# Patient Record
Sex: FEMALE | Race: WHITE | Employment: PART TIME | URBAN - METROPOLITAN AREA
[De-identification: names, ages, dates, MRNs, and addresses within clinical notes are randomized per-mention and may not be internally consistent; named-entity substitution may affect disease eponyms.]

---

## 2017-01-19 ENCOUNTER — OFFICE VISIT (OUTPATIENT)
Dept: PEDIATRICS | Facility: CLINIC | Age: 42
End: 2017-01-19
Payer: COMMERCIAL

## 2017-01-19 VITALS
WEIGHT: 146.1 LBS | SYSTOLIC BLOOD PRESSURE: 105 MMHG | HEART RATE: 63 BPM | BODY MASS INDEX: 23.48 KG/M2 | HEIGHT: 66 IN | TEMPERATURE: 97.4 F | OXYGEN SATURATION: 99 % | DIASTOLIC BLOOD PRESSURE: 69 MMHG

## 2017-01-19 DIAGNOSIS — N63.20 LEFT BREAST LUMP: ICD-10-CM

## 2017-01-19 DIAGNOSIS — F43.23 SITUATIONAL MIXED ANXIETY AND DEPRESSIVE DISORDER: ICD-10-CM

## 2017-01-19 DIAGNOSIS — Z01.818 PREOP GENERAL PHYSICAL EXAM: Primary | ICD-10-CM

## 2017-01-19 DIAGNOSIS — R09.89 THROAT CLEARING: ICD-10-CM

## 2017-01-19 LAB
ANION GAP SERPL CALCULATED.3IONS-SCNC: 6 MMOL/L (ref 3–14)
BUN SERPL-MCNC: 18 MG/DL (ref 7–30)
CALCIUM SERPL-MCNC: 8.4 MG/DL (ref 8.5–10.1)
CHLORIDE SERPL-SCNC: 106 MMOL/L (ref 94–109)
CO2 SERPL-SCNC: 25 MMOL/L (ref 20–32)
CREAT SERPL-MCNC: 0.7 MG/DL (ref 0.52–1.04)
ERYTHROCYTE [DISTWIDTH] IN BLOOD BY AUTOMATED COUNT: 11.8 % (ref 10–15)
GFR SERPL CREATININE-BSD FRML MDRD: ABNORMAL ML/MIN/1.7M2
GLUCOSE SERPL-MCNC: 84 MG/DL (ref 70–99)
HCT VFR BLD AUTO: 37.5 % (ref 35–47)
HGB BLD-MCNC: 12.9 G/DL (ref 11.7–15.7)
MCH RBC QN AUTO: 31.8 PG (ref 26.5–33)
MCHC RBC AUTO-ENTMCNC: 34.4 G/DL (ref 31.5–36.5)
MCV RBC AUTO: 92 FL (ref 78–100)
PLATELET # BLD AUTO: 201 10E9/L (ref 150–450)
POTASSIUM SERPL-SCNC: 4.2 MMOL/L (ref 3.4–5.3)
RBC # BLD AUTO: 4.06 10E12/L (ref 3.8–5.2)
SODIUM SERPL-SCNC: 137 MMOL/L (ref 133–144)
WBC # BLD AUTO: 3.6 10E9/L (ref 4–11)

## 2017-01-19 PROCEDURE — 99214 OFFICE O/P EST MOD 30 MIN: CPT | Performed by: NURSE PRACTITIONER

## 2017-01-19 PROCEDURE — 36415 COLL VENOUS BLD VENIPUNCTURE: CPT | Performed by: NURSE PRACTITIONER

## 2017-01-19 PROCEDURE — 80048 BASIC METABOLIC PNL TOTAL CA: CPT | Performed by: NURSE PRACTITIONER

## 2017-01-19 PROCEDURE — 85027 COMPLETE CBC AUTOMATED: CPT | Performed by: NURSE PRACTITIONER

## 2017-01-19 RX ORDER — ESCITALOPRAM OXALATE 10 MG/1
TABLET ORAL
Qty: 180 TABLET | Refills: 3 | Status: SHIPPED | OUTPATIENT
Start: 2017-01-19 | End: 2017-04-27

## 2017-01-19 RX ORDER — NICOTINE POLACRILEX 4 MG/1
20 GUM, CHEWING ORAL DAILY
Qty: 30 TABLET | Refills: 1 | Status: SHIPPED | OUTPATIENT
Start: 2017-01-19 | End: 2017-04-04

## 2017-01-19 ASSESSMENT — ANXIETY QUESTIONNAIRES
2. NOT BEING ABLE TO STOP OR CONTROL WORRYING: NOT AT ALL
5. BEING SO RESTLESS THAT IT IS HARD TO SIT STILL: NOT AT ALL
6. BECOMING EASILY ANNOYED OR IRRITABLE: NOT AT ALL
7. FEELING AFRAID AS IF SOMETHING AWFUL MIGHT HAPPEN: NOT AT ALL
3. WORRYING TOO MUCH ABOUT DIFFERENT THINGS: NOT AT ALL
GAD7 TOTAL SCORE: 0
1. FEELING NERVOUS, ANXIOUS, OR ON EDGE: NOT AT ALL
IF YOU CHECKED OFF ANY PROBLEMS ON THIS QUESTIONNAIRE, HOW DIFFICULT HAVE THESE PROBLEMS MADE IT FOR YOU TO DO YOUR WORK, TAKE CARE OF THINGS AT HOME, OR GET ALONG WITH OTHER PEOPLE: NOT DIFFICULT AT ALL

## 2017-01-19 ASSESSMENT — PATIENT HEALTH QUESTIONNAIRE - PHQ9: 5. POOR APPETITE OR OVEREATING: NOT AT ALL

## 2017-01-19 NOTE — PROGRESS NOTES
[address] [address]  [phone]  Dept: [phone]    PRE-OP EVALUATION:  Today's date: 2017    Kaitlyn Garcia (: 1975) presents for pre-operative evaluation assessment as requested by Dr. Montenegro.  She requires evaluation and anesthesia risk assessment prior to undergoing surgery/procedure for treatment of lump of the breast .  Proposed procedure: LUMPECTOMY BREAST    Date of Surgery/ Procedure: 17  Time of Surgery/ Procedure: 11:30AM  Hospital/Surgical Facility: [facility]  Fax number for surgical facility:   Primary Physician: Ani Slater  Type of Anesthesia Anticipated: Combined General with Block    Patient has a Health Care Directive or Living Will:  NO    Preop Questions 2017   1.  Do you have a history of heart attack, stroke, stent, bypass or surgery on an artery in the head, neck, heart or legs? No   2.  Do you ever have any pain or discomfort in your chest? No   3.  Do you have a history of  Heart Failure? No   4.   Are you troubled by shortness of breath when:  walking on a level surface, or up a slight hill, or at night? No   5.  Do you currently have a cold, bronchitis or other respiratory infection? No   6.  Do you have a cough, shortness of breath, or wheezing? No   7.  Do you sometimes get pains in the calves of your legs when you walk? No   8. Do you or anyone in your family have previous history of blood clots? No   9.  Do you or does anyone in your family have a serious bleeding problem such as prolonged bleeding following surgeries or cuts? No   10. Have you ever had problems with anemia or been told to take iron pills? YES - ablation 3 years ago, heavy menstrual cyles    11. Have you had any abnormal blood loss such as black, tarry or bloody stools, or abnormal vaginal bleeding? No   12. Have you ever had a blood transfusion? No   13. Have you or any of your relatives ever had problems with anesthesia? YES  - she has had a lot issues with severe nausea with anesthesia several years ago    14. Do you have sleep apnea, excessive snoring or daytime drowsiness? No   15. Do you have any prosthetic heart valves? No   16. Do you have prosthetic joints? No   17. Is there any chance that you may be pregnant? No           HPI:                                                      Brief HPI related to upcoming procedure: lump of the breast .  Proposed procedure: LUMPECTOMY BREAST      See problem list for active medical problems.  Problems all longstanding and stable, except as noted/documented.  See ROS for pertinent symptoms related to these conditions.                                                                                                  .    MEDICAL HISTORY:                                                      Patient Active Problem List    Diagnosis Date Noted     Allergic rhinitis 04/25/2016     Priority: Medium     Post-concussion syndrome 11/06/2015     Priority: Medium     Muscle spasms of head or neck 11/06/2015     Priority: Medium     Glaucoma suspect, bilateral 10/14/2015     Priority: Medium     Situational mixed anxiety and depressive disorder 04/30/2015     Priority: Medium     Choroidal nevus of right eye 02/20/2015     Priority: Medium     Family history of breast cancer 02/03/2015     Priority: Medium     WBC decreased 02/03/2015     Priority: Medium     Abnormal biliary HIDA scan 05/21/2014     Priority: Medium     Bloating symptom 05/12/2014     Priority: Medium     Abdominal pain 05/12/2014     Priority: Medium     Acne 06/06/2013     Priority: Medium     See dermatology Dr. Vallejo       Atypical hyperplasia of breast 08/14/2012     Priority: Medium     Mammographic microcalcification found on diagnostic imaging of breast 05/29/2012     Priority: Medium     Intraductal papilloma of breast 05/29/2012     Priority: Medium     Iron deficiency anemia 05/21/2012     Priority: Medium     CARDIOVASCULAR  SCREENING; LDL GOAL LESS THAN 160 03/08/2012     Priority: Medium      Past Medical History   Diagnosis Date     Anemia      Shingles      x2 in the past     Papilloma of breast 5/23/12     Left, See Dr. Lynch at      PONV (postoperative nausea and vomiting)      S/P endometrial ablation 10/10/13     Menorrhagia     Depressive disorder 2006     Treated with celexa for two years     Family history of breast cancer 2/3/2015     Family history of breast cancer 2/3/2015     WBC decreased 2/3/2015     Choroidal nevus of right eye      Family history of breast cancer 2/3/2015     WBC decreased 2/3/2015     Past Surgical History   Procedure Laterality Date     Soft tissue surgery       lumpectomy x 2 right breast     Soft tissue surgery       episiotomy fixed     Abdomen surgery       rectoplasty     Biopsy breast needle localization  7/30/2012     Procedure: BIOPSY BREAST NEEDLE LOCALIZATION;  left breast excisional Biopsy with wire localization @0830;  Surgeon: Robert Lynch MD;  Location: [facility] OR      tooth extraction w/forcep       Dilation and curettage, hysteroscopy, ablate endometrium novasure, combined  10/10/2013     Procedure: COMBINED DILATION AND CURETTAGE, HYSTEROSCOPY, ABLATE ENDOMETRIUM NOVASURE;  Endometrial ablation with Novasure;  Surgeon: Indu Birmingham DO;  Location:  OR     Breast surgery       Cosmetic surgery       Rectalplasty for episiotomy repair     Cosmetic surgery       Rectalplasty for episiotomy repair     Current Outpatient Prescriptions   Medication Sig Dispense Refill     fluticasone (FLONASE) 50 MCG/ACT nasal spray USE 2 SPRAYS IN EACH NOSTRIL ONCE DAILY 48 g 11     escitalopram (LEXAPRO) 10 MG tablet TAKE TWO TABLETS BY MOUTH EVERY  tablet 3     naproxen (NAPROSYN) 500 MG tablet TAKE 1 TABLET (500MG) BY MOUTH TWO TIMES A DAY AS NEEDED FOR MODERATE PAIN 30 tablet 1     ibuprofen (ADVIL,MOTRIN) 600 MG tablet Take 1 tablet (600 mg) by mouth every 6 hours as  "needed for pain 30 tablet 0     OTC products: no recent use of OTC ASA, NSAIDS or Steroids and no use of herbal medications or other supplements    Allergies   Allergen Reactions     No Known Drug Allergies       Latex Allergy: NO    Social History   Substance Use Topics     Smoking status: Never Smoker      Smokeless tobacco: Never Used     Alcohol Use: Yes      Comment: Less than once a month     History   Drug Use No       REVIEW OF SYSTEMS:                                                    C: NEGATIVE for fever, chills, change in weight  INTEGUMENTARY/SKIN: NEGATIVE for rash   E/M: NEGATIVE for ear, mouth and throat problems. Postnasal drainage after eating   R: NEGATIVE for significant cough or SOB  CV: NEGATIVE for chest pain, palpitations or peripheral edema  GI: NEGATIVE for nausea, abdominal pain, heartburn, or change in bowel habits  : NEGATIVE for frequency, dysuria, or hematuria  M: NEGATIVE for significant arthralgias or myalgia  N: NEGATIVE for weakness, dizziness or paresthesias. History of concussion in 2015 and has some word finding still present slightly   E: NEGATIVE for temperature intolerance, skin/hair changes  HEME/ALLERGY/IMMUNE: POSITIVE  for anemia and NEGATIVE for bleeding disorder  P: NEGATIVE for changes in mood or affect    EXAM:                                                    /69 mmHg  Pulse 63  Temp(Src) 97.4  F (36.3  C) (Temporal)  Ht 5' 6\" (1.676 m)  Wt 146 lb 1.6 oz (66.271 kg)  BMI 23.59 kg/m2  SpO2 99%  Breastfeeding? No    GENERAL APPEARANCE: healthy, alert and no distress     EYES: Eyes grossly normal to inspection and conjunctivae and sclerae normal     HENT: ear canals and TM's normal and nose and mouth without ulcers or lesions     NECK: no adenopathy     RESP: lungs clear to auscultation - no rales, rhonchi or wheezes     CV: regular rates and rhythm and no murmur, click or rub     ABDOMEN: soft, nontender     MS: extremities normal- no gross deformities " noted, no evidence of inflammation in joints, FROM in all extremities.     SKIN: no suspicious lesions or rashes     NEURO: Normal strength and tone, sensory exam grossly normal, mentation intact and speech normal     PSYCH: mentation appears normal. and affect normal/bright    DIAGNOSTICS:                                                    EKG: Not indicated due to non-vascular surgery and low risk of event (age <65 and without cardiac risk factors)  Results for orders placed or performed in visit on 01/19/17   Basic metabolic panel   Result Value Ref Range    Sodium 137 133 - 144 mmol/L    Potassium 4.2 3.4 - 5.3 mmol/L    Chloride 106 94 - 109 mmol/L    Carbon Dioxide 25 20 - 32 mmol/L    Anion Gap 6 3 - 14 mmol/L    Glucose 84 70 - 99 mg/dL    Urea Nitrogen 18 7 - 30 mg/dL    Creatinine 0.70 0.52 - 1.04 mg/dL    GFR Estimate >90  Non  GFR Calc   >60 mL/min/1.7m2    GFR Estimate If Black >90   GFR Calc   >60 mL/min/1.7m2    Calcium 8.4 (L) 8.5 - 10.1 mg/dL   CBC with platelets   Result Value Ref Range    WBC 3.6 (L) 4.0 - 11.0 10e9/L    RBC Count 4.06 3.8 - 5.2 10e12/L    Hemoglobin 12.9 11.7 - 15.7 g/dL    Hematocrit 37.5 35.0 - 47.0 %    MCV 92 78 - 100 fl    MCH 31.8 26.5 - 33.0 pg    MCHC 34.4 31.5 - 36.5 g/dL    RDW 11.8 10.0 - 15.0 %    Platelet Count 201 150 - 450 10e9/L       IMPRESSION:                                                    Reason for surgery/procedure: lump of the breast .  Proposed procedure: LUMPECTOMY BREAST  Diagnosis/reason for consult: preop evaluation     The proposed surgical procedure is considered INTERMEDIATE risk.    REVISED CARDIAC RISK INDEX  The patient has the following serious cardiovascular risks for perioperative complications such as (MI, PE, VFib and 3  AV Block):  No serious cardiac risks  INTERPRETATION: 1 risks: Class II (low risk - 0.9% complication rate)    The patient has the following additional risks for perioperative  complications:  No identified additional risks    Kaitlyn was seen today for pre-op exam.    Diagnoses and all orders for this visit:    Preop general physical exam  -     Basic metabolic panel  -     CBC with platelets    Left breast lump  -     Basic metabolic panel  -     CBC with platelets    Throat clearing  -     omeprazole 20 MG tablet; Take 1 tablet (20 mg) by mouth daily Take 30-60 minutes before a meal.  Symptomatic therapy suggested:   Will try the prilosec for the throat clearing.      Situational mixed anxiety and depressive disorder  Continue current medications.  -     escitalopram (LEXAPRO) 10 MG tablet; TAKE TWO TABLETS BY MOUTH EVERY DAY      RECOMMENDATIONS:                                                      APPROVAL GIVEN to proceed with proposed procedure, without further diagnostic evaluation     Signed Electronically by: SINDHU Antonio CNP    Copy of this evaluation report is provided to requesting physician.    [facility] Preop Guidelines

## 2017-01-19 NOTE — NURSING NOTE
"Chief Complaint   Patient presents with     Pre-Op Exam     DOS: 2/1/17       Initial /69 mmHg  Pulse 63  Temp(Src) 97.4  F (36.3  C) (Temporal)  Ht 5' 6\" (1.676 m)  Wt 146 lb 1.6 oz (66.271 kg)  BMI 23.59 kg/m2  SpO2 99%  Breastfeeding? No Estimated body mass index is 23.59 kg/(m^2) as calculated from the following:    Height as of this encounter: 5' 6\" (1.676 m).    Weight as of this encounter: 146 lb 1.6 oz (66.271 kg)..  BP completed using cuff size: ALEJANDRO Bhatti    "

## 2017-01-19 NOTE — MR AVS SNAPSHOT
After Visit Summary   1/19/2017    Kaitlyn Garcia    MRN: [id number]           Patient Information     Date Of Birth          1975        Visit Information        Provider Department      1/19/2017 9:20 AM Ani Slater APRN [facility]        Today's Diagnoses     Preop general physical exam    -  1     Left breast lump         Throat clearing         Situational mixed anxiety and depressive disorder           Care Instructions      Before Your Surgery      Call your surgeon if there is any change in your health. This includes signs of a cold or flu (such as a sore throat, runny nose, cough, rash or fever).    Do not smoke, drink alcohol or take over the counter medicine (unless your surgeon or primary care doctor tells you to) for the 24 hours before and after surgery.    If you take prescribed drugs: Follow your doctor s orders about which medicines to take and which to stop until after surgery.    Eating and drinking prior to surgery: follow the instructions from your surgeon    Take a shower or bath the night before surgery. Use the soap your surgeon gave you to gently clean your skin. If you do not have soap from your surgeon, use your regular soap. Do not shave or scrub the surgery site.  Wear clean pajamas and have clean sheets on your bed.     It was a pleasure seeing you today at the [facility]. Thank you for allowing us to care for you today. We truly hope we provided you with the excellent service you deserve. Please let us know if there is anything else we can do for you so we can be sure you are leaving completley satisfied with your care experience.       General information about your clinic   Clinic Hours Lab Hours (Appointments are required)   Mon-Thurs: 7:30 AM - 7 PM Mon-Thurs: 7:30 AM - 7 PM   Fri: 7:30 AM - 5 PM Fri: 7:30 AM - 5 PM        After Hours Nurse Advise & Appts:  [facility] Advisors:  [phone]  [facility] On Call: to make appointments anytime: [phone] On Call Physician: call [phone] and answering service will page the on call physician.        For urgent appointments, please call [phone] and ask for the triage nurse or your care team clinic nurse.  How to contact my care team:  [place]: [email or web address]   Phone: [phone]   Fax: [phone]       [facility]:   Phone: [phone]  Hours: 8:00 AM - 6:00 PM  Medication Refills:  Call your pharmacy and they will forward the refill to us. Please allow 3 business days for your refills to be completed.       Normal or non-critical lab and imaging results will be communicated to you by MyChart, letter or phone within 7 days.  If you do not hear from us within 10 days, please call the clinic. If you have a critical or abnormal lab result, we will notify you by phone as soon as possible.       We now have PWIC (Pediatric Walk in Care)  Monday-Friday from 7:30-4. Simply walk in and be seen for your urgent needs like cough, fever, rash, diarrhea or vomiting, pink eye, UTI. No appointments needed. Ask one of the team for more information      -Your Care Team:    Dr. Ashli Phillips - Internal Medicine/Pediatrics   Dr. Lesvia Oneal - Family Medicine  Dr. Juanita Watson - Pediatrics  Ani Slater CNP - Family Practice Nurse Practitioner  Dr. Aislinn Tay - Pediatrics  Dr. Steve Palafox - Internal Medicine        PLAN:   1.   Symptomatic therapy suggested:   Will try the prilosec for the throat clearing.    2.  Orders Placed This Encounter   Medications     omeprazole 20 MG tablet     Sig: Take 1 tablet (20 mg) by mouth daily Take 30-60 minutes before a meal.     Dispense:  30 tablet     Refill:  1     escitalopram (LEXAPRO) 10 MG tablet     Sig: TAKE TWO TABLETS BY MOUTH EVERY DAY     Dispense:  180 tablet     Refill:  3     Orders Placed This Encounter   Procedures     Basic metabolic panel     CBC with platelets       3.  Patient needs to follow up in if no improvement,or sooner if worsening of symptoms or other symptoms develop.  Will follow up and/or notify patient of  results via My Chart to determine further need for followup                    Follow-ups after your visit        Your next 10 appointments already scheduled     Feb 01, 2017  9:30 AM   (Arrive by 9:15 AM)   MA BREAST WIRE LOCALIZATION LEFT with Jae Breast Rad, UCBCMA3,  BREAST NURSE   [facility] ([facility])    [address] [place] [id number] [phone]           Do not use any powder, lotion or deodorant under your arms or on your breast. If you do, we will ask you to remove it before your exam.  Wear comfortable clothing and a well-fitted bra to the clinic. (A sports bra is best.) Bring a list of your medicines, including vitamins, minerals and over-the-counter drugs. It is best to leave valuables at home.  Tell your care team if:   You have any allergies.   You are taking aspirin, ibuprofen, naproxen or any drug that could increase bleeding.  Bring any previous mammograms from other facilities or have them mailed to the breast center.    Stop taking Coumadin (warfarin) 5 days before treatment. Restart the day after treatment.   If you take Plavix, Ticlid, Pletal or Persantine, ask your doctor if you should stop these medicines. You may need extra tests on the morning of your scan.            Feb 01, 2017 10:20 AM   MA POST PROCEDURE LEFT with UCBCMA3   [facility] ([facility])    [address] [place] [id number] [phone]           Do not use any powder, lotion or deodorant under your arms or on your breast. If you do, we will ask you to remove it before your exam.  Wear comfortable, two-piece clothing.  If you have any allergies, tell your care team.  Bring any previous mammograms from other facilities or have  them mailed to the breast center.            Feb 01, 2017   Procedure with Lori Montenegro MD   [facility] ([facility])    [address] [place] [address]  5th Floor  [facility] [address] [phone]           Located in the Clinics and Surgery Center at [address] [place] [address].   parking is very convenient and highly recommended.  is a $6 flat rate fee; no tips accepted.  Both  and self parkers should enter the main arrival plaza from [facility]; parking attendants will direct you based on your parking preference.            Feb 01, 2017  1:05 PM   MA BREAST SPECIMEN LEFT with UCBCMA3   [facility] ([facility])    [address] [place] [address]  2nd Floor  [facility] [address] [phone]           Do not use any powder, lotion or deodorant under your arms or on your breast. If you do, we will ask you to remove it before your exam.  Wear comfortable, two-piece clothing.  If you have any allergies, tell your care team.  Bring any previous mammograms from other facilities or have them mailed to the breast center.              Who to contact     If you have questions or need follow up information about today's clinic visit or your schedule please contact [facility] directly at [phone].  Normal or non-critical lab and imaging results will be communicated to you by MyChart, letter or phone within 4 business days after the clinic has received the results. If you do not hear from us within 7 days, please contact the clinic through MyChart or phone. If you have a critical or abnormal lab result, we will notify you by phone as soon as possible.  Submit refill requests through [facility] or call your pharmacy and they will forward the refill request to us. Please allow 3 business days for your refill to be completed.          Additional Information About Your Visit       "  CORD:USE Cord Blood [facility] Information     [facility] gives you secure access to your electronic health record. If you see a primary care provider, you can also send messages to your care team and make appointments. If you have questions, please call your primary care clinic.  If you do not have a primary care provider, please call [phone] and they will assist you.      [facility] is an electronic gateway that provides easy, online access to your medical records. With [facility], you can request a clinic appointment, read your test results, renew a prescription or communicate with your care team.     To access your existing account, please contact your [facility] or call [phone] for assistance.        Care EveryWhere ID     This is your Care EveryWhere ID. This could be used by other organizations to access your [facility] medical records  [id number]        Your Vitals Were     Pulse Temperature Height BMI (Body Mass Index) Pulse Oximetry Breastfeeding?    63 97.4  F (36.3  C) (Temporal) 5' 6\" (1.676 m) 23.59 kg/m2 99% No       Blood Pressure from Last 3 Encounters:   01/19/17 105/69   12/27/16 111/80   12/07/16 100/64    Weight from Last 3 Encounters:   01/19/17 146 lb 1.6 oz (66.271 kg)   12/27/16 146 lb (66.225 kg)   12/07/16 144 lb 14.4 oz (65.726 kg)              We Performed the Following     Basic metabolic panel     CBC with platelets          Today's Medication Changes          These changes are accurate as of: 1/19/17 10:32 AM.  If you have any questions, ask your nurse or doctor.               Start taking these medicines.        Dose/Directions    omeprazole 20 MG tablet   Used for:  Throat clearing   Started by:  Ani Slater APRN CNP        Dose:  20 mg   Take 1 tablet (20 mg) by mouth daily Take 30-60 minutes before a meal.   Quantity:  30 tablet   Refills:  1            Where to get your medicines      These medications were sent to [facility]  [place] - [address]  [address], [facility] [address]     Phone:  [phone]    - escitalopram 10 MG tablet  - omeprazole 20 MG tablet             Primary Care Provider Office Phone # Fax #    Ani Slater, SINDHU CRAFT [phone]       [facility] [address]  [place] [address]        Thank you!     Thank you for choosing [facility]  for your care. Our goal is always to provide you with excellent care. Hearing back from our patients is one way we can continue to improve our services. Please take a few minutes to complete the written survey that you may receive in the mail after your visit with us. Thank you!             Your Updated Medication List - Protect others around you: Learn how to safely use, store and throw away your medicines at www.disposemymeds.org.          This list is accurate as of: 1/19/17 10:32 AM.  Always use your most recent med list.                   Brand Name Dispense Instructions for use    escitalopram 10 MG tablet    LEXAPRO    180 tablet    TAKE TWO TABLETS BY MOUTH EVERY DAY       fluticasone 50 MCG/ACT spray    FLONASE    48 g    USE 2 SPRAYS IN EACH NOSTRIL ONCE DAILY       ibuprofen 600 MG tablet    ADVIL/MOTRIN    30 tablet    Take 1 tablet (600 mg) by mouth every 6 hours as needed for pain       naproxen 500 MG tablet    NAPROSYN    30 tablet    TAKE 1 TABLET (500MG) BY MOUTH TWO TIMES A DAY AS NEEDED FOR MODERATE PAIN       omeprazole 20 MG tablet     30 tablet    Take 1 tablet (20 mg) by mouth daily Take 30-60 minutes before a meal.

## 2017-01-19 NOTE — PROGRESS NOTES
Quick Note:    David Garcia,    Attached are your test results.  -Kidney function is normal (Cr, GFR), Sodium is normal, Potassium is normal, Calcium is normal, Glucose is normal (diabetes screening test).   -Normal red blood cell (hgb) levels, very slight decrease in white blood cell count and normal platelet levels. ADVISE: would recheck after surgery    Please contact us if you have any questions.    Ani Slater, CNP    ______

## 2017-01-19 NOTE — PATIENT INSTRUCTIONS
Before Your Surgery      Call your surgeon if there is any change in your health. This includes signs of a cold or flu (such as a sore throat, runny nose, cough, rash or fever).    Do not smoke, drink alcohol or take over the counter medicine (unless your surgeon or primary care doctor tells you to) for the 24 hours before and after surgery.    If you take prescribed drugs: Follow your doctor s orders about which medicines to take and which to stop until after surgery.    Eating and drinking prior to surgery: follow the instructions from your surgeon    Take a shower or bath the night before surgery. Use the soap your surgeon gave you to gently clean your skin. If you do not have soap from your surgeon, use your regular soap. Do not shave or scrub the surgery site.  Wear clean pajamas and have clean sheets on your bed.     It was a pleasure seeing you today at the [facility]. Thank you for allowing us to care for you today. We truly hope we provided you with the excellent service you deserve. Please let us know if there is anything else we can do for you so we can be sure you are leaving completley satisfied with your care experience.       General information about your clinic   Clinic Hours Lab Hours (Appointments are required)   Mon-Thurs: 7:30 AM - 7 PM Mon-Thurs: 7:30 AM - 7 PM   Fri: 7:30 AM - 5 PM Fri: 7:30 AM - 5 PM        After Hours Nurse Advise & Appts:  [facility]: [phone]  [facility] On Call: to make appointments anytime: [phone] On Call Physician: call [phone] and answering service will page the on call physician.        For urgent appointments, please call [phone] and ask for the triage nurse or your care team clinic nurse.  How to contact my care team:  MyChart: [email or web address]   Phone: [phone]   Fax: [phone]       [facility]:   Phone: [phone]  Hours: 8:00 AM - 6:00 PM  Medication Refills:  Call your  pharmacy and they will forward the refill to us. Please allow 3 business days for your refills to be completed.       Normal or non-critical lab and imaging results will be communicated to you by MyChart, letter or phone within 7 days.  If you do not hear from us within 10 days, please call the clinic. If you have a critical or abnormal lab result, we will notify you by phone as soon as possible.       We now have PWIC (Pediatric Walk in Care)  Monday-Friday from 7:30-4. Simply walk in and be seen for your urgent needs like cough, fever, rash, diarrhea or vomiting, pink eye, UTI. No appointments needed. Ask one of the team for more information      -Your Care Team:    Dr. Ashli Phillips - Internal Medicine/Pediatrics   Dr. Lesvia Oneal - Family Medicine  Dr. Juanita Watson - Pediatrics  Ani Slater CNP - Family Practice Nurse Practitioner  Dr. Aislinn Tay - Pediatrics  Dr. Steve Palafox - Internal Medicine        PLAN:   1.   Symptomatic therapy suggested:   Will try the prilosec for the throat clearing.    2.  Orders Placed This Encounter   Medications     omeprazole 20 MG tablet     Sig: Take 1 tablet (20 mg) by mouth daily Take 30-60 minutes before a meal.     Dispense:  30 tablet     Refill:  1     escitalopram (LEXAPRO) 10 MG tablet     Sig: TAKE TWO TABLETS BY MOUTH EVERY DAY     Dispense:  180 tablet     Refill:  3     Orders Placed This Encounter   Procedures     Basic metabolic panel     CBC with platelets       3. Patient needs to follow up in if no improvement,or sooner if worsening of symptoms or other symptoms develop.  Will follow up and/or notify patient of  results via My Chart to determine further need for followup

## 2017-01-20 ASSESSMENT — ANXIETY QUESTIONNAIRES: GAD7 TOTAL SCORE: 0

## 2017-01-20 ASSESSMENT — PATIENT HEALTH QUESTIONNAIRE - PHQ9: SUM OF ALL RESPONSES TO PHQ QUESTIONS 1-9: 0

## 2017-02-01 ENCOUNTER — HOSPITAL ENCOUNTER (OUTPATIENT)
Facility: AMBULATORY SURGERY CENTER | Age: 42
End: 2017-02-01
Attending: SURGERY

## 2017-02-01 ENCOUNTER — ANESTHESIA (OUTPATIENT)
Dept: SURGERY | Facility: AMBULATORY SURGERY CENTER | Age: 42
End: 2017-02-01

## 2017-02-01 ENCOUNTER — ANESTHESIA EVENT (OUTPATIENT)
Dept: SURGERY | Facility: AMBULATORY SURGERY CENTER | Age: 42
End: 2017-02-01

## 2017-02-01 VITALS
HEIGHT: 66 IN | BODY MASS INDEX: 22.5 KG/M2 | DIASTOLIC BLOOD PRESSURE: 64 MMHG | WEIGHT: 140 LBS | SYSTOLIC BLOOD PRESSURE: 104 MMHG | TEMPERATURE: 97.7 F | RESPIRATION RATE: 18 BRPM | OXYGEN SATURATION: 99 %

## 2017-02-01 DIAGNOSIS — Z98.890 S/P LUMPECTOMY, LEFT BREAST: Primary | ICD-10-CM

## 2017-02-01 LAB
HCG UR QL: NORMAL
INTERNAL QC OK POCT: YES

## 2017-02-01 RX ORDER — ONDANSETRON 2 MG/ML
INJECTION INTRAMUSCULAR; INTRAVENOUS PRN
Status: DISCONTINUED | OUTPATIENT
Start: 2017-02-01 | End: 2017-02-01

## 2017-02-01 RX ORDER — HYDROCODONE BITARTRATE AND ACETAMINOPHEN 5; 325 MG/1; MG/1
1-2 TABLET ORAL EVERY 4 HOURS PRN
Qty: 30 TABLET | Refills: 0 | Status: SHIPPED | OUTPATIENT
Start: 2017-02-01 | End: 2017-02-10

## 2017-02-01 RX ORDER — ONDANSETRON 2 MG/ML
4 INJECTION INTRAMUSCULAR; INTRAVENOUS EVERY 30 MIN PRN
Status: CANCELLED | OUTPATIENT
Start: 2017-02-01

## 2017-02-01 RX ORDER — FENTANYL CITRATE 50 UG/ML
25-50 INJECTION, SOLUTION INTRAMUSCULAR; INTRAVENOUS
Status: CANCELLED | OUTPATIENT
Start: 2017-02-01

## 2017-02-01 RX ORDER — ACETAMINOPHEN 325 MG/1
975 TABLET ORAL ONCE
Status: DISCONTINUED | OUTPATIENT
Start: 2017-02-01 | End: 2017-02-01 | Stop reason: HOSPADM

## 2017-02-01 RX ORDER — PROPOFOL 10 MG/ML
INJECTION, EMULSION INTRAVENOUS PRN
Status: DISCONTINUED | OUTPATIENT
Start: 2017-02-01 | End: 2017-02-01

## 2017-02-01 RX ORDER — DEXAMETHASONE SODIUM PHOSPHATE 4 MG/ML
INJECTION, SOLUTION INTRA-ARTICULAR; INTRALESIONAL; INTRAMUSCULAR; INTRAVENOUS; SOFT TISSUE PRN
Status: DISCONTINUED | OUTPATIENT
Start: 2017-02-01 | End: 2017-02-01

## 2017-02-01 RX ORDER — ONDANSETRON 4 MG/1
4 TABLET, ORALLY DISINTEGRATING ORAL EVERY 30 MIN PRN
Status: CANCELLED | OUTPATIENT
Start: 2017-02-01

## 2017-02-01 RX ORDER — GABAPENTIN 300 MG/1
300 CAPSULE ORAL ONCE
Status: COMPLETED | OUTPATIENT
Start: 2017-02-01 | End: 2017-02-01

## 2017-02-01 RX ORDER — SODIUM CHLORIDE, SODIUM LACTATE, POTASSIUM CHLORIDE, CALCIUM CHLORIDE 600; 310; 30; 20 MG/100ML; MG/100ML; MG/100ML; MG/100ML
INJECTION, SOLUTION INTRAVENOUS CONTINUOUS
Status: CANCELLED | OUTPATIENT
Start: 2017-02-01

## 2017-02-01 RX ORDER — SODIUM CHLORIDE, SODIUM LACTATE, POTASSIUM CHLORIDE, CALCIUM CHLORIDE 600; 310; 30; 20 MG/100ML; MG/100ML; MG/100ML; MG/100ML
INJECTION, SOLUTION INTRAVENOUS CONTINUOUS
Status: DISCONTINUED | OUTPATIENT
Start: 2017-02-01 | End: 2017-02-01 | Stop reason: HOSPADM

## 2017-02-01 RX ORDER — LIDOCAINE HYDROCHLORIDE 20 MG/ML
INJECTION, SOLUTION INFILTRATION; PERINEURAL PRN
Status: DISCONTINUED | OUTPATIENT
Start: 2017-02-01 | End: 2017-02-01

## 2017-02-01 RX ORDER — MEPERIDINE HYDROCHLORIDE 25 MG/ML
12.5 INJECTION INTRAMUSCULAR; INTRAVENOUS; SUBCUTANEOUS
Status: CANCELLED | OUTPATIENT
Start: 2017-02-01

## 2017-02-01 RX ORDER — LIDOCAINE 40 MG/G
CREAM TOPICAL
Status: DISCONTINUED | OUTPATIENT
Start: 2017-02-01 | End: 2017-02-01 | Stop reason: HOSPADM

## 2017-02-01 RX ORDER — FENTANYL CITRATE 50 UG/ML
INJECTION, SOLUTION INTRAMUSCULAR; INTRAVENOUS PRN
Status: DISCONTINUED | OUTPATIENT
Start: 2017-02-01 | End: 2017-02-01

## 2017-02-01 RX ORDER — NALOXONE HYDROCHLORIDE 0.4 MG/ML
.1-.4 INJECTION, SOLUTION INTRAMUSCULAR; INTRAVENOUS; SUBCUTANEOUS
Status: CANCELLED | OUTPATIENT
Start: 2017-02-01 | End: 2017-02-02

## 2017-02-01 RX ORDER — CEFAZOLIN SODIUM 1 G/3ML
1 INJECTION, POWDER, FOR SOLUTION INTRAMUSCULAR; INTRAVENOUS SEE ADMIN INSTRUCTIONS
Status: DISCONTINUED | OUTPATIENT
Start: 2017-02-01 | End: 2017-02-01 | Stop reason: HOSPADM

## 2017-02-01 RX ADMIN — PROPOFOL 20 MG: 10 INJECTION, EMULSION INTRAVENOUS at 11:45

## 2017-02-01 RX ADMIN — LIDOCAINE HYDROCHLORIDE 60 MG: 20 INJECTION, SOLUTION INFILTRATION; PERINEURAL at 11:26

## 2017-02-01 RX ADMIN — PROPOFOL 20 MG: 10 INJECTION, EMULSION INTRAVENOUS at 12:05

## 2017-02-01 RX ADMIN — PROPOFOL 20 MG: 10 INJECTION, EMULSION INTRAVENOUS at 11:52

## 2017-02-01 RX ADMIN — PROPOFOL 20 MG: 10 INJECTION, EMULSION INTRAVENOUS at 11:36

## 2017-02-01 RX ADMIN — SODIUM CHLORIDE, SODIUM LACTATE, POTASSIUM CHLORIDE, CALCIUM CHLORIDE: 600; 310; 30; 20 INJECTION, SOLUTION INTRAVENOUS at 10:42

## 2017-02-01 RX ADMIN — PROPOFOL 20 MG: 10 INJECTION, EMULSION INTRAVENOUS at 11:38

## 2017-02-01 RX ADMIN — DEXAMETHASONE SODIUM PHOSPHATE 4 MG: 4 INJECTION, SOLUTION INTRA-ARTICULAR; INTRALESIONAL; INTRAMUSCULAR; INTRAVENOUS; SOFT TISSUE at 11:26

## 2017-02-01 RX ADMIN — PROPOFOL 20 MG: 10 INJECTION, EMULSION INTRAVENOUS at 11:31

## 2017-02-01 RX ADMIN — FENTANYL CITRATE 25 MCG: 50 INJECTION, SOLUTION INTRAMUSCULAR; INTRAVENOUS at 12:03

## 2017-02-01 RX ADMIN — PROPOFOL 20 MG: 10 INJECTION, EMULSION INTRAVENOUS at 11:29

## 2017-02-01 RX ADMIN — PROPOFOL 20 MG: 10 INJECTION, EMULSION INTRAVENOUS at 11:48

## 2017-02-01 RX ADMIN — PROPOFOL 20 MG: 10 INJECTION, EMULSION INTRAVENOUS at 11:33

## 2017-02-01 RX ADMIN — PROPOFOL 20 MG: 10 INJECTION, EMULSION INTRAVENOUS at 12:14

## 2017-02-01 RX ADMIN — PROPOFOL 20 MG: 10 INJECTION, EMULSION INTRAVENOUS at 12:23

## 2017-02-01 RX ADMIN — PROPOFOL 20 MG: 10 INJECTION, EMULSION INTRAVENOUS at 12:03

## 2017-02-01 RX ADMIN — PROPOFOL 20 MG: 10 INJECTION, EMULSION INTRAVENOUS at 11:27

## 2017-02-01 RX ADMIN — PROPOFOL 20 MG: 10 INJECTION, EMULSION INTRAVENOUS at 12:33

## 2017-02-01 RX ADMIN — PROPOFOL 20 MG: 10 INJECTION, EMULSION INTRAVENOUS at 11:47

## 2017-02-01 RX ADMIN — PROPOFOL 20 MG: 10 INJECTION, EMULSION INTRAVENOUS at 11:50

## 2017-02-01 RX ADMIN — PROPOFOL 20 MG: 10 INJECTION, EMULSION INTRAVENOUS at 11:40

## 2017-02-01 RX ADMIN — PROPOFOL 20 MG: 10 INJECTION, EMULSION INTRAVENOUS at 11:44

## 2017-02-01 RX ADMIN — PROPOFOL 20 MG: 10 INJECTION, EMULSION INTRAVENOUS at 12:01

## 2017-02-01 RX ADMIN — PROPOFOL 20 MG: 10 INJECTION, EMULSION INTRAVENOUS at 11:56

## 2017-02-01 RX ADMIN — PROPOFOL 20 MG: 10 INJECTION, EMULSION INTRAVENOUS at 11:54

## 2017-02-01 RX ADMIN — PROPOFOL 20 MG: 10 INJECTION, EMULSION INTRAVENOUS at 11:26

## 2017-02-01 RX ADMIN — PROPOFOL 20 MG: 10 INJECTION, EMULSION INTRAVENOUS at 11:59

## 2017-02-01 RX ADMIN — PROPOFOL 20 MG: 10 INJECTION, EMULSION INTRAVENOUS at 12:17

## 2017-02-01 RX ADMIN — PROPOFOL 20 MG: 10 INJECTION, EMULSION INTRAVENOUS at 11:58

## 2017-02-01 RX ADMIN — FENTANYL CITRATE 50 MCG: 50 INJECTION, SOLUTION INTRAMUSCULAR; INTRAVENOUS at 11:23

## 2017-02-01 RX ADMIN — PROPOFOL 20 MG: 10 INJECTION, EMULSION INTRAVENOUS at 12:10

## 2017-02-01 RX ADMIN — GABAPENTIN 300 MG: 300 CAPSULE ORAL at 10:41

## 2017-02-01 RX ADMIN — PROPOFOL 20 MG: 10 INJECTION, EMULSION INTRAVENOUS at 12:07

## 2017-02-01 RX ADMIN — ONDANSETRON 4 MG: 2 INJECTION INTRAMUSCULAR; INTRAVENOUS at 11:26

## 2017-02-01 NOTE — IP AVS SNAPSHOT
[facility]    [address] [place] [address]    Phone:  [phone]    Fax:  [phone]                                       After Visit Summary   2/1/2017    Kaitlyn Garcia    MRN: [id number]           After Visit Summary Signature Page     I have received my discharge instructions, and my questions have been answered. I have discussed any challenges I see with this plan with the nurse or doctor.    ..........................................................................................................................................  Patient/Patient Representative Signature      ..........................................................................................................................................  Patient Representative Print Name and Relationship to Patient    ..................................................               ................................................  Date                                            Time    ..........................................................................................................................................  Reviewed by Signature/Title    ...................................................              ..............................................  Date                                                            Time

## 2017-02-01 NOTE — ANESTHESIA CARE TRANSFER NOTE
Patient: Kaitlyn Garcia    Procedure(s):  Left Wire Localized Lumpectomy - Wound Class: I-Clean    Diagnosis: Left Atypical Ductal Hyperplasia  Diagnosis Additional Information: No value filed.    Anesthesia Type:   MAC     Note:  Airway :Room Air  Patient transferred to:Phase II  Comments: Arrive Phase II, Stable, Airway Intact  104/57, 73,16,98,97.6  All questions answered.        Vitals: (Last set prior to Anesthesia Care Transfer)    CRNA VITALS  2/1/2017 1209 - 2/1/2017 1243      2/1/2017             Pulse: 71    Ht Rate: 68    SpO2: 95 %    Resp Rate (observed): (!) 2    Resp Rate (set): 10                Electronically Signed By: SINDHU Shine CRNA  February 1, 2017  12:43 PM

## 2017-02-01 NOTE — OP NOTE
DATE OF SURGERY: February 1, 2017     SURGEON: Lori Montenegro MD  ASSISTANT:   1. Merced Wiggins PA-C, no qualified resident was available, thus Ms Wiggins's assistance was required   2. Ramos Cabrera MS3    PREOPERATIVE DIAGNOSIS: Left Atypical Ductal Hyperplasia, central breast  POSTOPERATIVE DIAGNOSIS: Left Atypical Ductal Hyperplasia, central breast    PROCEDURE:   1. Left wire-localized lumpectomy  2. Scar revision  3. Local tissue advancement flap to close 4 x 4 cm cavity    ANESTHESIA: MAC    CLINICAL NOTE:  Kaitlyn Garcia is a 41 year old woman with left breast calcifications, biopsied to be atypical ductal hyperplasia. 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  She asked for a revision of her scar from that lumpectomy in the upper outer quadrant.  The risks and benefits of a wire-localized lumpectomy for diagnostic purposes were discussed with the patient and she elected to proceed with informed consent.    OPERATIVE FINDINGS:  1. Specimen radiograph confirmed presence of the wire and targeted calcifications within the specimen.     OPERATIVE PROCEDURE:  The patient first presented to the Breast Center for the wire-localization by the radiologist.  The wire-localization images were personally reviewed by me prior to the start of the procedure.  Of note, the patient fainted twice during her core needle biopsy; as a result two biopsy clips were placed at the time, neither of which were actually located in the calcifications themselves.  Thus, today, the target for the wire-localization were the suspicious calcifications themselves.    The patient was then brought to the operating room and placed in the supine position with appropriate padding for all of the pressure points. MAC was provided by the anesthesiology team.   A surgical safety checklist was performed to confirm the patient's identity, the site and laterality of the procedure. Perioperative antibiotics (Ancef) was  provided.  VTE prophylaxis was provided with serial compression devices. The left breast was then prepped and draped in the usual sterile fashion using Chloraprep.     An elliptical incision was made around the previous lumpectomy scar. Of note, the localizing wire also traversed this scar.  The skin of the scar was excised, taking care to release the wire without dividing it.  The scar was sent to pathology.  Next, skin flaps were raised circumferentially.  The breast tissue denoted by the localizing wire was then dissected out.  A suture was placed during the dissection to help orient the specimen for inking in the OR.  The dissection was carried out down to the prepectoral fat.  The specimen was inked and radiographed.  It was found to contain the targeted calcifications and wire.  The specimen was then sent to pathology.  The cavity now measures approximately 4 x 4 cm.  Hemoclips were placed to sean the edges of the cavity on the prepectoral plane: medial, inferior, lateral, and superior.   I then raised superior, lateral, and inferior superficial and prepectoral flaps to mobilize the remaining breast tissue into the cavity to fill it and avoid a divot.  The wound was irrigated and hemostasis was achieved.  The mobilized flaps were inset into the cavity and sutured in place with 3-0 vicryl. The incision was closed in two layers with interrupted 3-0 vicryl and a running subcuticular 4-0 monocryl. Steristrips and a dressing were applied.  The patient tolerated the procedure well. The sponge, needle, instrument counts were correct.  The patient was then awakened and taken to recovery in stable fashion.     I was present and scrubbed for the entire procedure.    EBL: 2 mL    SPECIMEN(S):  1. Left breast skin and scar  2. Left breast mass    POSTOPERATIVE PLANS:  Kaitlyn Garcia will be discharged home today with wound care instructions and a prescription for analgesics.  She will follow-up with me in 2 weeks.      Lori Montenegro MD MSc FRCSC    Division of Surgical Oncology  [facility]

## 2017-02-01 NOTE — ANESTHESIA POSTPROCEDURE EVALUATION
Patient: Kaitlyn Garcia    Procedure(s):  Left Wire Localized Lumpectomy - Wound Class: I-Clean    Diagnosis:Left Atypical Ductal Hyperplasia  Diagnosis Additional Information: No value filed.    Anesthesia Type:  MAC    Note:  Anesthesia Post Evaluation    Patient location during evaluation: PACU  Patient participation: Able to fully participate in evaluation  Level of consciousness: awake and alert  Pain management: adequate  Airway patency: patent  Cardiovascular status: hemodynamically stable  Respiratory status: acceptable  Hydration status: stable  PONV: none     Anesthetic complications: None          Last vitals:  Filed Vitals:    02/01/17 0817 02/01/17 1245 02/01/17 1300   BP: 108/80 102/64 104/64   Temp: 36.4  C (97.5  F) 36.4  C (97.5  F) 36.5  C (97.7  F)   Resp: 16 18 18   SpO2: 99% 97% 99%         Electronically Signed By: Dino Armstrong MD  February 1, 2017  1:24 PM

## 2017-02-01 NOTE — IP AVS SNAPSHOT
MRN:[id number]                      After Visit Summary   2/1/2017    Kaitlyn Garcia    MRN: [id number]           Thank you!     Thank you for choosing [facility] for your care. Our goal is always to provide you with excellent care. Hearing back from our patients is one way we can continue to improve our services. Please take a few minutes to complete the written survey that you may receive in the mail after you visit with us. Thank you!        Patient Information     Date Of Birth          1975        About your hospital stay     You were admitted on:  February 1, 2017 [facility]    You were discharged on:  February 1, 2017       Who to Call     For medical emergencies, please call 911.  For non-urgent questions about your medical care, please call your primary care provider or clinic, [phone]  For questions related to your surgery, please call your surgery clinic        Attending Provider     Provider    Lori Montenegro MD       Primary Care Provider Office Phone # Fax #    Ani SINDHU [facility] [phone]       [facility] [address]  [place] [address]        After Care Instructions     Discharge Instructions       1. Patient to follow up with appointment in 2 weeks.   2. Do not drive while taking narcotic pain medication (Norco).   3. May take plain Tylenol as needed for pain. Do not take at the same time as Norco as you can overdose on acetaminophen.   4. Avoid non-steroidal anti-inflammatory medications (Advil, Ibuprofen, Naproxen, aspirin, etc) for 5-7 days.   5. Caution with putting ice on the incision as it will be numb you will not realize it if you leave the ice for too long and can damage your skin.  6. If you develop any fever/chills, worsening pain, redness, swelling, or drainage from your wound please call the clinic (Monday through Friday 8:00am-5:00pm  "[phone] Lindsay TAFOYA) or on-call surgical oncology resident (nights and weekends [phone] and ask \"I would like to page the Surgical Oncology Resident on call.\")   7. No lifting over 15 lbs and no strenuous physical activity for 2 weeks.   8. Keep dressing clean and dry. May remove outer large dressing in 2 days prior to shower. Steristrips will fall off on their own in 10-14 days. Your sutures are dissolvable. Please refrain from submerging in a bathtub or swimming pool.   9. Can wear a supportive bra for comfort.                  Your next 10 appointments already scheduled     Feb 01, 2017  1:05 PM   MA BREAST SPECIMEN LEFT with [id number]   [facility] ([facility])    [address] [place] [address] [phone]           Do not use any powder, lotion or deodorant under your arms or on your breast. If you do, we will ask you to remove it before your exam.  Wear comfortable, two-piece clothing.  If you have any allergies, tell your care team.  Bring any previous mammograms from other facilities or have them mailed to the breast center.            Feb 14, 2017 11:45 AM   (Arrive by 11:30 AM)   Post-Op with Lori Montenegro MD   [facility] ([facility])    [address] [place] [address] [phone]              Further instructions from your care team       [facility]  Home Care Following Anesthesia  For 24 hours after surgery:  1. Get plenty of rest.  A responsible adult must stay with you for at least 24 hours after you leave the surgery center.  2. Do not drive or use heavy equipment.  If you have weakness or tingling, don't drive or use heavy equipment until this feeling goes away.   3. Do not drink alcohol.   4. Avoid strenuous or risky activities.  Ask for help when climbing stairs.  5. You may feel lightheaded.  IF so, sit for a " few minutes before standing.  Have someone help you get up.   6. If you have nausea (feel sick to your stomach): Drink only clear liquids such as apple juice, ginger ale, broth or 7-Up.  Rest may also help.  Be sure to drink enough fluids.  Move to a regular diet as you feel able.   7. You may have a slight fever.  Call the doctor if your fever is over 100 F (37.7 C) (taken under the tongue) or lasts longer than 24 hours.  8. You may have a dry mouth, a sore throat, muscle aches or trouble sleeping. These should go away after 24 hours.  9. Do not make important or legal decisions.   If you are female and have had general anesthesia you may have received a medication that inhibits the effectiveness of oral contraceptives.  We suggest you use a backup method of contraception for the next 7 days if this applies to you.  Tips for taking pain medications  To get the best pain relief possible, remember these points:    Take pain medications as directed, before pain becomes severe.    Pain medication can upset your stomach: taking it with food may help.    Constipation is a common side effect of pain medication. Drink plenty of  fluids.    Eat foods high in fiber. Take a stool softener if recommended by your doctor or pharmacist.    Do not drink alcohol, drive or operate machinery while taking pain medications.    Ask about other ways to control pain, such as with heat, ice or relaxation.    Call a doctor for any of the followin. Signs of infection (fever, growing tenderness at the surgery site, a large amount of drainage or bleeding, severe pain, foul-smelling drainage, redness, swelling).  2. It has been over 8 to 10 hours since surgery and you are still not able to urinate (pass water).  3. Headache for over 24 hours.  4. Numbness, tingling or weakness the day after surgery (if you had spinal anesthesia).  Your doctor is:  Dr. Lori Montenegro, Breast Center: [phone]         Or dial [phone] and ask for the  "resident on call for:  [facility]  For emergency care, call the:  [facility]:  [phone] (TTY for hearing impaired: [phone])    Pending Results     No orders found from 1/31/2017 to 2/2/2017.            Admission Information        Provider Department Dept Phone    2/1/2017 Lori Montenegro MD  [facility] [phone]      Your Vitals Were     Blood Pressure Temperature Respirations    108/80 mmHg 97.5  F (36.4  C) (Oral) 16    Height Weight BMI (Body Mass Index)    1.676 m (5' 6\") 63.504 kg (140 lb) 22.61 kg/m2    Pulse Oximetry          99%        [employer] Information     [employer] gives you secure access to your electronic health record. If you see a primary care provider, you can also send messages to your care team and make appointments. If you have questions, please call your primary care clinic.  If you do not have a primary care provider, please call [phone] and they will assist you.      [employer] is an electronic gateway that provides easy, online access to your medical records. With [employer], you can request a clinic appointment, read your test results, renew a prescription or communicate with your care team.     To access your existing account, please contact your [facility] or call [phone] for assistance.        Care EveryWhere ID     This is your Care EveryWhere ID. This could be used by other organizations to access your [place] medical records  [id number]           Review of your medicines      START taking        Dose / Directions    HYDROcodone-acetaminophen 5-325 MG per tablet   Commonly known as:  NORCO   Used for:  S/P lumpectomy, left breast        Dose:  1-2 tablet   Take 1-2 tablets by mouth every 4 hours as needed for other (Moderate to Severe Pain)   Quantity:  30 tablet   Refills:  0         CONTINUE these medicines which have NOT CHANGED        Dose / Directions    escitalopram 10 MG tablet   Commonly known as:  " LEXAPRO   Used for:  Situational mixed anxiety and depressive disorder        TAKE TWO TABLETS BY MOUTH EVERY DAY   Quantity:  180 tablet   Refills:  3       fluticasone 50 MCG/ACT spray   Commonly known as:  FLONASE   Used for:  Allergic rhinitis due to other allergic trigger, unspecified rhinitis seasonality        USE 2 SPRAYS IN EACH NOSTRIL ONCE DAILY   Quantity:  48 g   Refills:  11       omeprazole 20 MG tablet   Used for:  Throat clearing        Dose:  20 mg   Take 1 tablet (20 mg) by mouth daily Take 30-60 minutes before a meal.   Quantity:  30 tablet   Refills:  1         STOP taking     ibuprofen 600 MG tablet   Commonly known as:  ADVIL/MOTRIN           naproxen 500 MG tablet   Commonly known as:  NAPROSYN                Where to get your medicines      Some of these will need a paper prescription and others can be bought over the counter. Ask your nurse if you have questions.     Bring a paper prescription for each of these medications    - HYDROcodone-acetaminophen 5-325 MG per tablet             Protect others around you: Learn how to safely use, store and throw away your medicines at www.disposemymeds.org.             Medication List: This is a list of all your medications and when to take them. Check marks below indicate your daily home schedule. Keep this list as a reference.      Medications           Morning Afternoon Evening Bedtime As Needed    escitalopram 10 MG tablet   Commonly known as:  LEXAPRO   TAKE TWO TABLETS BY MOUTH EVERY DAY                                fluticasone 50 MCG/ACT spray   Commonly known as:  FLONASE   USE 2 SPRAYS IN EACH NOSTRIL ONCE DAILY                                HYDROcodone-acetaminophen 5-325 MG per tablet   Commonly known as:  NORCO   Take 1-2 tablets by mouth every 4 hours as needed for other (Moderate to Severe Pain)                                omeprazole 20 MG tablet   Take 1 tablet (20 mg) by mouth daily Take 30-60 minutes before a meal.

## 2017-02-01 NOTE — ANESTHESIA PREPROCEDURE EVALUATION
Anesthesia Evaluation     .        ROS/MED HX    ENT/Pulmonary:  - neg pulmonary ROS     Neurologic:  - neg neurologic ROS     Cardiovascular:  - neg cardiovascular ROS       METS/Exercise Tolerance:     Hematologic:  - neg hematologic  ROS       Musculoskeletal:  - neg musculoskeletal ROS       GI/Hepatic:     (+) GERD Asymptomatic on medication,       Renal/Genitourinary:  - ROS Renal section negative       Endo:  - neg endo ROS       Psychiatric:  - neg psychiatric ROS       Infectious Disease:  - neg infectious disease ROS       Malignancy:      - no malignancy   Other:    - neg other ROS           Physical Exam  Normal systems: cardiovascular, pulmonary and dental    Airway   Mallampati: II  TM distance: >3 FB  Neck ROM: full    Dental     Cardiovascular       Pulmonary                     Anesthesia Plan      History & Physical Review  History and physical reviewed and following examination; no interval change.    ASA Status:  2 .    NPO Status:  > 8 hours    Plan for MAC with Intravenous and Propofol induction. Maintenance will be TIVA.  Reason for MAC:  Procedure to face, neck, head or breast  PONV prophylaxis:  Ondansetron (or other 5HT-3) and Dexamethasone or Solumedrol       Postoperative Care  Postoperative pain management:  IV analgesics.      Consents  Anesthetic plan, risks, benefits and alternatives discussed with:  Patient..                          .

## 2017-02-01 NOTE — DISCHARGE INSTRUCTIONS
[facility]  Home Care Following Anesthesia  For 24 hours after surgery:  1. Get plenty of rest.  A responsible adult must stay with you for at least 24 hours after you leave the surgery center.  2. Do not drive or use heavy equipment.  If you have weakness or tingling, don't drive or use heavy equipment until this feeling goes away.   3. Do not drink alcohol.   4. Avoid strenuous or risky activities.  Ask for help when climbing stairs.  5. You may feel lightheaded.  IF so, sit for a few minutes before standing.  Have someone help you get up.   6. If you have nausea (feel sick to your stomach): Drink only clear liquids such as apple juice, ginger ale, broth or 7-Up.  Rest may also help.  Be sure to drink enough fluids.  Move to a regular diet as you feel able.   7. You may have a slight fever.  Call the doctor if your fever is over 100 F (37.7 C) (taken under the tongue) or lasts longer than 24 hours.  8. You may have a dry mouth, a sore throat, muscle aches or trouble sleeping. These should go away after 24 hours.  9. Do not make important or legal decisions.   If you are female and have had general anesthesia you may have received a medication that inhibits the effectiveness of oral contraceptives.  We suggest you use a backup method of contraception for the next 7 days if this applies to you.  Tips for taking pain medications  To get the best pain relief possible, remember these points:    Take pain medications as directed, before pain becomes severe.    Pain medication can upset your stomach: taking it with food may help.    Constipation is a common side effect of pain medication. Drink plenty of  fluids.    Eat foods high in fiber. Take a stool softener if recommended by your doctor or pharmacist.    Do not drink alcohol, drive or operate machinery while taking pain medications.    Ask about other ways to control pain, such as with heat, ice or relaxation.    Call a doctor  for any of the followin. Signs of infection (fever, growing tenderness at the surgery site, a large amount of drainage or bleeding, severe pain, foul-smelling drainage, redness, swelling).  2. It has been over 8 to 10 hours since surgery and you are still not able to urinate (pass water).  3. Headache for over 24 hours.  4. Numbness, tingling or weakness the day after surgery (if you had spinal anesthesia).  Your doctor is:  Dr. Lori Montenegro, [facility]: [phone]         Or dial [phone] and ask for the resident on call for:  [facility]  For emergency care, call the:  [facility]:  [phone] (TTY for hearing impaired: [phone])

## 2017-02-02 ENCOUNTER — TELEPHONE (OUTPATIENT)
Dept: SURGERY | Facility: CLINIC | Age: 42
End: 2017-02-02

## 2017-02-02 NOTE — TELEPHONE ENCOUNTER
POST-OP CALL  Feb 2, 2017    Kaitlyn Garcia is a 41 year old female s/p left wire-localized lumpectomy and scar revision.   She reports doing well. She is a little sore.   Fevers/chills: Patient denies fever/chills.  Eating/drinking: Patient is able to eat and drink without any complaints.   Bowel habits: Patient reports having a normal bowel movement.  Urine output: Voiding without difficulty.   Incisions: Patient denies any signs and symptoms of infection. No erythema, swelling or drainag  Pain: Patient reports pain is controlled.  Narcotics: She took 1 norco yesterday but has not need any today.   Follow up appointment scheduled on 2/14/17 with Dr. Montenegro.  Patient will call with any questions or concerns.    Merced Wiggins PA-C

## 2017-02-08 ENCOUNTER — TRANSFERRED RECORDS (OUTPATIENT)
Dept: HEALTH INFORMATION MANAGEMENT | Facility: CLINIC | Age: 42
End: 2017-02-08

## 2017-02-08 ENCOUNTER — PRE VISIT (OUTPATIENT)
Dept: OTOLARYNGOLOGY | Facility: CLINIC | Age: 42
End: 2017-02-08

## 2017-02-10 ENCOUNTER — OFFICE VISIT (OUTPATIENT)
Dept: OTOLARYNGOLOGY | Facility: CLINIC | Age: 42
End: 2017-02-10
Payer: COMMERCIAL

## 2017-02-10 VITALS — DIASTOLIC BLOOD PRESSURE: 74 MMHG | HEART RATE: 79 BPM | SYSTOLIC BLOOD PRESSURE: 103 MMHG

## 2017-02-10 DIAGNOSIS — K21.9 GASTROESOPHAGEAL REFLUX DISEASE WITHOUT ESOPHAGITIS: Primary | ICD-10-CM

## 2017-02-10 PROCEDURE — 99203 OFFICE O/P NEW LOW 30 MIN: CPT | Mod: 25 | Performed by: OTOLARYNGOLOGY

## 2017-02-10 PROCEDURE — 31575 DIAGNOSTIC LARYNGOSCOPY: CPT | Performed by: OTOLARYNGOLOGY

## 2017-02-10 ASSESSMENT — ENCOUNTER SYMPTOMS
SHORTNESS OF BREATH: 0
TREMORS: 0
HEMOPTYSIS: 0
DIZZINESS: 0
VOMITING: 0
CONSTITUTIONAL NEGATIVE: 1
SPUTUM PRODUCTION: 0
TINGLING: 0
HEARTBURN: 0
DOUBLE VISION: 0
BLURRED VISION: 0
NAUSEA: 0
COUGH: 0

## 2017-02-10 ASSESSMENT — PAIN SCALES - GENERAL: PAINLEVEL: MILD PAIN (2)

## 2017-02-10 NOTE — MR AVS SNAPSHOT
After Visit Summary   2/10/2017    Kaitlyn Garcia    MRN: [id number]           Patient Information     Date Of Birth          1975        Visit Information        Provider Department      2/10/2017 8:30 AM Lorena Fernandez MD [facility]         Follow-ups after your visit        Your next 10 appointments already scheduled     Feb 14, 2017 11:45 AM   (Arrive by 11:30 AM)   Post-Op with Lori Montenegro MD   [facility] ([facility])    [address] [facility]  [address] [facility] [id number] [phone]            May 09, 2017  8:30 AM   Return Visit with Lorena Fernandez MD   [facility] ([facility])    [id number] [place] [address] [phone]              Who to contact     If you have questions or need follow up information about today's clinic visit or your schedule please contact [facility] directly at [phone].  Normal or non-critical lab and imaging results will be communicated to you by [employer], letter or phone within 4 business days after the clinic has received the results. If you do not hear from us within 7 days, please contact the clinic through [employer] or phone. If you have a critical or abnormal lab result, we will notify you by phone as soon as possible.  Submit refill requests through [facility] or call your pharmacy and they will forward the refill request to us. Please allow 3 business days for your refill to be completed.          Additional Information About Your Visit        [employer] Information     [facility] gives you secure access to your electronic health record. If you see a primary care provider, you can also send messages to your care team and make appointments. If you have questions, please call your primary care clinic.  If you do not have a primary care provider, please call [phone] and they will assist you.      [facility]  is an electronic gateway that provides easy, online access to your medical records. With [employer], you can request a clinic appointment, read your test results, renew a prescription or communicate with your care team.     To access your existing account, please contact your [facility] or call [phone] for assistance.        Care EveryWhere ID     This is your Care EveryWhere ID. This could be used by other organizations to access your [facility] medical records  [id number]        Your Vitals Were     Pulse                   79            Blood Pressure from Last 3 Encounters:   02/10/17 103/74   02/01/17 104/64   01/19/17 105/69    Weight from Last 3 Encounters:   02/01/17 63.504 kg (140 lb)   01/19/17 66.271 kg (146 lb 1.6 oz)   12/27/16 66.225 kg (146 lb)              Today, you had the following     No orders found for display       Primary Care Provider Office Phone # Fax #    Ani Aye Slater, SINDHU [facility] [phone]       [facility] [address]  [place] [address]        Thank you!     Thank you for choosing [facility]  for your care. Our goal is always to provide you with excellent care. Hearing back from our patients is one way we can continue to improve our services. Please take a few minutes to complete the written survey that you may receive in the mail after your visit with us. Thank you!             Your Updated Medication List - Protect others around you: Learn how to safely use, store and throw away your medicines at www.disposemymeds.org.          This list is accurate as of: 2/10/17  8:53 AM.  Always use your most recent med list.                   Brand Name Dispense Instructions for use    escitalopram 10 MG tablet    LEXAPRO    180 tablet    TAKE TWO TABLETS BY MOUTH EVERY DAY       fluticasone 50 MCG/ACT spray    FLONASE    48 g    USE 2 SPRAYS IN EACH NOSTRIL ONCE DAILY       omeprazole 20 MG tablet      30 tablet    Take 1 tablet (20 mg) by mouth daily Take 30-60 minutes before a meal.

## 2017-02-10 NOTE — NURSING NOTE
"Kaitlyn Garcia's goals for this visit include:   Chief Complaint   Patient presents with     Consult       She requests these members of her care team be copied on today's visit information: Ani Slater      PCP: Ani Slater    Referring Provider:  No referring provider defined for this encounter.    Chief Complaint   Patient presents with     Consult       Initial There were no vitals taken for this visit. Estimated body mass index is 23.59 kg/(m^2) as calculated from the following:    Height as of 2/1/17: 1.676 m (5' 6\").    Weight as of 1/19/17: 66.271 kg (146 lb 1.6 oz).  Medication Reconciliation: complete    "

## 2017-02-10 NOTE — PROGRESS NOTES
HPI    This is a 41 year old patient who has been having post nasal drainage, frequent clearing of her throat and cough right after any meal or food intake. For months. Denies any facial pressure, heartburn, allergies. No hx of difficulty swallowing, voice changes, bleeding from nose, drainage, congestion, sore throat, food or environmental allergies. She has a hx of post concussion syndrome. She is not a smoker.    Review of Systems   Constitutional: Negative.    HENT: Negative.    Eyes: Negative for blurred vision and double vision.   Respiratory: Negative for cough, hemoptysis, sputum production and shortness of breath.    Gastrointestinal: Negative for heartburn, nausea and vomiting.   Skin: Negative.    Neurological: Negative for dizziness, tingling and tremors.   Endo/Heme/Allergies: Negative for environmental allergies.         Physical Exam   Constitutional: She is well-developed, well-nourished, and in no distress.   HENT:   Head: Normocephalic and atraumatic.   Right Ear: Tympanic membrane, external ear and ear canal normal. No drainage, swelling or tenderness. No middle ear effusion. No decreased hearing is noted.   Left Ear: Tympanic membrane, external ear and ear canal normal. No drainage, swelling or tenderness.  No middle ear effusion. No decreased hearing is noted.   Nose: Mucosal edema and septal deviation present. No rhinorrhea.   Mouth/Throat: Uvula is midline, oropharynx is clear and moist and mucous membranes are normal. No oropharyngeal exudate.   Eyes: Pupils are equal, round, and reactive to light.     Diagnostic nasal endoscopy:     She was seen in the room and identified. Pros and cons of the procedure were explained to the patient. The procedure and its alternatives were explained to the patient in lay terms. Her questions were answered. Her symptoms required a diagnostic endoscopic evaluation under local anesthesia. After obtaining an informed consent from the patient, 3% Lidocaine with  1: 100.000 epinephrine spray was applied to each nostril. Then a flexible scopic exam was performed. Septum is slightly deviated to the left. Ostiomeatal complexes are free of disease but swollen and narrow. No pus or polyp seen. Inferior turbinates are enlarged. Nasopharynx is normal. Rosenmüller fossa and torus tubarius are normal. Epiglottis, hypopharynx, false vocal folds are normal. No pooling in pyriform fossae. Vocal cords are mobile and showed hyperemia at the posterior larynx, nonspecific pachydermia otherwise normal. She tolerated the procedure well and left the room with no complications.    A/P  We will continue with topical nasal steroid to shrink the mucosa and open ope the nasal passages. Her history is consistent with the endoscopic evaluation and highly likely showed esophago-pharyngeal reflux. I will encourage her to continue with antireflux medication, Omeprazole once a day before breakfast. Options discussed and reflux precautions reviewed. I will see this pleasant lady in 3 months.

## 2017-02-13 ASSESSMENT — ENCOUNTER SYMPTOMS
TROUBLE SWALLOWING: 0
SINUS CONGESTION: 0
SINUS PAIN: 0
NECK MASS: 0
HOARSE VOICE: 0
TASTE DISTURBANCE: 0
SORE THROAT: 0
SMELL DISTURBANCE: 0

## 2017-02-14 ENCOUNTER — OFFICE VISIT (OUTPATIENT)
Dept: ONCOLOGY | Facility: CLINIC | Age: 42
End: 2017-02-14
Attending: SURGERY
Payer: COMMERCIAL

## 2017-02-14 VITALS
SYSTOLIC BLOOD PRESSURE: 113 MMHG | RESPIRATION RATE: 16 BRPM | WEIGHT: 146.2 LBS | HEART RATE: 62 BPM | OXYGEN SATURATION: 98 % | HEIGHT: 67 IN | TEMPERATURE: 97.7 F | DIASTOLIC BLOOD PRESSURE: 80 MMHG | BODY MASS INDEX: 22.95 KG/M2

## 2017-02-14 DIAGNOSIS — N60.99 ATYPICAL HYPERPLASIA OF BREAST: Primary | ICD-10-CM

## 2017-02-14 LAB — COPATH REPORT: NORMAL

## 2017-02-14 PROCEDURE — 99212 OFFICE O/P EST SF 10 MIN: CPT | Mod: ZF

## 2017-02-14 RX ORDER — HYDROCODONE BITARTRATE AND ACETAMINOPHEN 5; 325 MG/1; MG/1
TABLET ORAL
COMMUNITY
Start: 2017-02-01 | End: 2017-04-12

## 2017-02-14 RX ORDER — AMITRIPTYLINE HYDROCHLORIDE 10 MG/1
TABLET ORAL
COMMUNITY
Start: 2016-07-12 | End: 2017-02-14

## 2017-02-14 RX ORDER — NAPROXEN 500 MG/1
TABLET ORAL
COMMUNITY
Start: 2016-04-05 | End: 2017-04-12

## 2017-02-14 ASSESSMENT — PAIN SCALES - GENERAL: PAINLEVEL: NO PAIN (0)

## 2017-02-14 NOTE — PROGRESS NOTES
"FOLLOW-UP  Feb 14, 2017    Kaitlyn Garcia is a 41 year old female who returns for her first post-operative follow-up visit.    HPI:    She underwent a wire-localized left lumpectomy on 2/1/2017.  She is currently 2 week(s) post-op.  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    Since the procedure, she has been doing well. She denies any significant pain and has good range of motion of bilateral upper extremities. She denies any redness, drainage or fever/chills.    /80 (BP Location: Right arm, Patient Position: Chair, Cuff Size: Adult Regular)  Pulse 62  Temp 97.7  F (36.5  C) (Oral)  Resp 16  Ht 1.702 m (5' 7\")  Wt 66.3 kg (146 lb 3.2 oz)  SpO2 98%  BMI 22.9 kg/m2   Physical Exam   Constitutional: She is well-developed, well-nourished, and in no distress.   Pulmonary/Chest: No respiratory distress.       Skin: Skin is warm and dry.       INVESTIGATIONS:    Surgical Pathology (2/1/2017):  FINAL DIAGNOSIS:   A: Skin, left breast, scar, excision:   -Benign skin with dermal scar   B: Breast, left, wire localized lumpectomy:   - Flat epithelial atypia, occasional microscopic foci   - Columnar cell change/hyperplasia   - Multiple microscopic intraductal papillomas   - Sclerosing adenosis   - Fibrocystic change, including microcysts with apocrine metaplasia   - Usual ductal hyperplasia, florid   - Prior core biopsy site changes   - Calcifications associated with benign, non-atypical acini and with stroma   - Additional levels are in process (see comment)     ASSESSMENT:    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    She is doing well after surgery. Pathology was reviewed with her today.  We will await the final report of the additional levels.  Currently only atypia was identified. She does not require further surgery or other treatment. She should continue high-risk follow up with Dr Bolanos.  I have not made specific follow-up " appointments with me at this time and will defer ongoing care to her PCP.  She knows to call if she has concerns.     All of the above was discussed with the patient and all questions were answered.     PLAN:  1. Continue high risk screening with Dr Bolanos.  2. Follow up on final surgical pathology report.    Lori Montenegro MD MSc [facility]    Division of Surgical Oncology  [facility]

## 2017-02-14 NOTE — MR AVS SNAPSHOT
After Visit Summary   2/14/2017    Kaitlyn Garcia    MRN: [id number]           Patient Information     Date Of Birth          1975        Visit Information        Provider Department      2/14/2017 11:45 AM Lori Montenegro MD [facility]        Today's Diagnoses     Atypical hyperplasia of breast    -  1       Follow-ups after your visit        Your next 10 appointments already scheduled     May 09, 2017  8:30 AM CDT   Return Visit with Lorena Fernandez MD   [facility] ([facility])    [id number] [place] [address] [phone]              Who to contact     If you have questions or need follow up information about today's clinic visit or your schedule please contact [facility] directly at [phone].  Normal or non-critical lab and imaging results will be communicated to you by [employer], letter or phone within 4 business days after the clinic has received the results. If you do not hear from us within 7 days, please contact the clinic through [employer] or phone. If you have a critical or abnormal lab result, we will notify you by phone as soon as possible.  Submit refill requests through [employer] or call your pharmacy and they will forward the refill request to us. Please allow 3 business days for your refill to be completed.          Additional Information About Your Visit        MyChart Information     [employer] gives you secure access to your electronic health record. If you see a primary care provider, you can also send messages to your care team and make appointments. If you have questions, please call your primary care clinic.  If you do not have a primary care provider, please call [phone] and they will assist you.        Care EveryWhere ID     This is your Care EveryWhere ID. This could be used by other organizations to access your [facility] medical records  [id number]        Your Vitals Were      "Pulse Temperature Respirations Height Pulse Oximetry BMI (Body Mass Index)    62 97.7  F (36.5  C) (Oral) 16 1.702 m (5' 7\") 98% 22.9 kg/m2       Blood Pressure from Last 3 Encounters:   02/14/17 113/80   02/10/17 103/74   02/01/17 104/64    Weight from Last 3 Encounters:   02/14/17 66.3 kg (146 lb 3.2 oz)   02/01/17 63.5 kg (140 lb)   01/19/17 66.3 kg (146 lb 1.6 oz)              Today, you had the following     No orders found for display         Today's Medication Changes          These changes are accurate as of: 2/14/17  1:01 PM.  If you have any questions, ask your nurse or doctor.               Stop taking these medicines if you haven't already. Please contact your care team if you have questions.     amitriptyline 10 MG tablet   Commonly known as:  ELAVIL   Stopped by:  Lori Montenegro MD                    Primary Care Provider Office Phone # Fax #    Ani Aye Slater, SINDHU [facility] [phone]       [facility] [address]  [place] [address]        Thank you!     Thank you for choosing [facility]  for your care. Our goal is always to provide you with excellent care. Hearing back from our patients is one way we can continue to improve our services. Please take a few minutes to complete the written survey that you may receive in the mail after your visit with us. Thank you!             Your Updated Medication List - Protect others around you: Learn how to safely use, store and throw away your medicines at www.disposemymeds.org.          This list is accurate as of: 2/14/17  1:01 PM.  Always use your most recent med list.                   Brand Name Dispense Instructions for use    escitalopram 10 MG tablet    LEXAPRO    180 tablet    TAKE TWO TABLETS BY MOUTH EVERY DAY       fluticasone 50 MCG/ACT spray    FLONASE    48 g    USE 2 SPRAYS IN EACH NOSTRIL ONCE DAILY       HYDROcodone-acetaminophen 5-325 MG per tablet    NORCO         naproxen 500 MG " tablet    NAPROSYN         omeprazole 20 MG tablet     30 tablet    Take 1 tablet (20 mg) by mouth daily Take 30-60 minutes before a meal.

## 2017-02-14 NOTE — LETTER
"2017      RE: Kaitlyn Garcia  [address] [facility] [address]     2017    Ani Slater, APRN [facility] [address]  [facility] [address]    RE: Kaitlyn Garcia  (: 1975)    Dear Dr. Ani Slater:    Your patient was seen for evaluation in my office.  Please find a copy of my notes for your record and review.  If you have any further questions, please feel free to contact my office.   Thank you for your kind referral.    Sincerely,   Lori Montenegro MD MSc [facility]    ---     FOLLOW-UP  2017    Kaitlyn Garcia is a 41 year old female who returns for her first post-operative follow-up visit.    HPI:    She underwent a wire-localized left lumpectomy on 2017.  She is currently 2 week(s) post-op.  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    Since the procedure, she has been doing well. She denies any significant pain and has good range of motion of bilateral upper extremities. She denies any redness, drainage or fever/chills.    /80 (BP Location: Right arm, Patient Position: Chair, Cuff Size: Adult Regular)  Pulse 62  Temp 97.7  F (36.5  C) (Oral)  Resp 16  Ht 1.702 m (5' 7\")  Wt 66.3 kg (146 lb 3.2 oz)  SpO2 98%  BMI 22.9 kg/m2   Physical Exam   Constitutional: She is well-developed, well-nourished, and in no distress.   Pulmonary/Chest: No respiratory distress.       Skin: Skin is warm and dry.       INVESTIGATIONS:    Surgical Pathology (2017):  FINAL DIAGNOSIS:   A: Skin, left breast, scar, excision:   -Benign skin with dermal scar   B: Breast, left, wire localized lumpectomy:   - Flat epithelial atypia, occasional microscopic foci   - Columnar cell change/hyperplasia   - Multiple microscopic intraductal papillomas   - Sclerosing adenosis   - Fibrocystic change, including microcysts with apocrine metaplasia   - Usual ductal hyperplasia, florid   - Prior core biopsy site changes "   - Calcifications associated with benign, non-atypical acini and with stroma   - Additional levels are in process (see comment)     ASSESSMENT:    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    She is doing well after surgery. Pathology was reviewed with her today.  We will await the final report of the additional levels.  Currently only atypia was identified. She does not require further surgery or other treatment. She should continue high-risk follow up with Dr Bolanos.  I have not made specific follow-up appointments with me at this time and will defer ongoing care to her PCP.  She knows to call if she has concerns.     All of the above was discussed with the patient and all questions were answered.     PLAN:  1. Continue high risk screening with Dr Bolanos.  2. Follow up on final surgical pathology report.    Lori Montenegro MD MSc [facility]    Division of Surgical Oncology  [facility]

## 2017-02-14 NOTE — NURSING NOTE
"Kaitlyn Garcia is a 41 year old female who presents for:  Chief Complaint   Patient presents with     Oncology Clinic Visit     Adh and VIDAL, POst op         Initial Vitals:  /80 (BP Location: Right arm, Patient Position: Chair, Cuff Size: Adult Regular)  Pulse 62  Temp 97.7  F (36.5  C) (Oral)  Resp 16  Ht 1.702 m (5' 7\")  Wt 66.3 kg (146 lb 3.2 oz)  SpO2 98%  BMI 22.9 kg/m2 Estimated body mass index is 22.9 kg/(m^2) as calculated from the following:    Height as of this encounter: 1.702 m (5' 7\").    Weight as of this encounter: 66.3 kg (146 lb 3.2 oz).. Body surface area is 1.77 meters squared. BP completed using cuff size: regular  No Pain (0) No LMP recorded. Patient has had an ablation. Allergies and medications reviewed.     Medications: Medication refills not needed today.  Pharmacy name entered into [employer]:    [facility] [place], [place] - [address]  [facility] [place], [place] - [address]  [id number] - [place], [place] - [address] AT [facility]    Comments: Patient received flu vaccine. See Immunizations.  Ora Dumont CMA        6 minutes for nursing intake (face to face time)   Ora Dumont CMA        "

## 2017-02-14 NOTE — Clinical Note
"2/14/2017       RE: Kaitlyn Garcia  [address] [facility] [address]     Dear Colleague,    Thank you for referring your patient, Kaitlyn Garcia, to the [facility] at [facility]. Please see a copy of my visit note below.    FOLLOW-UP  Feb 14, 2017    Kaitlyn Garcia is a 41 year old female who returns for her first post-operative follow-up visit.    HPI:    She underwent a wire-localized left lumpectomy on 2/1/2017.  She is currently 2 week(s) post-op.  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    Since the procedure, she has been doing well. She denies any significant pain and has good range of motion of bilateral upper extremities. She denies any redness, drainage or fever/chills.    /80 (BP Location: Right arm, Patient Position: Chair, Cuff Size: Adult Regular)  Pulse 62  Temp 97.7  F (36.5  C) (Oral)  Resp 16  Ht 1.702 m (5' 7\")  Wt 66.3 kg (146 lb 3.2 oz)  SpO2 98%  BMI 22.9 kg/m2   Physical Exam   Constitutional: She is well-developed, well-nourished, and in no distress.   Pulmonary/Chest: No respiratory distress.       Skin: Skin is warm and dry.       INVESTIGATIONS:    Surgical Pathology (2/1/2017):  FINAL DIAGNOSIS:   A: Skin, left breast, scar, excision:   -Benign skin with dermal scar   B: Breast, left, wire localized lumpectomy:   - Flat epithelial atypia, occasional microscopic foci   - Columnar cell change/hyperplasia   - Multiple microscopic intraductal papillomas   - Sclerosing adenosis   - Fibrocystic change, including microcysts with apocrine metaplasia   - Usual ductal hyperplasia, florid   - Prior core biopsy site changes   - Calcifications associated with benign, non-atypical acini and with stroma   - Additional levels are in process (see comment)     ASSESSMENT:    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    She is doing well after surgery. Pathology was " reviewed with her today.  We will await the final report of the additional levels.  Currently only atypia was identified. She does not require further surgery or other treatment. She should continue high-risk follow up with Dr Bolanos.  I have not made specific follow-up appointments with me at this time and will defer ongoing care to her PCP.  She knows to call if she has concerns.     All of the above was discussed with the patient and all questions were answered.     PLAN:  1. Continue high risk screening with Dr Bolanos.  2. Follow up on final surgical pathology report.    Lori Montenegro MD MSc [facility]    Division of Surgical Oncology  [facility]       Again, thank you for allowing me to participate in the care of your patient.      Sincerely,    Lori Montenegro MD

## 2017-02-14 NOTE — Clinical Note
"2/14/2017      RE: Kaitlyn Garcia  [address] [facility] [address]       FOLLOW-UP  Feb 14, 2017    Kaitlyn Garcia is a 41 year old female who returns for her first post-operative follow-up visit.    HPI:    She underwent a wire-localized left lumpectomy on 2/1/2017.  She is currently 2 week(s) post-op.  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    Since the procedure, she has been doing well. She denies any significant pain and has good range of motion of bilateral upper extremities. She denies any redness, drainage or fever/chills.    /80 (BP Location: Right arm, Patient Position: Chair, Cuff Size: Adult Regular)  Pulse 62  Temp 97.7  F (36.5  C) (Oral)  Resp 16  Ht 1.702 m (5' 7\")  Wt 66.3 kg (146 lb 3.2 oz)  SpO2 98%  BMI 22.9 kg/m2   Physical Exam   Constitutional: She is well-developed, well-nourished, and in no distress.   Pulmonary/Chest: No respiratory distress.       Skin: Skin is warm and dry.       INVESTIGATIONS:    Surgical Pathology (2/1/2017):  FINAL DIAGNOSIS:   A: Skin, left breast, scar, excision:   -Benign skin with dermal scar   B: Breast, left, wire localized lumpectomy:   - Flat epithelial atypia, occasional microscopic foci   - Columnar cell change/hyperplasia   - Multiple microscopic intraductal papillomas   - Sclerosing adenosis   - Fibrocystic change, including microcysts with apocrine metaplasia   - Usual ductal hyperplasia, florid   - Prior core biopsy site changes   - Calcifications associated with benign, non-atypical acini and with stroma   - Additional levels are in process (see comment)     ASSESSMENT:    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    She is doing well after surgery. Pathology was reviewed with her today.  We will await the final report of the additional levels.  Currently only atypia was identified. She does not require further surgery or other treatment. She should continue " high-risk follow up with Dr Bolanos.  I have not made specific follow-up appointments with me at this time and will defer ongoing care to her PCP.  She knows to call if she has concerns.     All of the above was discussed with the patient and all questions were answered.     PLAN:  1. Continue high risk screening with Dr Bolanos.  2. Follow up on final surgical pathology report.    Lori Montenegro MD MSc [facility]    Division of Surgical Oncology  [facility]       Lori Montenegro MD

## 2017-02-14 NOTE — LETTER
"2017      RE: [facility]ait[facility]  [address] [facility] [address]     2017    RE: Kaitlyn Garcia  (: 1975)    Dear Dr. Bolanos    Your patient was seen for evaluation in my office.  Please find a copy of my notes for your record and review.  If you have any further questions, please feel free to contact my office.   Thank you.    Sincerely,   Lori Montenegro MD MSc [facility]    ---         FOLLOW-UP  2017    Kaitlyn Garcia is a 41 year old female who returns for her first post-operative follow-up visit.    HPI:    She underwent a wire-localized left lumpectomy on 2017.  She is currently 2 week(s) post-op.  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    Since the procedure, she has been doing well. She denies any significant pain and has good range of motion of bilateral upper extremities. She denies any redness, drainage or fever/chills.    /80 (BP Location: Right arm, Patient Position: Chair, Cuff Size: Adult Regular)  Pulse 62  Temp 97.7  F (36.5  C) (Oral)  Resp 16  Ht 1.702 m (5' 7\")  Wt 66.3 kg (146 lb 3.2 oz)  SpO2 98%  BMI 22.9 kg/m2   Physical Exam   Constitutional: She is well-developed, well-nourished, and in no distress.   Pulmonary/Chest: No respiratory distress.       Skin: Skin is warm and dry.       INVESTIGATIONS:    Surgical Pathology (2017):  FINAL DIAGNOSIS:   A: Skin, left breast, scar, excision:   -Benign skin with dermal scar   B: Breast, left, wire localized lumpectomy:   - Flat epithelial atypia, occasional microscopic foci   - Columnar cell change/hyperplasia   - Multiple microscopic intraductal papillomas   - Sclerosing adenosis   - Fibrocystic change, including microcysts with apocrine metaplasia   - Usual ductal hyperplasia, florid   - Prior core biopsy site changes   - Calcifications associated with benign, non-atypical acini and with stroma   - Additional levels are in process (see comment) "     ASSESSMENT:    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    She is doing well after surgery. Pathology was reviewed with her today.  We will await the final report of the additional levels.  Currently only atypia was identified. She does not require further surgery or other treatment. She should continue high-risk follow up with Dr Bolanos.  I have not made specific follow-up appointments with me at this time and will defer ongoing care to her PCP.  She knows to call if she has concerns.     All of the above was discussed with the patient and all questions were answered.     PLAN:  1. Continue high risk screening with Dr Bolanos.  2. Follow up on final surgical pathology report.    Lori Montenegro MD MSc [facility]    Division of Surgical Oncology  [facility]

## 2017-03-02 DIAGNOSIS — N60.99 ATYPICAL HYPERPLASIA OF BREAST: ICD-10-CM

## 2017-03-02 DIAGNOSIS — Z12.39 BREAST CANCER SCREENING, HIGH RISK PATIENT: Primary | ICD-10-CM

## 2017-04-04 DIAGNOSIS — R09.89 THROAT CLEARING: ICD-10-CM

## 2017-04-05 NOTE — TELEPHONE ENCOUNTER
omeprazole      Last Written Prescription Date: 02-  Last Fill Quantity: 30,  # refills: 0   Last Office Visit with G, P or [facility] prescribing provider: 01-19-17                                         Next 5 appointments (look out 90 days)     May 09, 2017  8:30 AM CDT   [facility] with [facility]na [facility]andez MD   [facility]    [address] [place] [id number] [phone]                   Thank You,  Shama German  Pharmacy Technician  [facility]  [phone]

## 2017-04-06 NOTE — TELEPHONE ENCOUNTER
Plan to recheck in May with Dr Fernandez.      Did 1 fill until this appt per protocol.    Miguel Barnes Pharm. D.  [facility] Pharmacy Manager  [phone]  4/6/2017

## 2017-04-12 ENCOUNTER — OFFICE VISIT (OUTPATIENT)
Dept: PEDIATRICS | Facility: CLINIC | Age: 42
End: 2017-04-12
Payer: COMMERCIAL

## 2017-04-12 VITALS
WEIGHT: 144.2 LBS | SYSTOLIC BLOOD PRESSURE: 106 MMHG | DIASTOLIC BLOOD PRESSURE: 73 MMHG | HEART RATE: 72 BPM | TEMPERATURE: 99 F | HEIGHT: 67 IN | OXYGEN SATURATION: 99 % | BODY MASS INDEX: 22.63 KG/M2

## 2017-04-12 DIAGNOSIS — J20.9 ACUTE BRONCHITIS, UNSPECIFIED ORGANISM: Primary | ICD-10-CM

## 2017-04-12 PROCEDURE — 99213 OFFICE O/P EST LOW 20 MIN: CPT | Performed by: INTERNAL MEDICINE

## 2017-04-12 RX ORDER — CODEINE PHOSPHATE AND GUAIFENESIN 10; 100 MG/5ML; MG/5ML
1 SOLUTION ORAL EVERY 4 HOURS PRN
Qty: 120 ML | Refills: 0 | Status: SHIPPED | OUTPATIENT
Start: 2017-04-12 | End: 2017-04-27

## 2017-04-12 NOTE — NURSING NOTE
"Chief Complaint   Patient presents with     URI       Initial /73 (BP Location: Right arm, Patient Position: Chair, Cuff Size: Adult Regular)  Pulse 72  Temp 99  F (37.2  C) (Temporal)  Ht 5' 7\" (1.702 m)  Wt 144 lb 3.2 oz (65.4 kg)  SpO2 99%  BMI 22.58 kg/m2 Estimated body mass index is 22.58 kg/(m^2) as calculated from the following:    Height as of this encounter: 5' 7\" (1.702 m).    Weight as of this encounter: 144 lb 3.2 oz (65.4 kg).  Medication Reconciliation: complete     Court Perez CMA    "

## 2017-04-12 NOTE — PATIENT INSTRUCTIONS
What Is Acute Bronchitis?  Acute or short-term bronchitis last for days or weeks. It occurs when the bronchial tubes (airways in the lungs) are irritated by a virus, bacteria, or allergen. This causes a cough that produces yellow or greenish mucus.  Inside healthy lungs    Air travels in and out of the lungs through the airways. The linings of these airways produce sticky mucus. This mucus traps particles that enter the lungs. Tiny structures called cilia then sweep the particles out of the airways.     Healthy airway: Airways are normally open. Air moves in and out easily.      Healthy cilia: Tiny, hairlike cilia sweep mucus and particles up and out of the airways.   Lungs with bronchitis  Bronchitis often occurs with a cold or the flu virus. The airways become inflamed (red and swollen). There is a deep  hacking  cough from the extra mucus. Other symptoms may include:    Wheezing    Chest discomfort    Shortness of breath    Mild fever  A second infection, this time due to bacteria, may then occur. And airways irritated by allergens or smoke are more likely to get infected.        Inflamed airway: Inflammation and extra mucus narrow the airway, causing shortness of breath.      Impaired cilia: Extra mucus impairs cilia, causing congestion and wheezing. Smoking makes the problem worse.   Making a diagnosis  A physical exam, health history, and certain tests help your healthcare provider make the diagnosis.  Health history  Your healthcare provider will ask you about your symptoms.  The exam  Your provider listens to your chest for signs of congestion. He or she may also check your ears, nose, and throat.  Possible tests    A sputum test for bacteria. This requires a sample of mucus from the lungs.    A nasal or throat swab for bacterial infection.    A chest X-ray if your healthcare provider thinks you have pneumonia.    Tests to check for an underlying condition, such as allergies, asthma, or COPD. You may need  to see a specialist for more lung function testing.  Treating a cough  The main treatment for bronchitis is easing symptoms. Avoiding smoke, allergens, and other things that trigger coughing can often help. If the infection is bacterial, you may be given antibiotics. During the illness, it's important to get plenty of sleep. To ease symptoms:    Don t smoke, and avoid secondhand smoke.    Use a humidifier, or breathe in steam from a hot shower. This may help loosen mucus.    Drink a lot of water and juice. They can soothe the throat and may help thin mucus.    Sit up or use extra pillows when in bed to help lessen coughing and congestion.    Ask your provider about using cough medicine, pain and fever medicine, or a decongestant.  Antibiotics  Most cases of bronchitis are caused by cold or flu viruses. Antibiotics don t treat viral illness. Taking antibiotics when they are not needed increases your risk of getting an infection later that is antibiotic-resistant. Your provider will prescribe antibiotics if the infection is caused by bacteria. If they are prescribed:    Take the medicine until it is used up, even if symptoms have improved. If you don t, the bronchitis may come back.    Take them as directed. For instance, some medicines should be taken with food.    Ask your provider or pharmacist what side effects are common, and what to do about them.  Follow-up care  You should see your provider again in 2 to 3 weeks. By this time, symptoms should have improved. An infection that lasts longer may mean you have a more serious problem.  Prevention    Avoid tobacco smoke. If you smoke, quit. Stay away from smoky places. Ask friends and family not to smoke around you, or in your home or car.    Get checked for allergies.    Ask your provider about getting a yearly flu shot, and pneumococcal or pneumonia shots.    Wash your hands often. This helps reduce the chance of picking up viruses that cause colds and flu.  Call  your healthcare provider if:    Symptoms worsen, or new symptoms develop.    Breathing problems worsen or  become severe.    Symptoms don t get better within a week, or within 3 days of taking antibiotics.     5025-1450 [employer]. [address] [place] [address], [place] [address]. All rights reserved. This information is not intended as a substitute for professional medical care. Always follow your healthcare professional's instructions.

## 2017-04-12 NOTE — MR AVS SNAPSHOT
After Visit Summary   4/12/2017    Kaitlyn Garcia    MRN: [id number]           Patient Information     Date Of Birth          1975        Visit Information        Provider Department      4/12/2017 3:20 PM Steve Palafox MD [facility]        Today's Diagnoses     Acute bronchitis, unspecified organism    -  1      Care Instructions      What Is Acute Bronchitis?  Acute or short-term bronchitis last for days or weeks. It occurs when the bronchial tubes (airways in the lungs) are irritated by a virus, bacteria, or allergen. This causes a cough that produces yellow or greenish mucus.  Inside healthy lungs    Air travels in and out of the lungs through the airways. The linings of these airways produce sticky mucus. This mucus traps particles that enter the lungs. Tiny structures called cilia then sweep the particles out of the airways.     Healthy airway: Airways are normally open. Air moves in and out easily.      Healthy cilia: Tiny, hairlike cilia sweep mucus and particles up and out of the airways.   Lungs with bronchitis  Bronchitis often occurs with a cold or the flu virus. The airways become inflamed (red and swollen). There is a deep  hacking  cough from the extra mucus. Other symptoms may include:    Wheezing    Chest discomfort    Shortness of breath    Mild fever  A second infection, this time due to bacteria, may then occur. And airways irritated by allergens or smoke are more likely to get infected.        Inflamed airway: Inflammation and extra mucus narrow the airway, causing shortness of breath.      Impaired cilia: Extra mucus impairs cilia, causing congestion and wheezing. Smoking makes the problem worse.   Making a diagnosis  A physical exam, health history, and certain tests help your healthcare provider make the diagnosis.  Health history  Your healthcare provider will ask you about your symptoms.  The exam  Your provider listens to your chest for signs of  congestion. He or she may also check your ears, nose, and throat.  Possible tests    A sputum test for bacteria. This requires a sample of mucus from the lungs.    A nasal or throat swab for bacterial infection.    A chest X-ray if your healthcare provider thinks you have pneumonia.    Tests to check for an underlying condition, such as allergies, asthma, or COPD. You may need to see a specialist for more lung function testing.  Treating a cough  The main treatment for bronchitis is easing symptoms. Avoiding smoke, allergens, and other things that trigger coughing can often help. If the infection is bacterial, you may be given antibiotics. During the illness, it's important to get plenty of sleep. To ease symptoms:    Don t smoke, and avoid secondhand smoke.    Use a humidifier, or breathe in steam from a hot shower. This may help loosen mucus.    Drink a lot of water and juice. They can soothe the throat and may help thin mucus.    Sit up or use extra pillows when in bed to help lessen coughing and congestion.    Ask your provider about using cough medicine, pain and fever medicine, or a decongestant.  Antibiotics  Most cases of bronchitis are caused by cold or flu viruses. Antibiotics don t treat viral illness. Taking antibiotics when they are not needed increases your risk of getting an infection later that is antibiotic-resistant. Your provider will prescribe antibiotics if the infection is caused by bacteria. If they are prescribed:    Take the medicine until it is used up, even if symptoms have improved. If you don t, the bronchitis may come back.    Take them as directed. For instance, some medicines should be taken with food.    Ask your provider or pharmacist what side effects are common, and what to do about them.  Follow-up care  You should see your provider again in 2 to 3 weeks. By this time, symptoms should have improved. An infection that lasts longer may mean you have a more serious  problem.  Prevention    Avoid tobacco smoke. If you smoke, quit. Stay away from smoky places. Ask friends and family not to smoke around you, or in your home or car.    Get checked for allergies.    Ask your provider about getting a yearly flu shot, and pneumococcal or pneumonia shots.    Wash your hands often. This helps reduce the chance of picking up viruses that cause colds and flu.  Call your healthcare provider if:    Symptoms worsen, or new symptoms develop.    Breathing problems worsen or  become severe.    Symptoms don t get better within a week, or within 3 days of taking antibiotics.     2259-5278 [employer]. [address] [place] [address]. All rights reserved. This information is not intended as a substitute for professional medical care. Always follow your healthcare professional's instructions.              Follow-ups after your visit        Your next 10 appointments already scheduled     May 09, 2017  8:30 AM CDT   Return Visit with Lorena Fernandez MD   [facility]    [address] [place] [id number] [phone]              Who to contact     If you have questions or need follow up information about today's clinic visit or your schedule please contact [facility] directly at [phone].  Normal or non-critical lab and imaging results will be communicated to you by MyChart, letter or phone within 4 business days after the clinic has received the results. If you do not hear from us within 7 days, please contact the clinic through MyChart or phone. If you have a critical or abnormal lab result, we will notify you by phone as soon as possible.  Submit refill requests through [employer] or call your pharmacy and they will forward the refill request to us. Please allow 3 business days for your refill to be completed.          Additional Information About Your Visit        MyChart Information      "[email or web address] gives you secure access to your electronic health record. If you see a primary care provider, you can also send messages to your care team and make appointments. If you have questions, please call your primary care clinic.  If you do not have a primary care provider, please call [phone] and they will assist you.      [email or web address] is an electronic gateway that provides easy, online access to your medical records. With [email or web address], you can request a clinic appointment, read your test results, renew a prescription or communicate with your care team.     To access your existing account, please contact your [facility] or call [phone] for assistance.        Care EveryWhere ID     This is your Care EveryWhere ID. This could be used by other organizations to access your [facility] medical records  [id number]        Your Vitals Were     Pulse Temperature Height Pulse Oximetry BMI (Body Mass Index)       72 99  F (37.2  C) (Temporal) 5' 7\" (1.702 m) 99% 22.58 kg/m2        Blood Pressure from Last 3 Encounters:   04/12/17 106/73   02/14/17 113/80   02/10/17 103/74    Weight from Last 3 Encounters:   04/12/17 144 lb 3.2 oz (65.4 kg)   02/14/17 146 lb 3.2 oz (66.3 kg)   02/01/17 140 lb (63.5 kg)              Today, you had the following     No orders found for display         Today's Medication Changes          These changes are accurate as of: 4/12/17  3:36 PM.  If you have any questions, ask your nurse or doctor.               Start taking these medicines.        Dose/Directions    guaiFENesin-codeine 100-10 MG/5ML Soln solution   Commonly known as:  ROBITUSSIN AC   Used for:  Acute bronchitis, unspecified organism   Started by:  Steve Palafox MD        Dose:  1 tsp.   Take 5 mLs by mouth every 4 hours as needed for cough   Quantity:  120 mL   Refills:  0            Where to get your medicines      Some of these will need a paper prescription and others can be bought over the " counter.  Ask your nurse if you have questions.     Bring a paper prescription for each of these medications     guaiFENesin-codeine 100-10 MG/5ML Soln solution                Primary Care Provider Office Phone # Fax #    SINDHU Antonio VENANCIO [phone]       [facility] [address]  [place] [address]        Thank you!     Thank you for choosing [facility]  for your care. Our goal is always to provide you with excellent care. Hearing back from our patients is one way we can continue to improve our services. Please take a few minutes to complete the written survey that you may receive in the mail after your visit with us. Thank you!             Your Updated Medication List - Protect others around you: Learn how to safely use, store and throw away your medicines at www.disposemymeds.org.          This list is accurate as of: 4/12/17  3:36 PM.  Always use your most recent med list.                   Brand Name Dispense Instructions for use    escitalopram 10 MG tablet    LEXAPRO    180 tablet    TAKE TWO TABLETS BY MOUTH EVERY DAY       fluticasone 50 MCG/ACT spray    FLONASE    48 g    USE 2 SPRAYS IN EACH NOSTRIL ONCE DAILY       guaiFENesin-codeine 100-10 MG/5ML Soln solution    ROBITUSSIN AC    120 mL    Take 5 mLs by mouth every 4 hours as needed for cough

## 2017-04-27 ENCOUNTER — OFFICE VISIT (OUTPATIENT)
Dept: PEDIATRICS | Facility: CLINIC | Age: 42
End: 2017-04-27
Payer: COMMERCIAL

## 2017-04-27 VITALS
WEIGHT: 142.9 LBS | BODY MASS INDEX: 22.38 KG/M2 | TEMPERATURE: 97.1 F | DIASTOLIC BLOOD PRESSURE: 70 MMHG | SYSTOLIC BLOOD PRESSURE: 102 MMHG | OXYGEN SATURATION: 99 % | HEART RATE: 64 BPM

## 2017-04-27 DIAGNOSIS — F43.23 SITUATIONAL MIXED ANXIETY AND DEPRESSIVE DISORDER: ICD-10-CM

## 2017-04-27 DIAGNOSIS — K21.9 GASTROESOPHAGEAL REFLUX DISEASE WITHOUT ESOPHAGITIS: ICD-10-CM

## 2017-04-27 DIAGNOSIS — Z13.29 SCREENING FOR THYROID DISORDER: ICD-10-CM

## 2017-04-27 DIAGNOSIS — Z13.6 CARDIOVASCULAR SCREENING; LDL GOAL LESS THAN 160: ICD-10-CM

## 2017-04-27 DIAGNOSIS — D72.819 LEUKOPENIA, UNSPECIFIED TYPE: ICD-10-CM

## 2017-04-27 DIAGNOSIS — Z00.00 ENCOUNTER FOR ROUTINE ADULT HEALTH EXAMINATION WITHOUT ABNORMAL FINDINGS: Primary | ICD-10-CM

## 2017-04-27 DIAGNOSIS — Z13.1 SCREENING FOR DIABETES MELLITUS: ICD-10-CM

## 2017-04-27 LAB
ANION GAP SERPL CALCULATED.3IONS-SCNC: 6 MMOL/L (ref 3–14)
BUN SERPL-MCNC: 11 MG/DL (ref 7–30)
CALCIUM SERPL-MCNC: 8.8 MG/DL (ref 8.5–10.1)
CHLORIDE SERPL-SCNC: 105 MMOL/L (ref 94–109)
CHOLEST SERPL-MCNC: 199 MG/DL
CO2 SERPL-SCNC: 29 MMOL/L (ref 20–32)
CREAT SERPL-MCNC: 0.83 MG/DL (ref 0.52–1.04)
DEPRECATED CALCIDIOL+CALCIFEROL SERPL-MC: 25 UG/L (ref 20–75)
ERYTHROCYTE [DISTWIDTH] IN BLOOD BY AUTOMATED COUNT: 11.8 % (ref 10–15)
GFR SERPL CREATININE-BSD FRML MDRD: 76 ML/MIN/1.7M2
GLUCOSE SERPL-MCNC: 83 MG/DL (ref 70–99)
HCT VFR BLD AUTO: 37.2 % (ref 35–47)
HDLC SERPL-MCNC: 75 MG/DL
HGB BLD-MCNC: 13.1 G/DL (ref 11.7–15.7)
LDLC SERPL CALC-MCNC: 109 MG/DL
MCH RBC QN AUTO: 32 PG (ref 26.5–33)
MCHC RBC AUTO-ENTMCNC: 35.2 G/DL (ref 31.5–36.5)
MCV RBC AUTO: 91 FL (ref 78–100)
NONHDLC SERPL-MCNC: 124 MG/DL
PLATELET # BLD AUTO: 232 10E9/L (ref 150–450)
POTASSIUM SERPL-SCNC: 3.6 MMOL/L (ref 3.4–5.3)
RBC # BLD AUTO: 4.09 10E12/L (ref 3.8–5.2)
SODIUM SERPL-SCNC: 140 MMOL/L (ref 133–144)
TRIGL SERPL-MCNC: 77 MG/DL
TSH SERPL DL<=0.005 MIU/L-ACNC: 0.96 MU/L (ref 0.4–4)
WBC # BLD AUTO: 3.8 10E9/L (ref 4–11)

## 2017-04-27 PROCEDURE — 85027 COMPLETE CBC AUTOMATED: CPT | Performed by: NURSE PRACTITIONER

## 2017-04-27 PROCEDURE — 84443 ASSAY THYROID STIM HORMONE: CPT | Performed by: NURSE PRACTITIONER

## 2017-04-27 PROCEDURE — 82306 VITAMIN D 25 HYDROXY: CPT | Performed by: NURSE PRACTITIONER

## 2017-04-27 PROCEDURE — 80061 LIPID PANEL: CPT | Performed by: NURSE PRACTITIONER

## 2017-04-27 PROCEDURE — 36415 COLL VENOUS BLD VENIPUNCTURE: CPT | Performed by: NURSE PRACTITIONER

## 2017-04-27 PROCEDURE — 99396 PREV VISIT EST AGE 40-64: CPT | Performed by: NURSE PRACTITIONER

## 2017-04-27 PROCEDURE — 80048 BASIC METABOLIC PNL TOTAL CA: CPT | Performed by: NURSE PRACTITIONER

## 2017-04-27 RX ORDER — OMEPRAZOLE 10 MG/1
CAPSULE, DELAYED RELEASE ORAL
Qty: 60 CAPSULE | Status: CANCELLED | OUTPATIENT
Start: 2017-04-27

## 2017-04-27 RX ORDER — ESCITALOPRAM OXALATE 20 MG/1
20 TABLET ORAL DAILY
Qty: 90 TABLET | Refills: 3 | Status: SHIPPED | OUTPATIENT
Start: 2017-04-27 | End: 2017-12-28

## 2017-04-27 ASSESSMENT — ANXIETY QUESTIONNAIRES
IF YOU CHECKED OFF ANY PROBLEMS ON THIS QUESTIONNAIRE, HOW DIFFICULT HAVE THESE PROBLEMS MADE IT FOR YOU TO DO YOUR WORK, TAKE CARE OF THINGS AT HOME, OR GET ALONG WITH OTHER PEOPLE: NOT DIFFICULT AT ALL
5. BEING SO RESTLESS THAT IT IS HARD TO SIT STILL: NOT AT ALL
6. BECOMING EASILY ANNOYED OR IRRITABLE: NOT AT ALL
7. FEELING AFRAID AS IF SOMETHING AWFUL MIGHT HAPPEN: NOT AT ALL
GAD7 TOTAL SCORE: 0
1. FEELING NERVOUS, ANXIOUS, OR ON EDGE: NOT AT ALL
3. WORRYING TOO MUCH ABOUT DIFFERENT THINGS: NOT AT ALL
2. NOT BEING ABLE TO STOP OR CONTROL WORRYING: NOT AT ALL

## 2017-04-27 ASSESSMENT — PATIENT HEALTH QUESTIONNAIRE - PHQ9: 5. POOR APPETITE OR OVEREATING: NOT AT ALL

## 2017-04-27 NOTE — PATIENT INSTRUCTIONS
PLAN:   1.   Symptomatic therapy suggested: Continue current medications.  2.  Orders Placed This Encounter   Medications     DISCONTD: OMEPRAZOLE PO     escitalopram (LEXAPRO) 20 MG tablet     Sig: Take 1 tablet (20 mg) by mouth daily     Dispense:  90 tablet     Refill:  3     omeprazole (PRILOSEC) 20 MG CR capsule     Sig: Take 1 capsule (20 mg) by mouth daily     Dispense:  90 capsule     Refill:  3     Orders Placed This Encounter   Procedures     Lipid panel reflex to direct LDL     Vitamin D Deficiency     Basic metabolic panel     TSH with free T4 reflex     3. Patient needs to follow up in if no improvement,or sooner if worsening of symptoms or other symptoms develop.  Will follow up and/or notify patient of  results via My Chart to determine further need for followup  Follow up office visit in one year for annual health maintenance exam, sooner PRN.    Preventive Health Recommendations  Female Ages 40 to 49    Yearly exam:     See your health care provider every year in order to  1. Review health changes.   2. Discuss preventive care.    3. Review your medicines if your doctor prescribed any.      Get a Pap test every three years (unless you have an abnormal result and your provider advises testing more often).      If you get Pap tests with HPV test, you only need to test every 5 years, unless you have an abnormal result. You do not need a Pap test if your uterus was removed (hysterectomy) and you have not had cancer.      You should be tested each year for STDs (sexually transmitted diseases), if you're at risk.       Ask your doctor if you should have a mammogram.      Have a colonoscopy (test for colon cancer) if someone in your family has had colon cancer or polyps before age 50.       Have a cholesterol test every 5 years.       Have a diabetes test (fasting glucose) after age 45. If you are at risk for diabetes, you should have this test every 3 years.    Shots: Get a flu shot each year. Get a  tetanus shot every 10 years.     Nutrition:     Eat at least 5 servings of fruits and vegetables each day.    Eat whole-grain bread, whole-wheat pasta and brown rice instead of white grains and rice.    Talk to your provider about Calcium and Vitamin D.     Lifestyle    Exercise at least 150 minutes a week (an average of 30 minutes a day, 5 days a week). This will help you control your weight and prevent disease.    Limit alcohol to one drink per day.    No smoking.     Wear sunscreen to prevent skin cancer.    See your dentist every six months for an exam and cleaning.  It was a pleasure seeing you today at the [facility]. Thank you for allowing us to care for you today. We truly hope we provided you with the excellent service you deserve. Please let us know if there is anything else we can do for you so we can be sure you are leaving completley satisfied with your care experience.       General information about your clinic   Clinic Hours Lab Hours (Appointments are required)   Mon-Thurs: 7:30 AM - 7 PM Mon-Thurs: 7:30 AM - 7 PM   Fri: 7:30 AM - 5 PM Fri: 7:30 AM - 5 PM        After Hours Nurse Advise & Appts:  [facility]: [phone]  [facility] On Call: to make appointments anytime: [phone] On Call Physician: call [phone] and answering service will page the on call physician.        For urgent appointments, please call [phone] and ask for the triage nurse or your care team clinic nurse.  How to contact my care team:  MyChart: [email or web address]   Phone: [phone]   Fax: [phone]       [facility]:   Phone: [phone]  Hours: 8:00 AM - 6:00 PM  Medication Refills:  Call your pharmacy and they will forward the refill to us. Please allow 3 business days for your refills to be completed.       Normal or non-critical lab and imaging results will be communicated to you by MyChart, letter or phone within 7 days.  If you do not hear from us  within 10 days, please call the clinic. If you have a critical or abnormal lab result, we will notify you by phone as soon as possible.       We now have PWIC (Pediatric Walk in Care)  Monday-Friday from 7:30-4. Simply walk in and be seen for your urgent needs like cough, fever, rash, diarrhea or vomiting, pink eye, UTI. No appointments needed. Ask one of the team for more information      -Your Care Team:    Dr. Steve Palafox - Internal Medicine  Dr. Ashli Phillips - Internal Medicine/Pediatrics   Dr. Lesvia Oneal - Family Medicine  Dr. Juanita Watson - Pediatrics  Dr. Aislinn Tay - Pediatrics  Ani Slater CNP - Family Practice Nurse Practitioner

## 2017-04-27 NOTE — MR AVS SNAPSHOT
After Visit Summary   4/27/2017    Kaitlyn Garcia    MRN: [id number]           Patient Information     Date Of Birth          1975        Visit Information        Provider Department      4/27/2017 10:00 AM Ani Slater APRN CNP [facility]        Today's Diagnoses     Encounter for routine adult health examination without abnormal findings    -  1    Situational mixed anxiety and depressive disorder        Gastroesophageal reflux disease without esophagitis        CARDIOVASCULAR SCREENING; LDL GOAL LESS THAN 160        WBC decreased        Screening for diabetes mellitus        Screening for thyroid disorder          Care Instructions    PLAN:   1.   Symptomatic therapy suggested: Continue current medications.  2.  Orders Placed This Encounter   Medications     DISCONTD: OMEPRAZOLE PO     escitalopram (LEXAPRO) 20 MG tablet     Sig: Take 1 tablet (20 mg) by mouth daily     Dispense:  90 tablet     Refill:  3     omeprazole (PRILOSEC) 20 MG CR capsule     Sig: Take 1 capsule (20 mg) by mouth daily     Dispense:  90 capsule     Refill:  3     Orders Placed This Encounter   Procedures     Lipid panel reflex to direct LDL     Vitamin D Deficiency     Basic metabolic panel     TSH with free T4 reflex     3. Patient needs to follow up in if no improvement,or sooner if worsening of symptoms or other symptoms develop.  Will follow up and/or notify patient of  results via My Chart to determine further need for followup  Follow up office visit in one year for annual health maintenance exam, sooner PRN.    Preventive Health Recommendations  Female Ages 40 to 49    Yearly exam:     See your health care provider every year in order to  1. Review health changes.   2. Discuss preventive care.    3. Review your medicines if your doctor prescribed any.      Get a Pap test every three years (unless you have an abnormal result and your provider advises testing more often).      If you get  Pap tests with HPV test, you only need to test every 5 years, unless you have an abnormal result. You do not need a Pap test if your uterus was removed (hysterectomy) and you have not had cancer.      You should be tested each year for STDs (sexually transmitted diseases), if you're at risk.       Ask your doctor if you should have a mammogram.      Have a colonoscopy (test for colon cancer) if someone in your family has had colon cancer or polyps before age 50.       Have a cholesterol test every 5 years.       Have a diabetes test (fasting glucose) after age 45. If you are at risk for diabetes, you should have this test every 3 years.    Shots: Get a flu shot each year. Get a tetanus shot every 10 years.     Nutrition:     Eat at least 5 servings of fruits and vegetables each day.    Eat whole-grain bread, whole-wheat pasta and brown rice instead of white grains and rice.    Talk to your provider about Calcium and Vitamin D.     Lifestyle    Exercise at least 150 minutes a week (an average of 30 minutes a day, 5 days a week). This will help you control your weight and prevent disease.    Limit alcohol to one drink per day.    No smoking.     Wear sunscreen to prevent skin cancer.    See your dentist every six months for an exam and cleaning.  It was a pleasure seeing you today at the [facility]. Thank you for allowing us to care for you today. We truly hope we provided you with the excellent service you deserve. Please let us know if there is anything else we can do for you so we can be sure you are leaving completley satisfied with your care experience.       General information about your clinic   Clinic Hours Lab Hours (Appointments are required)   Mon-Thurs: 7:30 AM - 7 PM Mon-Thurs: 7:30 AM - 7 PM   Fri: 7:30 AM - 5 PM Fri: 7:30 AM - 5 PM        After Hours Nurse Advise & Appts:  [facility]: [phone]  [facility] On Call: to make appointments anytime: [phone]  On Call Physician: call [phone] and answering service will page the on call physician.        For urgent appointments, please call [phone] and ask for the triage nurse or your care team clinic nurse.  How to contact my care team:  [facility]: [email or web address]   Phone: [phone]   Fax: [phone]       [facility]:   Phone: [phone]  Hours: 8:00 AM - 6:00 PM  Medication Refills:  Call your pharmacy and they will forward the refill to us. Please allow 3 business days for your refills to be completed.       Normal or non-critical lab and imaging results will be communicated to you by MyChart, letter or phone within 7 days.  If you do not hear from us within 10 days, please call the clinic. If you have a critical or abnormal lab result, we will notify you by phone as soon as possible.       We now have PWIC (Pediatric Walk in Care)  Monday-Friday from 7:30-4. Simply walk in and be seen for your urgent needs like cough, fever, rash, diarrhea or vomiting, pink eye, UTI. No appointments needed. Ask one of the team for more information      -Your Care Team:    Dr. Steve Palafox - Internal Medicine  Dr. Ashli Phillips - Internal Medicine/Pediatrics   Dr. Lesvia Oneal - Family Medicine  Dr. Juanita Watson - Pediatrics  Dr. Aislinn Tay - Pediatrics  Ani Slater CNP - Family Practice Nurse Practitioner        Follow-ups after your visit        Your next 10 appointments already scheduled     May 09, 2017  8:30 AM CDT   Return Visit with Lorena Fernandez MD   [facility])    [address] [address] [place] [address] [phone]            May 09, 2017 10:15 AM CDT   MR BREAST BILATERAL W CONTRAST with MGMR1   [facility])    [address] [place] [address] [phone]           Take your medicines as usual, unless your doctor tells you not to. Bring a list of your current  medicines to your exam (including vitamins, minerals and over-the-counter drugs).  The timing of your exam may depend on the start of your last period. If you re in menopause, you may have the exam anytime.  Please bring any previous mammograms or breast MRIs from other facilities to the MRI dept. Do not mail these items to us.  You will have contrast for this exam. To prepare:   The day before your exam, drink extra fluids at least six 8-ounce glasses (unless your doctor tells you to restrict your fluids).   Have a blood test (creatinine test) within 30 days of your exam. Go to your clinic or Diagnostic Imaging Department for this test.  The MRI machine uses a strong magnet. Please wear clothes without metal (snaps, zippers). A sweatsuit works well, or we may give you a hospital gown.  Please remove any body piercings and hair extensions before you arrive. You will also remove watches, jewelry, hairpins, wallets, dentures, partial dental plates and hearing aids. You may wear contact lenses, and you may be able to wear your rings. We have a safe place to keep your personal items, but it is safer to leave them at home.   **IMPORTANT** THE INSTRUCTIONS BELOW ARE ONLY FOR THOSE PATIENTS WHO HAVE BEEN TOLD THEY WILL RECEIVE SEDATION OR GENERAL ANESTHESIA DURING THEIR MRI PROCEDURE:  IF YOU WILL RECEIVE SEDATION (take medicine to help you relax during your exam)   You must get the medicine from your doctor before you arrive. Bring the medicine to the exam. Do not take it at home.   Arrive one hour early. Bring someone who can take you home after the test. Your medicine will make you sleepy. After the exam, you may not drive, take a bus or take a taxi by yourself.   No eating 8 hours before your exam. You may have clear liquids up until 4 hours before your exam. (Clear liquids include water, clear tea, black coffee and fruit juice without pulp.)  IF YOU WILL RECEIVE ANESTHESIA (be asleep for your exam)   Arrive 1 1/2  hours early. Bring someone who can take you home after the test. You may not drive, take a bus or take a taxi by yourself.   No eating 8 hours before your exam. You may have clear liquids up until 4 hours before your exam. (Clear liquids include water, clear tea, black coffee and fruit juice without pulp.)  If you have any questions, please contact your Imaging Department exam site.            May 24, 2017 10:00 AM CDT   (Arrive by 9:45 AM)   Return Visit with Krystal Bolanos MD   [facility] [place] [place] ([facility])    [address]  2nd Floor  [facility] [address] [phone]            Jun 09, 2017  8:30 AM CDT   Return Visit with Kirill Nicholson OD   [facility]    [address] [place] [address] [phone]              Who to contact     If you have questions or need follow up information about today's clinic visit or your schedule please contact [facility] directly at [phone].  Normal or non-critical lab and imaging results will be communicated to you by MyChart, letter or phone within 4 business days after the clinic has received the results. If you do not hear from us within 7 days, please contact the clinic through [employer] or phone. If you have a critical or abnormal lab result, we will notify you by phone as soon as possible.  Submit refill requests through [employer] or call your pharmacy and they will forward the refill request to us. Please allow 3 business days for your refill to be completed.          Additional Information About Your Visit        [employer] Information     [employer] gives you secure access to your electronic health record. If you see a primary care provider, you can also send messages to your care team and make appointments. If you have questions, please call your primary care clinic.  If you do not have a primary care provider, please call [phone] and they  will assist you.      [employer] is an electronic gateway that provides easy, online access to your medical records. With [employer], you can request a clinic appointment, read your test results, renew a prescription or communicate with your care team.     To access your existing account, please contact your [facility] or call [phone] for assistance.        Care EveryWhere ID     This is your Care EveryWhere ID. This could be used by other organizations to access your [facility] medical records  [id number]        Your Vitals Were     Pulse Temperature Pulse Oximetry Breastfeeding? BMI (Body Mass Index)       64 97.1  F (36.2  C) (Temporal) 99% No 22.38 kg/m2        Blood Pressure from Last 3 Encounters:   04/27/17 102/70   04/12/17 106/73   02/14/17 113/80    Weight from Last 3 Encounters:   04/27/17 142 lb 14.4 oz (64.8 kg)   04/12/17 144 lb 3.2 oz (65.4 kg)   02/14/17 146 lb 3.2 oz (66.3 kg)              We Performed the Following     **CBC with platelets FUTURE anytime     Basic metabolic panel     Lipid panel reflex to direct LDL     TSH with free T4 reflex     Vitamin D Deficiency          Today's Medication Changes          These changes are accurate as of: 4/27/17 10:24 AM.  If you have any questions, ask your nurse or doctor.               These medicines have changed or have updated prescriptions.        Dose/Directions    escitalopram 20 MG tablet   Commonly known as:  LEXAPRO   This may have changed:    - medication strength  - how much to take  - how to take this  - when to take this  - additional instructions   Used for:  Situational mixed anxiety and depressive disorder   Changed by:  Ani Slater APRN CNP        Dose:  20 mg   Take 1 tablet (20 mg) by mouth daily   Quantity:  90 tablet   Refills:  3       omeprazole 20 MG CR capsule   Commonly known as:  priLOSEC   This may have changed:    - medication strength  - how much to take  - when to take this   Used  for:  Gastroesophageal reflux disease without esophagitis   Changed by:  Ani Slater APRN CNP        Dose:  20 mg   Take 1 capsule (20 mg) by mouth daily   Quantity:  90 capsule   Refills:  3            Where to get your medicines      These medications were sent to [facility] [place] - [address]  [address], [facility] [address]     Phone:  [phone]     escitalopram 20 MG tablet    omeprazole 20 MG CR capsule                Primary Care Provider Office Phone # Fax #    SINDHU Antonio [phone]       [facility] [address]  [place] [address]        Thank you!     Thank you for choosing [facility]  for your care. Our goal is always to provide you with excellent care. Hearing back from our patients is one way we can continue to improve our services. Please take a few minutes to complete the written survey that you may receive in the mail after your visit with us. Thank you!             Your Updated Medication List - Protect others around you: Learn how to safely use, store and throw away your medicines at www.disposemymeds.org.          This list is accurate as of: 4/27/17 10:24 AM.  Always use your most recent med list.                   Brand Name Dispense Instructions for use    escitalopram 20 MG tablet    LEXAPRO    90 tablet    Take 1 tablet (20 mg) by mouth daily       fluticasone 50 MCG/ACT spray    FLONASE    48 g    USE 2 SPRAYS IN EACH NOSTRIL ONCE DAILY       omeprazole 20 MG CR capsule    priLOSEC    90 capsule    Take 1 capsule (20 mg) by mouth daily

## 2017-04-27 NOTE — PROGRESS NOTES
SUBJECTIVE:     CC: Kaitlyn Garcia is an 41 year old woman who presents for preventive health visit.     Healthy Habits:    Do you get at least three servings of calcium containing foods daily (dairy, green leafy vegetables, etc.)? yes    Amount of exercise or daily activities, outside of work: 3 day(s) per week    Problems taking medications regularly No    Medication side effects: No    Have you had an eye exam in the past two years? yes    Do you see a dentist twice per year? yes  Do you have sleep apnea, excessive snoring or daytime drowsiness?no    Today's PHQ-2 Score:   PHQ-2 ( 1999 [employer]) 4/27/2017 2/14/2017   Q1: Little interest or pleasure in doing things 0 0   Q2: Feeling down, depressed or hopeless 0 0   PHQ-2 Score 0 0       Abuse: Current or Past(Physical, Sexual or Emotional)- No  Do you feel safe in your environment - Yes    Social History   Substance Use Topics     Smoking status: Never Smoker     Smokeless tobacco: Never Used     Alcohol use 0.0 oz/week     0 Standard drinks or equivalent per week      Comment: Less than once a month     The patient does not drink >3 drinks per day nor >7 drinks per week.    Recent Labs   Lab Test  02/03/15   0854  05/21/12   1025   CHOL  156  195   HDL  58  64   LDL  85  109   TRIG  66  111   CHOLHDLRATIO  2.7  3.0       Reviewed orders with patient.  Reviewed health maintenance and updated orders accordingly - Yes    Mammo Decision Support:  Patient under age 50, mutual decision reflected in health maintenance.      Pertinent mammograms are reviewed under the imaging tab.  History of abnormal Pap smear: NO - age 30- 65 PAP every 3 years recommended    Reviewed and updated as needed this visit by clinical staff  Tobacco  Allergies  Meds  Med Hx  Surg Hx  Fam Hx  Soc Hx        Reviewed and updated as needed this visit by Provider        Past Medical History:   Diagnosis Date     Anemia      Choroidal nevus of right eye      Concussion 10/2015      Depressive disorder 2006    Treated with celexa for two years     Family history of breast cancer 2/3/2015     Family history of breast cancer 2/3/2015     Family history of breast cancer 2/3/2015     Family history of breast cancer 2/3/2015     Gastroesophageal reflux disease      Papilloma of breast 5/23/12    Left, See Dr. Lynch at      PONV (postoperative nausea and vomiting)      S/P endometrial ablation 10/10/13    Menorrhagia     Shingles     x2 in the past     WBC decreased 2/3/2015     WBC decreased 2/3/2015     WBC decreased 2/3/2015      Past Surgical History:   Procedure Laterality Date     ABDOMEN SURGERY      rectoplasty     BIOPSY BREAST NEEDLE LOCALIZATION  7/30/2012    Procedure: BIOPSY BREAST NEEDLE LOCALIZATION;  left breast excisional Biopsy with wire localization @0830;  Surgeon: Robert Lynch MD;  Location: [facility] OR     BREAST SURGERY       COSMETIC SURGERY      Rectalplasty for episiotomy repair     COSMETIC SURGERY      Rectalplasty for episiotomy repair     DILATION AND CURETTAGE, HYSTEROSCOPY, ABLATE ENDOMETRIUM NOVASURE, COMBINED  10/10/2013    Procedure: COMBINED DILATION AND CURETTAGE, HYSTEROSCOPY, ABLATE ENDOMETRIUM NOVASURE;  Endometrial ablation with Novasure;  Surgeon: Indu Birmingham DO;  Location: [facility] OR     HC TOOTH EXTRACTION W/FORCEP       LUMPECTOMY BREAST Left 2/1/2017    Procedure: LUMPECTOMY BREAST;  Surgeon: Lori Montenegro MD;  [facility]     SOFT TISSUE SURGERY      lumpectomy x 2 right breast     SOFT TISSUE SURGERY      episiotomy fixed       ROS:  CONSTITUTIONAL:NEGATIVE for fever, chills, change in weight  INTEGUMENTARY/SKIN: NEGATIVE for worrisome rashes, moles or lesions  EYES: NEGATIVE for vision changes or irritation  ENT: POSITIVE for nasal congestion and postnasal drainage and NEGATIVE for sinus pressure  RESP:NEGATIVE for significant cough or SOB  BREAST: NEGATIVE for masses, tenderness or discharge  CV: NEGATIVE for chest pain,  palpitations or peripheral edema  GI: POSITIVE for signs of possible reflux noted by ENT  and NEGATIVE for nausea, poor appetite, vomiting and weight loss   female: no unusual urinary symptoms, no unusual vaginal symptoms and menses: had an ablation so nothing   MUSCULOSKELETAL:NEGATIVE for significant arthralgias or myalgia  NEURO: NEGATIVE for weakness, dizziness or paresthesias  ENDOCRINE: NEGATIVE for temperature intolerance, skin/hair changes  HEME/ALLERGY/IMMUNE: POSITIVE  for allergies and NEGATIVE for anemia  PSYCHIATRIC: POSITIVE forHx anxiety and Hx depression and NEGATIVE forthoughts of hurting someone else and thoughts of self harm       Patient Active Problem List   Diagnosis     CARDIOVASCULAR SCREENING; LDL GOAL LESS THAN 160     Iron deficiency anemia     Mammographic microcalcification found on diagnostic imaging of breast     Intraductal papilloma of breast     Atypical hyperplasia of breast     Acne     Bloating symptom     Abdominal pain     Abnormal biliary HIDA scan     Family history of breast cancer     WBC decreased     Choroidal nevus of right eye     Situational mixed anxiety and depressive disorder     Glaucoma suspect, bilateral     Post-concussion syndrome     Muscle spasms of head or neck     Allergic rhinitis     Past Surgical History:   Procedure Laterality Date     ABDOMEN SURGERY      rectoplasty     BIOPSY BREAST NEEDLE LOCALIZATION  7/30/2012    Procedure: BIOPSY BREAST NEEDLE LOCALIZATION;  left breast excisional Biopsy with wire localization @0830;  Surgeon: Robert Lynch MD;  Location: [facility]     BREAST SURGERY       COSMETIC SURGERY      Rectalplasty for episiotomy repair     COSMETIC SURGERY      Rectalplasty for episiotomy repair     DILATION AND CURETTAGE, HYSTEROSCOPY, ABLATE ENDOMETRIUM NOVASURE, COMBINED  10/10/2013    Procedure: COMBINED DILATION AND CURETTAGE, HYSTEROSCOPY, ABLATE ENDOMETRIUM NOVASURE;  Endometrial ablation with Novasure;  Surgeon: Vinnie  nIdu Hamm DO;  Location: [facility]     HC TOOTH EXTRACTION W/FORCEP       LUMPECTOMY BREAST Left 2/1/2017    Procedure: LUMPECTOMY BREAST;  Surgeon: Lori Montenegro MD;  [facility]     SOFT TISSUE SURGERY      lumpectomy x 2 right breast     SOFT TISSUE SURGERY      episiotomy fixed       Social History   Substance Use Topics     Smoking status: Never Smoker     Smokeless tobacco: Never Used     Alcohol use 0.0 oz/week     0 Standard drinks or equivalent per week      Comment: Less than once a month     Family History   Problem Relation Age of Onset     Hypertension Father      Lipids Father      Hyperlipidemia Father      Depression/Anxiety Father      CEREBROVASCULAR DISEASE Father      TIA     CANCER Maternal Grandmother      Glaucoma Maternal Grandmother      Macular Degeneration Maternal Grandmother      OSTEOPOROSIS Maternal Grandmother      Anesthesia Reaction Paternal Grandmother      DIABETES Paternal Grandmother      CANCER Paternal Grandfather      Depression/Anxiety Mother      Depression/Anxiety Daughter      Depression Daughter      Depression Son      Cataracts Maternal Grandfather      Breast Cancer Paternal Aunt 60     x 2 aunts     Breast Cancer Other 40     Paternal cousin     Breast Cancer Other 64     Several aunts on fathers side/Several aunts on fathers side/Several aunts on fathers side/Several aunts on fathers side     Prostate Cancer Other 64     Uncle on mothers side     Asthma No family hx of      C.A.D. No family hx of      Cancer - colorectal No family hx of      Connective Tissue Disorder No family hx of      Thyroid Disease No family hx of      Coronary Artery Disease No family hx of      Ovarian Cancer No family hx of      Thyroid Disease No family hx of      Chemical Addiction No family hx of      Known Genetic Syndrome No family hx of          Current Outpatient Prescriptions   Medication Sig Dispense Refill     OMEPRAZOLE PO        escitalopram (LEXAPRO) 10 MG tablet  TAKE TWO TABLETS BY MOUTH EVERY  tablet 3     fluticasone (FLONASE) 50 MCG/ACT nasal spray USE 2 SPRAYS IN EACH NOSTRIL ONCE DAILY 48 g 11     Allergies   Allergen Reactions     No Known Drug Allergies      OBJECTIVE:     /70 (BP Location: Right arm, Patient Position: Chair, Cuff Size: Adult Regular)  Pulse 64  Temp 97.1  F (36.2  C) (Temporal)  Wt 142 lb 14.4 oz (64.8 kg)  SpO2 99%  Breastfeeding? No  BMI 22.38 kg/m2  EXAM:  GENERAL: healthy, alert and no distress  EYES: Eyes grossly normal to inspection, PERRL and conjunctivae and sclerae normal  HENT: ear canals and TM's normal, nose and mouth without ulcers or lesions  NECK: no adenopathy, no asymmetry, masses, or scars and thyroid normal to palpation  RESP: lungs clear to auscultation - no rales, rhonchi or wheezes  BREAST: normal without masses, tenderness or nipple discharge and no palpable axillary masses or adenopathy  CV: regular rate and rhythm, normal S1 S2, no S3 or S4, no murmur, click or rub, no peripheral edema and peripheral pulses strong  ABDOMEN: soft, nontender, no hepatosplenomegaly, no masses and bowel sounds normal   (female): normal female external genitalia, normal urethral meatus, vaginal mucosa pink, moist, well rugated, and normal cervix/adnexa/uterus without masses or discharge  MS: no gross musculoskeletal defects noted, no edema  SKIN: no suspicious lesions or rashes  NEURO: Normal strength and tone, mentation intact and speech normal  PSYCH: mentation appears normal, affect normal/bright  LYMPH: no cervical, supraclavicular, axillary, or inguinal adenopathy    ASSESSMENT/PLAN:     Kaitlyn was seen today for physical.    Diagnoses and all orders for this visit:    Encounter for routine adult health examination without abnormal findings  -     Lipid panel reflex to direct LDL  -     Vitamin D Deficiency  -     Basic metabolic panel    Situational mixed anxiety and depressive disorder  -     escitalopram (LEXAPRO) 20  "MG tablet; Take 1 tablet (20 mg) by mouth daily  Continue current medications.    Gastroesophageal reflux disease without esophagitis  -     omeprazole (PRILOSEC) 20 MG CR capsule; Take 1 capsule (20 mg) by mouth daily  Continue current medications.    CARDIOVASCULAR SCREENING; LDL GOAL LESS THAN 160  -     Lipid panel reflex to direct LDL    WBC decreased  -     **CBC with platelets FUTURE anytime    Screening for diabetes mellitus  -     Basic metabolic panel    Screening for thyroid disorder  -     TSH with free T4 reflex    PLAN:   Continue current medications.  Patient needs to follow up in if no improvement,or sooner if worsening of symptoms or other symptoms develop.  Will follow up and/or notify patient of  results via My Chart to determine further need for followup  Follow up office visit in one year for annual health maintenance exam, sooner PRN.      COUNSELING:   Reviewed preventive health counseling, as reflected in patient instructions  Special attention given to:        Regular exercise       Healthy diet/nutrition       Vision screening       Contraception       Osteoporosis Prevention/Bone Health       The 10-year ASCVD risk score (Soy OLIVAREZ Jr, et al., 2013) is: 0.2%    Values used to calculate the score:      Age: 41 years      Sex: Female      Is Non- : No      Diabetic: No      Tobacco smoker: No      Systolic Blood Pressure: 103 mmHg      Is BP treated: No      HDL Cholesterol: 75 mg/dL      Total Cholesterol: 199 mg/dL         reports that she has never smoked. She has never used smokeless tobacco.    Estimated body mass index is 22.58 kg/(m^2) as calculated from the following:    Height as of 4/12/17: 5' 7\" (1.702 m).    Weight as of 4/12/17: 144 lb 3.2 oz (65.4 kg).       Counseling Resources:  ATP IV Guidelines  Pooled Cohorts Equation Calculator  Breast Cancer Risk Calculator  FRAX Risk Assessment  ICSI Preventive Guidelines  Dietary Guidelines for Americans, " 2010  USDA's MyPlate  ASA Prophylaxis  Lung CA Screening    SINDHU Antonio CNP  [facility]

## 2017-04-27 NOTE — NURSING NOTE
"Chief Complaint   Patient presents with     Physical     AFE-fasting today       Initial /70 (BP Location: Right arm, Patient Position: Chair, Cuff Size: Adult Regular)  Pulse 64  Temp 97.1  F (36.2  C) (Temporal)  Wt 142 lb 14.4 oz (64.8 kg)  SpO2 99%  Breastfeeding? No  BMI 22.38 kg/m2 Estimated body mass index is 22.38 kg/(m^2) as calculated from the following:    Height as of 4/12/17: 5' 7\" (1.702 m).    Weight as of this encounter: 142 lb 14.4 oz (64.8 kg).  Medication Reconciliation: complete      ALEJANDRO Bishop      "

## 2017-04-28 ASSESSMENT — ANXIETY QUESTIONNAIRES: GAD7 TOTAL SCORE: 0

## 2017-04-28 ASSESSMENT — PATIENT HEALTH QUESTIONNAIRE - PHQ9: SUM OF ALL RESPONSES TO PHQ QUESTIONS 1-9: 0

## 2017-04-29 NOTE — PROGRESS NOTES
David Garcia,    Attached are your test results.  -Kidney function is normal (Cr, GFR), Sodium is normal, Potassium is normal, Calcium is normal, Glucose is normal (diabetes screening test).   -Cholesterol levels (LDL,HDL, Triglycerides) are normal.  ADVISE: rechecking in 1 year.  -TSH (thyroid stimulating hormone) level is normal which indicates normal thyroid function.  -Normal red blood cell (hgb) levels, very slightly decreased white blood cell count and normal platelet levels. ADVISE: Recheck in 3 months   -Vitamin D level is  Mildly below normal, 1000 IU daily in diet or supplements is recommended.    Please contact us if you have any questions.    Ani Slater, CNP

## 2017-05-09 ENCOUNTER — OFFICE VISIT (OUTPATIENT)
Dept: OTOLARYNGOLOGY | Facility: CLINIC | Age: 42
End: 2017-05-09
Payer: COMMERCIAL

## 2017-05-09 ENCOUNTER — RADIANT APPOINTMENT (OUTPATIENT)
Dept: MRI IMAGING | Facility: CLINIC | Age: 42
End: 2017-05-09
Attending: FAMILY MEDICINE
Payer: COMMERCIAL

## 2017-05-09 VITALS
HEIGHT: 66 IN | HEART RATE: 66 BPM | BODY MASS INDEX: 22.5 KG/M2 | SYSTOLIC BLOOD PRESSURE: 110 MMHG | WEIGHT: 140 LBS | DIASTOLIC BLOOD PRESSURE: 73 MMHG

## 2017-05-09 DIAGNOSIS — Z12.39 BREAST CANCER SCREENING, HIGH RISK PATIENT: ICD-10-CM

## 2017-05-09 DIAGNOSIS — K21.9 GASTROESOPHAGEAL REFLUX DISEASE WITHOUT ESOPHAGITIS: ICD-10-CM

## 2017-05-09 DIAGNOSIS — J31.0 CHRONIC RHINITIS: Primary | ICD-10-CM

## 2017-05-09 PROCEDURE — 0159T MR BREAST BILATERAL W/O & W CONTRAST: CPT | Performed by: RADIOLOGY

## 2017-05-09 PROCEDURE — 77059 MR BREAST BILATERAL W/O & W CONTRAST: CPT | Performed by: RADIOLOGY

## 2017-05-09 PROCEDURE — 99213 OFFICE O/P EST LOW 20 MIN: CPT | Performed by: OTOLARYNGOLOGY

## 2017-05-09 PROCEDURE — A9585 GADOBUTROL INJECTION: HCPCS | Performed by: RADIOLOGY

## 2017-05-09 RX ORDER — GADOBUTROL 604.72 MG/ML
7.5 INJECTION INTRAVENOUS ONCE
Status: COMPLETED | OUTPATIENT
Start: 2017-05-09 | End: 2017-05-09

## 2017-05-09 RX ADMIN — GADOBUTROL 6.5 ML: 604.72 INJECTION INTRAVENOUS at 10:36

## 2017-05-09 NOTE — NURSING NOTE
"Kaitlyn Garcia's goals for this visit include:   Chief Complaint   Patient presents with     RECHECK     sinuses are not better       She requests these members of her care team be copied on today's visit information: yes      PCP: Ani Slater    Referring Provider:  No referring provider defined for this encounter.    Chief Complaint   Patient presents with     RECHECK     sinuses are not better       Initial /73  Pulse 66  Ht 1.676 m (5' 6\")  Wt 63.5 kg (140 lb)  BMI 22.6 kg/m2 Estimated body mass index is 22.6 kg/(m^2) as calculated from the following:    Height as of this encounter: 1.676 m (5' 6\").    Weight as of this encounter: 63.5 kg (140 lb).  Medication Reconciliation: complete    "

## 2017-05-09 NOTE — MR AVS SNAPSHOT
After Visit Summary   5/9/2017    Kaitlyn Garcia    MRN: [id number]           Patient Information     Date Of Birth          1975        Visit Information        Provider Department      5/9/2017 8:30 AM Lorena Fernandez MD [facility]         Follow-ups after your visit        Your next 10 appointments already scheduled     May 09, 2017 10:15 AM CDT   MR BREAST BILATERAL W CONTRAST with [facility]   [facility] ([facility])    [address] [place] [address] [phone]           Take your medicines as usual, unless your doctor tells you not to. Bring a list of your current medicines to your exam (including vitamins, minerals and over-the-counter drugs).  The timing of your exam may depend on the start of your last period. If you re in menopause, you may have the exam anytime.  Please bring any previous mammograms or breast MRIs from other facilities to the MRI dept. Do not mail these items to us.  You will have contrast for this exam. To prepare:   The day before your exam, drink extra fluids at least six 8-ounce glasses (unless your doctor tells you to restrict your fluids).   Have a blood test (creatinine test) within 30 days of your exam. Go to your clinic or Diagnostic Imaging Department for this test.  The MRI machine uses a strong magnet. Please wear clothes without metal (snaps, zippers). A sweatsuit works well, or we may give you a hospital gown.  Please remove any body piercings and hair extensions before you arrive. You will also remove watches, jewelry, hairpins, wallets, dentures, partial dental plates and hearing aids. You may wear contact lenses, and you may be able to wear your rings. We have a safe place to keep your personal items, but it is safer to leave them at home.   **IMPORTANT** THE INSTRUCTIONS BELOW ARE ONLY FOR THOSE PATIENTS WHO HAVE BEEN TOLD THEY WILL RECEIVE SEDATION OR GENERAL ANESTHESIA DURING  THEIR MRI PROCEDURE:  IF YOU WILL RECEIVE SEDATION (take medicine to help you relax during your exam)   You must get the medicine from your doctor before you arrive. Bring the medicine to the exam. Do not take it at home.   Arrive one hour early. Bring someone who can take you home after the test. Your medicine will make you sleepy. After the exam, you may not drive, take a bus or take a taxi by yourself.   No eating 8 hours before your exam. You may have clear liquids up until 4 hours before your exam. (Clear liquids include water, clear tea, black coffee and fruit juice without pulp.)  IF YOU WILL RECEIVE ANESTHESIA (be asleep for your exam)   Arrive 1 1/2 hours early. Bring someone who can take you home after the test. You may not drive, take a bus or take a taxi by yourself.   No eating 8 hours before your exam. You may have clear liquids up until 4 hours before your exam. (Clear liquids include water, clear tea, black coffee and fruit juice without pulp.)  If you have any questions, please contact your Imaging Department exam site.            May 24, 2017  7:40 AM CDT   (Arrive by 7:25 AM)   Return Visit with Krystal Bolanos MD   [facility] ([facility])    [address]  [address] [facility] [address] [phone]            Jun 09, 2017  8:30 AM CDT   Return Visit with Kirill Nicholson OD   [facility]    [address] [address] [place] [address] [phone]            Aug 08, 2017  8:30 AM CDT   Return Visit with Lorena Fernandez MD   [facility] ([facility])    [address] [address] [place] [address] [phone]              Who to contact     If you have questions or need follow up information about today's clinic visit or your schedule please contact [facility] directly at [phone].  Normal or non-critical lab and imaging results  "will be communicated to you by MyChart, letter or phone within 4 business days after the clinic has received the results. If you do not hear from us within 7 days, please contact the clinic through [employer] or phone. If you have a critical or abnormal lab result, we will notify you by phone as soon as possible.  Submit refill requests through [employer] or call your pharmacy and they will forward the refill request to us. Please allow 3 business days for your refill to be completed.          Additional Information About Your Visit        Gu[employer] Information     [employer] gives you secure access to your electronic health record. If you see a primary care provider, you can also send messages to your care team and make appointments. If you have questions, please call your primary care clinic.  If you do not have a primary care provider, please call [phone] and they will assist you.      [employer] is an electronic gateway that provides easy, online access to your medical records. With [employer], you can request a clinic appointment, read your test results, renew a prescription or communicate with your care team.     To access your existing account, please contact your [facility] or call [phone] for assistance.        Care EveryWhere ID     This is your Care EveryWhere ID. This could be used by other organizations to access your [facility] medical records  [id number]        Your Vitals Were     Pulse Height BMI (Body Mass Index)             66 1.676 m (5' 6\") 22.6 kg/m2          Blood Pressure from Last 3 Encounters:   05/09/17 110/73   04/27/17 102/70   04/12/17 106/73    Weight from Last 3 Encounters:   05/09/17 63.5 kg (140 lb)   04/27/17 64.8 kg (142 lb 14.4 oz)   04/12/17 65.4 kg (144 lb 3.2 oz)              Today, you had the following     No orders found for display       Primary Care Provider Office Phone # Fax #    SINDHU Antonio [phone]       " [facility] [address]  [place] [address]        Thank you!     Thank you for choosing [facility]  for your care. Our goal is always to provide you with excellent care. Hearing back from our patients is one way we can continue to improve our services. Please take a few minutes to complete the written survey that you may receive in the mail after your visit with us. Thank you!             Your Updated Medication List - Protect others around you: Learn how to safely use, store and throw away your medicines at www.disposemymeds.org.          This list is accurate as of: 5/9/17  9:17 AM.  Always use your most recent med list.                   Brand Name Dispense Instructions for use    escitalopram 20 MG tablet    LEXAPRO    90 tablet    Take 1 tablet (20 mg) by mouth daily       fluticasone 50 MCG/ACT spray    FLONASE    48 g    USE 2 SPRAYS IN EACH NOSTRIL ONCE DAILY       omeprazole 20 MG CR capsule    priLOSEC    90 capsule    Take 1 capsule (20 mg) by mouth daily

## 2017-05-09 NOTE — PROGRESS NOTES
HPI    This is a 41 year old patient is here for the f/u. She has been having post nasal drainage, frequent clearing of her throat and cough right after any meal or food intake. For months. Denies any facial pressure, heartburn, allergies. No hx of difficulty swallowing, voice changes, bleeding from nose, drainage, congestion, sore throat, food or environmental allergies. She has a hx of post concussion syndrome. She is not a smoker.    Review of Systems   Constitutional: Negative.    HENT: Negative.    Eyes: Negative for blurred vision and double vision.   Respiratory: Negative for cough, hemoptysis, sputum production and shortness of breath.    Gastrointestinal: Negative for heartburn, nausea and vomiting.   Skin: Negative.    Neurological: Negative for dizziness, tingling and tremors.   Endo/Heme/Allergies: Negative for environmental allergies.         Physical Exam   Constitutional: She is well-developed, well-nourished, and in no distress.   HENT:   Head: Normocephalic and atraumatic.   Right Ear: Tympanic membrane, external ear and ear canal normal. No drainage, swelling or tenderness. No middle ear effusion. No decreased hearing is noted.   Left Ear: Tympanic membrane, external ear and ear canal normal. No drainage, swelling or tenderness.  No middle ear effusion. No decreased hearing is noted.   Nose: Mucosal edema and septal deviation present. No rhinorrhea.   Mouth/Throat: Uvula is midline, oropharynx is clear and moist and mucous membranes are normal. No oropharyngeal exudate.   Eyes: Pupils are equal, round, and reactive to light.     Septum is slightly deviated to the left. No pus or polyp seen. Inferior turbinates are enlarged. Nasopharynx is normal.     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 We will continue with topical nasal steroid to shrink the mucosa and open ope the nasal passages. Her history is  consistent with the endoscopic evaluation and highly likely showed esophago-pharyngeal reflux. I will encourage her to continue with antireflux medication, Omeprazole once a day before breakfast. Options discussed and reflux precautions reviewed. I will see this pleasant lady in 3 months.

## 2017-05-12 ENCOUNTER — RADIANT APPOINTMENT (OUTPATIENT)
Dept: ULTRASOUND IMAGING | Facility: CLINIC | Age: 42
End: 2017-05-12
Attending: FAMILY MEDICINE
Payer: COMMERCIAL

## 2017-05-12 DIAGNOSIS — R92.8 ABNORMAL MRI, BREAST: ICD-10-CM

## 2017-05-12 PROCEDURE — 76642 ULTRASOUND BREAST LIMITED: CPT | Mod: LT

## 2017-05-18 ENCOUNTER — HOSPITAL ENCOUNTER (OUTPATIENT)
Dept: MRI IMAGING | Facility: CLINIC | Age: 42
Discharge: HOME | End: 2017-05-18
Attending: FAMILY MEDICINE | Admitting: FAMILY MEDICINE
Payer: COMMERCIAL

## 2017-05-18 ENCOUNTER — MYC MEDICAL ADVICE (OUTPATIENT)
Dept: PEDIATRICS | Facility: CLINIC | Age: 42
End: 2017-05-18

## 2017-05-18 ENCOUNTER — TELEPHONE (OUTPATIENT)
Dept: OBGYN | Facility: CLINIC | Age: 42
End: 2017-05-18

## 2017-05-18 ENCOUNTER — HOSPITAL ENCOUNTER (OUTPATIENT)
Dept: MAMMOGRAPHY | Facility: CLINIC | Age: 42
End: 2017-05-18
Attending: FAMILY MEDICINE
Payer: COMMERCIAL

## 2017-05-18 DIAGNOSIS — R92.8 ABNORMAL MRI, BREAST: ICD-10-CM

## 2017-05-18 DIAGNOSIS — F43.23 SITUATIONAL MIXED ANXIETY AND DEPRESSIVE DISORDER: Primary | ICD-10-CM

## 2017-05-18 PROCEDURE — 88305 TISSUE EXAM BY PATHOLOGIST: CPT | Performed by: RADIOLOGY

## 2017-05-18 PROCEDURE — 00000159 ZZHCL STATISTIC H-SEND OUTS PREP: Performed by: RADIOLOGY

## 2017-05-18 PROCEDURE — 27210995 ZZH RX 272: Performed by: FAMILY MEDICINE

## 2017-05-18 PROCEDURE — A9585 GADOBUTROL INJECTION: HCPCS | Performed by: FAMILY MEDICINE

## 2017-05-18 PROCEDURE — 40000272 MA POST PROCEDURE LEFT

## 2017-05-18 PROCEDURE — 19085 BX BREAST 1ST LESION MR IMAG: CPT

## 2017-05-18 PROCEDURE — 88305 TISSUE EXAM BY PATHOLOGIST: CPT | Mod: 26 | Performed by: RADIOLOGY

## 2017-05-18 PROCEDURE — 25000128 H RX IP 250 OP 636: Performed by: FAMILY MEDICINE

## 2017-05-18 RX ORDER — DIAZEPAM 2 MG
2 TABLET ORAL EVERY 12 HOURS PRN
Qty: 10 TABLET | Refills: 0 | Status: SHIPPED | OUTPATIENT
Start: 2017-05-18 | End: 2018-04-24

## 2017-05-18 RX ORDER — ACYCLOVIR 200 MG/1
60 CAPSULE ORAL ONCE
Status: COMPLETED | OUTPATIENT
Start: 2017-05-18 | End: 2017-05-18

## 2017-05-18 RX ORDER — GADOBUTROL 604.72 MG/ML
10 INJECTION INTRAVENOUS ONCE
Status: COMPLETED | OUTPATIENT
Start: 2017-05-18 | End: 2017-05-18

## 2017-05-18 RX ADMIN — GADOBUTROL 10 ML: 604.72 INJECTION INTRAVENOUS at 13:15

## 2017-05-18 RX ADMIN — SODIUM CHLORIDE 60 ML: 9 INJECTION, SOLUTION INTRAMUSCULAR; INTRAVENOUS; SUBCUTANEOUS at 13:15

## 2017-05-18 NOTE — DISCHARGE INSTRUCTIONS
After Your Breast Biopsy    Bleeding or bruising: Slight bruising is normal.  If you bleed through the bandage, put direct pressure on the breast.  If you are still bleeding after 20 minutes, call the doctor who ordered the exam.    Bandages: Keep your bandage in place until tomorrow morning.  Do not get it wet.  Leave the tape in place for two days.  On the second day, cover it with a Band-Aid.    Activity: You may shower the morning after the exam.  No heavy activity (lifting, vacuuming) for 24 hours.    Discomfort: Wear your bra overnight to support the breast.  You may take Tylenol (acetaminophen) for pain.  If you had a stereotactic of MR-directed biopsy, you may take aspirin or ibuprofen (Advil, Motrin) the morning after your biopsy, unless your doctor tells you not to.    Infection: Infection is rare.  Symptoms include fever, redness, increasing pain and fluid draining from the biopsy site.  If you have any of these symptoms, please call the doctor who ordered your exam.    Results: Results may take up to three business days.  If you have not heard your results in three days, call the Breast Center Nurse at [phone] or [phone].  In rare cases, we may need to do another biopsy.    Call the doctor who ordered your exam if:    You have bleeding that lasts more than 20 minutes.    You have pain that cannot be controlled.    You have signs of infection (fever, redness, drainage or other signs).    You have not had your results within three days.    Nurse navigator: Our nurse navigator is here to answer your questions and help you set up future clinic visits.  Please call [phone].    Thank you for choosing [facility].  Please call us if you have questions or concerns about your biopsy.

## 2017-05-18 NOTE — TELEPHONE ENCOUNTER
Routed [employer] message to PCP    Kelle Welch RN,   [facility]

## 2017-05-18 NOTE — TELEPHONE ENCOUNTER
Spoke [facility] . She needed orders placed for Breast biopsy  by Dr Bolanos signed.   Printed orders from 5/12/17 and had Clayton Nunez sign them and they were faxed to  #[phone]

## 2017-05-19 LAB — COPATH REPORT: NORMAL

## 2017-05-19 NOTE — PROGRESS NOTES
After review by Breast Center Radiologist, Dr. Sosa Ojeda, Ms. Garcia was called and given her 5/18/2017 Left MRI Breast Biopsy results (Papilloma, Fibrocystic Changes and Sclerosing Adenosis) and recommended Follow up (Surgical Consult).  Biopsy site is without issues. Kaitlyn has dealt with these type of results before.  She is seeing Dr. Bolanos next week and will discuss a plan with her.  She has had breast surgery in the past for similar pathology results.  She will arrange a surgical consult herself with the surgeon she has used before at the [facility]. She has our [facility] phone number if she needs any further assistance.

## 2017-05-22 ASSESSMENT — ENCOUNTER SYMPTOMS
MUSCLE WEAKNESS: 0
SMELL DISTURBANCE: 0
STIFFNESS: 0
SORE THROAT: 0
TROUBLE SWALLOWING: 0
NECK MASS: 0
MUSCLE CRAMPS: 0
JOINT SWELLING: 0
SINUS CONGESTION: 1
ARTHRALGIAS: 1
SINUS PAIN: 1
BACK PAIN: 0
TASTE DISTURBANCE: 0
HOARSE VOICE: 0
MYALGIAS: 0
NECK PAIN: 0

## 2017-05-23 NOTE — PROGRESS NOTES
Kaitlyn is a 41 year old female who presents to the Breast Center to discuss her hx of intraductal papilloma[s] X 2 and atypical hyperplasia atypia identified 7/30/2012 and 12/2016.   - Frustrated that after her last two images they have resulted in the need for biopsy and excisional biopsy.  She is wondering if mastectomy or other interventions are needed.   - After last MRI  5.9.2017  There was a focal enhancement in central left breast. Pathology showed an intraductal papilloma with no evidence for malignancy. Surgical consultation recommended and she will schedule with Dr. Montenegro.  Mammogram in 12/2016 resulted in biopsy and excisional biopsy  12/12/2016:  Columnar cell change with flat epithelial atypia, focus of atypical ductal hyperplasia (1 mm) with excisional biopsy 2.1.2017:  showed flat epithelial atypia.   - Life time risk by SEBASTIAN 42% and 20% by Heather. Abiodun 11%.  HPI:   Kaitlyn is a healthy 41-year old woman with a history of four breast biopsies: Two  intraductal papillomas and atypical hyperplasia found [5/2012 and 12.2016].   She had her first menstrual period at 14-15 years old and had her first child at age 17. She saw genetic counselor 2/2015 and no genetic testing was recommended. No first degree relatives with breast cancer:     Two of Kaitlyn s paternal aunts have had breast cancer in their 60 s, and are currently in their 70 s.    One of Kaitlyn mcintosh paternal cousins had either breast or ovarian cancer in her 30s or 40s. She is currently 50 years old. This cousin s mother has never had cancer.  ROS  General:  None  Head/eyes:  None  Ears/Nose/Throat:  None  Breast: no concerns  Cardiovascular:  None  Respiratory:  None  Gastrointestinal:  None  Genitourinary:  None  Sexual Function:  None  Musculoskeletal:  None  Skin:  None  Neurological:  None  Mental Health:  None  Endocrine:  None  Past Medical History:  Past Medical History:   Diagnosis Date     Anemia      Choroidal nevus of right eye       Concussion 10/2015     Depressive disorder 2006    Treated with celexa for two years     Family history of breast cancer 2/3/2015     Family history of breast cancer 2/3/2015     Family history of breast cancer 2/3/2015     Family history of breast cancer 2/3/2015     Gastroesophageal reflux disease      Papilloma of breast 5/23/12    Left, See Dr. Lynch at      PONV (postoperative nausea and vomiting)      S/P endometrial ablation 10/10/13    Menorrhagia     Shingles     x2 in the past     WBC decreased 2/3/2015     WBC decreased 2/3/2015     WBC decreased 2/3/2015   Past Surgical History:  Past Surgical History:   Procedure Laterality Date     ABDOMEN SURGERY      rectoplasty     BIOPSY BREAST NEEDLE LOCALIZATION  7/30/2012    Procedure: BIOPSY BREAST NEEDLE LOCALIZATION;  left breast excisional Biopsy with wire localization @0830;  Surgeon: Robert Lynch MD;  Location: [facility] OR     BREAST SURGERY       COSMETIC SURGERY      Rectalplasty for episiotomy repair     COSMETIC SURGERY      Rectalplasty for episiotomy repair     DILATION AND CURETTAGE, HYSTEROSCOPY, ABLATE ENDOMETRIUM NOVASURE, COMBINED  10/10/2013    Procedure: COMBINED DILATION AND CURETTAGE, HYSTEROSCOPY, ABLATE ENDOMETRIUM NOVASURE;  Endometrial ablation with Novasure;  Surgeon: Indu Birmingham DO;  Location: [facility] OR     HC TOOTH EXTRACTION W/FORCEP       LUMPECTOMY BREAST Left 2/1/2017    Procedure: LUMPECTOMY BREAST;  Surgeon: Lori Montenegro MD;  Location: [facility]     SOFT TISSUE SURGERY      lumpectomy x 2 right breast     SOFT TISSUE SURGERY      episiotomy fixed   Medications:  Current Outpatient Prescriptions   Medication Sig Dispense Refill     diazepam (VALIUM) 2 MG tablet Take 1 tablet (2 mg) by mouth every 12 hours as needed for anxiety or sleep 10 tablet 0     escitalopram (LEXAPRO) 20 MG tablet Take 1 tablet (20 mg) by mouth daily 90 tablet 3     omeprazole (PRILOSEC) 20 MG CR capsule Take 1 capsule (20 mg) by  "mouth daily 90 capsule 3     fluticasone (FLONASE) 50 MCG/ACT nasal spray USE 2 SPRAYS IN EACH NOSTRIL ONCE DAILY 48 g 11   Social History:  Nurse on medical floor at [employer]  Vitals:   /67  Pulse 65  Temp 97.8  F (36.6  C) (Oral)  Resp 16  Ht 1.695 m (5' 6.75\")  Wt 65.6 kg (144 lb 11.2 oz)  SpO2 99%  BMI 22.83 kg/m2  Physical Exam:  Constitutional: no distress   Pyschological: Alert/Oriented  Eyes: anicteric, normal extra-ocular movements  Neck: No thyroidmegaly  Breast: Examined in a sitting and lying position.  Symmetrical without visible distortion, swelling or rashes.  No nipple inversion, nipple discharge, breast dimpling or puckering.  Breast tissue is heterogenous dense to palpation. Well healed surgical scars in the upper outer quadrant of both breasts.  The left breast over the scar has significant fibroglandular tissues.  No abnormal masses noted. Axillary area without masses or lympadenopathy.  Skin: no concerning lesions, no jaundice  Neurological: Normal gait, no tremor  25 minutes was spent in direct contact with the patient and > 50% of the time in patient education and coordination of care.  Diagnoses and associated orders for this visit:  HX of Atypical hyperplasia and two intraductal papillomas. Has been following with high risk breast cancer screening since 2012:   -  Reviewed images with radiologist and agree that repeated biopsies are frustrating but continuing with high risk screening is the most reasonable approach    Next mammogram in 12/2017 with breast center visit  AH is a risk factor for breast cancer. Patients diagnosed with atypical hyperplasia are at higher risk for developing breast cancer in the future. Current guidelines advised High risk screening and a discussion regarding benefits of Chemoprevention to reduce risk.   Discussed in depth screening, chemoprevention and prevention of breast cancer  Chemoprevention:  Discussed results of the NSABP P-1 Tamoxifen vs " placebo for 5 yrs: Results reduced the risk of invasive breast cancer by 49% and subgroup analysis demonstrated greatest benefit of 56% for LCIS and 86% reduction for ADH.     RX for Tamoxifen 20 mg given.   5/2017: intraductal papilloma - needs surgical consult which she will arrange before leaving clinic today.

## 2017-05-24 ENCOUNTER — ONCOLOGY VISIT (OUTPATIENT)
Dept: ONCOLOGY | Facility: CLINIC | Age: 42
End: 2017-05-24
Attending: FAMILY MEDICINE
Payer: COMMERCIAL

## 2017-05-24 VITALS
WEIGHT: 144.7 LBS | TEMPERATURE: 97.8 F | HEIGHT: 67 IN | BODY MASS INDEX: 22.71 KG/M2 | SYSTOLIC BLOOD PRESSURE: 103 MMHG | HEART RATE: 65 BPM | OXYGEN SATURATION: 99 % | DIASTOLIC BLOOD PRESSURE: 67 MMHG | RESPIRATION RATE: 16 BRPM

## 2017-05-24 DIAGNOSIS — N60.99 ATYPICAL HYPERPLASIA OF BREAST: Primary | ICD-10-CM

## 2017-05-24 DIAGNOSIS — D24.2 INTRADUCTAL PAPILLOMA OF BREAST, LEFT: ICD-10-CM

## 2017-05-24 DIAGNOSIS — Z12.39 BREAST CANCER SCREENING, HIGH RISK PATIENT: ICD-10-CM

## 2017-05-24 PROCEDURE — 99212 OFFICE O/P EST SF 10 MIN: CPT | Mod: ZF

## 2017-05-24 RX ORDER — TAMOXIFEN CITRATE 20 MG/1
20 TABLET ORAL DAILY
Qty: 90 TABLET | Refills: 1 | Status: SHIPPED | OUTPATIENT
Start: 2017-05-24 | End: 2017-11-06

## 2017-05-24 ASSESSMENT — PAIN SCALES - GENERAL: PAINLEVEL: NO PAIN (0)

## 2017-05-24 NOTE — NURSING NOTE
"Oncology Rooming Note    May 24, 2017 7:35 AM   Kaitlyn Garcia is a 51 year old female who presents for: Oncology Clinic Visit    Initial Vitals: /67  Pulse 65  Temp 97.8  F (36.6  C) (Oral)  Resp 16  Ht 1.695 m (5' 6.75\")  Wt 65.6 kg (144 lb 11.2 oz)  SpO2 99%  BMI 22.83 kg/m2 Estimated body mass index is 22.83 kg/(m^2) as calculated from the following:    Height as of this encounter: 1.695 m (5' 6.75\").    Weight as of this encounter: 65.6 kg (144 lb 11.2 oz). Body surface area is 1.76 meters squared.  No Pain (0) Comment: Data Unavailable   No LMP recorded. Patient has had an ablation.  Allergies reviewed: Yes  Medications reviewed: Yes    Medications: Medication refills not needed today.  Pharmacy name entered into [facility]:    [facility] [place], [place] - [address]  [facility] [place] [address]  [id number] - [place], [place] - [address] [place] AT [facility]    Clinical concerns:no pain just tenderness and brusing  MD was NOT notified.    6 minutes for nursing intake (face to face time)     Ora Dumont CMA                              "

## 2017-05-24 NOTE — MR AVS SNAPSHOT
After Visit Summary   5/24/2017    Kaitlyn Garcia    MRN: [id number]           Patient Information     Date Of Birth          1975        Visit Information        Provider Department      5/24/2017 7:40 AM Krystal Bolanos MD [facility]        Today's Diagnoses     Atypical hyperplasia of breast    -  1       Follow-ups after your visit        Your next 10 appointments already scheduled     Jun 09, 2017  8:30 AM CDT   Return Visit with Kirill Nicholson OD   [facility] ([facility])    [address] [place] [address] [id number] [phone]            Jun 09, 2017 11:45 AM CDT   (Arrive by 11:30 AM)   Return Visit with Lori Montenegro MD   [facility] ([facility])    [address] [place] [id number] [phone]            Aug 08, 2017  8:30 AM CDT   Return Visit with Lorena Fernandez MD   [facility] ([facility])    [address] [place] [address] [phone]              Who to contact     If you have questions or need follow up information about today's clinic visit or your schedule please contact [facility] directly at [phone].  Normal or non-critical lab and imaging results will be communicated to you by MyChart, letter or phone within 4 business days after the clinic has received the results. If you do not hear from us within 7 days, please contact the clinic through MyChart or phone. If you have a critical or abnormal lab result, we will notify you by phone as soon as possible.  Submit refill requests through [employer] or call your pharmacy and they will forward the refill request to us. Please allow 3 business days for your refill to be completed.          Additional Information About Your Visit        [employer] Information     [employer] gives you secure access to your electronic health record. If you  "see a primary care provider, you can also send messages to your care team and make appointments. If you have questions, please call your primary care clinic.  If you do not have a primary care provider, please call [phone] and they will assist you.        Care EveryWhere ID     This is your Care EveryWhere ID. This could be used by other organizations to access your [facility] medical records  [id number]        Your Vitals Were     Pulse Temperature Respirations Height Pulse Oximetry BMI (Body Mass Index)    65 97.8  F (36.6  C) (Oral) 16 1.695 m (5' 6.75\") 99% 22.83 kg/m2       Blood Pressure from Last 3 Encounters:   05/24/17 103/67   05/09/17 110/73   04/27/17 102/70    Weight from Last 3 Encounters:   05/24/17 65.6 kg (144 lb 11.2 oz)   05/09/17 63.5 kg (140 lb)   04/27/17 64.8 kg (142 lb 14.4 oz)              Today, you had the following     No orders found for display         Today's Medication Changes          These changes are accurate as of: 5/24/17  8:35 AM.  If you have any questions, ask your nurse or doctor.               Start taking these medicines.        Dose/Directions    tamoxifen 20 MG tablet   Commonly known as:  NOLVADEX   Used for:  Atypical hyperplasia of breast   Started by:  Krystal Bolanos MD        Dose:  20 mg   Take 1 tablet (20 mg) by mouth daily   Quantity:  90 tablet   Refills:  1            Where to get your medicines      These medications were sent to [facility] [place] - [place], [place] - [address], [address]  [address], [facility] [address]     Phone:  [phone]     tamoxifen 20 MG tablet                Primary Care Provider Office Phone # Fax #    SINDHU Antonio [phone]       [facility] [address]  [place] [address]        Thank you!     Thank you for choosing [facility]  for your care. Our goal is always to provide you with excellent care. Hearing back " from our patients is one way we can continue to improve our services. Please take a few minutes to complete the written survey that you may receive in the mail after your visit with us. Thank you!             Your Updated Medication List - Protect others around you: Learn how to safely use, store and throw away your medicines at www.disposemymeds.org.          This list is accurate as of: 5/24/17  8:35 AM.  Always use your most recent med list.                   Brand Name Dispense Instructions for use    diazepam 2 MG tablet    VALIUM    10 tablet    Take 1 tablet (2 mg) by mouth every 12 hours as needed for anxiety or sleep       escitalopram 20 MG tablet    LEXAPRO    90 tablet    Take 1 tablet (20 mg) by mouth daily       fluticasone 50 MCG/ACT spray    FLONASE    48 g    USE 2 SPRAYS IN EACH NOSTRIL ONCE DAILY       omeprazole 20 MG CR capsule    priLOSEC    90 capsule    Take 1 capsule (20 mg) by mouth daily       tamoxifen 20 MG tablet    NOLVADEX    90 tablet    Take 1 tablet (20 mg) by mouth daily

## 2017-06-09 ENCOUNTER — OFFICE VISIT (OUTPATIENT)
Dept: OPTOMETRY | Facility: CLINIC | Age: 42
End: 2017-06-09
Payer: COMMERCIAL

## 2017-06-09 ENCOUNTER — ONCOLOGY VISIT (OUTPATIENT)
Dept: ONCOLOGY | Facility: CLINIC | Age: 42
End: 2017-06-09
Attending: SURGERY
Payer: COMMERCIAL

## 2017-06-09 VITALS
BODY MASS INDEX: 22.32 KG/M2 | SYSTOLIC BLOOD PRESSURE: 102 MMHG | OXYGEN SATURATION: 97 % | DIASTOLIC BLOOD PRESSURE: 70 MMHG | RESPIRATION RATE: 18 BRPM | TEMPERATURE: 97.5 F | WEIGHT: 142.2 LBS | HEART RATE: 67 BPM | HEIGHT: 67 IN

## 2017-06-09 DIAGNOSIS — N60.99 ATYPICAL HYPERPLASIA OF BREAST: Primary | ICD-10-CM

## 2017-06-09 DIAGNOSIS — D24.2 INTRADUCTAL PAPILLOMA OF BREAST, LEFT: ICD-10-CM

## 2017-06-09 DIAGNOSIS — H40.003 GLAUCOMA SUSPECT, BILATERAL: Primary | ICD-10-CM

## 2017-06-09 PROCEDURE — 92083 EXTENDED VISUAL FIELD XM: CPT | Performed by: OPTOMETRIST

## 2017-06-09 PROCEDURE — 92133 CPTRZD OPH DX IMG PST SGM ON: CPT | Performed by: OPTOMETRIST

## 2017-06-09 PROCEDURE — 99212 OFFICE O/P EST SF 10 MIN: CPT | Mod: ZF

## 2017-06-09 PROCEDURE — 99213 OFFICE O/P EST LOW 20 MIN: CPT | Performed by: OPTOMETRIST

## 2017-06-09 ASSESSMENT — TONOMETRY
OD_IOP_MMHG: 10
IOP_METHOD: TONOPEN
OS_IOP_MMHG: 13

## 2017-06-09 ASSESSMENT — PAIN SCALES - GENERAL: PAINLEVEL: NO PAIN (0)

## 2017-06-09 ASSESSMENT — CUP TO DISC RATIO
OS_RATIO: 0.65
OD_RATIO: 0.6

## 2017-06-09 ASSESSMENT — SLIT LAMP EXAM - LIDS
COMMENTS: NORMAL
COMMENTS: NORMAL

## 2017-06-09 ASSESSMENT — VISUAL ACUITY
METHOD: SNELLEN - LINEAR
OD_SC: 20/20
OS_SC: 20/20

## 2017-06-09 ASSESSMENT — EXTERNAL EXAM - RIGHT EYE: OD_EXAM: NORMAL

## 2017-06-09 ASSESSMENT — EXTERNAL EXAM - LEFT EYE: OS_EXAM: NORMAL

## 2017-06-09 NOTE — NURSING NOTE
"Oncology Rooming Note    June 9, 2017 11:31 AM   Kaitlyn Garcia is a 41 year old female who presents for:    Chief Complaint   Patient presents with     Oncology Clinic Visit     ADH and FEA- New      Initial Vitals: /70  Pulse 67  Temp 97.5  F (36.4  C) (Oral)  Resp 18  Ht 1.702 m (5' 7\")  Wt 64.5 kg (142 lb 3.2 oz)  SpO2 97%  BMI 22.27 kg/m2 Estimated body mass index is 22.27 kg/(m^2) as calculated from the following:    Height as of this encounter: 1.702 m (5' 7\").    Weight as of this encounter: 64.5 kg (142 lb 3.2 oz). Body surface area is 1.75 meters squared.  No Pain (0) Comment: Data Unavailable   No LMP recorded. Patient has had an ablation.  Allergies reviewed: Yes  Medications reviewed: Yes    Medications: Medication refills not needed today.  Pharmacy name entered into [facility]:    [facility] [place], [place] - [address]  [facility] [place] - [address]  [id number] - [place], [place] - [address] [place] [facility]    Clinical concerns: no new concerns     6 minutes for nursing intake (face to face time)     Ora Dumont CMA                "

## 2017-06-09 NOTE — PROGRESS NOTES
CHIEF COMPLAINT:   Chief Complaint   Patient presents with     Glaucoma Suspect Evaluation          OBJECTIVE:     See ophthalmology exam    ASSESSMENT:         ICD-10-CM    1. Glaucoma suspect, bilateral H40.003 OCT Optic Nerve RNFL Optovue OU (both eyes)     HVF 24-2 OU   Due to appearance of optic nerves  IOPs- within normal limits both eyes   Visual fields- stable both eyes   OCT- stable both eyes   Thin corneas both eyes     PLAN:      Patient Instructions   You are a glaucoma suspect.  We will continue to monitor your intraocular pressures and optic nerves.    Return in 1 year for a complete eye exam or sooner if needed.    Kirill Nicholson, OD

## 2017-06-09 NOTE — LETTER
"2017      RE: Kaitlyn Garcia  [address] [facility] [address]     2017    Ani Slater, APRN [facility] [address]  [facility] [address]    RE: Kaitlyn Garcia  (: 1975)    Dear Dr. Ani Slater:    Your patient was seen for evaluation in my office.  Please find a copy of my notes for your record and review.  If you have any further questions, please feel free to contact my office.   Thank you for your kind referral.    Sincerely,   Lori Montenegro MD MSc [facility]    ---    FOLLOW-UP  2017    Kaitlyn Gracia is a 41 year old female who returns for follow-up for an intraductal papilloma.    Treatment to date:  1. L lumpectomy for intraductal papilloma ( by Dr Lynch) - pathology showed atypia.  2. High risk screening with MRI and mammograms [id number] since   3. L wire-localized lumpectomy for ADH (2017) - pathology without malignancy    HPI:    Since her last visit, an enhancing area was seen on MRI.  She subsequently underwent MRI-guided biopsy.  It showed a small intraductal papilloma without malignancy; no comment in the report regarding the presence of atypia.    She has been having some left nipple discharge since the MRI-guided biopsy.  It is coming from multiple ducts and is occasionally bloody.    Since , she has been following the high risk screening guidelines.  She was recently started on chemoprevention with Tamoxifen by Dr Bolanos in May 2017.    /70  Pulse 67  Temp 97.5  F (36.4  C) (Oral)  Resp 18  Ht 1.702 m (5' 7\")  Wt 64.5 kg (142 lb 3.2 oz)  SpO2 97%  BMI 22.27 kg/m2   Physical Exam   Constitutional: She appears well-developed and well-nourished.   Pulmonary/Chest: Right breast exhibits no inverted nipple, no mass, no nipple discharge, no skin change and no tenderness. Left breast exhibits no inverted nipple, no mass, no nipple discharge, no skin change and no tenderness. Breasts are symmetrical (L " slightly > R).       Lymphadenopathy:     She has no cervical adenopathy.        Right cervical: No superficial cervical, no deep cervical and no posterior cervical adenopathy present.       Left cervical: No superficial cervical, no deep cervical and no posterior cervical adenopathy present.     She has no axillary adenopathy.        Right axillary: No pectoral and no lateral adenopathy present.        Left axillary: No pectoral and no lateral adenopathy present.       Right: No supraclavicular adenopathy present.        Left: No supraclavicular adenopathy present.   Skin: Skin is warm and dry.        INVESTIGATIONS:    Left breast ultrasound (5/12/2017) showed:  FINDINGS:     Targeted left breast ultrasound was performed demonstrating scattered small cysts in the fibroglandular cone of the retroareolar left breast and scarring laterally in the left breast. There are couple of prominent fat lobules deep in the retroareolar left breast at the 6:00 position. No ultrasound correlate for the MRI finding is seen.  Both the technologist and radiologist scanned the patient with ultrasound.  IMPRESSION: BI-RADS CATEGORY: 4 - Suspicious Abnormality-Biopsy Should Be Considered    Bilateral Breast MRI (5/9/2017) showed:  Background parenchymal enhancement 1st post C image: Moderate  Findings: 2 adjacent masslike areas of enhancement mid central breast on the left more prominent within the considerable background enhancement, longest diameter of the 2 masslike areas is 9 mm with questionable washout. Postsurgical changes laterally on the left.  Impression: BI-RADS CATEGORY: 0 - Need Additional Imaging Evaluation and/or Prior Mammograms for Comparison.    Pathology from [facility] (5/18/2017) showed:  FINAL DIAGNOSIS:   Left breast, 0.9 cm lesion, retroareolar area, 2.7 cm from the nipple, MRI guided needle core biopsy   - Small intraductal papilloma   - Mild columnar cell hyperplasia   - Mild duct ectasia   -  Focal sclerosing adenosis   - No evidence of malignancy     ASSESSMENT:    Kaitlyn Garcia is a 41 year old female with a new finding of another intraductal papilloma.    The natural history of intraductal papilloma was discussed with the patient and her .  Intraductal papilloma is a benign proliferative lesion of the breast that can cause nipple discharge.  An excision following the core needle biopsy is recommended for diagnostic purposes because there are a small percentage of intraductal papilloma that are upgraded to atypia upon excision.  Non-atypical intraductal papilloma are rarely found to be non-invasive or invasive malignancy upon excision.    Kaitlyn Garcia is on chemoprevention currently.  I would like to have her pathology reviewed here; should the intraductal papilloma found here does not demonstrate any signs of atypia, it would be reasonable to manage it with watchful waiting (she is already on a [id number] imaging schedule), and only remove the papilloma if it demonstrates interval change.  If however, the intraductal papilloma seen here does contain atypia, it would increase the likelihood of there also being non-invasive or invasive malignancy, and excision should be considered.    I have not made specific follow-up appointments with me at this time and will defer ongoing care to her PCP and to Dr Bolanos.  She knows to call if she has concerns.     All of the above was discussed with Kaitlyn Garcia and her .  All questions were answered. They did seem to understand.  She elected to proceed with watchful waiting assuming the pathology review demonstrates an absence of atypia.    Total time spent with the patient was 60 minutes, of which more than half was counseling.     PLAN:  1. Pathology review of the biopsy - if no atypia, watchful waiting; if atypia, excision.  2. Continue high-risk screening.  3. Continue chemoprevention.    Lori Montenegro MD MSc [facility]  Assistant  Professor  Division of Surgical Oncology  [facility]     ADDENDUM:  Pathology review (6/15/2017) showed:  FINAL DIAGNOSIS:   CASE FROM [facility], [place], [place] ([id number], OBTAINED 5/18/2017):   LEFT BREAST, RETROAREOLAR, MRI-GUIDED CORE BIOPSY:   - Fragment of intraductal papilloma (1.5 mm in greatest dimension)   - Fibrocystic change, including multiple microcysts with apocrine metaplasia   - Sclerosing adenosis, focal   - Occasional luminal calcification   - No atypical or malignant findings     Results made available to patient on MyChart. Will proceed with watchful waiting of the papilloma as discussed above.    Lori Montenegro MD MSc FRCSC    Division of Surgical Oncology  [facility]

## 2017-06-09 NOTE — MR AVS SNAPSHOT
After Visit Summary   6/9/2017    Kaitlyn Garcia    MRN: [id number]           Patient Information     Date Of Birth          1975        Visit Information        Provider Department      6/9/2017 8:30 AM Kirill Nicholson, OD [facility]        Today's Diagnoses     Glaucoma suspect, bilateral    -  1      Care Instructions    You are a glaucoma suspect.  We will continue to monitor your intraocular pressures and optic nerves.    Return in 1 year for a complete eye exam or sooner if needed.    Kirill Nicholson OD            Follow-ups after your visit        Follow-up notes from your care team     Return in about 1 year (around 6/9/2018) for Annual Visit.      Your next 10 appointments already scheduled     Aug 08, 2017  8:30 AM CDT   Return Visit with Lorena Fernandez MD   [facility]    [id number] [place] [address] [phone]              Who to contact     If you have questions or need follow up information about today's clinic visit or your schedule please contact [facility] directly at [phone].  Normal or non-critical lab and imaging results will be communicated to you by MyChart, letter or phone within 4 business days after the clinic has received the results. If you do not hear from us within 7 days, please contact the clinic through [email or web address] International[employer] or phone. If you have a critical or abnormal lab result, we will notify you by phone as soon as possible.  Submit refill requests through PagoPago or call your pharmacy and they will forward the refill request to us. Please allow 3 business days for your refill to be completed.          Additional Information About Your Visit        MyChart Information     PagoPago gives you secure access to your electronic health record. If you see a primary care provider, you can also send messages to your care team and make appointments. If you have questions,  please call your primary care clinic.  If you do not have a primary care provider, please call [phone] and they will assist you.      Nanosys is an electronic gateway that provides easy, online access to your medical records. With Nanosys, you can request a clinic appointment, read your test results, renew a prescription or communicate with your care team.     To access your existing account, please contact your [facility] or call [phone] for assistance.        Care EveryWhere ID     This is your Care EveryWhere ID. This could be used by other organizations to access your [facility] medical records  [id number]         Blood Pressure from Last 3 Encounters:   06/09/17 102/70   05/24/17 103/67   05/09/17 110/73    Weight from Last 3 Encounters:   06/09/17 64.5 kg (142 lb 3.2 oz)   05/24/17 65.6 kg (144 lb 11.2 oz)   05/09/17 63.5 kg (140 lb)              We Performed the Following     HVF 24-2 OU     OCT Optic Nerve RNFL Optovue OU (both eyes)        Primary Care Provider Office Phone # Fax #    Ani SINDHU Oh [facility] [phone]       [facility] [address]  [place] [address]        Thank you!     Thank you for choosing [facility]  for your care. Our goal is always to provide you with excellent care. Hearing back from our patients is one way we can continue to improve our services. Please take a few minutes to complete the written survey that you may receive in the mail after your visit with us. Thank you!             Your Updated Medication List - Protect others around you: Learn how to safely use, store and throw away your medicines at www.disposemymeds.org.          This list is accurate as of: 6/9/17 12:12 PM.  Always use your most recent med list.                   Brand Name Dispense Instructions for use    diazepam 2 MG tablet    VALIUM    10 tablet    Take 1 tablet (2 mg) by mouth every 12 hours as  needed for anxiety or sleep       escitalopram 20 MG tablet    LEXAPRO    90 tablet    Take 1 tablet (20 mg) by mouth daily       fluticasone 50 MCG/ACT spray    FLONASE    48 g    USE 2 SPRAYS IN EACH NOSTRIL ONCE DAILY       omeprazole 20 MG CR capsule    priLOSEC    90 capsule    Take 1 capsule (20 mg) by mouth daily       tamoxifen 20 MG tablet    NOLVADEX    90 tablet    Take 1 tablet (20 mg) by mouth daily

## 2017-06-09 NOTE — LETTER
"2017      RE: Kaitlyn Garcia  [address] [facility] [address]     2017    Krystal Bolanos MD  [facility]  [address] [facility] [address]  [place] [address]    RE: Kaitlyn Garcia  (: 1975)    Dear Dr. Bolanos    Your patient was seen for evaluation in my office.  Please find a copy of my notes for your record and review.  If you have any further questions, please feel free to contact my office.   Thank you for your kind referral.    Sincerely,   Lori Montenegro MD MSc [facility]    ---       FOLLOW-UP  2017    Kaitlyn Garcia is a 41 year old female who returns for follow-up for an intraductal papilloma.    Treatment to date:  1. L lumpectomy for intraductal papilloma ( by Dr Lynch) - pathology showed atypia.  2. High risk screening with MRI and mammograms [id number] since   3. L wire-localized lumpectomy for ADH (2017) - pathology without malignancy    HPI:    Since her last visit, an enhancing area was seen on MRI.  She subsequently underwent MRI-guided biopsy.  It showed a small intraductal papilloma without malignancy; no comment in the report regarding the presence of atypia.    She has been having some left nipple discharge since the MRI-guided biopsy.  It is coming from multiple ducts and is occasionally bloody.    Since , she has been following the high risk screening guidelines.  She was recently started on chemoprevention with Tamoxifen by Dr Bolanos in May 2017.    /70  Pulse 67  Temp 97.5  F (36.4  C) (Oral)  Resp 18  Ht 1.702 m (5' 7\")  Wt 64.5 kg (142 lb 3.2 oz)  SpO2 97%  BMI 22.27 kg/m2   Physical Exam   Constitutional: She appears well-developed and well-nourished.   Pulmonary/Chest: Right breast exhibits no inverted nipple, no mass, no nipple discharge, no skin change and no tenderness. Left breast exhibits no inverted nipple, no mass, no nipple discharge, no skin change and no tenderness. Breasts are symmetrical (L slightly > R). "       Lymphadenopathy:     She has no cervical adenopathy.        Right cervical: No superficial cervical, no deep cervical and no posterior cervical adenopathy present.       Left cervical: No superficial cervical, no deep cervical and no posterior cervical adenopathy present.     She has no axillary adenopathy.        Right axillary: No pectoral and no lateral adenopathy present.        Left axillary: No pectoral and no lateral adenopathy present.       Right: No supraclavicular adenopathy present.        Left: No supraclavicular adenopathy present.   Skin: Skin is warm and dry.        INVESTIGATIONS:    Left breast ultrasound (5/12/2017) showed:  FINDINGS:     Targeted left breast ultrasound was performed demonstrating scattered small cysts in the fibroglandular cone of the retroareolar left breast and scarring laterally in the left breast. There are couple of prominent fat lobules deep in the retroareolar left breast at the 6:00 position. No ultrasound correlate for the MRI finding is seen.  Both the technologist and radiologist scanned the patient with ultrasound.  IMPRESSION: BI-RADS CATEGORY: 4 - Suspicious Abnormality-Biopsy Should Be Considered    Bilateral Breast MRI (5/9/2017) showed:  Background parenchymal enhancement 1st post C image: Moderate  Findings: 2 adjacent masslike areas of enhancement mid central breast on the left more prominent within the considerable background enhancement, longest diameter of the 2 masslike areas is 9 mm with questionable washout. Postsurgical changes laterally on the left.  Impression: BI-RADS CATEGORY: 0 - Need Additional Imaging Evaluation and/or Prior Mammograms for Comparison.    Pathology from [facility] (5/18/2017) showed:  FINAL DIAGNOSIS:   Left breast, 0.9 cm lesion, retroareolar area, 2.7 cm from the nipple, MRI guided needle core biopsy   - Small intraductal papilloma   - Mild columnar cell hyperplasia   - Mild duct ectasia   - Focal  sclerosing adenosis   - No evidence of malignancy     ASSESSMENT:    Kaitlyn Garcia is a 41 year old female with a new finding of another intraductal papilloma.    The natural history of intraductal papilloma was discussed with the patient and her .  Intraductal papilloma is a benign proliferative lesion of the breast that can cause nipple discharge.  An excision following the core needle biopsy is recommended for diagnostic purposes because there are a small percentage of intraductal papilloma that are upgraded to atypia upon excision.  Non-atypical intraductal papilloma are rarely found to be non-invasive or invasive malignancy upon excision.    Kaitlyn Garcia is on chemoprevention currently.  I would like to have her pathology reviewed here; should the intraductal papilloma found here does not demonstrate any signs of atypia, it would be reasonable to manage it with watchful waiting (she is already on a [id number] imaging schedule), and only remove the papilloma if it demonstrates interval change.  If however, the intraductal papilloma seen here does contain atypia, it would increase the likelihood of there also being non-invasive or invasive malignancy, and excision should be considered.    I have not made specific follow-up appointments with me at this time and will defer ongoing care to her PCP and to Dr Bolanos.  She knows to call if she has concerns.     All of the above was discussed with Kaitlyn Garcia and her .  All questions were answered. They did seem to understand.  She elected to proceed with watchful waiting assuming the pathology review demonstrates an absence of atypia.    Total time spent with the patient was 60 minutes, of which more than half was counseling.     PLAN:  1. Pathology review of the biopsy - if no atypia, watchful waiting; if atypia, excision.  2. Continue high-risk screening.  3. Continue chemoprevention.    Lori Montenegro MD MSc [facility]    Division of  Surgical Oncology  [facility]     ADDENDUM:  Pathology review (6/15/2017) showed:  FINAL DIAGNOSIS:   CASE FROM [facility], [place], [place] ([id number], OBTAINED 5/18/2017):   LEFT BREAST, RETROAREOLAR, MRI-GUIDED CORE BIOPSY:   - Fragment of intraductal papilloma (1.5 mm in greatest dimension)   - Fibrocystic change, including multiple microcysts with apocrine metaplasia   - Sclerosing adenosis, focal   - Occasional luminal calcification   - No atypical or malignant findings     Results made available to patient on MyChart. Will proceed with watchful waiting of the papilloma as discussed above.    Lori Montenegro MD MSc FRCSC    Division of Surgical Oncology  [facility]

## 2017-06-09 NOTE — PROGRESS NOTES
"FOLLOW-UP  Jun 9, 2017    Kaitlyn Garcia is a 41 year old female who returns for follow-up for an intraductal papilloma.    Treatment to date:  1. L lumpectomy for intraductal papilloma (2012 by Dr Lynch) - pathology showed atypia.  2. High risk screening with MRI and mammograms [id number] since 2012  3. L wire-localized lumpectomy for ADH (2/1/2017) - pathology without malignancy    HPI:    Since her last visit, an enhancing area was seen on MRI.  She subsequently underwent MRI-guided biopsy.  It showed a small intraductal papilloma without malignancy; no comment in the report regarding the presence of atypia.    She has been having some left nipple discharge since the MRI-guided biopsy.  It is coming from multiple ducts and is occasionally bloody.    Since 2012, she has been following the high risk screening guidelines.  She was recently started on chemoprevention with Tamoxifen by Dr Bolanos in May 2017.    /70  Pulse 67  Temp 97.5  F (36.4  C) (Oral)  Resp 18  Ht 1.702 m (5' 7\")  Wt 64.5 kg (142 lb 3.2 oz)  SpO2 97%  BMI 22.27 kg/m2   Physical Exam   Constitutional: She appears well-developed and well-nourished.   Pulmonary/Chest: Right breast exhibits no inverted nipple, no mass, no nipple discharge, no skin change and no tenderness. Left breast exhibits no inverted nipple, no mass, no nipple discharge, no skin change and no tenderness. Breasts are symmetrical (L slightly > R).       Lymphadenopathy:     She has no cervical adenopathy.        Right cervical: No superficial cervical, no deep cervical and no posterior cervical adenopathy present.       Left cervical: No superficial cervical, no deep cervical and no posterior cervical adenopathy present.     She has no axillary adenopathy.        Right axillary: No pectoral and no lateral adenopathy present.        Left axillary: No pectoral and no lateral adenopathy present.       Right: No supraclavicular adenopathy present.        Left: No " supraclavicular adenopathy present.   Skin: Skin is warm and dry.        INVESTIGATIONS:    Left breast ultrasound (5/12/2017) showed:  FINDINGS:     Targeted left breast ultrasound was performed demonstrating scattered small cysts in the fibroglandular cone of the retroareolar left breast and scarring laterally in the left breast. There are couple of prominent fat lobules deep in the retroareolar left breast at the 6:00 position. No ultrasound correlate for the MRI finding is seen.  Both the technologist and radiologist scanned the patient with ultrasound.  IMPRESSION: BI-RADS CATEGORY: 4 - Suspicious Abnormality-Biopsy Should Be Considered    Bilateral Breast MRI (5/9/2017) showed:  Background parenchymal enhancement 1st post C image: Moderate  Findings: 2 adjacent masslike areas of enhancement mid central breast on the left more prominent within the considerable background enhancement, longest diameter of the 2 masslike areas is 9 mm with questionable washout. Postsurgical changes laterally on the left.  Impression: BI-RADS CATEGORY: 0 - Need Additional Imaging Evaluation and/or Prior Mammograms for Comparison.    Pathology from [facility] (5/18/2017) showed:  FINAL DIAGNOSIS:   Left breast, 0.9 cm lesion, retroareolar area, 2.7 cm from the nipple, MRI guided needle core biopsy   - Small intraductal papilloma   - Mild columnar cell hyperplasia   - Mild duct ectasia   - Focal sclerosing adenosis   - No evidence of malignancy     ASSESSMENT:    Kaitlyn Garcia is a 41 year old female with a new finding of another intraductal papilloma.    The natural history of intraductal papilloma was discussed with the patient and her .  Intraductal papilloma is a benign proliferative lesion of the breast that can cause nipple discharge.  An excision following the core needle biopsy is recommended for diagnostic purposes because there are a small percentage of intraductal papilloma that are upgraded to  atypia upon excision.  Non-atypical intraductal papilloma are rarely found to be non-invasive or invasive malignancy upon excision.    Kaitlyn Garcia is on chemoprevention currently.  I would like to have her pathology reviewed here; should the intraductal papilloma found here does not demonstrate any signs of atypia, it would be reasonable to manage it with watchful waiting (she is already on a [id number] imaging schedule), and only remove the papilloma if it demonstrates interval change.  If however, the intraductal papilloma seen here does contain atypia, it would increase the likelihood of there also being non-invasive or invasive malignancy, and excision should be considered.    I have not made specific follow-up appointments with me at this time and will defer ongoing care to her PCP and to Dr Bolanos.  She knows to call if she has concerns.     All of the above was discussed with Kaitlyn Garcia and her .  All questions were answered. They did seem to understand.  She elected to proceed with watchful waiting assuming the pathology review demonstrates an absence of atypia.    Total time spent with the patient was 60 minutes, of which more than half was counseling.     PLAN:  1. Pathology review of the biopsy - if no atypia, watchful waiting; if atypia, excision.  2. Continue high-risk screening.  3. Continue chemoprevention.    Lori Montenegro MD MSc [facility]    Division of Surgical Oncology  [facility]     ADDENDUM:  Pathology review (6/15/2017) showed:  FINAL DIAGNOSIS:   CASE FROM [place], [place] ([id number], OBTAINED 5/18/2017):   LEFT BREAST, RETROAREOLAR, MRI-GUIDED CORE BIOPSY:   - Fragment of intraductal papilloma (1.5 mm in greatest dimension)   - Fibrocystic change, including multiple microcysts with apocrine metaplasia   - Sclerosing adenosis, focal   - Occasional luminal calcification   - No atypical or malignant findings     Results made available  to patient on MyChart. Will proceed with watchful waiting of the papilloma as discussed above.    Lori Montenegro MD MSc FRCSC    Division of Surgical Oncology  [facility]

## 2017-06-09 NOTE — MR AVS SNAPSHOT
After Visit Summary   6/9/2017    Kaitlyn Garcia    MRN: [id number]           Patient Information     Date Of Birth          1975        Visit Information        Provider Department      6/9/2017 11:45 AM Lori Montenegro MD [facility]        Today's Diagnoses     Atypical hyperplasia of breast    -  1    Intraductal papilloma of breast, left           Follow-ups after your visit        Your next 10 appointments already scheduled     Aug 08, 2017  8:30 AM CDT   [facility]    [address] [place] [address] [phone]              Who to contact     If you have questions or need follow up information about today's clinic visit or your schedule please contact [facility] directly at [phone].  Normal or non-critical lab and imaging results will be communicated to you by MyChart, letter or phone within 4 business days after the clinic has received the results. If you do not hear from us within 7 days, please contact the clinic through Brandtreehart or phone. If you have a critical or abnormal lab result, we will notify you by phone as soon as possible.  Submit refill requests through TheCityGame or call your pharmacy and they will forward the refill request to us. Please allow 3 business days for your refill to be completed.          Additional Information About Your Visit        MyChart Information     The[employer]Game gives you secure access to your electronic health record. If you see a primary care provider, you can also send messages to your care team and make appointments. If you have questions, please call your primary care clinic.  If you do not have a primary care provider, please call [phone] and they will assist you.        Care EveryWhere ID     This is your Care EveryWhere ID. This could be used by other organizations to access your [facility]  "records  [id number]        Your Vitals Were     Pulse Temperature Respirations Height Pulse Oximetry BMI (Body Mass Index)    67 97.5  F (36.4  C) (Oral) 18 1.702 m (5' 7\") 97% 22.27 kg/m2       Blood Pressure from Last 3 Encounters:   06/09/17 102/70   05/24/17 103/67   05/09/17 110/73    Weight from Last 3 Encounters:   06/09/17 64.5 kg (142 lb 3.2 oz)   05/24/17 65.6 kg (144 lb 11.2 oz)   05/09/17 63.5 kg (140 lb)              We Performed the Following     Pathology Consult        Primary Care Provider Office Phone # Fax #    Ani Griffiths Bryon, APRN [facility] [phone]       [facility] [address]  [place] [address]        Equal Access to Services     VILLA ALBERTO : Hadii aad ku hadasho Soomaali, waaxda luqadaha, qaybta kaalmada adeegyada, waxay franciscoin hayaddie fong . So [facility] [phone].    ATENCIÓN: Si dottyla español, tiene a lugo disposición servicios gratuitos de asistencia lingüística. Ewelina al [phone].    We comply with applicable federal civil rights laws and [place] laws. We do not discriminate on the basis of race, color, national origin, age, disability sex, sexual orientation or gender identity.            Thank you!     Thank you for choosing [facility]  for your care. Our goal is always to provide you with excellent care. Hearing back from our patients is one way we can continue to improve our services. Please take a few minutes to complete the written survey that you may receive in the mail after your visit with us. Thank you!             Your Updated Medication List - Protect others around you: Learn how to safely use, store and throw away your medicines at www.disposemymeds.org.          This list is accurate as of: 6/9/17 11:59 PM.  Always use your most recent med list.                   Brand Name Dispense Instructions for use Diagnosis    diazepam 2 MG tablet    VALIUM    10 tablet    Take 1 tablet (2 mg) by mouth every 12 " hours as needed for anxiety or sleep    Situational mixed anxiety and depressive disorder       escitalopram 20 MG tablet    LEXAPRO    90 tablet    Take 1 tablet (20 mg) by mouth daily    Situational mixed anxiety and depressive disorder       fluticasone 50 MCG/ACT spray    FLONASE    48 g    USE 2 SPRAYS IN EACH NOSTRIL ONCE DAILY    Allergic rhinitis due to other allergic trigger, unspecified rhinitis seasonality       omeprazole 20 MG CR capsule    priLOSEC    90 capsule    Take 1 capsule (20 mg) by mouth daily    Gastroesophageal reflux disease without esophagitis       tamoxifen 20 MG tablet    NOLVADEX    90 tablet    Take 1 tablet (20 mg) by mouth daily    Atypical hyperplasia of breast

## 2017-06-09 NOTE — Clinical Note
"6/9/2017       RE: Kaitlyn Garcia  [address] [place] [address]     Dear Colleague,    Thank you for referring your patient, Kaitlyn Garcia, to the [facility] at [facility]. Please see a copy of my visit note below.    FOLLOW-UP  Jun 9, 2017    Kaitlyn Garcia is a 41 year old female who returns for follow-up for an intraductal papilloma.    Treatment to date:  1. L lumpectomy for intraductal papilloma (2012 by Dr Lynch) - pathology showed atypia.  2. High risk screening with MRI and mammograms [id number] since 2012  3. L wire-localized lumpectomy for ADH (2/1/2017) - pathology without malignancy    HPI:    Since her last visit, an enhancing area was seen on MRI.  She subsequently underwent MRI-guided biopsy.  It showed a small intraductal papilloma without malignancy; no comment in the report regarding the presence of atypia.    She has been having some left nipple discharge since the MRI-guided biopsy.  It is coming from multiple ducts and is occasionally bloody.    Since 2012, she has been following the high risk screening guidelines.  She was recently started on chemoprevention with Tamoxifen by Dr Bolanos in May 2017.    /70  Pulse 67  Temp 97.5  F (36.4  C) (Oral)  Resp 18  Ht 1.702 m (5' 7\")  Wt 64.5 kg (142 lb 3.2 oz)  SpO2 97%  BMI 22.27 kg/m2   Physical Exam   Constitutional: She appears well-developed and well-nourished.   Pulmonary/Chest: Right breast exhibits no inverted nipple, no mass, no nipple discharge, no skin change and no tenderness. Left breast exhibits no inverted nipple, no mass, no nipple discharge, no skin change and no tenderness. Breasts are symmetrical (L slightly > R).       Lymphadenopathy:     She has no cervical adenopathy.        Right cervical: No superficial cervical, no deep cervical and no posterior cervical adenopathy present.       Left cervical: No superficial cervical, no deep cervical and no " posterior cervical adenopathy present.     She has no axillary adenopathy.        Right axillary: No pectoral and no lateral adenopathy present.        Left axillary: No pectoral and no lateral adenopathy present.       Right: No supraclavicular adenopathy present.        Left: No supraclavicular adenopathy present.   Skin: Skin is warm and dry.        INVESTIGATIONS:    Left breast ultrasound (5/12/2017) showed:  FINDINGS:     Targeted left breast ultrasound was performed demonstrating scattered small cysts in the fibroglandular cone of the retroareolar left breast and scarring laterally in the left breast. There are couple of prominent fat lobules deep in the retroareolar left breast at the 6:00 position. No ultrasound correlate for the MRI finding is seen.  Both the technologist and radiologist scanned the patient with ultrasound.  IMPRESSION: BI-RADS CATEGORY: 4 - Suspicious Abnormality-Biopsy Should Be Considered    Bilateral Breast MRI (5/9/2017) showed:  Background parenchymal enhancement 1st post C image: Moderate  Findings: 2 adjacent masslike areas of enhancement mid central breast on the left more prominent within the considerable background enhancement, longest diameter of the 2 masslike areas is 9 mm with questionable washout. Postsurgical changes laterally on the left.  Impression: BI-RADS CATEGORY: 0 - Need Additional Imaging Evaluation and/or Prior Mammograms for Comparison.    Pathology from [facility] (5/18/2017) showed:  FINAL DIAGNOSIS:   Left breast, 0.9 cm lesion, retroareolar area, 2.7 cm from the nipple, MRI guided needle core biopsy   - Small intraductal papilloma   - Mild columnar cell hyperplasia   - Mild duct ectasia   - Focal sclerosing adenosis   - No evidence of malignancy     ASSESSMENT:    Kaitlyn Garcia is a 41 year old female with a new finding of another intraductal papilloma.    The natural history of intraductal papilloma was discussed with the patient and her  .  Intraductal papilloma is a benign proliferative lesion of the breast that can cause nipple discharge.  An excision following the core needle biopsy is recommended for diagnostic purposes because there are a small percentage of intraductal papilloma that are upgraded to atypia upon excision.  Non-atypical intraductal papilloma are rarely found to be non-invasive or invasive malignancy upon excision.    Kaitlyn Garcia is on chemoprevention currently.  I would like to have her pathology reviewed here; should the intraductal papilloma found here does not demonstrate any signs of atypia, it would be reasonable to manage it with watchful waiting (she is already on a [id number] imaging schedule), and only remove the papilloma if it demonstrates interval change.  If however, the intraductal papilloma seen here does contain atypia, it would increase the likelihood of there also being non-invasive or invasive malignancy, and excision should be considered.    I have not made specific follow-up appointments with me at this time and will defer ongoing care to her PCP and to Dr Bolanos.  She knows to call if she has concerns.     All of the above was discussed with Kaitlyn Garcia and her .  All questions were answered. They did seem to understand.  She elected to proceed with watchful waiting assuming the pathology review demonstrates an absence of atypia.    Total time spent with the patient was 60 minutes, of which more than half was counseling.     PLAN:  1. Pathology review of the biopsy - if no atypia, watchful waiting; if atypia, excision.  2. Continue high-risk screening.  3. Continue chemoprevention.    Lori Montenegro MD MSc [facility]    Division of Surgical Oncology  [facility]     ADDENDUM:  Pathology review (6/15/2017) showed:  FINAL DIAGNOSIS:   CASE FROM [place], [place] ([id number], OBTAINED 5/18/2017):   LEFT BREAST, RETROAREOLAR, MRI-GUIDED CORE BIOPSY:    - Fragment of intraductal papilloma (1.5 mm in greatest dimension)   - Fibrocystic change, including multiple microcysts with apocrine metaplasia   - Sclerosing adenosis, focal   - Occasional luminal calcification   - No atypical or malignant findings     Results made available to patient on MyChart. Will proceed with watchful waiting of the papilloma as discussed above.    Lori Montenegro MD MSc FRC    Division of Surgical Oncology  [facility]     Again, thank you for allowing me to participate in the care of your patient.      Sincerely,    Lori Montenegro MD

## 2017-06-09 NOTE — PATIENT INSTRUCTIONS
You are a glaucoma suspect.  We will continue to monitor your intraocular pressures and optic nerves.    Return in 1 year for a complete eye exam or sooner if needed.    Kirill Nicholson, DEE

## 2017-06-15 PROCEDURE — 00000346 ZZHCL STATISTIC REVIEW OUTSIDE SLIDES TC 88321: Performed by: SURGERY

## 2017-06-26 LAB — COPATH REPORT: NORMAL

## 2017-08-15 ENCOUNTER — OFFICE VISIT (OUTPATIENT)
Dept: OTOLARYNGOLOGY | Facility: CLINIC | Age: 42
End: 2017-08-15
Payer: COMMERCIAL

## 2017-08-15 VITALS
WEIGHT: 140 LBS | BODY MASS INDEX: 21.97 KG/M2 | HEIGHT: 67 IN | DIASTOLIC BLOOD PRESSURE: 77 MMHG | SYSTOLIC BLOOD PRESSURE: 110 MMHG | HEART RATE: 76 BPM

## 2017-08-15 DIAGNOSIS — K21.9 GASTROESOPHAGEAL REFLUX DISEASE WITHOUT ESOPHAGITIS: Primary | ICD-10-CM

## 2017-08-15 PROCEDURE — 99213 OFFICE O/P EST LOW 20 MIN: CPT | Performed by: OTOLARYNGOLOGY

## 2017-08-15 NOTE — NURSING NOTE
"Kaitlyn Garcia's goals for this visit include:   Chief Complaint   Patient presents with     RECHECK     No new concerns, hears whistling when she breathes         She requests these members of her care team be copied on today's visit information: yes      PCP: Ani Slater    Referring Provider:  ESTABLISHED PATIENT  No address on file    Chief Complaint   Patient presents with     RECHECK     No new concerns, hears whistling when she breathes       Initial /77  Pulse 76  Ht 1.702 m (5' 7\")  Wt 63.5 kg (140 lb)  BMI 21.93 kg/m2 Estimated body mass index is 21.93 kg/(m^2) as calculated from the following:    Height as of this encounter: 1.702 m (5' 7\").    Weight as of this encounter: 63.5 kg (140 lb).  Medication Reconciliation: complete   Becky Woodward CMA (AAMA)      "

## 2017-08-15 NOTE — MR AVS SNAPSHOT
After Visit Summary   8/15/2017    Kaitlyn Garcia    MRN: [id number]           Patient Information     Date Of Birth          1975        Visit Information        Provider Department      8/15/2017 8:30 AM Lorena Fernandez MD [facility]        Today's Diagnoses     Gastroesophageal reflux disease without esophagitis    -  1       Follow-ups after your visit        Who to contact     If you have questions or need follow up information about today's clinic visit or your schedule please contact [facility] directly at [phone].  Normal or non-critical lab and imaging results will be communicated to you by [employer], letter or phone within 4 business days after the clinic has received the results. If you do not hear from us within 7 days, please contact the clinic through [employer] or phone. If you have a critical or abnormal lab result, we will notify you by phone as soon as possible.  Submit refill requests through Spl[employer] or call your pharmacy and they will forward the refill request to us. Please allow 3 business days for your refill to be completed.          Additional Information About Your Visit        MyChart Information     Splother gives you secure access to your electronic health record. If you see a primary care provider, you can also send messages to your care team and make appointments. If you have questions, please call your primary care clinic.  If you do not have a primary care provider, please call [phone] and they will assist you.      Splother is an electronic gateway that provides easy, online access to your medical records. With Splother, you can request a clinic appointment, read your test results, renew a prescription or communicate with your care team.     To access your existing account, please contact your [facility] or call [phone] for assistance.        Care EveryWhere ID     This is your Care  "EveryWhere ID. This could be used by other organizations to access your [place] medical records  [id number]        Your Vitals Were     Pulse Height BMI (Body Mass Index)             76 1.702 m (5' 7\") 21.93 kg/m2          Blood Pressure from Last 3 Encounters:   08/15/17 110/77   06/09/17 102/70   05/24/17 103/67    Weight from Last 3 Encounters:   08/15/17 63.5 kg (140 lb)   06/09/17 64.5 kg (142 lb 3.2 oz)   05/24/17 65.6 kg (144 lb 11.2 oz)              Today, you had the following     No orders found for display       Primary Care Provider Office Phone # Fax #    Ani Griffiths Slater, APRN [facility] [phone]       [address]  [place] [address]        Equal Access to Services     [facility]: Hadii aad ku hadasho Soomaali, waaxda luqadaha, qaybta kaalmada adeegyada, waxay franciscoin hayaan ademary fong . So [facility] [phone].    ATENCIÓN: Si habla español, tiene a lugo disposición servicios gratuitos de asistencia lingüística. Llame al [phone].    We comply with applicable federal civil rights laws and [place] laws. We do not discriminate on the basis of race, color, national origin, age, disability sex, sexual orientation or gender identity.            Thank you!     Thank you for choosing [facility]  for your care. Our goal is always to provide you with excellent care. Hearing back from our patients is one way we can continue to improve our services. Please take a few minutes to complete the written survey that you may receive in the mail after your visit with us. Thank you!             Your Updated Medication List - Protect others around you: Learn how to safely use, store and throw away your medicines at www.disposemymeds.org.          This list is accurate as of: 8/15/17  9:15 AM.  Always use your most recent med list.                   Brand Name Dispense Instructions for use Diagnosis    diazepam 2 MG tablet    VALIUM    10 tablet    Take 1 " tablet (2 mg) by mouth every 12 hours as needed for anxiety or sleep    Situational mixed anxiety and depressive disorder       escitalopram 20 MG tablet    LEXAPRO    90 tablet    Take 1 tablet (20 mg) by mouth daily    Situational mixed anxiety and depressive disorder       fluticasone 50 MCG/ACT spray    FLONASE    48 g    USE 2 SPRAYS IN EACH NOSTRIL ONCE DAILY    Allergic rhinitis due to other allergic trigger, unspecified rhinitis seasonality       omeprazole 20 MG CR capsule    priLOSEC    90 capsule    Take 1 capsule (20 mg) by mouth daily    Gastroesophageal reflux disease without esophagitis       tamoxifen 20 MG tablet    NOLVADEX    90 tablet    Take 1 tablet (20 mg) by mouth daily    Atypical hyperplasia of breast

## 2017-08-15 NOTE — PROGRESS NOTES
HPI    This 41 year old patient is here for the f/u. I am glad to see that she is doing great in terms of post nasal drainage, frequent clearing of her throat and cough right after any meal or food intake. Denies any facial pressure, heartburn, allergies. No hx of difficulty swallowing, voice changes, bleeding from nose, drainage, congestion, sore throat, food or environmental allergies. She has a hx of post concussion syndrome. She is not a smoker.    Review of Systems   Constitutional: Negative.    HENT: Negative.    Eyes: Negative for blurred vision and double vision.   Respiratory: Negative for cough, hemoptysis, sputum production and shortness of breath.    Gastrointestinal: Negative for heartburn, nausea and vomiting.   Skin: Negative.    Neurological: Negative for dizziness, tingling and tremors.   Endo/Heme/Allergies: Negative for environmental allergies.         Physical Exam   Constitutional: She is well-developed, well-nourished, and in no distress.   HENT:   Head: Normocephalic and atraumatic.   Right Ear: Tympanic membrane, external ear and ear canal normal. No drainage, swelling or tenderness. No middle ear effusion. No decreased hearing is noted.   Left Ear: Tympanic membrane, external ear and ear canal normal. No drainage, swelling or tenderness.  No middle ear effusion. No decreased hearing is noted.   Nose: Mucosal edema and septal deviation present. No rhinorrhea.   Mouth/Throat: Uvula is midline, oropharynx is clear and moist and mucous membranes are normal. No oropharyngeal exudate.   Eyes: Pupils are equal, round, and reactive to light.     Septum is slightly deviated to the left. No pus or polyp seen. Inferior turbinates are enlarged. Nasopharynx is normal.     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 We will continue with topical nasal steroid to shrink the mucosa and open up the nasal passages.  I will  also continue with antireflux medication, Omeprazole once a day before breakfast. Options discussed and reflux precautions reviewed. I will see this pleasant lady in 3 months.

## 2017-09-20 DIAGNOSIS — D70.9 NEUTROPENIA, UNSPECIFIED TYPE (H): ICD-10-CM

## 2017-09-20 LAB
BASOPHILS # BLD AUTO: 0 10E9/L (ref 0–0.2)
BASOPHILS NFR BLD AUTO: 0.5 %
DIFFERENTIAL METHOD BLD: ABNORMAL
EOSINOPHIL # BLD AUTO: 0.1 10E9/L (ref 0–0.7)
EOSINOPHIL NFR BLD AUTO: 2.1 %
ERYTHROCYTE [DISTWIDTH] IN BLOOD BY AUTOMATED COUNT: 11.8 % (ref 10–15)
HCT VFR BLD AUTO: 34.1 % (ref 35–47)
HGB BLD-MCNC: 11.7 G/DL (ref 11.7–15.7)
LYMPHOCYTES # BLD AUTO: 1.5 10E9/L (ref 0.8–5.3)
LYMPHOCYTES NFR BLD AUTO: 33.7 %
MCH RBC QN AUTO: 31.3 PG (ref 26.5–33)
MCHC RBC AUTO-ENTMCNC: 34.3 G/DL (ref 31.5–36.5)
MCV RBC AUTO: 91 FL (ref 78–100)
MONOCYTES # BLD AUTO: 0.2 10E9/L (ref 0–1.3)
MONOCYTES NFR BLD AUTO: 5.2 %
NEUTROPHILS # BLD AUTO: 2.6 10E9/L (ref 1.6–8.3)
NEUTROPHILS NFR BLD AUTO: 58.5 %
PLATELET # BLD AUTO: 175 10E9/L (ref 150–450)
RBC # BLD AUTO: 3.74 10E12/L (ref 3.8–5.2)
WBC # BLD AUTO: 4.4 10E9/L (ref 4–11)

## 2017-09-20 PROCEDURE — 36415 COLL VENOUS BLD VENIPUNCTURE: CPT | Performed by: NURSE PRACTITIONER

## 2017-09-20 PROCEDURE — 85025 COMPLETE CBC W/AUTO DIFF WBC: CPT | Performed by: NURSE PRACTITIONER

## 2017-09-21 NOTE — PROGRESS NOTES
David Garcia,    Attached are your test results.  -Normal red blood cell (hgb) levels, normal white blood cell count and normal platelet levels.   Please contact us if you have any questions.    Ani Slater, CNP

## 2017-11-06 DIAGNOSIS — N60.99 ATYPICAL HYPERPLASIA OF BREAST: ICD-10-CM

## 2017-11-06 RX ORDER — TAMOXIFEN CITRATE 20 MG/1
20 TABLET ORAL DAILY
Qty: 90 TABLET | Refills: 1 | Status: SHIPPED | OUTPATIENT
Start: 2017-11-06 | End: 2017-11-22

## 2017-11-06 NOTE — TELEPHONE ENCOUNTER
Received refill request for Tamoxifen Citrate 20mg tablets. Dr. Bolanos prescribed at [facility] for patient. Unable to fill per protocol. Will pend to Dr. Bolanos for advice

## 2017-11-21 ASSESSMENT — ENCOUNTER SYMPTOMS
SINUS PAIN: 0
DOUBLE VISION: 0
SMELL DISTURBANCE: 0
EYE IRRITATION: 0
EYE REDNESS: 0
NECK MASS: 0
SORE THROAT: 0
HOARSE VOICE: 0
EYE PAIN: 0
HOT FLASHES: 1
SINUS CONGESTION: 1
TROUBLE SWALLOWING: 0
EYE WATERING: 0
TASTE DISTURBANCE: 0
DECREASED LIBIDO: 0

## 2017-11-21 NOTE — PROGRESS NOTES
FOLLOW UP  Nov 22, 2017     Kaitlyn Garcia is a 42 year old woman who presents with left breast atypical ductal hyperplasia followed with high risk screening and preventative therapy with Tamoxifen.     HPI:    History of 3 left needle biopsies, 2 right needle biopsies, and 2 excisional left breast biopsy, all benign. The left breast biopsy on 12/12/16 did have 1 mm of atypical ductal hyperplasia.     She has 2 paternal aunts who had breast cancer in their 60's and a paternal cousin with breast cancer in her 40's. She was seen by a genetic counselor in 2015, no genetic testing was done.     She began high risk screening with annual mammogram and breast MR in 2013. She started Tamoxifen for risk reduction 5/24/17. She has occasionally hot flashes at night but overall tolerating it well.     Her most recent breast MRI 5/9/17 revealed an area of enhancement biopsied to be an intraductal papilloma without atypia. She was seen for surgical consultation by Dr. Montenegro and monitoring with imaging was recommended.     She is here today for a mammogram per the recommendations of Dr. Montenegro. She denies any breast masses, changes, nipple inversion, nipple discharge.     BREAST-SPECIFIC HISTORY:    Previous breast imaging: Yes   - 5/21/12 b/l Dmammo and right u/s for right breast lump: calcifications in the upper outer quadrant of left breast spanning 5 cm. Right u/s negative. BI-RADS 4 suspicious for malignancy   - 5/23/12 left stereotactic biopsy: intraductal papilloma   - 7/30/12 left wire-placement   - 2/19/13 MRI: large segmental enhancement of left lateral breast, BI-RADS 0 incomplete   Left u/s: BI-RADS 3 probably benign  - 3/29/13 MRI: BI-RADS 2 benign findings  - 8/5/13 b/l Dmammo for fullness: BI-RADS 2 benign   - 2/5/14 MRI: cysts in left breast, BI-RADS 2 benign   - 5/9/14 Dmammo b/l: negative. Left u/s: cystic appearing structures, BI-RADS 2 benign  - 5/20/14 MRI: study not completed due to nausea, BI-RADS 0 incomplete  -  14 MRI: BI-RADS 2 benign  - 10/28/14 b/l Dmammo: BI-RADS 2 benign   - 5/7/15 MRI BI-RADS 2 benign   - 11/10/15 Smammo: bilateral calcs: BI-RADS 2 benign  - 16 left u/s for pain: multiple tiny cyst  - 16 MRI: BI-RADS 2 benign   - 16  Smammo: lateral left breast developing macrocalcifications BI-RADS 0 incomplete   - 16 Dmammo left: left 2:00 calcifications BI-RADS 4 suspicious  - 17: stereotactic biopsy: focus of ADH    - 17 wire placement   - 17 MRI: 2 adjacent mass like areas of enhancement mid central left breast BI-RADS 0 incomplete   - 17 left u/s: scattered small cysts BI-RADS 4 suspicious   - 17 MR biopsy: intraductal papilloma     Prior breast biopsies/surgeries: Yes   - 2 right breast biopsies   - 12 left needle biopsy: intraductal papilloma with sclerosing features   - 12 left excisional biopsy with Dr. Lynch: focal columnar cell change with atypia, intraductal papilloma and surrounding intraductal papillomatosis, sclerosing adenosis, fibrocystic changes  - 16 left needle biopsy x2: flat epithelial atypia with focus of ADH, duct ectasia and focal sclerosing adenosis, microcalcifications.   FEA and columnar cell change with atypia  - 17 left scar excision and excisional biopsy with Dr. Monteengro: FEA, columnar cell change/hyperplasia, intraductal papillomas, usual ductal hyperplasia, calcifications  - 17 left MRI needle biopsy: intraductal papilloma, mild columnar cell hyperplasia, mild duct ectasia, focal sclerosing adenosis    Prior history of breast cancer: No  Prior radiation history: No   Self breast exams: No  Breast density: heterogeneously dense    GYN HISTORY:  . Age at 1st pregnancy: 17. Breastfeeding history: Yes.   Age at menarche: 14-15  Menopausal: premenopausal  Menopausal hormone replacement therapy: No    RISK ASSESSMENT: >1.5% 5 yr risk, >20% lifetime risk   Heather:2.4% 5 yr risk   SEBASTIAN 8: 43.8% lifetime  Abiodun: 11.6%  lifetime    FAMILY HISTORY:  Breast ca: Yes    - paternal aunt 60 's   - paternal aunt 60's  - paternal cousin 40   Ovarian ca: No   - ? Paternal cousin with ovarian cancer   Pancreatic ca: No   Prostate: yes  - maternal uncle 69   Gastric ca: Yes   - paternal grandfather, passed   Melanoma: No  Colon ca: No  Other cancer: Yes   - maternal grandmother skin cancer 90's  - paternal grandfather brain 60's   [place] ethnicity: No  Other genetic, testing, syndromes, or clotting disorders: No   - She saw a genetic counselor 2/2015 and no testing was recommended     PAST MEDICAL HISTORY  Past Medical History:   Diagnosis Date     Anemia      Choroidal nevus of right eye      Concussion 10/2015     Depressive disorder 2006    Treated with celexa for two years     Family history of breast cancer 2/3/2015     Family history of breast cancer 2/3/2015     Family history of breast cancer 2/3/2015     Family history of breast cancer 2/3/2015     Gastroesophageal reflux disease      Papilloma of breast 5/23/12    Left, See Dr. Lynch at      PONV (postoperative nausea and vomiting)      S/P endometrial ablation 10/10/13    Menorrhagia     Shingles     x2 in the past     WBC decreased 2/3/2015     WBC decreased 2/3/2015     WBC decreased 2/3/2015   BMI: Body mass index is 22.95 kg/(m^2).  Cataracts no  Clotting disorders: no  Antiphospholipid syndrome: no  SLE: no  DVT, PE, stroke, TIA no  Osteoporosis: no  Endometriosis no  Contraception: vasectomy    PAST SURGICAL HISTORY   Past Surgical History:   Procedure Laterality Date     ABDOMEN SURGERY      rectoplasty     BIOPSY BREAST NEEDLE LOCALIZATION  7/30/2012    Procedure: BIOPSY BREAST NEEDLE LOCALIZATION;  left breast excisional Biopsy with wire localization @0830;  Surgeon: Robert Lynch MD;  Location: [facility]     BREAST SURGERY       COSMETIC SURGERY      Rectalplasty for episiotomy repair     COSMETIC SURGERY      Rectalplasty for episiotomy repair     DILATION AND  "CURETTAGE, HYSTEROSCOPY, ABLATE ENDOMETRIUM NOVASURE, COMBINED  10/10/2013    Procedure: COMBINED DILATION AND CURETTAGE, HYSTEROSCOPY, ABLATE ENDOMETRIUM NOVASURE;  Endometrial ablation with Novasure;  Surgeon: Indu Birmingham DO;  Location: [facility] OR     HC TOOTH EXTRACTION W/FORCEP       LUMPECTOMY BREAST Left 2/1/2017    Procedure: LUMPECTOMY BREAST;  Surgeon: Lori Montenegro MD;  Location: [facility]     SOFT TISSUE SURGERY      lumpectomy x 2 right breast     SOFT TISSUE SURGERY      episiotomy fixed   Hysterectomy: No    MEDICATIONS  Current Outpatient Prescriptions   Medication Sig Dispense Refill     tamoxifen (NOLVADEX) 20 MG tablet Take 1 tablet (20 mg) by mouth daily 90 tablet 1     escitalopram (LEXAPRO) 20 MG tablet Take 1 tablet (20 mg) by mouth daily 90 tablet 3     omeprazole (PRILOSEC) 20 MG CR capsule Take 1 capsule (20 mg) by mouth daily 90 capsule 3     fluticasone (FLONASE) 50 MCG/ACT nasal spray USE 2 SPRAYS IN EACH NOSTRIL ONCE DAILY 48 g 11     diazepam (VALIUM) 2 MG tablet Take 1 tablet (2 mg) by mouth every 12 hours as needed for anxiety or sleep (Patient not taking: Reported on 8/15/2017) 10 tablet 0   SSRI: yes, Lexapro   OCP: No no  HRT No    ALLERGIES  Allergies   Allergen Reactions     No Known Drug Allergies       SOCIAL HISTORY:  Smokes: No  EtOH: Yes, less than once per month  Exercise: planning     ROS:  Eyes: negative Change in vision No  Respiratory: No shortness of breath, dyspnea on exertion, cough, or hemoptysis Cough No, Shortness of breath No  Cardiovascular: negative   Gastrointestinal: negative  Breast: negative  Musculoskeletal: negative Joint pain No  Psychiatric: negative  Hematologic/Lymphatic/Immunologic: negative Easy bruising or bleeding , Enlarged Lymph nodes no,   Endocrine: negative    EXAM  /67 (BP Location: Right arm, Patient Position: Sitting, Cuff Size: Adult Regular)  Pulse 74  Temp 98.4  F (36.9  C) (Oral)  Ht 1.702 m (5' 7\")  Wt " 66.5 kg (146 lb 8 oz)  LMP 11/13/2017  SpO2 100%  BMI 22.95 kg/m2   PHYSICAL EXAM  Respiratory: breathing non labored.   Breasts: Examination was done in both the upright and supine positions.  Breasts are symmetrical . No dominant fixed or suspicious masses noted. No skin or nipple changes. No nipple discharge. Right scar at 10:00. Left breast scar at 2:00. Left breast with nodularity beneath the scar.   No clavicular, cervical, or axillary lymphadenopathy.     INVESTIGATIONS:    Bilateral diagnostic mammogram 11/21/17:      ASSESSMENT/PLAN:    Kaitlyn Garcia is a 42 year old woman with history of atypical ductal hyperplasia doing high risk screening and preventative therapy with Tamoxifen.    1) Reviewed images today with the Radiologist and results were discussed with the patient.    - No concerning findings. Final radiology report is pending.     2) We also reviewed that atypical ductal hyperplasia increases her baseline risk for breast cancer. Patients diagnosed with atypical hyperplasia are at higher risk for developing breast cancer in the future. Her lifetime risk for breast cancer by SEBASTIAN was 43.8%. Discussed she meets guidelines for high risk screening with yearly mammogram alternating with Breast MRI every six months. Clinical encounter every 6-12 months including family history, risk assessment, and clinical breast exam.   - Next breast MRI with clinic visit: 5/20/18, MRI ordered   - Next mammogram: 11/22/18  - Breast awareness.     3) She is a candidate for breast cancer risk-reduction intervention based on a Heather estimated 5 year risk of 2.4%. The contraindications for Tamoxifen (history of VTE, history or stroke, history of TIA, pregnancy, and potential pregnancy without an effective nonhormal method of method of contraception) along with potential side effects (hot flashes, deep vein thrombosis, pulmonary embolis, stroke, cataracts, and endometrial cancer) including signs and symptoms were  discussed.   - Tamoxifen 20 mg daily for 5 years started: 5/24/17, refilled today   - Yearly gynecological examination. Prompt evaluation for any vaginal spotting.  - Discussed that SSRI's such as Lexapro may effect the metabolism of Tamoxifen and she should discuss with her prescriber switching to something such as citalopram or venlafaxine     4) Lifestyle Modifications were provided.   - Maintain a healthy weight. Your BMI is Body mass index is 22.95 kg/(m^2).. Recommended BMI is 20-25. Higher body mass index (BMI) and adult weight gain is associated with increased risk for breast cancer. This increase in risk has been attributed to increase in circulating endogenous estrogen levels from fat tissue.   - Alcohol consumption, even at moderate levels (1-2 drinks per day), increases breast cancer risk. Limit alcohol consumption to less than 1 drink per day. (1 ounce of liquor, 6 ounces of wine, or 8 ounces of beer)  - Exercise 30 minutes 3x weekly. 150 minutes per week of activity.     Merced Wiggins PA-C    Total time spent face to face with the patient was 15 minutes. 12 minutes was spent counseling the patient as described above.

## 2017-11-22 ENCOUNTER — OFFICE VISIT (OUTPATIENT)
Dept: SURGERY | Facility: CLINIC | Age: 42
End: 2017-11-22
Attending: PHYSICIAN ASSISTANT
Payer: COMMERCIAL

## 2017-11-22 VITALS
DIASTOLIC BLOOD PRESSURE: 67 MMHG | TEMPERATURE: 98.4 F | HEART RATE: 74 BPM | HEIGHT: 67 IN | SYSTOLIC BLOOD PRESSURE: 112 MMHG | WEIGHT: 146.5 LBS | OXYGEN SATURATION: 100 % | BODY MASS INDEX: 22.99 KG/M2

## 2017-11-22 DIAGNOSIS — Z80.3 FAMILY HISTORY OF BREAST CANCER: ICD-10-CM

## 2017-11-22 DIAGNOSIS — N60.92 ATYPICAL DUCTAL HYPERPLASIA OF LEFT BREAST: Primary | ICD-10-CM

## 2017-11-22 DIAGNOSIS — N60.99 ATYPICAL HYPERPLASIA OF BREAST: ICD-10-CM

## 2017-11-22 DIAGNOSIS — Z91.89 AT HIGH RISK FOR BREAST CANCER: ICD-10-CM

## 2017-11-22 PROCEDURE — 99213 OFFICE O/P EST LOW 20 MIN: CPT | Mod: ZP | Performed by: PHYSICIAN ASSISTANT

## 2017-11-22 PROCEDURE — 99213 OFFICE O/P EST LOW 20 MIN: CPT

## 2017-11-22 PROCEDURE — 99212 OFFICE O/P EST SF 10 MIN: CPT | Mod: ZF

## 2017-11-22 RX ORDER — TAMOXIFEN CITRATE 20 MG/1
20 TABLET ORAL DAILY
Qty: 90 TABLET | Refills: 3 | Status: SHIPPED | OUTPATIENT
Start: 2017-11-22 | End: 2018-05-09

## 2017-11-22 ASSESSMENT — PAIN SCALES - GENERAL: PAINLEVEL: NO PAIN (0)

## 2017-11-22 NOTE — LETTER
11/22/2017       RE: Kaitlyn Garcia  [address] [facility] [address]     Dear Colleague,    Thank you for referring your patient, Kaitlyn Garcia, to the [facility]. Please see a copy of my visit note below.    FOLLOW UP  Nov 22, 2017     Kaitlyn Garcia is a 42 year old woman who presents with left breast atypical ductal hyperplasia followed with high risk screening and preventative therapy with Tamoxifen.     HPI:    History of 3 left needle biopsies, 2 right needle biopsies, and 2 excisional left breast biopsy, all benign. The left breast biopsy on 12/12/16 did have 1 mm of atypical ductal hyperplasia.     She has 2 paternal aunts who had breast cancer in their 60's and a paternal cousin with breast cancer in her 40's. She was seen by a genetic counselor in 2015, no genetic testing was done.     She began high risk screening with annual mammogram and breast MR in 2013. She started Tamoxifen for risk reduction 5/24/17. She has occasionally hot flashes at night but overall tolerating it well.     Her most recent breast MRI 5/9/17 revealed an area of enhancement biopsied to be an intraductal papilloma without atypia. She was seen for surgical consultation by Dr. Montenegro and monitoring with imaging was recommended.     She is here today for a mammogram per the recommendations of Dr. Montenegro. She denies any breast masses, changes, nipple inversion, nipple discharge.     BREAST-SPECIFIC HISTORY:    Previous breast imaging: Yes   - 5/21/12 b/l Dmammo and right u/s for right breast lump: calcifications in the upper outer quadrant of left breast spanning 5 cm. Right u/s negative. BI-RADS 4 suspicious for malignancy   - 5/23/12 left stereotactic biopsy: intraductal papilloma   - 7/30/12 left wire-placement   - 2/19/13 MRI: large segmental enhancement of left lateral breast, BI-RADS 0 incomplete   Left u/s: BI-RADS 3 probably benign  - 3/29/13 MRI: BI-RADS 2 benign findings  - 8/5/13 b/l Dmammo for  fullness: BI-RADS 2 benign   - 14 MRI: cysts in left breast, BI-RADS 2 benign   - 14 Dmammo b/l: negative. Left u/s: cystic appearing structures, BI-RADS 2 benign  - 14 MRI: study not completed due to nausea, BI-RADS 0 incomplete  - 14 MRI: BI-RADS 2 benign  - 10/28/14 b/l Dmammo: BI-RADS 2 benign   - 5/7/15 MRI BI-RADS 2 benign   - 11/10/15 Smammo: bilateral calcs: BI-RADS 2 benign  - 16 left u/s for pain: multiple tiny cyst  - 16 MRI: BI-RADS 2 benign   - 16  Smammo: lateral left breast developing macrocalcifications BI-RADS 0 incomplete   - 16 Dmammo left: left 2:00 calcifications BI-RADS 4 suspicious  - 17: stereotactic biopsy: focus of ADH    - 17 wire placement   - 17 MRI: 2 adjacent mass like areas of enhancement mid central left breast BI-RADS 0 incomplete   - 17 left u/s: scattered small cysts BI-RADS 4 suspicious   - 17 MR biopsy: intraductal papilloma     Prior breast biopsies/surgeries: Yes   - 2 right breast biopsies   - 12 left needle biopsy: intraductal papilloma with sclerosing features   - 12 left excisional biopsy with Dr. Lynch: focal columnar cell change with atypia, intraductal papilloma and surrounding intraductal papillomatosis, sclerosing adenosis, fibrocystic changes  - 16 left needle biopsy x2: flat epithelial atypia with focus of ADH, duct ectasia and focal sclerosing adenosis, microcalcifications.   FEA and columnar cell change with atypia  - 17 left scar excision and excisional biopsy with Dr. Montenegro: FEA, columnar cell change/hyperplasia, intraductal papillomas, usual ductal hyperplasia, calcifications  - 17 left MRI needle biopsy: intraductal papilloma, mild columnar cell hyperplasia, mild duct ectasia, focal sclerosing adenosis    Prior history of breast cancer: No  Prior radiation history: No   Self breast exams: No  Breast density: heterogeneously dense    GYN HISTORY:  . Age at 1st pregnancy: 17.  Breastfeeding history: Yes.   Age at menarche: 14-15  Menopausal: premenopausal  Menopausal hormone replacement therapy: No    RISK ASSESSMENT: >1.5% 5 yr risk, >20% lifetime risk   Heather:2.4% 5 yr risk   SEBASTIAN 8: 43.8% lifetime  Abiodun: 11.6% lifetime    FAMILY HISTORY:  Breast ca: Yes    - paternal aunt 60 's   - paternal aunt 60's  - paternal cousin 40   Ovarian ca: No   - ? Paternal cousin with ovarian cancer   Pancreatic ca: No   Prostate: yes  - maternal uncle 69   Gastric ca: Yes   - paternal grandfather, passed   Melanoma: No  Colon ca: No  Other cancer: Yes   - maternal grandmother skin cancer 90's  - paternal grandfather brain 60's   Muslim ethnicity: No  Other genetic, testing, syndromes, or clotting disorders: No   - She saw a genetic counselor 2/2015 and no testing was recommended     PAST MEDICAL HISTORY  Past Medical History:   Diagnosis Date     Anemia      Choroidal nevus of right eye      Concussion 10/2015     Depressive disorder 2006    Treated with celexa for two years     Family history of breast cancer 2/3/2015     Family history of breast cancer 2/3/2015     Family history of breast cancer 2/3/2015     Family history of breast cancer 2/3/2015     Gastroesophageal reflux disease      Papilloma of breast 5/23/12    Left, See Dr. Lynch at      PONV (postoperative nausea and vomiting)      S/P endometrial ablation 10/10/13    Menorrhagia     Shingles     x2 in the past     WBC decreased 2/3/2015     WBC decreased 2/3/2015     WBC decreased 2/3/2015   BMI: Body mass index is 22.95 kg/(m^2).  Cataracts no  Clotting disorders: no  Antiphospholipid syndrome: no  SLE: no  DVT, PE, stroke, TIA no  Osteoporosis: no  Endometriosis no  Contraception: vasectomy    PAST SURGICAL HISTORY   Past Surgical History:   Procedure Laterality Date     ABDOMEN SURGERY      rectoplasty     BIOPSY BREAST NEEDLE LOCALIZATION  7/30/2012    Procedure: BIOPSY BREAST NEEDLE LOCALIZATION;  left breast excisional Biopsy with  wire localization @0830;  Surgeon: Robert Lynch MD;  Location: [facility]     BREAST SURGERY       COSMETIC SURGERY      Rectalplasty for episiotomy repair     COSMETIC SURGERY      Rectalplasty for episiotomy repair     DILATION AND CURETTAGE, HYSTEROSCOPY, ABLATE ENDOMETRIUM NOVASURE, COMBINED  10/10/2013    Procedure: COMBINED DILATION AND CURETTAGE, HYSTEROSCOPY, ABLATE ENDOMETRIUM NOVASURE;  Endometrial ablation with Novasure;  Surgeon: Indu Birmingham DO;  Location: [facility] OR     HC TOOTH EXTRACTION W/FORCEP       LUMPECTOMY BREAST Left 2/1/2017    Procedure: LUMPECTOMY BREAST;  Surgeon: Lori Montenegro MD;  Location: [facility]     SOFT TISSUE SURGERY      lumpectomy x 2 right breast     SOFT TISSUE SURGERY      episiotomy fixed   Hysterectomy: No    MEDICATIONS  Current Outpatient Prescriptions   Medication Sig Dispense Refill     tamoxifen (NOLVADEX) 20 MG tablet Take 1 tablet (20 mg) by mouth daily 90 tablet 1     escitalopram (LEXAPRO) 20 MG tablet Take 1 tablet (20 mg) by mouth daily 90 tablet 3     omeprazole (PRILOSEC) 20 MG CR capsule Take 1 capsule (20 mg) by mouth daily 90 capsule 3     fluticasone (FLONASE) 50 MCG/ACT nasal spray USE 2 SPRAYS IN EACH NOSTRIL ONCE DAILY 48 g 11     diazepam (VALIUM) 2 MG tablet Take 1 tablet (2 mg) by mouth every 12 hours as needed for anxiety or sleep (Patient not taking: Reported on 8/15/2017) 10 tablet 0   SSRI: yes, Lexapro   OCP: No no  HRT No    ALLERGIES  Allergies   Allergen Reactions     No Known Drug Allergies       SOCIAL HISTORY:  Smokes: No  EtOH: Yes, less than once per month  Exercise: planning     ROS:  Eyes: negative Change in vision No  Respiratory: No shortness of breath, dyspnea on exertion, cough, or hemoptysis Cough No, Shortness of breath No  Cardiovascular: negative   Gastrointestinal: negative  Breast: negative  Musculoskeletal: negative Joint pain No  Psychiatric: negative  Hematologic/Lymphatic/Immunologic: negative Easy  "bruising or bleeding , Enlarged Lymph nodes no,   Endocrine: negative    EXAM  /67 (BP Location: Right arm, Patient Position: Sitting, Cuff Size: Adult Regular)  Pulse 74  Temp 98.4  F (36.9  C) (Oral)  Ht 1.702 m (5' 7\")  Wt 66.5 kg (146 lb 8 oz)  LMP 11/13/2017  SpO2 100%  BMI 22.95 kg/m2   PHYSICAL EXAM  Respiratory: breathing non labored.   Breasts: Examination was done in both the upright and supine positions.  Breasts are symmetrical . No dominant fixed or suspicious masses noted. No skin or nipple changes. No nipple discharge. Right scar at 10:00. Left breast scar at 2:00. Left breast with nodularity beneath the scar.   No clavicular, cervical, or axillary lymphadenopathy.     INVESTIGATIONS:    Bilateral diagnostic mammogram 11/21/17:      ASSESSMENT/PLAN:    Kaitlyn Garcia is a 42 year old woman with history of atypical ductal hyperplasia doing high risk screening and preventative therapy with Tamoxifen.    1) Reviewed images today with the Radiologist and results were discussed with the patient.    - No concerning findings. Final radiology report is pending.     2) We also reviewed that atypical ductal hyperplasia increases her baseline risk for breast cancer. Patients diagnosed with atypical hyperplasia are at higher risk for developing breast cancer in the future. Her lifetime risk for breast cancer by SEBASTIAN was 43.8%. Discussed she meets guidelines for high risk screening with yearly mammogram alternating with Breast MRI every six months. Clinical encounter every 6-12 months including family history, risk assessment, and clinical breast exam.   - Next breast MRI with clinic visit: 5/20/18, MRI ordered   - Next mammogram: 11/22/18  - Breast awareness.     3) She is a candidate for breast cancer risk-reduction intervention based on a Heather estimated 5 year risk of 2.4%. The contraindications for Tamoxifen (history of VTE, history or stroke, history of TIA, pregnancy, and potential pregnancy " without an effective nonhormal method of method of contraception) along with potential side effects (hot flashes, deep vein thrombosis, pulmonary embolis, stroke, cataracts, and endometrial cancer) including signs and symptoms were discussed.   - Tamoxifen 20 mg daily for 5 years started: 5/24/17, refilled today   - Yearly gynecological examination. Prompt evaluation for any vaginal spotting.  - Discussed that SSRI's such as Lexapro may effect the metabolism of Tamoxifen and she should discuss with her prescriber switching to something such as citalopram or venlafaxine     4) Lifestyle Modifications were provided.   - Maintain a healthy weight. Your BMI is Body mass index is 22.95 kg/(m^2).. Recommended BMI is 20-25. Higher body mass index (BMI) and adult weight gain is associated with increased risk for breast cancer. This increase in risk has been attributed to increase in circulating endogenous estrogen levels from fat tissue.   - Alcohol consumption, even at moderate levels (1-2 drinks per day), increases breast cancer risk. Limit alcohol consumption to less than 1 drink per day. (1 ounce of liquor, 6 ounces of wine, or 8 ounces of beer)  - Exercise 30 minutes 3x weekly. 150 minutes per week of activity.     Merced Wiggins PA-C    Total time spent face to face with the patient was 15 minutes. 12 minutes was spent counseling the patient as described above.     Again, thank you for allowing me to participate in the care of your patient.      Sincerely,    Merced Wiggins PA-C

## 2017-11-22 NOTE — PATIENT INSTRUCTIONS
Kaitlyn Garcia is a 42 year old woman with history of atypical ductal hyperplasia doing high risk screening and preventative therapy with Tamoxifen.    1) Reviewed images today with the Radiologist and results were discussed with the patient.    2) We also reviewed that atypical ductal hyperplasia increases her baseline risk for breast cancer. Patients diagnosed with atypical hyperplasia are at higher risk for developing breast cancer in the future. Her lifetime risk for breast cancer by SEBASTIAN was 43.8%. Discussed she meets guidelines for high risk screening with yearly mammogram alternating with Breast MRI every six months. Clinical encounter every 6-12 months including family history, risk assessment, and clinical breast exam.   - Next breast MRI with clinic visit: 5/20/18, MRI ordered   - Next mammogram: 11/22/18  - Breast awareness.     3) She is a candidate for breast cancer risk-reduction intervention based on a Heather estimated 5 year risk of 2.4%. The contraindications for Tamoxifen (history of VTE, history or stroke, history of TIA, pregnancy, and potential pregnancy without an effective nonhormal method of method of contraception) along with potential side effects (hot flashes, deep vein thrombosis, pulmonary embolis, stroke, cataracts, and endometrial cancer) including signs and symptoms were discussed.   - Tamoxifen 20 mg daily for 5 years started: 5/24/17, refilled today   - Yearly gynecological examination. Prompt evaluation for any vaginal spotting.  - Discussed that SSRI's such as Lexapro may effect the metabolism of Tamoxifen and she should discuss with her prescriber switching to something such as citalopram or venlafaxine     4) Lifestyle Modifications were provided.   - Maintain a healthy weight. Your BMI is Body mass index is 22.95 kg/(m^2).. Recommended BMI is 20-25. Higher body mass index (BMI) and adult weight gain is associated with increased risk for breast cancer. This increase in risk has  been attributed to increase in circulating endogenous estrogen levels from fat tissue.   - Alcohol consumption, even at moderate levels (1-2 drinks per day), increases breast cancer risk. Limit alcohol consumption to less than 1 drink per day. (1 ounce of liquor, 6 ounces of wine, or 8 ounces of beer)  - Exercise 30 minutes 3x weekly. 150 minutes per week of activity.

## 2017-11-22 NOTE — MR AVS SNAPSHOT
After Visit Summary   11/22/2017    Kaitlyn Garcia    MRN: [id number]           Patient Information     Date Of Birth          1975        Visit Information        Provider Department      11/22/2017 10:00 AM Merced Wiggins, KAIN QUINTERO [facility]        Today's Diagnoses     Atypical ductal hyperplasia of left breast    -  1    Family history of breast cancer        At high risk for breast cancer        Atypical hyperplasia of breast          Care Instructions    Kaitlyn Garcia is a 42 year old woman with history of atypical ductal hyperplasia doing high risk screening and preventative therapy with Tamoxifen.    1) Reviewed images today with the Radiologist and results were discussed with the patient.    2) We also reviewed that atypical ductal hyperplasia increases her baseline risk for breast cancer. Patients diagnosed with atypical hyperplasia are at higher risk for developing breast cancer in the future. Her lifetime risk for breast cancer by SEBASTIAN was 43.8%. Discussed she meets guidelines for high risk screening with yearly mammogram alternating with Breast MRI every six months. Clinical encounter every 6-12 months including family history, risk assessment, and clinical breast exam.   - Next breast MRI with clinic visit: 5/20/18, MRI ordered   - Next mammogram: 11/22/18  - Breast awareness.     3) She is a candidate for breast cancer risk-reduction intervention based on a Heather estimated 5 year risk of 2.4%. The contraindications for Tamoxifen (history of VTE, history or stroke, history of TIA, pregnancy, and potential pregnancy without an effective nonhormal method of method of contraception) along with potential side effects (hot flashes, deep vein thrombosis, pulmonary embolis, stroke, cataracts, and endometrial cancer) including signs and symptoms were discussed.   - Tamoxifen 20 mg daily for 5 years started: 5/24/17, refilled today   - Yearly gynecological examination.  Prompt evaluation for any vaginal spotting.  - Discussed that SSRI's such as Lexapro may effect the metabolism of Tamoxifen and she should discuss with her prescriber switching to something such as citalopram or venlafaxine     4) Lifestyle Modifications were provided.   - Maintain a healthy weight. Your BMI is Body mass index is 22.95 kg/(m^2).. Recommended BMI is 20-25. Higher body mass index (BMI) and adult weight gain is associated with increased risk for breast cancer. This increase in risk has been attributed to increase in circulating endogenous estrogen levels from fat tissue.   - Alcohol consumption, even at moderate levels (1-2 drinks per day), increases breast cancer risk. Limit alcohol consumption to less than 1 drink per day. (1 ounce of liquor, 6 ounces of wine, or 8 ounces of beer)  - Exercise 30 minutes 3x weekly. 150 minutes per week of activity.           Follow-ups after your visit        Future tests that were ordered for you today     Open Future Orders        Priority Expected Expires Ordered    MRI Breast Bilateral w Contrast Routine 5/20/2018 11/22/2018 11/22/2017            Who to contact     If you have questions or need follow up information about today's clinic visit or your schedule please contact [facility] directly at [phone].  Normal or non-critical lab and imaging results will be communicated to you by [employer], letter or phone within 4 business days after the clinic has received the results. If you do not hear from us within 7 days, please contact the clinic through [employer] or phone. If you have a critical or abnormal lab result, we will notify you by phone as soon as possible.  Submit refill requests through [employer] or call your pharmacy and they will forward the refill request to us. Please allow 3 business days for your refill to be completed.          Additional Information About Your Visit        Inspro Information     [employer] gives you secure access to  "your electronic health record. If you see a primary care provider, you can also send messages to your care team and make appointments. If you have questions, please call your primary care clinic.  If you do not have a primary care provider, please call [phone] and they will assist you.        Care EveryWhere ID     This is your Care EveryWhere ID. This could be used by other organizations to access your [facility] medical records  [id number]        Your Vitals Were     Pulse Temperature Height Last Period Pulse Oximetry BMI (Body Mass Index)    74 98.4  F (36.9  C) (Oral) 1.702 m (5' 7\") 11/13/2017 100% 22.95 kg/m2       Blood Pressure from Last 3 Encounters:   11/22/17 112/67   08/15/17 110/77   06/09/17 102/70    Weight from Last 3 Encounters:   11/22/17 66.5 kg (146 lb 8 oz)   08/15/17 63.5 kg (140 lb)   06/09/17 64.5 kg (142 lb 3.2 oz)                 Where to get your medicines      These medications were sent to [facility] - [place], [place] - [address], [address]  [address], [address], [facility] [address]     Phone:  [phone]     tamoxifen 20 MG tablet          Primary Care Provider Office Phone # Fax #    Ani Griffiths Bryon, APRN [facility] [phone]       [address]  [place] [address]        Equal Access to Services     VILLA ALBERTO AH: Hadii aad ku hadasho Soomaali, waaxda luqadaha, qaybta kaalmada adeegyada, morenita gomes. So [facility] [phone].    ATENCIÓN: Si habla eduard, tiene a lugo disposición servicios gratuitos de asistencia lingüística. Llame al [phone].    We comply with applicable federal civil rights laws and [place] laws. We do not discriminate on the basis of race, color, national origin, age, disability, sex, sexual orientation, or gender identity.            Thank you!     Thank you for choosing [facility]  for your care. Our goal is always to provide you with " excellent care. Hearing back from our patients is one way we can continue to improve our services. Please take a few minutes to complete the written survey that you may receive in the mail after your visit with us. Thank you!             Your Updated Medication List - Protect others around you: Learn how to safely use, store and throw away your medicines at www.disposemymeds.org.          This list is accurate as of: 11/22/17 10:40 AM.  Always use your most recent med list.                   Brand Name Dispense Instructions for use Diagnosis    diazepam 2 MG tablet    VALIUM    10 tablet    Take 1 tablet (2 mg) by mouth every 12 hours as needed for anxiety or sleep    Situational mixed anxiety and depressive disorder       escitalopram 20 MG tablet    LEXAPRO    90 tablet    Take 1 tablet (20 mg) by mouth daily    Situational mixed anxiety and depressive disorder       fluticasone 50 MCG/ACT spray    FLONASE    48 g    USE 2 SPRAYS IN EACH NOSTRIL ONCE DAILY    Allergic rhinitis due to other allergic trigger, unspecified rhinitis seasonality       omeprazole 20 MG CR capsule    priLOSEC    90 capsule    Take 1 capsule (20 mg) by mouth daily    Gastroesophageal reflux disease without esophagitis       tamoxifen 20 MG tablet    NOLVADEX    90 tablet    Take 1 tablet (20 mg) by mouth daily    Atypical hyperplasia of breast

## 2017-11-22 NOTE — NURSING NOTE
"Oncology Rooming Note    November 22, 2017 9:42 AM   Kaitlyn Garcia is a 42 year old female who presents for:    Chief Complaint   Patient presents with     Oncology Clinic Visit     Follow up-Biopsy     Initial Vitals: /67 (BP Location: Right arm, Patient Position: Sitting, Cuff Size: Adult Regular)  Pulse 74  Temp 98.4  F (36.9  C) (Oral)  Ht 1.702 m (5' 7\")  Wt 66.5 kg (146 lb 8 oz)  LMP 11/13/2017  SpO2 100%  BMI 22.95 kg/m2 Estimated body mass index is 22.95 kg/(m^2) as calculated from the following:    Height as of this encounter: 1.702 m (5' 7\").    Weight as of this encounter: 66.5 kg (146 lb 8 oz). Body surface area is 1.77 meters squared.  No Pain (0) Comment: Data Unavailable   Patient's last menstrual period was 11/13/2017.  Allergies reviewed: Yes  Medications reviewed: Yes    Medications: MEDICATION REFILLS NEEDED TODAY. Provider was notified.  Pharmacy name entered into [facility]:    [facility] [place] - [place], [place] - [address]  [facility] [place], [place] - [address]  [id number] - [place], [place] - [address], [place] AT [facility]    Clinical concerns: Refill on Tamoxifen Merced Wiggins was notified.    10 minutes for nursing intake (face to face time)     Padmini Nguyen LPN              "

## 2017-12-19 ENCOUNTER — OFFICE VISIT (OUTPATIENT)
Dept: OPHTHALMOLOGY | Facility: CLINIC | Age: 42
End: 2017-12-19
Attending: OPHTHALMOLOGY
Payer: COMMERCIAL

## 2017-12-19 DIAGNOSIS — H43.391 VITREOUS SYNERESIS OF RIGHT EYE: ICD-10-CM

## 2017-12-19 DIAGNOSIS — D31.31 CHOROIDAL NEVUS OF RIGHT EYE: ICD-10-CM

## 2017-12-19 DIAGNOSIS — H43.392 VITREOUS SYNERESIS, LEFT: Primary | ICD-10-CM

## 2017-12-19 PROCEDURE — 76512 OPH US DX B-SCAN: CPT | Mod: ZF | Performed by: OPHTHALMOLOGY

## 2017-12-19 PROCEDURE — 92134 CPTRZ OPH DX IMG PST SGM RTA: CPT | Mod: ZF | Performed by: OPHTHALMOLOGY

## 2017-12-19 PROCEDURE — 92250 FUNDUS PHOTOGRAPHY W/I&R: CPT | Mod: 59,ZF | Performed by: OPHTHALMOLOGY

## 2017-12-19 PROCEDURE — 99212 OFFICE O/P EST SF 10 MIN: CPT | Mod: 25,ZF

## 2017-12-19 ASSESSMENT — TONOMETRY
IOP_METHOD: TONOPEN
OD_IOP_MMHG: 14
OS_IOP_MMHG: 16

## 2017-12-19 ASSESSMENT — CUP TO DISC RATIO
OS_RATIO: 0.65
OD_RATIO: 0.6

## 2017-12-19 ASSESSMENT — CONF VISUAL FIELD
OD_NORMAL: 1
OS_NORMAL: 1

## 2017-12-19 ASSESSMENT — VISUAL ACUITY
OD_SC: 20/20
OS_SC: 20/20
METHOD: SNELLEN - LINEAR

## 2017-12-19 ASSESSMENT — EXTERNAL EXAM - RIGHT EYE: OD_EXAM: NORMAL

## 2017-12-19 ASSESSMENT — SLIT LAMP EXAM - LIDS
COMMENTS: NORMAL
COMMENTS: NORMAL

## 2017-12-19 ASSESSMENT — EXTERNAL EXAM - LEFT EYE: OS_EXAM: NORMAL

## 2017-12-19 NOTE — NURSING NOTE
Chief Complaints and History of Present Illnesses   Patient presents with     Yearly Exam     follow up Choroidal nevus of right eye     HPI    Affected eye(s):  Right   Symptoms:     No floaters   No flashes   No glare   No halos      Duration:  1 year   Frequency:  Constant       Do you have eye pain now?:  No      Comments:  Choroidal nevus of right eye  Pt reports no change  Leigh BHATIA 9:40 AM December 19, 2017

## 2017-12-19 NOTE — MR AVS SNAPSHOT
After Visit Summary   12/19/2017    Kaitlyn Garcia    MRN: [id number]           Patient Information     Date Of Birth          1975        Visit Information        Provider Department      12/19/2017 9:45 AM Aliya Perez MD [facility]        Today's Diagnoses     Vitreous syneresis, left    -  1    Choroidal nevus of right eye        Vitreous syneresis of right eye           Follow-ups after your visit        Follow-up notes from your care team     Return in about 1 year (around 12/19/2018) for OCT OU, Fundus photos OU (Optos).      Future tests that were ordered for you today     Open Future Orders        Priority Expected Expires Ordered    Fundus Photos OD (right eye) Routine  6/22/2019 12/19/2017    OCT Retina Spectralis OU (both eyes) Routine  6/22/2019 12/19/2017            Who to contact     Please call your clinic at [phone] to:    Ask questions about your health    Make or cancel appointments    Discuss your medicines    Learn about your test results    Speak to your doctor   If you have compliments or concerns about an experience at your clinic, or if you wish to file a complaint, please contact [facility] Patient Relations at [phone] or email us at [email or web address]         Additional Information About Your Visit        MyChart Information     [employer] gives you secure access to your electronic health record. If you see a primary care provider, you can also send messages to your care team and make appointments. If you have questions, please call your primary care clinic.  If you do not have a primary care provider, please call [phone] and they will assist you.      [employer] is an electronic gateway that provides easy, online access to your medical records. With [employer], you can request a clinic appointment, read your test results, renew a prescription or communicate with your care team.     To access your existing  account, please contact your [facility] or call [phone] for assistance.        Care EveryWhere ID     This is your Care EveryWhere ID. This could be used by other organizations to access your [place] medical records  [id number]        Your Vitals Were     Last Period                   11/13/2017            Blood Pressure from Last 3 Encounters:   11/22/17 112/67   08/15/17 110/77   06/09/17 102/70    Weight from Last 3 Encounters:   11/22/17 66.5 kg (146 lb 8 oz)   08/15/17 63.5 kg (140 lb)   06/09/17 64.5 kg (142 lb 3.2 oz)              We Performed the Following     Fundus Autofluorescence Image (FAF) OU (both eyes)     Fundus Photos OD (right eye)     OCT Retina Spectralis OD (right eye)     Ultrasound B-scan OD (right eye)        Primary Care Provider Office Phone # Fax #    Ani Griffiths Bryon, APRN [facility] [phone]       [address]  [place] [address]        Equal Access to Services     VILLA ALBERTO : Hadii aad ku hadasho Soomaali, waaxda luqadaha, qaybta kaalmada adeegyada, waxay idiin hayronnin deuce fong . So [facility] [phone].    ATENCIÓN: Si habla español, tiene a lugo disposición servicios gratuitos de asistencia lingüística. Llame al [phone].    We comply with applicable federal civil rights laws and [place] laws. We do not discriminate on the basis of race, color, national origin, age, disability, sex, sexual orientation, or gender identity.            Thank you!     Thank you for choosing E[facility] CLINIC  for your care. Our goal is always to provide you with excellent care. Hearing back from our patients is one way we can continue to improve our services. Please take a few minutes to complete the written survey that you may receive in the mail after your visit with us. Thank you!             Your Updated Medication List - Protect others around you: Learn how to safely use, store and throw away your medicines at  www.disposemymeds.org.          This list is accurate as of: 12/19/17 10:27 PM.  Always use your most recent med list.                   Brand Name Dispense Instructions for use Diagnosis    diazepam 2 MG tablet    VALIUM    10 tablet    Take 1 tablet (2 mg) by mouth every 12 hours as needed for anxiety or sleep    Situational mixed anxiety and depressive disorder       escitalopram 20 MG tablet    LEXAPRO    90 tablet    Take 1 tablet (20 mg) by mouth daily    Situational mixed anxiety and depressive disorder       fluticasone 50 MCG/ACT spray    FLONASE    48 g    USE 2 SPRAYS IN EACH NOSTRIL ONCE DAILY    Allergic rhinitis due to other allergic trigger, unspecified rhinitis seasonality       omeprazole 20 MG CR capsule    priLOSEC    90 capsule    Take 1 capsule (20 mg) by mouth daily    Gastroesophageal reflux disease without esophagitis       tamoxifen 20 MG tablet    NOLVADEX    90 tablet    Take 1 tablet (20 mg) by mouth daily    Atypical hyperplasia of breast
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
## 2017-12-28 ENCOUNTER — OFFICE VISIT (OUTPATIENT)
Dept: PEDIATRICS | Facility: CLINIC | Age: 42
End: 2017-12-28
Payer: COMMERCIAL

## 2017-12-28 VITALS
TEMPERATURE: 96.3 F | BODY MASS INDEX: 22.49 KG/M2 | HEART RATE: 84 BPM | OXYGEN SATURATION: 99 % | WEIGHT: 143.6 LBS | SYSTOLIC BLOOD PRESSURE: 110 MMHG | DIASTOLIC BLOOD PRESSURE: 60 MMHG

## 2017-12-28 DIAGNOSIS — J01.00 ACUTE NON-RECURRENT MAXILLARY SINUSITIS: Primary | ICD-10-CM

## 2017-12-28 PROCEDURE — 99213 OFFICE O/P EST LOW 20 MIN: CPT | Performed by: NURSE PRACTITIONER

## 2017-12-28 NOTE — NURSING NOTE
"Chief Complaint   Patient presents with     Sinus Problem     sinus infection x 1 week       Initial /60 (BP Location: Right arm, Patient Position: Sitting, Cuff Size: Adult Regular)  Pulse 84  Temp 96.3  F (35.7  C) (Temporal)  Wt 143 lb 9.6 oz (65.1 kg)  SpO2 99%  Breastfeeding? No  BMI 22.49 kg/m2 Estimated body mass index is 22.49 kg/(m^2) as calculated from the following:    Height as of 11/22/17: 5' 7\" (1.702 m).    Weight as of this encounter: 143 lb 9.6 oz (65.1 kg).  Medication Reconciliation: complete      ALEJANDRO Bihsop      "

## 2017-12-28 NOTE — PATIENT INSTRUCTIONS
PLAN:   1.   Symptomatic therapy suggested: rest, increase fluids, apply heat to sinuses prn, use mist of vaporizer prn, OTC saline nasal spray as needed and call prn if symptoms persist or worsen.  2.  Orders Placed This Encounter   Medications     amoxicillin-clavulanate (AUGMENTIN) 875-125 MG per tablet     Sig: Take 1 tablet by mouth 2 times daily     Dispense:  20 tablet     Refill:  0     3. Patient needs to follow up in if no improvement,or sooner if worsening of symptoms or other symptoms develop.    It was a pleasure seeing you today at the [facility]. Thank you for allowing us to care for you today. We truly hope we provided you with the excellent service you deserve. Please let us know if there is anything else we can do for you so we can be sure you are leaving completley satisfied with your care experience.       General information about your clinic   Clinic Hours Lab Hours (Appointments are required)   Mon-Thurs: 7:30 AM - 7 PM Mon-Thurs: 7:30 AM - 7 PM   Fri: 7:30 AM - 5 PM Fri: 7:30 AM - 5 PM        After Hours Nurse Advise & Appts:  [facility]: [phone]  [facility] On Call: to make appointments anytime: [phone] On Call Physician: call [phone] and answering service will page the on call physician.        For urgent appointments, please call [phone] and ask for the triage nurse or your care team clinic nurse.  How to contact my care team:  MyChart: [email or web address]   Phone: [phone]   Fax: [phone]       [facility]:   Phone: [phone]  Hours: 8:00 AM - 6:00 PM  Medication Refills:  Call your pharmacy and they will forward the refill to us. Please allow 3 business days for your refills to be completed.       Normal or non-critical lab and imaging results will be communicated to you by MyChart, letter or phone within 7 days.  If you do not hear from us within 10 days, please call the clinic. If you have a critical  or abnormal lab result, we will notify you by phone as soon as possible.       We now have PWIC (Pediatric Walk in Care)  Monday-Friday from 7:30-4. Simply walk in and be seen for your urgent needs like cough, fever, rash, diarrhea or vomiting, pink eye, UTI. No appointments needed. Ask one of the team for more information      -Your Care Team:    Dr. Ashli Phillips - Internal Medicine/Pediatrics   Dr. Lesvia Oneal - Family Medicine  Dr. Juanita Watson - Pediatrics  Ani Slater CNP - Family Practice Nurse Practitioner  Dr. Aislinn Tay - Pediatrics

## 2017-12-28 NOTE — PROGRESS NOTES
SUBJECTIVE:   Kaitlyn Garcia is a 42 year old female who presents to clinic today for the following health issues:    Acute Illness   Acute illness concerns: sinus infection, runny nose  Onset: 1 week    Fever: no    Chills/Sweats: YES    Headache (location?): YES    Sinus Pressure:YES- tender, post-nasal drainage, facial pain and teeth pain    Conjunctivitis:  no    Ear Pain: no    Rhinorrhea: YES    Congestion: YES    Sore Throat: no     Cough: YES-non-productive, productive of yellow sputum    Wheeze: no    Decreased Appetite: YES    Nausea: no    Vomiting: no    Diarrhea:  no    Dysuria/Freq.: no    Fatigue/Achiness: YES    Sick/Strep Exposure: YES- daughter     Therapies Tried and outcome: sudafed, tylenol, ibuprofen, aleve     Works on medical unit at [employer]     Problem list and histories reviewed & adjusted, as indicated.  Additional history: as documented    Patient Active Problem List   Diagnosis     CARDIOVASCULAR SCREENING; LDL GOAL LESS THAN 160     Iron deficiency anemia     Mammographic microcalcification found on diagnostic imaging of breast     Intraductal papilloma of breast     Atypical hyperplasia of breast     Acne     Bloating symptom     Abdominal pain     Abnormal biliary HIDA scan     Family history of breast cancer     Neutropenia (H)     Choroidal nevus of right eye     Situational mixed anxiety and depressive disorder     Glaucoma suspect, bilateral     Post-concussion syndrome     Muscle spasms of head or neck     Allergic rhinitis     WBC decreased     Past Surgical History:   Procedure Laterality Date     ABDOMEN SURGERY      rectoplasty     BIOPSY BREAST NEEDLE LOCALIZATION  7/30/2012    Procedure: BIOPSY BREAST NEEDLE LOCALIZATION;  left breast excisional Biopsy with wire localization @0830;  Surgeon: Robert Lynch MD;  Location: [facility] OR     BREAST SURGERY       COSMETIC SURGERY      Rectalplasty for episiotomy repair     COSMETIC SURGERY      Rectalplasty for  episiotomy repair     DILATION AND CURETTAGE, HYSTEROSCOPY, ABLATE ENDOMETRIUM NOVASURE, COMBINED  10/10/2013    Procedure: COMBINED DILATION AND CURETTAGE, HYSTEROSCOPY, ABLATE ENDOMETRIUM NOVASURE;  Endometrial ablation with Novasure;  Surgeon: Indu Birmingham DO;  Location: [facility] OR     HC TOOTH EXTRACTION W/FORCEP       LUMPECTOMY BREAST Left 2/1/2017    Procedure: LUMPECTOMY BREAST;  Surgeon: Lori Montenegro MD;  Location: [facility]     SOFT TISSUE SURGERY      lumpectomy x 2 right breast     SOFT TISSUE SURGERY      episiotomy fixed       Social History   Substance Use Topics     Smoking status: Never Smoker     Smokeless tobacco: Never Used     Alcohol use 0.0 oz/week     0 Standard drinks or equivalent per week      Comment: Less than once a month     Family History   Problem Relation Age of Onset     Hypertension Father      Lipids Father      Hyperlipidemia Father      Depression/Anxiety Father      CEREBROVASCULAR DISEASE Father      TIA     CANCER Maternal Grandmother      Glaucoma Maternal Grandmother      Macular Degeneration Maternal Grandmother      OSTEOPOROSIS Maternal Grandmother      Anesthesia Reaction Paternal Grandmother      DIABETES Paternal Grandmother      CANCER Paternal Grandfather      Depression/Anxiety Mother      Depression/Anxiety Daughter      Depression Daughter      Depression Son      Cataracts Maternal Grandfather      Breast Cancer Paternal Aunt 60     x 2 aunts     Breast Cancer Other 40     Paternal cousin     Breast Cancer Other 64     Several aunts on fathers side/Several aunts on fathers side/Several aunts on fathers side/Several aunts on fathers side     Prostate Cancer Other 64     Uncle on mothers side     Asthma No family hx of      C.A.D. No family hx of      Cancer - colorectal No family hx of      Connective Tissue Disorder No family hx of      Thyroid Disease No family hx of      Coronary Artery Disease No family hx of      Ovarian Cancer No family  hx of      Thyroid Disease No family hx of      Chemical Addiction No family hx of      Known Genetic Syndrome No family hx of          Current Outpatient Prescriptions   Medication Sig Dispense Refill     tamoxifen (NOLVADEX) 20 MG tablet Take 1 tablet (20 mg) by mouth daily 90 tablet 3     diazepam (VALIUM) 2 MG tablet Take 1 tablet (2 mg) by mouth every 12 hours as needed for anxiety or sleep 10 tablet 0     omeprazole (PRILOSEC) 20 MG CR capsule Take 1 capsule (20 mg) by mouth daily 90 capsule 3     fluticasone (FLONASE) 50 MCG/ACT nasal spray USE 2 SPRAYS IN EACH NOSTRIL ONCE DAILY 48 g 11     Allergies   Allergen Reactions     No Known Drug Allergies      Labs reviewed in EPIC      Reviewed and updated as needed this visit by clinical staffTobacco  Allergies  Meds  Med Hx  Surg Hx  Fam Hx  Soc Hx      Reviewed and updated as needed this visit by Provider         ROS:  CONSTITUTIONAL:POSITIVE  for fatigue and NEGATIVE  for fever   ENT/MOUTH: POSITIVE for nasal congestion, postnasal drainage, sinus pressure and tooth pain and NEGATIVE for epistaxis and fever  RESP:POSITIVE for cough-non productive and NEGATIVE for SOB/dyspnea and wheezing  CV: NEGATIVE for chest pain/chest pressure  GI: NEGATIVE for nausea and vomiting  MUSCULOSKELETAL: NEGATIVE for significant arthralgias or myalgia  HEME/ALLERGY/IMMUNE: NEGATIVE for allergies    OBJECTIVE:     /60 (BP Location: Right arm, Patient Position: Sitting, Cuff Size: Adult Regular)  Pulse 84  Temp 96.3  F (35.7  C) (Temporal)  Wt 143 lb 9.6 oz (65.1 kg)  SpO2 99%  Breastfeeding? No  BMI 22.49 kg/m2  Body mass index is 22.49 kg/(m^2).  GENERAL: Patient is well nourished, well developed,in no apparent distress  mildly ill but alert and responsive  EYES:  Right conjunctiva is not injected and without discharge.  Left conjunctiva is not injected and without discharge.  EARS: negative findings: external ears normal to inspection and palpation, no mastoid  process tenderness, positive findings: , Right TM: serous middle ear fluid, Left TM: serous middle ear fluid  NOSE: positive findings: mucosa erythematous and swollen,  Sinus maxillary tenderness on bilaterally.  THROAT: normal.  NECK: supple with no adenopathy,   CARDIAC:NORMAL - regular rate and rhythm without murmur.  RESP: normal respiratory rate and rhythm, diaphragmatic excursion normal  unlabored respirations, no intercostal retractions or accessory muscle use  ABD: Abdomen soft, non-tender.  SKIN: Skin color, texture, turgor normal. No rashes or lesions.  MS: extremities normal- no gross deformities noted, gait normal and normal muscle tone    Diagnostic Test Results:  none     ASSESSMENT/PLAN:     Kaitlyn was seen today for sinus problem.    Diagnoses and all orders for this visit:    Acute non-recurrent maxillary sinusitis  -     amoxicillin-clavulanate (AUGMENTIN) 875-125 MG per tablet; Take 1 tablet by mouth 2 times daily    PLAN:   1.   Symptomatic therapy suggested: rest, increase fluids, apply heat to sinuses prn, use mist of vaporizer prn, OTC saline nasal spray as needed and call prn if symptoms persist or worsen.  2.  Orders Placed This Encounter   Medications     amoxicillin-clavulanate (AUGMENTIN) 875-125 MG per tablet     Sig: Take 1 tablet by mouth 2 times daily     Dispense:  20 tablet     Refill:  0     3. Patient needs to follow up in if no improvement,or sooner if worsening of symptoms or other symptoms develop.      See Patient Instructions    SINDHU Antonio CNP  [facility]

## 2017-12-28 NOTE — MR AVS SNAPSHOT
After Visit Summary   12/28/2017    Kaitlyn Garcia    MRN: [id number]           Patient Information     Date Of Birth          1975        Visit Information        Provider Department      12/28/2017 2:50 PM Ani Slater APRN [facility]        Today's Diagnoses     Acute non-recurrent maxillary sinusitis    -  1      Care Instructions    PLAN:   1.   Symptomatic therapy suggested: rest, increase fluids, apply heat to sinuses prn, use mist of vaporizer prn, OTC saline nasal spray as needed and call prn if symptoms persist or worsen.  2.  Orders Placed This Encounter   Medications     amoxicillin-clavulanate (AUGMENTIN) 875-125 MG per tablet     Sig: Take 1 tablet by mouth 2 times daily     Dispense:  20 tablet     Refill:  0     3. Patient needs to follow up in if no improvement,or sooner if worsening of symptoms or other symptoms develop.    It was a pleasure seeing you today at the [facility]. Thank you for allowing us to care for you today. We truly hope we provided you with the excellent service you deserve. Please let us know if there is anything else we can do for you so we can be sure you are leaving completley satisfied with your care experience.       General information about your clinic   Clinic Hours Lab Hours (Appointments are required)   Mon-Thurs: 7:30 AM - 7 PM Mon-Thurs: 7:30 AM - 7 PM   Fri: 7:30 AM - 5 PM Fri: 7:30 AM - 5 PM        After Hours Nurse Advise & Appts:  [facility]: [phone]  [facility] On Call: to make appointments anytime: [phone] On Call Physician: call [phone] and answering service will page the on call physician.        For urgent appointments, please call [phone] and ask for the triage nurse or your care team clinic nurse.  How to contact my care team:  MyChart: [email or web address]   Phone: [phone]   Fax: [phone]       [facility]:   Phone:  [phone]  Hours: 8:00 AM - 6:00 PM  Medication Refills:  Call your pharmacy and they will forward the refill to us. Please allow 3 business days for your refills to be completed.       Normal or non-critical lab and imaging results will be communicated to you by MyChart, letter or phone within 7 days.  If you do not hear from us within 10 days, please call the clinic. If you have a critical or abnormal lab result, we will notify you by phone as soon as possible.       We now have PWIC (Pediatric Walk in Care)  Monday-Friday from 7:30-4. Simply walk in and be seen for your urgent needs like cough, fever, rash, diarrhea or vomiting, pink eye, UTI. No appointments needed. Ask one of the team for more information      -Your Care Team:    Dr. Ashli Phillips - Internal Medicine/Pediatrics   Dr. Lesvia Oneal - Family Medicine  Dr. Juanita Watson - Pediatrics  Ani Slater CNP - Family Practice Nurse Practitioner  Dr. Aislinn Tay - Pediatrics                       Follow-ups after your visit        Who to contact     If you have questions or need follow up information about today's clinic visit or your schedule please contact [facility] directly at [phone].  Normal or non-critical lab and imaging results will be communicated to you by MyChart, letter or phone within 4 business days after the clinic has received the results. If you do not hear from us within 7 days, please contact the clinic through [employer] or phone. If you have a critical or abnormal lab result, we will notify you by phone as soon as possible.  Submit refill requests through [employer] [facility] or call your pharmacy and they will forward the refill request to us. Please allow 3 business days for your refill to be completed.          Additional Information About Your Visit        [facility] Information     [facility] gives you secure access to your electronic health record. If you see a primary care provider, you can also send messages to your care  team and make appointments. If you have questions, please call your primary care clinic.  If you do not have a primary care provider, please call [phone] and they will assist you.      [employer] is an electronic gateway that provides easy, online access to your medical records. With [employer], you can request a clinic appointment, read your test results, renew a prescription or communicate with your care team.     To access your existing account, please contact your [facility] or call [phone] for assistance.        Care EveryWhere ID     This is your Care EveryWhere ID. This could be used by other organizations to access your [place] medical records  [id number]        Your Vitals Were     Pulse Temperature Pulse Oximetry Breastfeeding? BMI (Body Mass Index)       84 96.3  F (35.7  C) (Temporal) 99% No 22.49 kg/m2        Blood Pressure from Last 3 Encounters:   12/28/17 110/60   11/22/17 112/67   08/15/17 110/77    Weight from Last 3 Encounters:   12/28/17 143 lb 9.6 oz (65.1 kg)   11/22/17 146 lb 8 oz (66.5 kg)   08/15/17 140 lb (63.5 kg)              Today, you had the following     No orders found for display         Today's Medication Changes          These changes are accurate as of: 12/28/17  3:11 PM.  If you have any questions, ask your nurse or doctor.               Start taking these medicines.        Dose/Directions    amoxicillin-clavulanate 875-125 MG per tablet   Commonly known as:  AUGMENTIN   Used for:  Acute non-recurrent maxillary sinusitis   Started by:  Ani Slater APRN CNP        Dose:  1 tablet   Take 1 tablet by mouth 2 times daily   Quantity:  20 tablet   Refills:  0            Where to get your medicines      These medications were sent to [facility] - [place], [place] - [address], [address]  [address], [address], [facility] [address]     Phone:  [phone]     amoxicillin-clavulanate 875-125 MG per tablet                 Primary Care Provider Office Phone # Fax #    Ani Slater, APRN [facility] [phone]       [address]  [place] [address]        Equal Access to Services     ALEX ALBERTO : Hadii aad ku hadyazmino Somaddisonali, waaxda luqadaha, qaybta kaalmada adeegyada, waxromana ovallen deuce cagle hetal gomes. So [facility] [phone].    ATENCIÓN: Si habla español, tiene a lugo disposición servicios gratuitos de asistencia lingüística. Llame al [phone].    We comply with applicable federal civil rights laws and [place] laws. We do not discriminate on the basis of race, color, national origin, age, disability, sex, sexual orientation, or gender identity.            Thank you!     Thank you for choosing [facility]  for your care. Our goal is always to provide you with excellent care. Hearing back from our patients is one way we can continue to improve our services. Please take a few minutes to complete the written survey that you may receive in the mail after your visit with us. Thank you!             Your Updated Medication List - Protect others around you: Learn how to safely use, store and throw away your medicines at www.disposemymeds.org.          This list is accurate as of: 12/28/17  3:11 PM.  Always use your most recent med list.                   Brand Name Dispense Instructions for use Diagnosis    amoxicillin-clavulanate 875-125 MG per tablet    AUGMENTIN    20 tablet    Take 1 tablet by mouth 2 times daily    Acute non-recurrent maxillary sinusitis       diazepam 2 MG tablet    VALIUM    10 tablet    Take 1 tablet (2 mg) by mouth every 12 hours as needed for anxiety or sleep    Situational mixed anxiety and depressive disorder       fluticasone 50 MCG/ACT spray    FLONASE    48 g    USE 2 SPRAYS IN EACH NOSTRIL ONCE DAILY    Allergic rhinitis due to other allergic trigger, unspecified rhinitis seasonality       omeprazole 20 MG CR capsule    priLOSEC    90  capsule    Take 1 capsule (20 mg) by mouth daily    Gastroesophageal reflux disease without esophagitis       tamoxifen 20 MG tablet    NOLVADEX    90 tablet    Take 1 tablet (20 mg) by mouth daily    Atypical hyperplasia of breast

## 2018-02-01 ENCOUNTER — TRANSFERRED RECORDS (OUTPATIENT)
Dept: HEALTH INFORMATION MANAGEMENT | Facility: CLINIC | Age: 43
End: 2018-02-01

## 2018-02-27 ENCOUNTER — TRANSFERRED RECORDS (OUTPATIENT)
Dept: HEALTH INFORMATION MANAGEMENT | Facility: CLINIC | Age: 43
End: 2018-02-27

## 2018-03-19 ENCOUNTER — TRANSFERRED RECORDS (OUTPATIENT)
Dept: HEALTH INFORMATION MANAGEMENT | Facility: CLINIC | Age: 43
End: 2018-03-19

## 2018-04-08 ENCOUNTER — HEALTH MAINTENANCE LETTER (OUTPATIENT)
Age: 43
End: 2018-04-08

## 2018-04-23 ENCOUNTER — TRANSFERRED RECORDS (OUTPATIENT)
Dept: HEALTH INFORMATION MANAGEMENT | Facility: CLINIC | Age: 43
End: 2018-04-23

## 2018-04-24 ENCOUNTER — MYC MEDICAL ADVICE (OUTPATIENT)
Dept: PEDIATRICS | Facility: CLINIC | Age: 43
End: 2018-04-24

## 2018-04-24 DIAGNOSIS — F43.23 SITUATIONAL MIXED ANXIETY AND DEPRESSIVE DISORDER: ICD-10-CM

## 2018-04-24 RX ORDER — DIAZEPAM 2 MG
2 TABLET ORAL EVERY 12 HOURS PRN
Qty: 10 TABLET | Refills: 0 | Status: SHIPPED | OUTPATIENT
Start: 2018-04-24 | End: 2019-05-30

## 2018-04-24 NOTE — TELEPHONE ENCOUNTER
Routing refill request to provider for review/approval because:  Drug not on the [facility] refill protocol     diazepam (VALIUM) 2 MG tablet 10 tablet 0 5/18/2017  --   Sig: Take 1 tablet (2 mg) by mouth every 12 hours as needed for anxiety or sleep     Last OV with Ani Slater CNP: 12/28/2017    Brandy Garcia RN

## 2018-04-24 NOTE — TELEPHONE ENCOUNTER
Please inform patient, refill/prescriptioni approved. Please fax prescription to designated pharmacy.

## 2018-04-26 NOTE — TELEPHONE ENCOUNTER
My chart message sent to patient asking what pharmacy she would like the script for valium faxed to?    Message also left on patients voicemail.    Disha Engel CMA    Script for Valium is on my desk

## 2018-04-27 ASSESSMENT — ENCOUNTER SYMPTOMS
STIFFNESS: 0
JOINT SWELLING: 0
MUSCLE CRAMPS: 0
MYALGIAS: 1
MUSCLE WEAKNESS: 1
ARTHRALGIAS: 1
BACK PAIN: 0
NECK PAIN: 0

## 2018-04-30 ENCOUNTER — RADIANT APPOINTMENT (OUTPATIENT)
Dept: MRI IMAGING | Facility: CLINIC | Age: 43
End: 2018-04-30
Attending: PHYSICIAN ASSISTANT
Payer: COMMERCIAL

## 2018-04-30 DIAGNOSIS — Z91.89 AT HIGH RISK FOR BREAST CANCER: ICD-10-CM

## 2018-04-30 DIAGNOSIS — N60.92 ATYPICAL DUCTAL HYPERPLASIA OF LEFT BREAST: ICD-10-CM

## 2018-04-30 DIAGNOSIS — Z80.3 FAMILY HISTORY OF BREAST CANCER: ICD-10-CM

## 2018-04-30 PROCEDURE — A9585 GADOBUTROL INJECTION: HCPCS | Performed by: RADIOLOGY

## 2018-04-30 PROCEDURE — 77059 MR BREAST BILATERAL W/O & W CONTRAST: CPT | Performed by: RADIOLOGY

## 2018-04-30 PROCEDURE — 0159T MR BREAST BILATERAL W/O & W CONTRAST: CPT | Performed by: RADIOLOGY

## 2018-04-30 RX ORDER — GADOBUTROL 604.72 MG/ML
7.5 INJECTION INTRAVENOUS ONCE
Status: COMPLETED | OUTPATIENT
Start: 2018-04-30 | End: 2018-04-30

## 2018-04-30 RX ADMIN — GADOBUTROL 6.5 ML: 604.72 INJECTION INTRAVENOUS at 10:29

## 2018-05-07 NOTE — PROGRESS NOTES
FOLLOW UP  May 9, 2018     Kaitlyn Garcia is a 42 year old woman who presents with history of left breast atypical ductal hyperplasia here for high risk screening and preventative therapy with Tamoxifen.     HPI:    History of 3 left breast needle biopsies, 2 right breast needle biopsies, and 2 excisional left breast biopsy, all benign. The left breast biopsy on 12/12/16 had 1 mm of atypical ductal hyperplasia. She has 2 paternal aunts who had breast cancer in their 60's and a paternal cousin with breast cancer in her 40's. She was seen by a genetic counselor in 2015, no genetic testing was done. She began high risk screening with annual mammogram and breast MRI in 2013. She started Tamoxifen for risk reduction 5/24/17. She had a breast MRI 5/9/17 revealed an area of enhancement biopsied to be an intraductal papilloma without atypia. She was seen for surgical consultation by Dr. Montenegro and monitoring with imaging was recommended.    She is here today to follow up the results of her screening breast MRI on 4/30/18, BI-RADS 2. She is tolerating Tamoxifen. She denies any breast masses, changes, nipple inversion, nipple discharge. She reports left breast upper outer quadrant itching. She denies any skin changes. She is not applying any moisturizers.     BREAST-SPECIFIC HISTORY:  Previous breast imaging: Yes   - 5/21/12 b/l Dmammo and right u/s for right breast lump: calcifications in the upper outer quadrant of left breast spanning 5 cm. Right u/s negative. BI-RADS 4 suspicious for malignancy   - 5/23/12 left stereotactic biopsy: intraductal papilloma   - 7/30/12 left wire-placement   - 2/19/13 MRI: large segmental enhancement of left lateral breast, BI-RADS 0 incomplete   Left u/s: BI-RADS 3 probably benign  - 3/29/13 MRI: BI-RADS 2 benign findings  - 8/5/13 b/l Dmammo for fullness: BI-RADS 2 benign   - 2/5/14 MRI: cysts in left breast, BI-RADS 2 benign   - 5/9/14 Dmammo b/l: negative. Left u/s: cystic appearing  structures, BI-RADS 2 benign  - 14 MRI: study not completed due to nausea, BI-RADS 0 incomplete  - 14 MRI: BI-RADS 2 benign  - 10/28/14 b/l Dmammo: BI-RADS 2 benign   - 5/7/15 MRI BI-RADS 2 benign   - 11/10/15 Smammo: bilateral calcs: BI-RADS 2 benign  - 16 left u/s for pain: multiple tiny cyst  - 16 MRI: BI-RADS 2 benign   - 16  Smammo: lateral left breast developing macrocalcifications BI-RADS 0 incomplete   - 16 Dmammo left: left 2:00 calcifications BI-RADS 4 suspicious  - 17: stereotactic biopsy: focus of ADH    - 17 wire placement   - 17 MRI: 2 adjacent mass like areas of enhancement mid central left breast BI-RADS 0 incomplete   - 17 left u/s: scattered small cysts BI-RADS 4 suspicious   - 17 MR biopsy: intraductal papilloma   - 18 MRI BI-RADS 2    Prior breast biopsies:Yes   - 2 right breast biopsies   - 12 left needle biopsy: intraductal papilloma with sclerosing features   - 12 left excisional biopsy with Dr. Lynch: focal columnar cell change with atypia, intraductal papilloma and surrounding intraductal papillomatosis, sclerosing adenosis, fibrocystic changes  - 16 left needle biopsy x2: flat epithelial atypia with focus of ADH, duct ectasia and focal sclerosing adenosis, microcalcifications.   FEA and columnar cell change with atypia  - 17 left scar excision and excisional biopsy with Dr. Montenegro: FEA, columnar cell change/hyperplasia, intraductal papillomas, usual ductal hyperplasia, calcifications  - 17 left MRI needle biopsy: intraductal papilloma, mild columnar cell hyperplasia, mild duct ectasia, focal sclerosing adenosis     Prior history of breast cancer: No  Prior radiation history: No   Self breast exams: No  Breast density: heterogeneously dense    GYN HISTORY:  . Age at 1st pregnancy: 17. Breastfeeding history: Yes.   Age at menarche: [age]  Menopausal: premenopausal  Menopausal hormone replacement therapy:  No    RISK ASSESSMENT: > 1.7% 5 year risk and > 20% lifetime risk   Heather: 2.4% 5 yr risk   IBIS8: 43.8% lifetime risk, 3.7% 5 yrs risk   Abiodun: 11.6% lifetime risk     FAMILY HISTORY:  Breast ca: Yes    - paternal aunt 60 's   - paternal aunt 60's  - paternal cousin 40   Ovarian ca: No   - ? Paternal cousin with ovarian cancer   Pancreatic ca: No   Prostate: yes  - maternal uncle 69   Gastric ca: Yes   - paternal grandfather, passed   Melanoma: No  Colon ca: No  Other cancer: Yes   - maternal grandmother skin cancer 90's  - paternal grandfather brain 60's   Pentecostal ethnicity: No  Other genetic, testing, syndromes, or clotting disorders: No   - She saw a genetic counselor 2/2015 and no testing was recommended     PAST MEDICAL HISTORY  Past Medical History:   Diagnosis Date     Anemia      Atypical ductal hyperplasia of breast      Choroidal nevus of right eye      Concussion 10/2015     Depressive disorder 2006    Treated with celexa for two years     Family history of breast cancer 2/3/2015     Family history of breast cancer 2/3/2015     Family history of breast cancer 2/3/2015     Family history of breast cancer 2/3/2015     Gastroesophageal reflux disease      Papilloma of breast 5/23/12    Left, See Dr. Lynch at      PONV (postoperative nausea and vomiting)      S/P endometrial ablation 10/10/13    Menorrhagia     Shingles     x2 in the past     WBC decreased 2/3/2015     WBC decreased 2/3/2015     WBC decreased 2/3/2015     PAST SURGICAL HISTORY   Past Surgical History:   Procedure Laterality Date     ABDOMEN SURGERY      rectoplasty     BIOPSY BREAST NEEDLE LOCALIZATION  7/30/2012    Procedure: BIOPSY BREAST NEEDLE LOCALIZATION;  left breast excisional Biopsy with wire localization @0830;  Surgeon: Robert Lynch MD;  Location: [facility] OR     BREAST SURGERY       COSMETIC SURGERY      Rectalplasty for episiotomy repair     COSMETIC SURGERY      Rectalplasty for episiotomy repair     DILATION AND CURETTAGE,  HYSTEROSCOPY, ABLATE ENDOMETRIUM NOVASURE, COMBINED  10/10/2013    Procedure: COMBINED DILATION AND CURETTAGE, HYSTEROSCOPY, ABLATE ENDOMETRIUM NOVASURE;  Endometrial ablation with Novasure;  Surgeon: Indu Birmingham DO;  Location: [facility] OR     HC TOOTH EXTRACTION W/FORCEP       LUMPECTOMY BREAST Left 2/1/2017    Procedure: LUMPECTOMY BREAST;  Surgeon: Lori Montenegro MD;  Location: [facility]     SOFT TISSUE SURGERY      lumpectomy x 2 right breast     SOFT TISSUE SURGERY      episiotomy fixed   Hysterectomy: no    MEDICATIONS  Current Outpatient Prescriptions   Medication Sig Dispense Refill     fluticasone (FLONASE) 50 MCG/ACT nasal spray USE 2 SPRAYS IN EACH NOSTRIL ONCE DAILY 48 g 11     omeprazole (PRILOSEC) 20 MG CR capsule Take 1 capsule (20 mg) by mouth daily 90 capsule 3     tamoxifen (NOLVADEX) 20 MG tablet Take 1 tablet (20 mg) by mouth daily 90 tablet 3     diazepam (VALIUM) 2 MG tablet Take 1 tablet (2 mg) by mouth every 12 hours as needed for anxiety or sleep (Patient not taking: Reported on 5/9/2018) 10 tablet 0     [DISCONTINUED] tamoxifen (NOLVADEX) 20 MG tablet Take 1 tablet (20 mg) by mouth daily 90 tablet 3   Contraception: partner vasectomy     ALLERGIES  Allergies   Allergen Reactions     No Known Drug Allergies         SOCIAL HISTORY:  Smokes: No  EtOH: Yes, less than 1 per month  Exercise: walking   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    ROS:  Eyes: negative Change in vision No  Headaches: no  Respiratory: No shortness of breath, dyspnea on exertion, cough, or hemoptysis Cough No, Shortness of breath No  Cardiovascular: negative   Gastrointestinal: negative Abdominal pain: no  Breast: negative  Vaginal bleeding: no  Musculoskeletal: negative Joint pain No Back pain: no  Psychiatric: negative  Hematologic/Lymphatic/Immunologic: negative Easy bruising or bleeding , Enlarged Lymph nodes no,   Endocrine: negative    EXAM  /72  Pulse 80  Temp 97.6  F (36.4  " C) (Oral)  Resp 16  Ht 1.702 m (5' 7\")  Wt 64.1 kg (141 lb 4 oz)  SpO2 98%  Breastfeeding? No  BMI 22.12 kg/m2   PHYSICAL EXAM  Respiratory: breathing non labored.   Breasts: Examination was done in both the upright and supine positions.  Breasts are symmetrical . No dominant fixed or suspicious masses noted. No skin or nipple changes. No nipple discharge. Right breast scar at 10:00 and left breast scar at 2:00. No skin changes.   No clavicular, cervical, or axillary lymphadenopathy.     ASSESSMENT/PLAN:    Kaitlyn Garcia is a 42 year old woman who presents with history of left breast atypical ductal hyperplasia here for high risk screening and preventative therapy with Tamoxifen. Her recent breast MRI was negative.     1) Reviewed images today and results were discussed with the patient.    - No concerning findings.    2) Left breast skin itching.   - Apply Eucerin or Aquaphor 2-3 times daily. Follow up in 1 month if the itching does not resolved.      3) We also reviewed that atypical ductal hyperplasia increases her baseline risk for breast cancer. Patients diagnosed with atypical hyperplasia are at higher risk for developing breast cancer in the future. She meets guidelines for high risk screening with a lifetime risk of breast cancer >20%. Discussed she meets guidelines for high risk screening with yearly mammogram alternating with Breast MRI every six months. Clinical encounter every 6-12 months including family history, risk assessment, and clinical breast exam.   - Next mammogram with clinic visit: 11/22/18, needs to be scheduled  - Next breast MRI with clinic visit: 5/2019, MRI ordered   - Breast awareness.      4) She is a candidate for breast cancer risk-reduction intervention based on a Heather estimated 5 year risk of 2.4%. The contraindications for Tamoxifen (history of VTE, history or stroke, history of TIA, pregnancy, and potential pregnancy without an effective nonhormal method of method of " contraception) along with potential side effects (hot flashes, deep vein thrombosis, pulmonary embolis, stroke, cataracts, and endometrial cancer) including signs and symptoms were discussed.   - Tamoxifen 20 mg daily for 5 years started: 5/24/17, refilled today   - Yearly gynecological examination. Prompt evaluation for any vaginal spotting.    5) Lifestyle Modifications were provided.   - Maintain a healthy weight. Your BMI is Body mass index is 22.12 kg/(m^2).. Recommended BMI is 20-25. Higher body mass index (BMI) and adult weight gain is associated with increased risk for breast cancer. This increase in risk has been attributed to increase in circulating endogenous estrogen levels from fat tissue.   - Alcohol consumption, even at moderate levels (1-2 drinks per day), increases breast cancer risk. Limit alcohol consumption to less than 1 drink per day. (1 ounce of liquor, 6 ounces of wine, or 8 ounces of beer)  - Exercise a minimum of 150 minutes per week of moderate-intensity aerobic activity.     Merced Wiggins PA-C    Total time spent face to face with the patient was 20 minutes. 15 minutes was spent counseling the patient as described above.

## 2018-05-09 ENCOUNTER — OFFICE VISIT (OUTPATIENT)
Dept: SURGERY | Facility: CLINIC | Age: 43
End: 2018-05-09
Attending: PHYSICIAN ASSISTANT
Payer: COMMERCIAL

## 2018-05-09 VITALS
SYSTOLIC BLOOD PRESSURE: 115 MMHG | TEMPERATURE: 97.6 F | WEIGHT: 141.25 LBS | DIASTOLIC BLOOD PRESSURE: 72 MMHG | RESPIRATION RATE: 16 BRPM | HEIGHT: 67 IN | HEART RATE: 80 BPM | BODY MASS INDEX: 22.17 KG/M2 | OXYGEN SATURATION: 98 %

## 2018-05-09 DIAGNOSIS — N60.99 ATYPICAL HYPERPLASIA OF BREAST: ICD-10-CM

## 2018-05-09 DIAGNOSIS — Z91.89 AT HIGH RISK FOR BREAST CANCER: Primary | ICD-10-CM

## 2018-05-09 PROCEDURE — G0463 HOSPITAL OUTPT CLINIC VISIT: HCPCS | Mod: ZF

## 2018-05-09 PROCEDURE — 99214 OFFICE O/P EST MOD 30 MIN: CPT | Mod: ZP | Performed by: PHYSICIAN ASSISTANT

## 2018-05-09 RX ORDER — TAMOXIFEN CITRATE 20 MG/1
20 TABLET ORAL DAILY
Qty: 90 TABLET | Refills: 3 | Status: SHIPPED | OUTPATIENT
Start: 2018-05-09 | End: 2019-06-20 | Stop reason: DRUGHIGH

## 2018-05-09 ASSESSMENT — PAIN SCALES - GENERAL: PAINLEVEL: NO PAIN (0)

## 2018-05-09 NOTE — MR AVS SNAPSHOT
After Visit Summary   5/9/2018    Kaitlyn Garcia    MRN: [id number]           Patient Information     Date Of Birth          1975        Visit Information        Provider Department      5/9/2018 8:00 AM Merced Wiggins PA-C [facility]        Today's Diagnoses     At high risk for breast cancer    -  1    Atypical hyperplasia of breast          Care Instructions    Kaitlyn Garcia is a 42 year old woman who presents with history of left breast atypical ductal hyperplasia here for high risk screening and preventative therapy with Tamoxifen. Her recent breast MRI was negative.     1) Reviewed images today and results were discussed with the patient.    - No concerning findings.     2) We also reviewed that atypical ductal hyperplasia increases her baseline risk for breast cancer. Patients diagnosed with atypical hyperplasia are at higher risk for developing breast cancer in the future. She meets guidelines for high risk screening with a lifetime risk of breast cancer >20%. Discussed she meets guidelines for high risk screening with yearly mammogram alternating with Breast MRI every six months. Clinical encounter every 6-12 months including family history, risk assessment, and clinical breast exam.   - Next mammogram with clinic visit: 11/22/18, needs to be scheduled  - Next breast MRI with clinic visit: 5/2019, MRI ordered   - Breast awareness.      3) She is a candidate for breast cancer risk-reduction intervention based on a Heather estimated 5 year risk of 2.4%. The contraindications for Tamoxifen (history of VTE, history or stroke, history of TIA, pregnancy, and potential pregnancy without an effective nonhormal method of method of contraception) along with potential side effects (hot flashes, deep vein thrombosis, pulmonary embolis, stroke, cataracts, and endometrial cancer) including signs and symptoms were discussed.   - Tamoxifen 20 mg daily for 5 years started: 5/24/17,  refilled today   - Yearly gynecological examination. Prompt evaluation for any vaginal spotting.    3) Lifestyle Modifications were provided.   - Maintain a healthy weight. Your BMI is Body mass index is 22.12 kg/(m^2).. Recommended BMI is 20-25. Higher body mass index (BMI) and adult weight gain is associated with increased risk for breast cancer. This increase in risk has been attributed to increase in circulating endogenous estrogen levels from fat tissue.   - Alcohol consumption, even at moderate levels (1-2 drinks per day), increases breast cancer risk. Limit alcohol consumption to less than 1 drink per day. (1 ounce of liquor, 6 ounces of wine, or 8 ounces of beer)  - Exercise a minimum of 150 minutes per week of moderate-intensity aerobic activity.           Follow-ups after your visit        Your next 10 appointments already scheduled     May 09, 2018  8:00 AM CDT   (Arrive by 7:45 AM)   Return Visit with Merced Wiggins PA-C   [facility] [place]    [address] [place] [id number] [phone]              Future tests that were ordered for you today     Open Future Orders        Priority Expected Expires Ordered    MR Breast Bilateral w Contrast Routine 5/1/2019 5/7/2019 5/7/2018            Who to contact     If you have questions or need follow up information about today's clinic visit or your schedule please contact [facility] directly at [phone].  Normal or non-critical lab and imaging results will be communicated to you by MyChart, letter or phone within 4 business days after the clinic has received the results. If you do not hear from us within 7 days, please contact the clinic through MyChart or phone. If you have a critical or abnormal lab result, we will notify you by phone as soon as possible.  Submit refill requests through [employer] or call your pharmacy and they will forward the refill request  "to us. Please allow 3 business days for your refill to be completed.          Additional Information About Your Visit        MyChart Information     [employer] gives you secure access to your electronic health record. If you see a primary care provider, you can also send messages to your care team and make appointments. If you have questions, please call your primary care clinic.  If you do not have a primary care provider, please call [phone] and they will assist you.        Care EveryWhere ID     This is your Care EveryWhere ID. This could be used by other organizations to access your [facility] medical records  [id number]        Your Vitals Were     Pulse Temperature Respirations Height Pulse Oximetry Breastfeeding?    80 97.6  F (36.4  C) (Oral) 16 1.702 m (5' 7\") 98% No    BMI (Body Mass Index)                   22.12 kg/m2            Blood Pressure from Last 3 Encounters:   05/09/18 115/72   12/28/17 110/60   11/22/17 112/67    Weight from Last 3 Encounters:   05/09/18 64.1 kg (141 lb 4 oz)   12/28/17 65.1 kg (143 lb 9.6 oz)   11/22/17 66.5 kg (146 lb 8 oz)                 Where to get your medicines      These medications were sent to [facility] - [place], [place] - [address], [address]  [address], [facility] [address]     Phone:  [phone]     tamoxifen 20 MG tablet          Primary Care Provider Office Phone # Fax #    Ani Aye Slater, APRN [facility] [phone]       [address]  [place] [address]        Equal Access to Services     ALEX ALBERTO : Hadii george Alvarenga, jamilda marc, qaybta kaalmorenita mejía. So [facility] [phone].    ATENCIÓN: Si habla español, tiene a lugo disposición servicios gratuitos de asistencia lingüística. Daiame al [phone].    We comply with applicable federal civil rights laws and [place] laws. We do not discriminate on the basis of race, " color, national origin, age, disability, sex, sexual orientation, or gender identity.            Thank you!     Thank you for choosing [facility]  for your care. Our goal is always to provide you with excellent care. Hearing back from our patients is one way we can continue to improve our services. Please take a few minutes to complete the written survey that you may receive in the mail after your visit with us. Thank you!             Your Updated Medication List - Protect others around you: Learn how to safely use, store and throw away your medicines at www.disposemymeds.org.          This list is accurate as of 5/9/18  7:59 AM.  Always use your most recent med list.                   Brand Name Dispense Instructions for use Diagnosis    diazepam 2 MG tablet    VALIUM    10 tablet    Take 1 tablet (2 mg) by mouth every 12 hours as needed for anxiety or sleep    Situational mixed anxiety and depressive disorder       fluticasone 50 MCG/ACT spray    FLONASE    48 g    USE 2 SPRAYS IN EACH NOSTRIL ONCE DAILY    Allergic rhinitis due to other allergic trigger, unspecified rhinitis seasonality       omeprazole 20 MG CR capsule    priLOSEC    90 capsule    Take 1 capsule (20 mg) by mouth daily    Gastroesophageal reflux disease without esophagitis       tamoxifen 20 MG tablet    NOLVADEX    90 tablet    Take 1 tablet (20 mg) by mouth daily    Atypical hyperplasia of breast

## 2018-05-09 NOTE — LETTER
5/9/2018       RE: Kaitlyn Garcia  [address] [facility] [address]     Dear Colleague,    Thank you for referring your patient, Kaitlyn Garcia, to the [facility]. Please see a copy of my visit note below.    FOLLOW UP  May 9, 2018     Kaitlyn Garcia is a 42 year old woman who presents with history of left breast atypical ductal hyperplasia here for high risk screening and preventative therapy with Tamoxifen.     HPI:    History of 3 left breast needle biopsies, 2 right breast needle biopsies, and 2 excisional left breast biopsy, all benign. The left breast biopsy on 12/12/16 had 1 mm of atypical ductal hyperplasia. She has 2 paternal aunts who had breast cancer in their 60's and a paternal cousin with breast cancer in her 40's. She was seen by a genetic counselor in 2015, no genetic testing was done. She began high risk screening with annual mammogram and breast MRI in 2013. She started Tamoxifen for risk reduction 5/24/17. She had a breast MRI 5/9/17 revealed an area of enhancement biopsied to be an intraductal papilloma without atypia. She was seen for surgical consultation by Dr. Montenegro and monitoring with imaging was recommended.    She is here today to follow up the results of her screening breast MRI on 4/30/18, BI-RADS 2. She is tolerating Tamoxifen. She denies any breast masses, changes, nipple inversion, nipple discharge. She reports left breast upper outer quadrant itching. She denies any skin changes. She is not applying any moisturizers.     BREAST-SPECIFIC HISTORY:  Previous breast imaging: Yes   - 5/21/12 b/l Dmammo and right u/s for right breast lump: calcifications in the upper outer quadrant of left breast spanning 5 cm. Right u/s negative. BI-RADS 4 suspicious for malignancy   - 5/23/12 left stereotactic biopsy: intraductal papilloma   - 7/30/12 left wire-placement   - 2/19/13 MRI: large segmental enhancement of left lateral breast, BI-RADS 0 incomplete   Left u/s:  BI-RADS 3 probably benign  - 3/29/13 MRI: BI-RADS 2 benign findings  - 8/5/13 b/l Dmammo for fullness: BI-RADS 2 benign   - 2/5/14 MRI: cysts in left breast, BI-RADS 2 benign   - 5/9/14 Dmammo b/l: negative. Left u/s: cystic appearing structures, BI-RADS 2 benign  - 5/20/14 MRI: study not completed due to nausea, BI-RADS 0 incomplete  - 5/28/14 MRI: BI-RADS 2 benign  - 10/28/14 b/l Dmammo: BI-RADS 2 benign   - 5/7/15 MRI BI-RADS 2 benign   - 11/10/15 Smammo: bilateral calcs: BI-RADS 2 benign  - 1/25/16 left u/s for pain: multiple tiny cyst  - 5/17/16 MRI: BI-RADS 2 benign   - 12/6/16  Smammo: lateral left breast developing macrocalcifications BI-RADS 0 incomplete   - 12/7/16 Dmammo left: left 2:00 calcifications BI-RADS 4 suspicious  - 12/12/17: stereotactic biopsy: focus of ADH    - 2/1/17 wire placement   - 5/9/17 MRI: 2 adjacent mass like areas of enhancement mid central left breast BI-RADS 0 incomplete   - 5/12/17 left u/s: scattered small cysts BI-RADS 4 suspicious   - 5/18/17 MR biopsy: intraductal papilloma   - 4/30/18 MRI BI-RADS 2    Prior breast biopsies:Yes   - 2 right breast biopsies   - 5/23/12 left needle biopsy: intraductal papilloma with sclerosing features   - 7/30/12 left excisional biopsy with Dr. Lynch: focal columnar cell change with atypia, intraductal papilloma and surrounding intraductal papillomatosis, sclerosing adenosis, fibrocystic changes  - 12/12/16 left needle biopsy x2: flat epithelial atypia with focus of ADH, duct ectasia and focal sclerosing adenosis, microcalcifications.   FEA and columnar cell change with atypia  - 2/1/17 left scar excision and excisional biopsy with Dr. Montenegro: FEA, columnar cell change/hyperplasia, intraductal papillomas, usual ductal hyperplasia, calcifications  - 5/18/17 left MRI needle biopsy: intraductal papilloma, mild columnar cell hyperplasia, mild duct ectasia, focal sclerosing adenosis     Prior history of breast cancer: No  Prior radiation history: No    Self breast exams: No  Breast density: heterogeneously dense    GYN HISTORY:  . Age at 1st pregnancy: 17. Breastfeeding history: Yes.   Age at menarche: 14-14  Menopausal: premenopausal  Menopausal hormone replacement therapy: No    RISK ASSESSMENT: > 1.7% 5 year risk and > 20% lifetime risk   Heather: 2.4% 5 yr risk   IBIS8: 43.8% lifetime risk, 3.7% 5 yrs risk   Abiodun: 11.6% lifetime risk     FAMILY HISTORY:  Breast ca: Yes    - paternal aunt 60 's   - paternal aunt 60's  - paternal cousin 40   Ovarian ca: No   - ? Paternal cousin with ovarian cancer   Pancreatic ca: No   Prostate: yes  - maternal uncle 69   Gastric ca: Yes   - paternal grandfather, passed   Melanoma: No  Colon ca: No  Other cancer: Yes   - maternal grandmother skin cancer 90's  - paternal grandfather brain 60's   Muslim ethnicity: No  Other genetic, testing, syndromes, or clotting disorders: No   - She saw a genetic counselor 2015 and no testing was recommended     PAST MEDICAL HISTORY  Past Medical History:   Diagnosis Date     Anemia      Atypical ductal hyperplasia of breast      Choroidal nevus of right eye      Concussion 10/2015     Depressive disorder 2006    Treated with celexa for two years     Family history of breast cancer 2/3/2015     Family history of breast cancer 2/3/2015     Family history of breast cancer 2/3/2015     Family history of breast cancer 2/3/2015     Gastroesophageal reflux disease      Papilloma of breast 11    Left, See Dr. Lynch at      PONV (postoperative nausea and vomiting)      S/P endometrial ablation 10/10/13    Menorrhagia     Shingles     x2 in the past     WBC decreased 2/3/2015     WBC decreased 2/3/2015     WBC decreased 2/3/2015     PAST SURGICAL HISTORY   Past Surgical History:   Procedure Laterality Date     ABDOMEN SURGERY      rectoplasty     BIOPSY BREAST NEEDLE LOCALIZATION  2012    Procedure: BIOPSY BREAST NEEDLE LOCALIZATION;  left breast excisional Biopsy with wire localization  @0830;  Surgeon: Robert Lynch MD;  Location: [facility] OR     BREAST SURGERY       COSMETIC SURGERY      Rectalplasty for episiotomy repair     COSMETIC SURGERY      Rectalplasty for episiotomy repair     DILATION AND CURETTAGE, HYSTEROSCOPY, ABLATE ENDOMETRIUM NOVASURE, COMBINED  10/10/2013    Procedure: COMBINED DILATION AND CURETTAGE, HYSTEROSCOPY, ABLATE ENDOMETRIUM NOVASURE;  Endometrial ablation with Novasure;  Surgeon: Indu Birmingham DO;  Location: [facility] OR     HC TOOTH EXTRACTION W/FORCEP       LUMPECTOMY BREAST Left 2/1/2017    Procedure: LUMPECTOMY BREAST;  Surgeon: Lori Montenegro MD;  Location: [facility]     SOFT TISSUE SURGERY      lumpectomy x 2 right breast     SOFT TISSUE SURGERY      episiotomy fixed   Hysterectomy: no    MEDICATIONS  Current Outpatient Prescriptions   Medication Sig Dispense Refill     fluticasone (FLONASE) 50 MCG/ACT nasal spray USE 2 SPRAYS IN EACH NOSTRIL ONCE DAILY 48 g 11     omeprazole (PRILOSEC) 20 MG CR capsule Take 1 capsule (20 mg) by mouth daily 90 capsule 3     tamoxifen (NOLVADEX) 20 MG tablet Take 1 tablet (20 mg) by mouth daily 90 tablet 3     diazepam (VALIUM) 2 MG tablet Take 1 tablet (2 mg) by mouth every 12 hours as needed for anxiety or sleep (Patient not taking: Reported on 5/9/2018) 10 tablet 0     [DISCONTINUED] tamoxifen (NOLVADEX) 20 MG tablet Take 1 tablet (20 mg) by mouth daily 90 tablet 3   Contraception: partner vasectomy     ALLERGIES  Allergies   Allergen Reactions     No Known Drug Allergies         SOCIAL HISTORY:  Smokes: No  EtOH: Yes, less than 1 per month  Exercise: walking   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    ROS:  Eyes: negative Change in vision No  Headaches: no  Respiratory: No shortness of breath, dyspnea on exertion, cough, or hemoptysis Cough No, Shortness of breath No  Cardiovascular: negative   Gastrointestinal: negative Abdominal pain: no  Breast: negative  Vaginal bleeding: no  Musculoskeletal:  "negative Joint pain No Back pain: no  Psychiatric: negative  Hematologic/Lymphatic/Immunologic: negative Easy bruising or bleeding , Enlarged Lymph nodes no,   Endocrine: negative    EXAM  /72  Pulse 80  Temp 97.6  F (36.4  C) (Oral)  Resp 16  Ht 1.702 m (5' 7\")  Wt 64.1 kg (141 lb 4 oz)  SpO2 98%  Breastfeeding? No  BMI 22.12 kg/m2   PHYSICAL EXAM  Respiratory: breathing non labored.   Breasts: Examination was done in both the upright and supine positions.  Breasts are symmetrical . No dominant fixed or suspicious masses noted. No skin or nipple changes. No nipple discharge. Right breast scar at 10:00 and left breast scar at 2:00. No skin changes.   No clavicular, cervical, or axillary lymphadenopathy.     ASSESSMENT/PLAN:    Kaitlyn Garcia is a 42 year old woman who presents with history of left breast atypical ductal hyperplasia here for high risk screening and preventative therapy with Tamoxifen. Her recent breast MRI was negative.     1) Reviewed images today and results were discussed with the patient.    - No concerning findings.    2) Left breast skin itching.   - Apply Eucerin or Aquaphor 2-3 times daily. Follow up in 1 month if the itching does not resolved.      3) We also reviewed that atypical ductal hyperplasia increases her baseline risk for breast cancer. Patients diagnosed with atypical hyperplasia are at higher risk for developing breast cancer in the future. She meets guidelines for high risk screening with a lifetime risk of breast cancer >20%. Discussed she meets guidelines for high risk screening with yearly mammogram alternating with Breast MRI every six months. Clinical encounter every 6-12 months including family history, risk assessment, and clinical breast exam.   - Next mammogram with clinic visit: 11/22/18, needs to be scheduled  - Next breast MRI with clinic visit: 5/2019, MRI ordered   - Breast awareness.      4) She is a candidate for breast cancer risk-reduction " intervention based on a Heather estimated 5 year risk of 2.4%. The contraindications for Tamoxifen (history of VTE, history or stroke, history of TIA, pregnancy, and potential pregnancy without an effective nonhormal method of method of contraception) along with potential side effects (hot flashes, deep vein thrombosis, pulmonary embolis, stroke, cataracts, and endometrial cancer) including signs and symptoms were discussed.   - Tamoxifen 20 mg daily for 5 years started: 5/24/17, refilled today   - Yearly gynecological examination. Prompt evaluation for any vaginal spotting.    5) Lifestyle Modifications were provided.   - Maintain a healthy weight. Your BMI is Body mass index is 22.12 kg/(m^2).. Recommended BMI is 20-25. Higher body mass index (BMI) and adult weight gain is associated with increased risk for breast cancer. This increase in risk has been attributed to increase in circulating endogenous estrogen levels from fat tissue.   - Alcohol consumption, even at moderate levels (1-2 drinks per day), increases breast cancer risk. Limit alcohol consumption to less than 1 drink per day. (1 ounce of liquor, 6 ounces of wine, or 8 ounces of beer)  - Exercise a minimum of 150 minutes per week of moderate-intensity aerobic activity.     Merced Wiggins PA-C    Total time spent face to face with the patient was 20 minutes. 15 minutes was spent counseling the patient as described above.     Again, thank you for allowing me to participate in the care of your patient.      Sincerely,    Merced Wiggins PA-C

## 2018-05-09 NOTE — PATIENT INSTRUCTIONS
Kaitlyn Garcia is a 42 year old woman who presents with history of left breast atypical ductal hyperplasia here for high risk screening and preventative therapy with Tamoxifen. Her recent breast MRI was negative.     1) Reviewed images today and results were discussed with the patient.    - No concerning findings.     2) We also reviewed that atypical ductal hyperplasia increases her baseline risk for breast cancer. Patients diagnosed with atypical hyperplasia are at higher risk for developing breast cancer in the future. She meets guidelines for high risk screening with a lifetime risk of breast cancer >20%. Discussed she meets guidelines for high risk screening with yearly mammogram alternating with Breast MRI every six months. Clinical encounter every 6-12 months including family history, risk assessment, and clinical breast exam.   - Next mammogram with clinic visit: 11/22/18, needs to be scheduled  - Next breast MRI with clinic visit: 5/2019, MRI ordered   - Breast awareness.      3) She is a candidate for breast cancer risk-reduction intervention based on a Heather estimated 5 year risk of 2.4%. The contraindications for Tamoxifen (history of VTE, history or stroke, history of TIA, pregnancy, and potential pregnancy without an effective nonhormal method of method of contraception) along with potential side effects (hot flashes, deep vein thrombosis, pulmonary embolis, stroke, cataracts, and endometrial cancer) including signs and symptoms were discussed.   - Tamoxifen 20 mg daily for 5 years started: 5/24/17, refilled today   - Yearly gynecological examination. Prompt evaluation for any vaginal spotting.    3) Lifestyle Modifications were provided.   - Maintain a healthy weight. Your BMI is Body mass index is 22.12 kg/(m^2).. Recommended BMI is 20-25. Higher body mass index (BMI) and adult weight gain is associated with increased risk for breast cancer. This increase in risk has been attributed to increase in  circulating endogenous estrogen levels from fat tissue.   - Alcohol consumption, even at moderate levels (1-2 drinks per day), increases breast cancer risk. Limit alcohol consumption to less than 1 drink per day. (1 ounce of liquor, 6 ounces of wine, or 8 ounces of beer)  - Exercise a minimum of 150 minutes per week of moderate-intensity aerobic activity.

## 2018-05-09 NOTE — NURSING NOTE
"Oncology Rooming Note    May 9, 2018 7:50 AM   Kaitlyn Garcia is a 42 year old female who presents for:    Chief Complaint   Patient presents with     Oncology Clinic Visit     Return:      Initial Vitals: /72  Pulse 80  Temp 97.6  F (36.4  C) (Oral)  Resp 16  Ht 1.702 m (5' 7\")  Wt 64.1 kg (141 lb 4 oz)  SpO2 98%  Breastfeeding? No  BMI 22.12 kg/m2 Estimated body mass index is 22.12 kg/(m^2) as calculated from the following:    Height as of this encounter: 1.702 m (5' 7\").    Weight as of this encounter: 64.1 kg (141 lb 4 oz). Body surface area is 1.74 meters squared.  No Pain (0) Comment: Data Unavailable   No LMP recorded. Patient has had an ablation.  Allergies reviewed: Yes  Medications reviewed: Yes    Medications: MEDICATION REFILLS NEEDED TODAY. Provider was notified.  Tomoxifen    Pharmacy name entered into [facility]:    [facility] [place], [place] - [address], [id number]  [facility] [place], [place] - [address]  [id number] - [place], [place] - [address], [place] AT [facility]    Clinical concerns: no new concerns today Merced Wiggins was notified.    10 minutes for nursing intake (face to face time)     Iris Kahn CMA              "

## 2018-06-04 ENCOUNTER — RADIANT APPOINTMENT (OUTPATIENT)
Dept: ULTRASOUND IMAGING | Facility: CLINIC | Age: 43
End: 2018-06-04
Attending: NURSE PRACTITIONER
Payer: COMMERCIAL

## 2018-06-04 ENCOUNTER — OFFICE VISIT (OUTPATIENT)
Dept: PEDIATRICS | Facility: CLINIC | Age: 43
End: 2018-06-04
Payer: COMMERCIAL

## 2018-06-04 VITALS
HEIGHT: 67 IN | HEART RATE: 86 BPM | DIASTOLIC BLOOD PRESSURE: 70 MMHG | OXYGEN SATURATION: 98 % | WEIGHT: 141 LBS | TEMPERATURE: 99.2 F | BODY MASS INDEX: 22.13 KG/M2 | SYSTOLIC BLOOD PRESSURE: 102 MMHG

## 2018-06-04 DIAGNOSIS — E04.9 THYROID ENLARGED: ICD-10-CM

## 2018-06-04 DIAGNOSIS — Z00.00 ENCOUNTER FOR ROUTINE ADULT HEALTH EXAMINATION WITHOUT ABNORMAL FINDINGS: Primary | ICD-10-CM

## 2018-06-04 DIAGNOSIS — N93.8 DUB (DYSFUNCTIONAL UTERINE BLEEDING): ICD-10-CM

## 2018-06-04 DIAGNOSIS — Z12.4 SCREENING FOR MALIGNANT NEOPLASM OF CERVIX: ICD-10-CM

## 2018-06-04 LAB
ERYTHROCYTE [DISTWIDTH] IN BLOOD BY AUTOMATED COUNT: 11.4 % (ref 10–15)
FSH SERPL-ACNC: 2.3 IU/L
HCT VFR BLD AUTO: 37 % (ref 35–47)
HGB BLD-MCNC: 12.5 G/DL (ref 11.7–15.7)
LH SERPL-ACNC: 3.8 IU/L
MCH RBC QN AUTO: 31.6 PG (ref 26.5–33)
MCHC RBC AUTO-ENTMCNC: 33.8 G/DL (ref 31.5–36.5)
MCV RBC AUTO: 94 FL (ref 78–100)
PLATELET # BLD AUTO: 209 10E9/L (ref 150–450)
RBC # BLD AUTO: 3.95 10E12/L (ref 3.8–5.2)
TSH SERPL DL<=0.005 MIU/L-ACNC: 1.21 MU/L (ref 0.4–4)
WBC # BLD AUTO: 5 10E9/L (ref 4–11)

## 2018-06-04 PROCEDURE — 87624 HPV HI-RISK TYP POOLED RSLT: CPT | Performed by: NURSE PRACTITIONER

## 2018-06-04 PROCEDURE — 76856 US EXAM PELVIC COMPLETE: CPT

## 2018-06-04 PROCEDURE — G0145 SCR C/V CYTO,THINLAYER,RESCR: HCPCS | Performed by: NURSE PRACTITIONER

## 2018-06-04 PROCEDURE — 76536 US EXAM OF HEAD AND NECK: CPT

## 2018-06-04 PROCEDURE — 36415 COLL VENOUS BLD VENIPUNCTURE: CPT | Performed by: NURSE PRACTITIONER

## 2018-06-04 PROCEDURE — 99396 PREV VISIT EST AGE 40-64: CPT | Performed by: NURSE PRACTITIONER

## 2018-06-04 PROCEDURE — 76830 TRANSVAGINAL US NON-OB: CPT

## 2018-06-04 PROCEDURE — 83002 ASSAY OF GONADOTROPIN (LH): CPT | Performed by: NURSE PRACTITIONER

## 2018-06-04 PROCEDURE — 84443 ASSAY THYROID STIM HORMONE: CPT | Performed by: NURSE PRACTITIONER

## 2018-06-04 PROCEDURE — 85027 COMPLETE CBC AUTOMATED: CPT | Performed by: NURSE PRACTITIONER

## 2018-06-04 PROCEDURE — 83001 ASSAY OF GONADOTROPIN (FSH): CPT | Performed by: NURSE PRACTITIONER

## 2018-06-04 NOTE — PROGRESS NOTES
SUBJECTIVE:   CC: Kaitlyn Garcia is an 42 year old woman who presents for preventive health visit.     Healthy Habits:    Do you get at least three servings of calcium containing foods daily (dairy, green leafy vegetables, etc.)? yes    Amount of exercise or daily activities, outside of work: 3 day(s) per week    Problems taking medications regularly No    Medication side effects: Yes tamoxifen - heat flashes    Have you had an eye exam in the past two years? yes    Do you see a dentist twice per year? yes    Do you have sleep apnea, excessive snoring or daytime drowsiness?no    Today's PHQ-2 Score:   PHQ-2 ( 1999 [employer]) 6/4/2018 12/28/2017   Q1: Little interest or pleasure in doing things 0 0   Q2: Feeling down, depressed or hopeless 0 0   PHQ-2 Score 0 0       Abuse: Current or Past(Physical, Sexual or Emotional)- No  Do you feel safe in your environment - Yes    Social History   Substance Use Topics     Smoking status: Never Smoker     Smokeless tobacco: Never Used     Alcohol use 0.0 oz/week     0 Standard drinks or equivalent per week      Comment: Less than once a month     If you drink alcohol do you typically have >3 drinks per day or >7 drinks per week? No                     Reviewed orders with patient.  Reviewed health maintenance and updated orders accordingly - Yes  Labs reviewed in EPIC  BP Readings from Last 3 Encounters:   06/04/18 102/70   05/09/18 115/72   12/28/17 110/60    Wt Readings from Last 3 Encounters:   06/04/18 141 lb (64 kg)   05/09/18 141 lb 4 oz (64.1 kg)   12/28/17 143 lb 9.6 oz (65.1 kg)               Patient Active Problem List   Diagnosis     CARDIOVASCULAR SCREENING; LDL GOAL LESS THAN 160     Iron deficiency anemia     Mammographic microcalcification found on diagnostic imaging of breast     Intraductal papilloma of breast     Atypical hyperplasia of breast     Acne     Bloating symptom     Abdominal pain     Abnormal biliary HIDA scan     Family history of breast cancer      Neutropenia (H)     Choroidal nevus of right eye     Situational mixed anxiety and depressive disorder     Glaucoma suspect, bilateral     Post-concussion syndrome     Muscle spasms of head or neck     Allergic rhinitis     WBC decreased     Past Surgical History:   Procedure Laterality Date     ABDOMEN SURGERY      rectoplasty     BIOPSY BREAST NEEDLE LOCALIZATION  7/30/2012    Procedure: BIOPSY BREAST NEEDLE LOCALIZATION;  left breast excisional Biopsy with wire localization @0830;  Surgeon: Robert Lynch MD;  Location: [facility]     BREAST SURGERY       COSMETIC SURGERY      Rectalplasty for episiotomy repair     COSMETIC SURGERY      Rectalplasty for episiotomy repair     DILATION AND CURETTAGE, HYSTEROSCOPY, ABLATE ENDOMETRIUM NOVASURE, COMBINED  10/10/2013    Procedure: COMBINED DILATION AND CURETTAGE, HYSTEROSCOPY, ABLATE ENDOMETRIUM NOVASURE;  Endometrial ablation with Novasure;  Surgeon: Indu Birmingham DO;  Location: [facility] OR     HC TOOTH EXTRACTION W/FORCEP       LUMPECTOMY BREAST Left 2/1/2017    Procedure: LUMPECTOMY BREAST;  Surgeon: Lori Montenegro MD;  Location: [facility]     SOFT TISSUE SURGERY      lumpectomy x 2 right breast     SOFT TISSUE SURGERY      episiotomy fixed       Social History   Substance Use Topics     Smoking status: Never Smoker     Smokeless tobacco: Never Used     Alcohol use 0.0 oz/week     0 Standard drinks or equivalent per week      Comment: Less than once a month     Family History   Problem Relation Age of Onset     Hypertension Father      Lipids Father      Hyperlipidemia Father      Depression/Anxiety Father      CEREBROVASCULAR DISEASE Father      TIA     CANCER Maternal Grandmother      Glaucoma Maternal Grandmother      Macular Degeneration Maternal Grandmother      OSTEOPOROSIS Maternal Grandmother      Anesthesia Reaction Paternal Grandmother      DIABETES Paternal Grandmother      CANCER Paternal Grandfather      Depression/Anxiety Mother       Depression/Anxiety Daughter      Depression Daughter      Depression Son      Cataracts Maternal Grandfather      Breast Cancer Paternal Aunt 60     x 2 aunts     Breast Cancer Other 40     Paternal cousin     Breast Cancer Other 64     Several aunts on fathers side/Several aunts on fathers side/Several aunts on fathers side/Several aunts on fathers side     Prostate Cancer Other 64     Uncle on mothers side     Asthma No family hx of      C.A.D. No family hx of      Cancer - colorectal No family hx of      Connective Tissue Disorder No family hx of      Thyroid Disease No family hx of      Coronary Artery Disease No family hx of      Ovarian Cancer No family hx of      Thyroid Disease No family hx of      Chemical Addiction No family hx of      Known Genetic Syndrome No family hx of          Current Outpatient Prescriptions   Medication Sig Dispense Refill     diazepam (VALIUM) 2 MG tablet Take 1 tablet (2 mg) by mouth every 12 hours as needed for anxiety or sleep (Patient not taking: Reported on 5/9/2018) 10 tablet 0     fluticasone (FLONASE) 50 MCG/ACT nasal spray USE 2 SPRAYS IN EACH NOSTRIL ONCE DAILY (Patient not taking: Reported on 6/4/2018) 48 g 11     omeprazole (PRILOSEC) 20 MG CR capsule Take 1 capsule (20 mg) by mouth daily 90 capsule 3     tamoxifen (NOLVADEX) 20 MG tablet Take 1 tablet (20 mg) by mouth daily (Patient not taking: Reported on 6/4/2018) 90 tablet 3     Allergies   Allergen Reactions     No Known Drug Allergies        Patient under age 50, mutual decision reflected in health maintenance.      Pertinent mammograms are reviewed under the imaging tab.  History of abnormal Pap smear: NO - age 30- 65 PAP every 3 years recommended    Reviewed and updated as needed this visit by clinical staff         Reviewed and updated as needed this visit by Provider        Past Medical History:   Diagnosis Date     Anemia      Atypical ductal hyperplasia of breast      Choroidal nevus of right eye       Concussion 10/2015     Depressive disorder 2006    Treated with celexa for two years     Family history of breast cancer 2/3/2015     Family history of breast cancer 2/3/2015     Family history of breast cancer 2/3/2015     Family history of breast cancer 2/3/2015     Gastroesophageal reflux disease      Papilloma of breast 5/23/12    Left, See Dr. Lynch at      PONV (postoperative nausea and vomiting)      S/P endometrial ablation 10/10/13    Menorrhagia     Shingles     x2 in the past     WBC decreased 2/3/2015     WBC decreased 2/3/2015     WBC decreased 2/3/2015      Past Surgical History:   Procedure Laterality Date     ABDOMEN SURGERY      rectoplasty     BIOPSY BREAST NEEDLE LOCALIZATION  7/30/2012    Procedure: BIOPSY BREAST NEEDLE LOCALIZATION;  left breast excisional Biopsy with wire localization @0830;  Surgeon: Robert Lynch MD;  Location: [facility]     BREAST SURGERY       COSMETIC SURGERY      Rectalplasty for episiotomy repair     COSMETIC SURGERY      Rectalplasty for episiotomy repair     DILATION AND CURETTAGE, HYSTEROSCOPY, ABLATE ENDOMETRIUM NOVASURE, COMBINED  10/10/2013    Procedure: COMBINED DILATION AND CURETTAGE, HYSTEROSCOPY, ABLATE ENDOMETRIUM NOVASURE;  Endometrial ablation with Novasure;  Surgeon: Indu Birmingham DO;  Location: [facility]     HC TOOTH EXTRACTION W/FORCEP       LUMPECTOMY BREAST Left 2/1/2017    Procedure: LUMPECTOMY BREAST;  Surgeon: Lori Montenegro MD;  Location: [facility]     SOFT TISSUE SURGERY      lumpectomy x 2 right breast     SOFT TISSUE SURGERY      episiotomy fixed       ROS:  CONSTITUTIONAL:NEGATIVE for fever, chills, change in weight  INTEGUMENTARY/SKIN: NEGATIVE for worrisome rashes, moles or lesions  EYES: NEGATIVE for vision changes or irritation  ENT: NEGATIVE for ear, mouth and throat problems  RESP:NEGATIVE for significant cough or SOB  BREAST: POSITIVE for pain and is seeing oncology this week  and NEGATIVE for erythema, galactorrhea,  "swelling and warmth  CV: NEGATIVE for chest pain, palpitations or peripheral edema  GI: NEGATIVE for nausea, abdominal pain, heartburn, or change in bowel habits   female: no unusual urinary symptoms, no unusual vaginal symptoms and had some spotting about 3 weeks ago  Has not had menses for at least a year and has had a history of ablation done 2013   Has appointment with GYN tomorrow   MUSCULOSKELETAL:POSITIVE  for left hip labral tear and is working with [facility] and NEGATIVE for joint swelling  and joint warmth   NEURO: NEGATIVE for weakness, dizziness or paresthesias  ENDOCRINE: NEGATIVE for temperature intolerance, skin/hair changes  HEME/ALLERGY/IMMUNE: NEGATIVE for bleeding problems  PSYCHIATRIC: NEGATIVE for changes in mood or affect     OBJECTIVE:   /70 (BP Location: Right arm, Patient Position: Chair, Cuff Size: Adult Regular)  Pulse 86  Temp 99.2  F (37.3  C) (Temporal)  Ht 5' 6.75\" (1.695 m)  Wt 141 lb (64 kg)  SpO2 98%  BMI 22.25 kg/m2  EXAM:  GENERAL: healthy, alert and no distress  EYES: Eyes grossly normal to inspection, PERRL and conjunctivae and sclerae normal  HENT: ear canals and TM's normal, nose and mouth without ulcers or lesions  NECK: no adenopathy, no asymmetry, masses, or scars and thyroid appears enlarged  to palpation  RESP: lungs clear to auscultation - no rales, rhonchi or wheezes  BREAST: normal without masses, tenderness or nipple discharge and no palpable axillary masses or adenopathy  CV: regular rate and rhythm, normal S1 S2, no S3 or S4, no murmur, click or rub, no peripheral edema and peripheral pulses strong  ABDOMEN: soft, nontender, no hepatosplenomegaly, no masses and bowel sounds normal   (female): deferred  MS: no gross musculoskeletal defects noted, no edema  SKIN: no suspicious lesions or rashes  NEURO: Normal strength and tone, mentation intact and speech normal  PSYCH: mentation appears normal, affect normal/bright  LYMPH: no cervical, supraclavicular, " "axillary, or inguinal adenopathy    ASSESSMENT/PLAN:   Kaitlyn was seen today for physical.    Diagnoses and all orders for this visit:    Encounter for routine adult health examination without abnormal findings    Screening for malignant neoplasm of cervix  -     HPV High Risk Types DNA Cervical  -     Pap imaged thin layer screen with HPV - recommended age 30 - 65 years (select HPV order below)    DUB (dysfunctional uterine bleeding)  -     US Pelvic Complete w Transvaginal; Future  -     CBC with platelets  -     Lutropin  -     Follicle stimulating hormone  -     TSH with free T4 reflex    Thyroid enlarged  -     US Thyroid; Future  -     TSH with free T4 reflex    PLAN:   Patient needs to follow up in if no improvement,or sooner if worsening of symptoms or other symptoms develop.  FURTHER TESTING:       - pelvic and thyroid ultrasound       - diagnostic mammo   Will follow up and/or notify patient of  results via My Chart to determine further need for followup  Follow up office visit in one year for annual health maintenance exam, sooner PRN.    COUNSELING:   Reviewed preventive health counseling, as reflected in patient instructions  Special attention given to:        Regular exercise       Healthy diet/nutrition       Vision screening       Contraception       Family planning       Osteoporosis Prevention/Bone Health       The 10-year ASCVD risk score ([place] JUANIS Jr, et al., 2013) is: 0.3%    Values used to calculate the score:      Age: 42 years      Sex: Female      Is Non- : No      Diabetic: No      Tobacco smoker: No      Systolic Blood Pressure: 102 mmHg      Is BP treated: No      HDL Cholesterol: 75 mg/dL      Total Cholesterol: 199 mg/dL         reports that she has never smoked. She has never used smokeless tobacco.    Estimated body mass index is 22.12 kg/(m^2) as calculated from the following:    Height as of 5/9/18: 5' 7\" (1.702 m).    Weight as of 5/9/18: 141 lb 4 oz (64.1 " kg).       Counseling Resources:  ATP IV Guidelines  Pooled Cohorts Equation Calculator  Breast Cancer Risk Calculator  FRAX Risk Assessment  ICSI Preventive Guidelines  Dietary Guidelines for Americans, 2010  USDA's MyPlate  ASA Prophylaxis  Lung CA Screening    SINDHU Antonio [facility]

## 2018-06-04 NOTE — PATIENT INSTRUCTIONS
PLAN:   1.   Orders Placed This Encounter   Procedures     US Pelvic Complete w Transvaginal     US Thyroid     HPV High Risk Types DNA Cervical     Pap imaged thin layer screen with HPV - recommended age 30 - 65 years (select HPV order below)     CBC with platelets     Lutropin     Follicle stimulating hormone     TSH with free T4 reflex       2. Patient needs to follow up in if no improvement,or sooner if worsening of symptoms or other symptoms develop.  FURTHER TESTING:       - pelvic and thyroid ultrasound       - diagnostic mammo   Will follow up and/or notify patient of  results via My Chart to determine further need for followup  Follow up office visit in one year for annual health maintenance exam, sooner PRN.    Preventive Health Recommendations  Female Ages 40 to 49    Yearly exam:     See your health care provider every year in order to  1. Review health changes.   2. Discuss preventive care.    3. Review your medicines if your doctor prescribed any.      Get a Pap test every three years (unless you have an abnormal result and your provider advises testing more often).      If you get Pap tests with HPV test, you only need to test every 5 years, unless you have an abnormal result. You do not need a Pap test if your uterus was removed (hysterectomy) and you have not had cancer.      You should be tested each year for STDs (sexually transmitted diseases), if you're at risk.       Ask your doctor if you should have a mammogram.      Have a colonoscopy (test for colon cancer) if someone in your family has had colon cancer or polyps before age 50.       Have a cholesterol test every 5 years.       Have a diabetes test (fasting glucose) after age 45. If you are at risk for diabetes, you should have this test every 3 years.    Shots: Get a flu shot each year. Get a tetanus shot every 10 years.     Nutrition:     Eat at least 5 servings of fruits and vegetables each day.    Eat whole-grain bread, whole-wheat  pasta and brown rice instead of white grains and rice.    Talk to your provider about Calcium and Vitamin D.     Lifestyle    Exercise at least 150 minutes a week (an average of 30 minutes a day, 5 days a week). This will help you control your weight and prevent disease.    Limit alcohol to one drink per day.    No smoking.     Wear sunscreen to prevent skin cancer.    See your dentist every six months for an exam and cleaning.  It was a pleasure seeing you today at the [facility]. Thank you for allowing us to care for you today. We truly hope we provided you with the excellent service you deserve. Please let us know if there is anything else we can do for you so we can be sure you are leaving completley satisfied with your care experience.       General information about your clinic   Clinic Hours Lab Hours (Appointments are required)   Mon-Thurs: 7:30 AM - 7 PM Mon-Thurs: 7:30 AM - 7 PM   Fri: 7:30 AM - 5 PM Fri: 7:30 AM - 5 PM        After Hours Nurse Advise & Appts:  [facility]: [phone]  [facility] On Call: to make appointments anytime: [phone] On Call Physician: call [phone] and answering service will page the on call physician.        For urgent appointments, please call [phone] and ask for the triage nurse or your care team clinic nurse.  How to contact my care team:  MyChart: [email or web address]   Phone: [phone]   Fax: [phone]       [facility]:   Phone: [phone]  Hours: 8:00 AM - 6:00 PM  Medication Refills:  Call your pharmacy and they will forward the refill to us. Please allow 3 business days for your refills to be completed.       Normal or non-critical lab and imaging results will be communicated to you by MyChart, letter or phone within 7 days.  If you do not hear from us within 10 days, please call the clinic. If you have a critical or abnormal lab result, we will notify you by phone as soon as possible.       We  now have PWIC (Pediatric Walk in Care)  Monday-Friday from 7:30-4. Simply walk in and be seen for your urgent needs like cough, fever, rash, diarrhea or vomiting, pink eye, UTI. No appointments needed. Ask one of the team for more information      -Your Care Team:    Dr. Ashli Phillips - Internal Medicine/Pediatrics   Dr. Lesvia Oneal - Family Medicine  Dr. Juanita Watson - Pediatrics  Dr. Aislinn Tay - Pediatrics  Ani Slater CNP - Family Practice Nurse Practitioner

## 2018-06-04 NOTE — PROGRESS NOTES
David Garcia,    Attached are your test results.  Good news is thyroid is normal    Please contact us if you have any questions.    Ani Slater, CNP

## 2018-06-04 NOTE — MR AVS SNAPSHOT
After Visit Summary   6/4/2018    Kaitlyn Garcia    MRN: [id number]           Patient Information     Date Of Birth          1975        Visit Information        Provider Department      6/4/2018 11:10 AM Ani Slater APRN CNP [facility]        Today's Diagnoses     Encounter for routine adult health examination without abnormal findings    -  1    Screening for malignant neoplasm of cervix        DUB (dysfunctional uterine bleeding)        Thyroid enlarged          Care Instructions    PLAN:   1.   Orders Placed This Encounter   Procedures     US Pelvic Complete w Transvaginal     US Thyroid     HPV High Risk Types DNA Cervical     Pap imaged thin layer screen with HPV - recommended age 30 - 65 years (select HPV order below)     CBC with platelets     Lutropin     Follicle stimulating hormone     TSH with free T4 reflex       2. Patient needs to follow up in if no improvement,or sooner if worsening of symptoms or other symptoms develop.  FURTHER TESTING:       - pelvic and thyroid ultrasound       - diagnostic mammo   Will follow up and/or notify patient of  results via My Chart to determine further need for followup  Follow up office visit in one year for annual health maintenance exam, sooner PRN.    Preventive Health Recommendations  Female Ages 40 to 49    Yearly exam:     See your health care provider every year in order to  1. Review health changes.   2. Discuss preventive care.    3. Review your medicines if your doctor prescribed any.      Get a Pap test every three years (unless you have an abnormal result and your provider advises testing more often).      If you get Pap tests with HPV test, you only need to test every 5 years, unless you have an abnormal result. You do not need a Pap test if your uterus was removed (hysterectomy) and you have not had cancer.      You should be tested each year for STDs (sexually transmitted diseases), if you're at risk.        Ask your doctor if you should have a mammogram.      Have a colonoscopy (test for colon cancer) if someone in your family has had colon cancer or polyps before age 50.       Have a cholesterol test every 5 years.       Have a diabetes test (fasting glucose) after age 45. If you are at risk for diabetes, you should have this test every 3 years.    Shots: Get a flu shot each year. Get a tetanus shot every 10 years.     Nutrition:     Eat at least 5 servings of fruits and vegetables each day.    Eat whole-grain bread, whole-wheat pasta and brown rice instead of white grains and rice.    Talk to your provider about Calcium and Vitamin D.     Lifestyle    Exercise at least 150 minutes a week (an average of 30 minutes a day, 5 days a week). This will help you control your weight and prevent disease.    Limit alcohol to one drink per day.    No smoking.     Wear sunscreen to prevent skin cancer.    See your dentist every six months for an exam and cleaning.  It was a pleasure seeing you today at the [facility]. Thank you for allowing us to care for you today. We truly hope we provided you with the excellent service you deserve. Please let us know if there is anything else we can do for you so we can be sure you are leaving completley satisfied with your care experience.       General information about your clinic   Clinic Hours Lab Hours (Appointments are required)   Mon-Thurs: 7:30 AM - 7 PM Mon-Thurs: 7:30 AM - 7 PM   Fri: 7:30 AM - 5 PM Fri: 7:30 AM - 5 PM        After Hours Nurse Advise & Appts:  [facility] Nurse Advisors: [phone]  [facility] On Call: to make appointments anytime: [phone] On Call Physician: call [phone] and answering service will page the on call physician.        For urgent appointments, please call [phone] and ask for the triage nurse or your care team clinic nurse.  How to contact my care team:  MyChart: [email or web address]   Phone:  [phone]   Fax: [phone]       [facility]:   Phone: [phone]  Hours: 8:00 AM - 6:00 PM  Medication Refills:  Call your pharmacy and they will forward the refill to us. Please allow 3 business days for your refills to be completed.       Normal or non-critical lab and imaging results will be communicated to you by MyChart, letter or phone within 7 days.  If you do not hear from us within 10 days, please call the clinic. If you have a critical or abnormal lab result, we will notify you by phone as soon as possible.       We now have PWIC (Pediatric Walk in Care)  Monday-Friday from 7:30-4. Simply walk in and be seen for your urgent needs like cough, fever, rash, diarrhea or vomiting, pink eye, UTI. No appointments needed. Ask one of the team for more information      -Your Care Team:    Dr. Ashli Phillips - Internal Medicine/Pediatrics   Dr. Lesvia Oneal - Family Medicine  Dr. Juanita Watson - Pediatrics  Dr. Aislinn Tay - Pediatrics  Ani Slater CNP - Family Practice Nurse Practitioner                         Follow-ups after your visit        Your next 10 appointments already scheduled     Jun 07, 2018  9:00 AM CDT   MA DIAGNOSTIC LEFT W/ BLOSSOM with [id number]   [facility] ([facility])    [address] [place] [id number] [phone]           Do not use any powder, lotion or deodorant under your arms or on your breast. If you do, we will ask you to remove it before your exam.  Wear comfortable, two-piece clothing.  If you have any allergies, tell your care team.  Bring any previous mammograms from other facilities or have them mailed to the breast center.  Three-dimensional (3D) mammograms are available at [facility] locations in [facility], [facility], [place], [place], and [place]. [facility] locations include [place] [facility] in [place]. Benefits of 3D mammograms  "include: - Improved rate of cancer detection - Decreases your chance of having to go back for more tests, which means fewer: - \"False-positive\" results (This means that there is an abnormal area but it isn't cancer.) - Invasive testing procedures, such as a biopsy or surgery - Can provide clearer images of the breast if you have dense breast tissue. 3D mammography is an optional exam that anyone can have with a 2D mammogram. It doesn't replace or take the place of a 2D mammogram. 2D mammograms remain an effective screening test for all women.  Not all insurance companies cover the cost of a 3D mammogram. Check with your insurance.            Jun 07, 2018  9:30 AM CDT   US BREAST LEFT LIMITED 1-3 QUAD with [id number]   [facility] ([facility])    [address] [place] [id number] [phone]           Please bring a list of your medicines (including vitamins, minerals and over-the-counter drugs). Also, tell your doctor about any allergies you may have. Wear comfortable clothes and leave your valuables at home.  You do not need to do anything special to prepare for your exam.  Please call the Imaging Department at your exam site with any questions.              Future tests that were ordered for you today     Open Future Orders        Priority Expected Expires Ordered    US Thyroid Routine  6/4/2019 6/4/2018    US Pelvic Complete w Transvaginal Routine  6/4/2019 6/4/2018    US Breast Left Limited 1-3 Quadrants Routine  6/4/2019 6/4/2018    MA Diagnostic Left w/Domenico Routine  6/4/2019 6/4/2018            Who to contact     If you have questions or need follow up information about today's clinic visit or your schedule please contact [facility] directly at [phone].  Normal or non-critical lab and imaging results will be communicated to you by MyChart, letter or phone within 4 business days after the clinic has received the results. " "If you do not hear from us within 7 days, please contact the clinic through [employer] or phone. If you have a critical or abnormal lab result, we will notify you by phone as soon as possible.  Submit refill requests through [employer] or call your pharmacy and they will forward the refill request to us. Please allow 3 business days for your refill to be completed.          Additional Information About Your Visit        [employer] Information     [employer] gives you secure access to your electronic health record. If you see a primary care provider, you can also send messages to your care team and make appointments. If you have questions, please call your primary care clinic.  If you do not have a primary care provider, please call [phone] and they will assist you.      [employer] is an electronic gateway that provides easy, online access to your medical records. With [employer], you can request a clinic appointment, read your test results, renew a prescription or communicate with your care team.     To access your existing account, please contact your [facility] or call [phone] for assistance.        Care EveryWhere ID     This is your Care EveryWhere ID. This could be used by other organizations to access your [facility] medical records  [id number]        Your Vitals Were     Pulse Temperature Height Pulse Oximetry BMI (Body Mass Index)       86 99.2  F (37.3  C) (Temporal) 5' 6.75\" (1.695 m) 98% 22.25 kg/m2        Blood Pressure from Last 3 Encounters:   06/04/18 102/70   05/09/18 115/72   12/28/17 110/60    Weight from Last 3 Encounters:   06/04/18 141 lb (64 kg)   05/09/18 141 lb 4 oz (64.1 kg)   12/28/17 143 lb 9.6 oz (65.1 kg)              We Performed the Following     CBC with platelets     Follicle stimulating hormone     HPV High Risk Types DNA Cervical     Lutropin     Pap imaged thin layer screen with HPV - recommended age 30 - 65 years (select HPV order below)     TSH with free " T4 reflex        Primary Care Provider Office Phone # Fax #    Ani Slater, APRN [facility] [phone]       [address]  [place] [address]        Equal Access to Services     ALEX ALBERTO : Hadracheal george arango deepikao Somaddisonali, waaxda luqadaha, qaybta kaalmada adeegyada, waxromana idiin hayaan deuce cagle hetal gomes. So [facility] [phone].    ATENCIÓN: Si habla español, tiene a lugo disposición servicios gratuitos de asistencia lingüística. Llame al [phone].    We comply with applicable federal civil rights laws and [place] laws. We do not discriminate on the basis of race, color, national origin, age, disability, sex, sexual orientation, or gender identity.            Thank you!     Thank you for choosing [facility]  for your care. Our goal is always to provide you with excellent care. Hearing back from our patients is one way we can continue to improve our services. Please take a few minutes to complete the written survey that you may receive in the mail after your visit with us. Thank you!             Your Updated Medication List - Protect others around you: Learn how to safely use, store and throw away your medicines at www.disposemymeds.org.          This list is accurate as of 6/4/18 11:44 AM.  Always use your most recent med list.                   Brand Name Dispense Instructions for use Diagnosis    diazepam 2 MG tablet    VALIUM    10 tablet    Take 1 tablet (2 mg) by mouth every 12 hours as needed for anxiety or sleep    Situational mixed anxiety and depressive disorder       fluticasone 50 MCG/ACT spray    FLONASE    48 g    USE 2 SPRAYS IN EACH NOSTRIL ONCE DAILY    Allergic rhinitis due to other allergic trigger, unspecified rhinitis seasonality       omeprazole 20 MG CR capsule    priLOSEC    90 capsule    Take 1 capsule (20 mg) by mouth daily    Gastroesophageal reflux disease without esophagitis       tamoxifen 20 MG tablet    NOLVADEX    90 tablet     Take 1 tablet (20 mg) by mouth daily    Atypical hyperplasia of breast

## 2018-06-04 NOTE — PROGRESS NOTES
David Garcia,    Attached are your test results.  -TSH (thyroid stimulating hormone) level is normal which indicates normal thyroid function.  -Normal red blood cell (hgb) levels, normal white blood cell count and normal platelet levels.   Please contact us if you have any questions.    Ani Slater, CNP

## 2018-06-04 NOTE — PROGRESS NOTES
David Garcia,    Attached are your test results.  Uterus looks normal appearing   Is a cystic area on the right ovary which will need to recheck    Please contact us if you have any questions.    Ani Slaetr, CNP

## 2018-06-05 NOTE — PROGRESS NOTES
David Garcia,    Attached are your test results.  Not looking menopausal    Please contact us if you have any questions.    Ani Slater, CNP

## 2018-06-06 ENCOUNTER — RADIANT APPOINTMENT (OUTPATIENT)
Dept: ULTRASOUND IMAGING | Facility: CLINIC | Age: 43
End: 2018-06-06
Attending: PHYSICIAN ASSISTANT
Payer: COMMERCIAL

## 2018-06-06 ENCOUNTER — TRANSFERRED RECORDS (OUTPATIENT)
Dept: HEALTH INFORMATION MANAGEMENT | Facility: CLINIC | Age: 43
End: 2018-06-06

## 2018-06-06 ENCOUNTER — RADIANT APPOINTMENT (OUTPATIENT)
Dept: MAMMOGRAPHY | Facility: CLINIC | Age: 43
End: 2018-06-06
Attending: PHYSICIAN ASSISTANT
Payer: COMMERCIAL

## 2018-06-06 DIAGNOSIS — N64.4 BREAST PAIN, LEFT: ICD-10-CM

## 2018-06-06 LAB
COPATH REPORT: NORMAL
PAP: NORMAL

## 2018-06-06 PROCEDURE — 76642 ULTRASOUND BREAST LIMITED: CPT | Mod: LT

## 2018-06-06 PROCEDURE — G0279 TOMOSYNTHESIS, MAMMO: HCPCS

## 2018-06-06 PROCEDURE — 77065 DX MAMMO INCL CAD UNI: CPT

## 2018-06-08 LAB
FINAL DIAGNOSIS: NORMAL
HPV HR 12 DNA CVX QL NAA+PROBE: NEGATIVE
HPV16 DNA SPEC QL NAA+PROBE: NEGATIVE
HPV18 DNA SPEC QL NAA+PROBE: NEGATIVE
SPECIMEN DESCRIPTION: NORMAL
SPECIMEN SOURCE CVX/VAG CYTO: NORMAL

## 2018-08-28 ENCOUNTER — OFFICE VISIT (OUTPATIENT)
Dept: PEDIATRICS | Facility: CLINIC | Age: 43
End: 2018-08-28
Payer: COMMERCIAL

## 2018-08-28 VITALS
HEART RATE: 81 BPM | WEIGHT: 135.3 LBS | TEMPERATURE: 98.6 F | DIASTOLIC BLOOD PRESSURE: 80 MMHG | BODY MASS INDEX: 21.35 KG/M2 | OXYGEN SATURATION: 96 % | SYSTOLIC BLOOD PRESSURE: 112 MMHG

## 2018-08-28 DIAGNOSIS — N60.99 ATYPICAL HYPERPLASIA OF BREAST: ICD-10-CM

## 2018-08-28 DIAGNOSIS — K21.9 GASTROESOPHAGEAL REFLUX DISEASE WITHOUT ESOPHAGITIS: ICD-10-CM

## 2018-08-28 DIAGNOSIS — F41.1 GAD (GENERALIZED ANXIETY DISORDER): Primary | ICD-10-CM

## 2018-08-28 DIAGNOSIS — F41.0 PANIC ATTACK: ICD-10-CM

## 2018-08-28 DIAGNOSIS — F51.02 ADJUSTMENT INSOMNIA: ICD-10-CM

## 2018-08-28 PROCEDURE — 99214 OFFICE O/P EST MOD 30 MIN: CPT | Performed by: FAMILY MEDICINE

## 2018-08-28 RX ORDER — HYDROXYZINE HYDROCHLORIDE 25 MG/1
12.5-25 TABLET, FILM COATED ORAL
Qty: 30 TABLET | Refills: 0 | Status: SHIPPED | OUTPATIENT
Start: 2018-08-28 | End: 2019-05-30

## 2018-08-28 RX ORDER — VENLAFAXINE HYDROCHLORIDE 37.5 MG/1
37.5 CAPSULE, EXTENDED RELEASE ORAL DAILY
Qty: 30 CAPSULE | Refills: 1 | Status: SHIPPED | OUTPATIENT
Start: 2018-08-28 | End: 2018-09-13

## 2018-08-28 ASSESSMENT — ANXIETY QUESTIONNAIRES
5. BEING SO RESTLESS THAT IT IS HARD TO SIT STILL: NEARLY EVERY DAY
GAD7 TOTAL SCORE: 20
2. NOT BEING ABLE TO STOP OR CONTROL WORRYING: NEARLY EVERY DAY
6. BECOMING EASILY ANNOYED OR IRRITABLE: MORE THAN HALF THE DAYS
7. FEELING AFRAID AS IF SOMETHING AWFUL MIGHT HAPPEN: NEARLY EVERY DAY
3. WORRYING TOO MUCH ABOUT DIFFERENT THINGS: NEARLY EVERY DAY
1. FEELING NERVOUS, ANXIOUS, OR ON EDGE: NEARLY EVERY DAY

## 2018-08-28 ASSESSMENT — PATIENT HEALTH QUESTIONNAIRE - PHQ9: 5. POOR APPETITE OR OVEREATING: NEARLY EVERY DAY

## 2018-08-28 NOTE — PROGRESS NOTES
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  Had history of anxiety depression in the past, was on Lexapro until December 2017 when she decided to stop secondary to possible interactions between her tamoxifen and Lexapro.  Patient felt fine since then until this episode.  Yesterday she felt severe panic attack that she had chest pressure, choking sensation lasting for a few minutes.  Patient denies suicidal ideations  She denies use of alcohol, smoking, recreational drugs  Patient is unable to sleep secondary to anxiety for the past several weeks   has no history of insomnia in the past.  Denies family or personal history of thyroid disorder.  Past medical history is significant for atypical hyperplasia of the breast, is currently on tamoxifen.  Has a history of GERD, was on Prilosec before.  Secondary to worsening anxiety, felt a reoccurring and severe heartburn with no associated concerns for nausea, vomiting, abdominal pain, bloating for the past few weeks.  Patient denies hematemesis, melena.      Status since last visit: Worsened     Other associated symptoms:None    Complicating factors:     Significant life event: Yes-       Current substance abuse: None    PHQ-9 4/11/2016 1/19/2017 4/27/2017   Total Score 1 0 0   Q9: Suicide Ideation Not at all Not at all Not at all     AUDELIA-7 SCORE 4/11/2016 1/19/2017 4/27/2017   Total Score - - -   Total Score 0 0 0     In the past two weeks have you had thoughts of suicide or self-harm?  No.    Do you have concerns about your personal safety or the safety of others?   No  PHQ-9  English  PHQ-9   Any Language  AUDELIA-7  Suicide Assessment  Five-step Evaluation and Treatment (SAFE-T)    Amount of exercise or physical activity: None    Problems taking medications regularly: No    Medication side effects: none    Diet: regular (no restrictions)            Problem list and histories reviewed & adjusted, as indicated.  Additional history: as documented    Patient Active Problem List   Diagnosis     CARDIOVASCULAR SCREENING; LDL GOAL LESS THAN 160     Iron deficiency anemia     Mammographic microcalcification found on diagnostic imaging of breast     Intraductal papilloma of breast     Atypical hyperplasia of breast     Acne     Bloating symptom     Abdominal pain     Abnormal biliary HIDA scan     Family history of breast cancer     Neutropenia (H)     Choroidal nevus of right eye     Situational mixed anxiety and depressive disorder     Glaucoma suspect, bilateral     Post-concussion syndrome     Muscle spasms of head or neck     Allergic rhinitis     WBC decreased     Past Surgical History:   Procedure Laterality Date     ABDOMEN SURGERY      rectoplasty     BIOPSY BREAST NEEDLE LOCALIZATION  7/30/2012    Procedure: BIOPSY BREAST NEEDLE LOCALIZATION;  left breast excisional Biopsy with wire localization @0830;  Surgeon: Robert Lynch MD;  Location: [facility] OR     BREAST SURGERY       COSMETIC SURGERY      Rectalplasty for episiotomy repair     COSMETIC SURGERY      Rectalplasty for episiotomy repair     DILATION AND CURETTAGE, HYSTEROSCOPY, ABLATE ENDOMETRIUM NOVASURE, COMBINED  10/10/2013    Procedure: COMBINED DILATION AND CURETTAGE, HYSTEROSCOPY, ABLATE ENDOMETRIUM NOVASURE;  Endometrial ablation with Novasure;  Surgeon: Indu Birmingham DO;  Location: [facility] OR     HC TOOTH EXTRACTION W/FORCEP       LUMPECTOMY BREAST Left 2/1/2017    Procedure: LUMPECTOMY BREAST;  Surgeon: Lori Montenegro MD;  Location: [facility]     SOFT TISSUE SURGERY      lumpectomy x 2 right breast     SOFT TISSUE SURGERY      episiotomy fixed       Social History    Substance Use Topics     Smoking status: Never Smoker     Smokeless tobacco: Never Used     Alcohol use 0.0 oz/week     0 Standard drinks or equivalent per week      Comment: Less than once a month     Family History   Problem Relation Age of Onset     Hypertension Father      Lipids Father      Hyperlipidemia Father      Depression/Anxiety Father      Cerebrovascular Disease Father      TIA     Cancer Maternal Grandmother      Glaucoma Maternal Grandmother      Macular Degeneration Maternal Grandmother      Osteoperosis Maternal Grandmother      Anesthesia Reaction Paternal Grandmother      Diabetes Paternal Grandmother      Cancer Paternal Grandfather      Depression/Anxiety Mother      Depression/Anxiety Daughter      Depression Daughter      Depression Son      Cataracts Maternal Grandfather      Breast Cancer Paternal Aunt 60     x 2 aunts     Breast Cancer Other 40     Paternal cousin     Breast Cancer Other 64     Several aunts on fathers side/Several aunts on fathers side/Several aunts on fathers side/Several aunts on fathers side     Prostate Cancer Other 64     Uncle on mothers side     Asthma No family hx of      C.A.D. No family hx of      Cancer - colorectal No family hx of      Connective Tissue Disorder No family hx of      Thyroid Disease No family hx of      Coronary Artery Disease No family hx of      Ovarian Cancer No family hx of      Thyroid Disease No family hx of      Chemical Addiction No family hx of      Known Genetic Syndrome No family hx of          Current Outpatient Prescriptions   Medication Sig Dispense Refill     hydrOXYzine (ATARAX) 25 MG tablet Take 0.5-1 tablets (12.5-25 mg) by mouth nightly as needed for itching 30 tablet 0     Naproxen Sodium (ALEVE PO) Take 440 mg by mouth At Bedtime       omeprazole (PRILOSEC) 20 MG CR capsule Take 1 capsule (20 mg) by mouth daily 90 capsule 3     tamoxifen (NOLVADEX) 20 MG tablet Take 1 tablet (20 mg) by mouth daily 90 tablet 3      venlafaxine (EFFEXOR-XR) 37.5 MG 24 hr capsule Take 1 capsule (37.5 mg) by mouth daily 30 capsule 1     diazepam (VALIUM) 2 MG tablet Take 1 tablet (2 mg) by mouth every 12 hours as needed for anxiety or sleep (Patient not taking: Reported on 8/28/2018) 10 tablet 0     fluticasone (FLONASE) 50 MCG/ACT nasal spray USE 2 SPRAYS IN EACH NOSTRIL ONCE DAILY (Patient not taking: Reported on 6/4/2018) 48 g 11     Allergies   Allergen Reactions     No Known Drug Allergies      Recent Labs   Lab Test  06/04/18   1222  04/27/17   1026  01/19/17   1034  02/03/15   0854   06/06/13   1057   05/21/12   1025   LDL   --   109*   --   85   --    --    --   109   HDL   --   75   --   58   --    --    --   64   TRIG   --   77   --   66   --    --    --   111   ALT   --    --    --   46   --   35   --    --    CR   --   0.83  0.70  0.77   < >  0.67   --    --    GFRESTIMATED   --   76  >90  Non  GFR Calc    83   < >  >90   --    --    GFRESTBLACK   --   >90   GFR Calc    >90   GFR Calc    >90   GFR Calc     < >  >90   --    --    POTASSIUM   --   3.6  4.2  3.7   < >  4.0   --    --    TSH  1.21  0.96   --    --    --    --    < >   --     < > = values in this interval not displayed.      BP Readings from Last 3 Encounters:   08/28/18 112/80   06/04/18 102/70   05/09/18 115/72    Wt Readings from Last 3 Encounters:   08/28/18 135 lb 4.8 oz (61.4 kg)   06/04/18 141 lb (64 kg)   05/09/18 141 lb 4 oz (64.1 kg)                  Labs reviewed in EPIC    Reviewed and updated as needed this visit by clinical staff  Tobacco  Allergies  Meds  Med Hx  Surg Hx  Fam Hx  Soc Hx      Reviewed and updated as needed this visit by Provider         ROS:  CONSTITUTIONAL: NEGATIVE for fever, chills, change in weight  RESP: NEGATIVE for significant cough or SOB  CV: NEGATIVE for chest pain, palpitations or peripheral edema  GI: as above  MUSCULOSKELETAL: NEGATIVE for significant  arthralgias or myalgia  NEURO: NEGATIVE for weakness, dizziness or paresthesias  ENDOCRINE: NEGATIVE for temperature intolerance, skin/hair changes  HEME/ALLERGY/IMMUNE: NEGATIVE for bleeding problems  PSYCHIATRIC: as above    OBJECTIVE:     /80  Pulse 81  Temp 98.6  F (37  C) (Oral)  Wt 135 lb 4.8 oz (61.4 kg)  SpO2 96%  BMI 21.35 kg/m2  Body mass index is 21.35 kg/(m^2).  GENERAL: healthy, alert and no distress  NECK: no adenopathy, no asymmetry, masses, or scars and thyroid normal to palpation  RESP: lungs clear to auscultation - no rales, rhonchi or wheezes  CV: regular rate and rhythm, normal S1 S2, no S3 or S4, no murmur, click or rub, no peripheral edema and peripheral pulses strong  NEURO: Normal strength and tone, mentation intact and speech normal  PSYCH: mentation appears normal, tearful and anxious    Diagnostic Test Results:  none     ASSESSMENT/PLAN:             1. AUDELIA (generalized anxiety disorder)  With recent worsening.  Recommended to start on Effexor 37.5 mg once daily,  Follow for recheck with Nella and myself in 4-5 weeks or sooner if needed  Recommended counseling, patient will call her therapist to schedule for a follow-up appointment.  Reviewed relaxation techniques  Dosing and potential medication side effects discussed.  Patient verbalised understanding and is agreeable to the plan.    - hydrOXYzine (ATARAX) 25 MG tablet; Take 0.5-1 tablets (12.5-25 mg) by mouth nightly as needed for itching  Dispense: 30 tablet; Refill: 0  - venlafaxine (EFFEXOR-XR) 37.5 MG 24 hr capsule; Take 1 capsule (37.5 mg) by mouth daily  Dispense: 30 capsule; Refill: 1    2. Panic attack  Prescription given for hydroxyzine to take as needed at night to help with sleep and panic attack  Expect improvement in panic attacks with improving anxiety on Effexor  Patient verbalised understanding and is agreeable to the plan.    - hydrOXYzine (ATARAX) 25 MG tablet; Take 0.5-1 tablets (12.5-25 mg) by mouth nightly  as needed for itching  Dispense: 30 tablet; Refill: 0  - venlafaxine (EFFEXOR-XR) 37.5 MG 24 hr capsule; Take 1 capsule (37.5 mg) by mouth daily  Dispense: 30 capsule; Refill: 1    3. Adjustment insomnia  Reviewed sleep hygiene  Expect improvement with improving anxiety  Can use hydroxyzine as needed for anxiety and insomnia at night as needed  - hydrOXYzine (ATARAX) 25 MG tablet; Take 0.5-1 tablets (12.5-25 mg) by mouth nightly as needed for itching  Dispense: 30 tablet; Refill: 0    4. Gastroesophageal reflux disease without esophagitis  Relapsing due to anxiety  We will start back on omeprazole 20 mg once daily, continue reflux precautions  - omeprazole (PRILOSEC) 20 MG CR capsule; Take 1 capsule (20 mg) by mouth daily  Dispense: 90 capsule; Refill: 3    5. Atypical hyperplasia of breast  On tamoxifen.  Lexapro was initially stopped this reason      Chart documentation done in part with Dragon Voice recognition Software. Although reviewed after completion, some word and grammatical error may remain.    Chart forwarded to PCP for FYI     See Patient Instructions    Lesvia Oneal MD  [facility]

## 2018-08-28 NOTE — MR AVS SNAPSHOT
After Visit Summary   8/28/2018    Kaitlyn Garcia    MRN: [id number]           Patient Information     Date Of Birth          1975        Visit Information        Provider Department      8/28/2018 1:30 PM Lesvia Oneal MD [facility]        Today's Diagnoses     AUDELIA (generalized anxiety disorder)    -  1    Panic attack        Adjustment insomnia        Gastroesophageal reflux disease without esophagitis          Care Instructions    Start on Effexor 37.5mg daily in the morning  Take hydroxyzine as needed for panic attacks  Schedule for recheck with Nella or Dr. Oneal in 5-6 weeks          Follow-ups after your visit        Follow-up notes from your care team     Return in about 5 weeks (around 10/2/2018) for Medication check.      Your next 10 appointments already scheduled     Nov 28, 2018 11:00 AM CST   (Arrive by 10:45 AM)   Return Visit with eMrced Wiggins PA-C   [facility] ([facility])    [address] [place] [address]  [address]  [facility] [id number] [phone]            Nov 28, 2018 12:30 PM CST   MA SCREENING DIGITAL BILATERAL with [id number]   [facility] ([facility])    [address] [place] [address], [address]nd Floor  [facility] [id number] [phone]           Do not use any powder, lotion or deodorant under your arms or on your breast. If you do, we will ask you to remove it before your exam.  Wear comfortable, two-piece clothing.  If you have any allergies, tell your care team.  Bring any previous mammograms from other facilities or have them mailed to the breast center. Three-dimensional (3D) mammograms are available at [facility] locations in [facility], [facility], [facility], and [place]. [facility] locations include [place] [facility] in [place]. Benefits of 3D mammograms include: - Improved rate of cancer detection -  "Decreases your chance of having to go back for more tests, which means fewer: - \"False-positive\" results (This means that there is an abnormal area but it isn't cancer.) - Invasive testing procedures, such as a biopsy or surgery - Can provide clearer images of the breast if you have dense breast tissue. 3D mammography is an optional exam that anyone can have with a 2D mammogram. It doesn't replace or take the place of a 2D mammogram. 2D mammograms remain an effective screening test for all women.  Not all insurance companies cover the cost of a 3D mammogram. Check with your insurance.            Nov 28, 2018  1:00 PM CST   MR BREAST BILATERAL W CONTRAST with UC1   [facility] ([facility])    [address] [id number] [phone]           Take your medicines as usual, unless your doctor tells you not to. Bring a list of your current medicines to your exam (including vitamins, minerals and over-the-counter drugs).  The timing of your exam may depend on the start of your last period. If you re in menopause, you may have the exam anytime.  Please bring any previous mammograms or breast MRIs from other facilities to the MRI dept. Do not mail these items to us.   You will have IV contrast for this exam.  You do not need to do anything special to prepare.  The MRI machine uses a strong magnet. Please wear clothes without metal (snaps, zippers). A sweatsuit works well, or we may give you a hospital gown.  Please remove any body piercings and hair extensions before you arrive. You will also remove watches, jewelry, hairpins, wallets, dentures, partial dental plates and hearing aids. You may wear contact lenses, and you may be able to wear your rings. We have a safe place to keep your personal items, but it is safer to leave them at home.  **IMPORTANT** THE INSTRUCTIONS BELOW ARE ONLY FOR THOSE PATIENTS WHO HAVE BEEN PRESCRIBED SEDATION OR GENERAL " ANESTHESIA DURING THEIR MRI PROCEDURE:  IF YOUR DOCTOR PRESCRIBED ORAL SEDATION (take medicine to help you relax during your exam):   You must get the medicine from your doctor (oral medication) before you arrive. Bring the medicine to the exam. Do not take it at home. You ll be told when to take it upon arriving for your exam.   Arrive one hour early. Bring someone who can take you home after the test. Your medicine will make you sleepy. After the exam, you may not drive, take a bus or take a taxi by yourself.  IF YOUR DOCTOR PRESCRIBED IV SEDATION:   Arrive one hour early. Bring someone who can take you home after the test. Your medicine will make you sleepy. After the exam, you may not drive, take a bus or take a taxi by yourself.   No eating 6 hours before your exam. You may have clear liquids up until 4 hours before your exam. (Clear liquids include water, clear tea, black coffee and fruit juice without pulp.)  IF YOUR DOCTOR PRESCRIBED ANESTHESIA (be asleep for your exam):   Arrive 1 1/2 hours early. Bring someone who can take you home after the test. You may not drive, take a bus or take a taxi by yourself.   No eating 8 hours before your exam. You may have clear liquids up until 4 hours before your exam. (Clear liquids include water, clear tea, black coffee and fruit juice without pulp.)   You will spend four to five hours in the recovery room.  If you have any questions, please contact your Imaging Department exam site.              Who to contact     If you have questions or need follow up information about today's clinic visit or your schedule please contact [facility] directly at [phone].  Normal or non-critical lab and imaging results will be communicated to you by MyChart, letter or phone within 4 business days after the clinic has received the results. If you do not hear from us within 7 days, please contact the clinic through MyChart or phone. If you have a critical or abnormal  lab result, we will notify you by phone as soon as possible.  Submit refill requests through [employer] or call your pharmacy and they will forward the refill request to us. Please allow 3 business days for your refill to be completed.          Additional Information About Your Visit        [facility] Information     [employer] gives you secure access to your electronic health record. If you see a primary care provider, you can also send messages to your care team and make appointments. If you have questions, please call your primary care clinic.  If you do not have a primary care provider, please call [phone] and they will assist you.      [employer] is an electronic gateway that provides easy, online access to your medical records. With [employer], you can request a clinic appointment, read your test results, renew a prescription or communicate with your care team.     To access your existing account, please contact your [facility] or call [phone] for assistance.        Care EveryWhere ID     This is your Care EveryWhere ID. This could be used by other organizations to access your [facility] medical records  [id number]        Your Vitals Were     Pulse Temperature Pulse Oximetry BMI (Body Mass Index)          81 98.6  F (37  C) (Oral) 96% 21.35 kg/m2         Blood Pressure from Last 3 Encounters:   08/28/18 112/80   06/04/18 102/70   05/09/18 115/72    Weight from Last 3 Encounters:   08/28/18 135 lb 4.8 oz (61.4 kg)   06/04/18 141 lb (64 kg)   05/09/18 141 lb 4 oz (64.1 kg)              Today, you had the following     No orders found for display         Today's Medication Changes          These changes are accurate as of 8/28/18  2:02 PM.  If you have any questions, ask your nurse or doctor.               Start taking these medicines.        Dose/Directions    hydrOXYzine 25 MG tablet   Commonly known as:  ATARAX   Used for:  AUDELIA (generalized anxiety disorder), Panic attack, Adjustment  insomnia   Started by:  Lesvia Oneal MD        Dose:  12.5-25 mg   Take 0.5-1 tablets (12.5-25 mg) by mouth nightly as needed for itching   Quantity:  30 tablet   Refills:  0       venlafaxine 37.5 MG 24 hr capsule   Commonly known as:  EFFEXOR-XR   Used for:  Panic attack, AUDELIA (generalized anxiety disorder)   Started by:  Lesvia Oneal MD        Dose:  37.5 mg   Take 1 capsule (37.5 mg) by mouth daily   Quantity:  30 capsule   Refills:  1            Where to get your medicines      These medications were sent to [facility] - [place], [place] - [address], [address]  [address], [address], [facility] [address]     Phone:  [phone]     hydrOXYzine 25 MG tablet    omeprazole 20 MG CR capsule    venlafaxine 37.5 MG 24 hr capsule                Primary Care Provider Office Phone # Fax #    Ani Griffiths Bryon, APRN [facility] [phone]       [address]  [place] [address]        Equal Access to Services     [facility]: Hadii aad ku hadasho Soomaali, waaxda luqadaha, qaybta kaalmada adeegyada, morenita soares hayronnin ademary fong . So [facility] [phone].    ATENCIÓN: Si habla español, tiene a lugo disposición servicios gratuitos de asistencia lingüística. [facility] [phone].    We comply with applicable federal civil rights laws and [place] laws. We do not discriminate on the basis of race, color, national origin, age, disability, sex, sexual orientation, or gender identity.            Thank you!     Thank you for choosing [facility]  for your care. Our goal is always to provide you with excellent care. Hearing back from our patients is one way we can continue to improve our services. Please take a few minutes to complete the written survey that you may receive in the mail after your visit with us. Thank you!             Your Updated Medication List - Protect others around you: Learn how to safely use, store and throw  away your medicines at www.disposemymeds.org.          This list is accurate as of 8/28/18  2:02 PM.  Always use your most recent med list.                   Brand Name Dispense Instructions for use Diagnosis    ALEVE PO      Take 440 mg by mouth At Bedtime        diazepam 2 MG tablet    VALIUM    10 tablet    Take 1 tablet (2 mg) by mouth every 12 hours as needed for anxiety or sleep    Situational mixed anxiety and depressive disorder       fluticasone 50 MCG/ACT spray    FLONASE    48 g    USE 2 SPRAYS IN EACH NOSTRIL ONCE DAILY    Allergic rhinitis due to other allergic trigger, unspecified rhinitis seasonality       hydrOXYzine 25 MG tablet    ATARAX    30 tablet    Take 0.5-1 tablets (12.5-25 mg) by mouth nightly as needed for itching    AUDELIA (generalized anxiety disorder), Panic attack, Adjustment insomnia       omeprazole 20 MG CR capsule    priLOSEC    90 capsule    Take 1 capsule (20 mg) by mouth daily    Gastroesophageal reflux disease without esophagitis       tamoxifen 20 MG tablet    NOLVADEX    90 tablet    Take 1 tablet (20 mg) by mouth daily    Atypical hyperplasia of breast       venlafaxine 37.5 MG 24 hr capsule    EFFEXOR-XR    30 capsule    Take 1 capsule (37.5 mg) by mouth daily    Panic attack, AUDELIA (generalized anxiety disorder)

## 2018-08-28 NOTE — PATIENT INSTRUCTIONS
Start on Effexor 37.5mg daily in the morning  Take hydroxyzine as needed for panic attacks  Schedule for recheck with Nella or Dr. Oneal in 5-6 weeks

## 2018-08-29 ASSESSMENT — PATIENT HEALTH QUESTIONNAIRE - PHQ9: SUM OF ALL RESPONSES TO PHQ QUESTIONS 1-9: 18

## 2018-08-29 ASSESSMENT — ANXIETY QUESTIONNAIRES: GAD7 TOTAL SCORE: 20

## 2018-09-13 ENCOUNTER — OFFICE VISIT (OUTPATIENT)
Dept: PEDIATRICS | Facility: CLINIC | Age: 43
End: 2018-09-13
Payer: COMMERCIAL

## 2018-09-13 VITALS
WEIGHT: 135.9 LBS | OXYGEN SATURATION: 97 % | SYSTOLIC BLOOD PRESSURE: 111 MMHG | DIASTOLIC BLOOD PRESSURE: 70 MMHG | BODY MASS INDEX: 21.44 KG/M2 | TEMPERATURE: 97.6 F | HEART RATE: 85 BPM

## 2018-09-13 DIAGNOSIS — F41.0 PANIC ATTACK: ICD-10-CM

## 2018-09-13 DIAGNOSIS — F41.1 GAD (GENERALIZED ANXIETY DISORDER): ICD-10-CM

## 2018-09-13 DIAGNOSIS — F43.23 SITUATIONAL MIXED ANXIETY AND DEPRESSIVE DISORDER: Primary | ICD-10-CM

## 2018-09-13 PROCEDURE — 99213 OFFICE O/P EST LOW 20 MIN: CPT | Performed by: NURSE PRACTITIONER

## 2018-09-13 RX ORDER — VENLAFAXINE HYDROCHLORIDE 37.5 MG/1
37.5 CAPSULE, EXTENDED RELEASE ORAL DAILY
Qty: 90 CAPSULE | Refills: 3 | Status: SHIPPED | OUTPATIENT
Start: 2018-09-13 | End: 2019-06-20

## 2018-09-13 RX ORDER — LORAZEPAM 1 MG/1
TABLET ORAL
COMMUNITY
Start: 2018-08-30

## 2018-09-13 ASSESSMENT — ANXIETY QUESTIONNAIRES
IF YOU CHECKED OFF ANY PROBLEMS ON THIS QUESTIONNAIRE, HOW DIFFICULT HAVE THESE PROBLEMS MADE IT FOR YOU TO DO YOUR WORK, TAKE CARE OF THINGS AT HOME, OR GET ALONG WITH OTHER PEOPLE: SOMEWHAT DIFFICULT
3. WORRYING TOO MUCH ABOUT DIFFERENT THINGS: NOT AT ALL
7. FEELING AFRAID AS IF SOMETHING AWFUL MIGHT HAPPEN: NOT AT ALL
1. FEELING NERVOUS, ANXIOUS, OR ON EDGE: NOT AT ALL
GAD7 TOTAL SCORE: 0
6. BECOMING EASILY ANNOYED OR IRRITABLE: NOT AT ALL
5. BEING SO RESTLESS THAT IT IS HARD TO SIT STILL: NOT AT ALL
2. NOT BEING ABLE TO STOP OR CONTROL WORRYING: NOT AT ALL

## 2018-09-13 ASSESSMENT — PATIENT HEALTH QUESTIONNAIRE - PHQ9: 5. POOR APPETITE OR OVEREATING: NOT AT ALL

## 2018-09-13 NOTE — PATIENT INSTRUCTIONS
PLAN:   1.   Symptomatic therapy suggested: call prn if symptoms persist or worsen.  Increase calcium to 1000mg and 800iu Vit D    2.  Orders Placed This Encounter   Medications     LORazepam (ATIVAN) 1 MG tablet     Sig: Take 1 tablet (1 mg) by mouth twice a day as needed.     venlafaxine (EFFEXOR-XR) 37.5 MG 24 hr capsule     Sig: Take 1 capsule (37.5 mg) by mouth daily     Dispense:  90 capsule     Refill:  3       3. Patient needs to follow up in if no improvement,or sooner if worsening of symptoms or other symptoms develop.  Follow up in 6 months.     It was a pleasure seeing you today at the [facility]. Thank you for allowing us to care for you today. We truly hope we provided you with the excellent service you deserve. Please let us know if there is anything else we can do for you so we can be sure you are leaving completley satisfied with your care experience.       General information about your clinic   Clinic Hours Lab Hours (Appointments are required)   Mon-Thurs: 7:30 AM - 7 PM Mon-Thurs: 7:30 AM - 7 PM   Fri: 7:30 AM - 5 PM Fri: 7:30 AM - 5 PM        After Hours Nurse Advise & Appts:  [facility]: [phone]  [facility] On Call: to make appointments anytime: [phone] On Call Physician: call [phone] and answering service will page the on call physician.        For urgent appointments, please call [phone] and ask for the triage nurse or your care team clinic nurse.  How to contact my care team:  [facility]: [email or web address]   Phone: [phone]   Fax: [phone]       [facility]:   Phone: [phone]  Hours: 8:00 AM - 6:00 PM  Medication Refills:  Call your pharmacy and they will forward the refill to us. Please allow 3 business days for your refills to be completed.       Normal or non-critical lab and imaging results will be communicated to you by MyChart, letter or phone within 7 days.  If you do not hear from us within 10 days,  please call the clinic. If you have a critical or abnormal lab result, we will notify you by phone as soon as possible.       We now have PWIC (Pediatric Walk in Care)  Monday-Friday from 7:30-4. Simply walk in and be seen for your urgent needs like cough, fever, rash, diarrhea or vomiting, pink eye, UTI. No appointments needed. Ask one of the team for more information      -Your Care Team:    Dr. Ashli Phillips - Internal Medicine/Pediatrics   Dr. Lesvia Oneal - Family Medicine  Dr. Juanita Watson - Pediatrics  Dr. Aislinn Tay - Pediatrics  Ani Slater CNP - Family Practice Nurse Practitioner

## 2018-09-13 NOTE — MR AVS SNAPSHOT
After Visit Summary   9/13/2018    Kaitlyn Garcia    MRN: [id number]           Patient Information     Date Of Birth          1975        Visit Information        Provider Department      9/13/2018 11:50 AM Ani Slater APRN [facility]        Today's Diagnoses     Situational mixed anxiety and depressive disorder    -  1    Panic attack        AUDELIA (generalized anxiety disorder)          Care Instructions    PLAN:   1.   Symptomatic therapy suggested: call prn if symptoms persist or worsen.  Increase calcium to 1000mg and 800iu Vit D    2.  Orders Placed This Encounter   Medications     LORazepam (ATIVAN) 1 MG tablet     Sig: Take 1 tablet (1 mg) by mouth twice a day as needed.     venlafaxine (EFFEXOR-XR) 37.5 MG 24 hr capsule     Sig: Take 1 capsule (37.5 mg) by mouth daily     Dispense:  90 capsule     Refill:  3       3. Patient needs to follow up in if no improvement,or sooner if worsening of symptoms or other symptoms develop.  Follow up in 6 months.     It was a pleasure seeing you today at the [facility]. Thank you for allowing us to care for you today. We truly hope we provided you with the excellent service you deserve. Please let us know if there is anything else we can do for you so we can be sure you are leaving completley satisfied with your care experience.       General information about your clinic   Clinic Hours Lab Hours (Appointments are required)   Mon-Thurs: 7:30 AM - 7 PM Mon-Thurs: 7:30 AM - 7 PM   Fri: 7:30 AM - 5 PM Fri: 7:30 AM - 5 PM        After Hours Nurse Advise & Appts:  [facility]: [phone]  [facility] On Call: to make appointments anytime: [phone] On Call Physician: call [phone] and answering service will page the on call physician.        For urgent appointments, please call [phone] and ask for the triage nurse or your care team clinic nurse.  How to contact my care  team:  [facility]: [email or web address]   Phone: [phone]   Fax: [phone]       [facility]:   Phone: [phone]  Hours: 8:00 AM - 6:00 PM  Medication Refills:  Call your pharmacy and they will forward the refill to us. Please allow 3 business days for your refills to be completed.       Normal or non-critical lab and imaging results will be communicated to you by MyChart, letter or phone within 7 days.  If you do not hear from us within 10 days, please call the clinic. If you have a critical or abnormal lab result, we will notify you by phone as soon as possible.       We now have PWIC (Pediatric Walk in Care)  Monday-Friday from 7:30-4. Simply walk in and be seen for your urgent needs like cough, fever, rash, diarrhea or vomiting, pink eye, UTI. No appointments needed. Ask one of the team for more information      -Your Care Team:    Dr. Ashli Phillips - Internal Medicine/Pediatrics   Dr. Lesvia Oneal - Family Medicine  Dr. Juanita Watson - Pediatrics  Dr. Aislinn Tay - Pediatrics  Ani Slater CNP - Family Practice Nurse Practitioner                         Follow-ups after your visit        Your next 10 appointments already scheduled     Nov 28, 2018 11:00 AM CST   (Arrive by 10:45 AM)   Return Visit with Merced Wiggins PA-C   [facility] ([facility])    [address] [place] [address]  [address]  [facility] [address] [phone]            Nov 28, 2018 12:30 PM CST   MA SCREENING DIGITAL BILATERAL with [id number]   [facility] ([facility])    [address] [place] [address], 2nd Floor  [facility] [id number] [phone]           Do not use any powder, lotion or deodorant under your arms or on your breast. If you do, we will ask you to remove it before your exam.  Wear comfortable, two-piece clothing.  If you have any allergies, tell your care team.  Bring any previous mammograms from other facilities or have them  "mailed to the breast center. Three-dimensional (3D) mammograms are available at [facility] locations in [place], [place], [place], [place], [facility], [place], [place], and [place]. [facility] locations include [place] and [facility] Center in [place]. Benefits of 3D mammograms include: - Improved rate of cancer detection - Decreases your chance of having to go back for more tests, which means fewer: - \"False-positive\" results (This means that there is an abnormal area but it isn't cancer.) - Invasive testing procedures, such as a biopsy or surgery - Can provide clearer images of the breast if you have dense breast tissue. 3D mammography is an optional exam that anyone can have with a 2D mammogram. It doesn't replace or take the place of a 2D mammogram. 2D mammograms remain an effective screening test for all women.  Not all insurance companies cover the cost of a 3D mammogram. Check with your insurance.            Nov 28, 2018  1:00 PM CST   MR BREAST BILATERAL W CONTRAST with UCMR1   [facility] ([facility])    [address] [place] [address] [phone]           Take your medicines as usual, unless your doctor tells you not to. Bring a list of your current medicines to your exam (including vitamins, minerals and over-the-counter drugs).  The timing of your exam may depend on the start of your last period. If you re in menopause, you may have the exam anytime.  Please bring any previous mammograms or breast MRIs from other facilities to the MRI dept. Do not mail these items to us.   You will have IV contrast for this exam.  You do not need to do anything special to prepare.  The MRI machine uses a strong magnet. Please wear clothes without metal (snaps, zippers). A sweatsuit works well, or we may give you a hospital gown.  Please remove any body piercings and hair extensions before you arrive. You will also remove watches, " jewelry, hairpins, wallets, dentures, partial dental plates and hearing aids. You may wear contact lenses, and you may be able to wear your rings. We have a safe place to keep your personal items, but it is safer to leave them at home.  **IMPORTANT** THE INSTRUCTIONS BELOW ARE ONLY FOR THOSE PATIENTS WHO HAVE BEEN PRESCRIBED SEDATION OR GENERAL ANESTHESIA DURING THEIR MRI PROCEDURE:  IF YOUR DOCTOR PRESCRIBED ORAL SEDATION (take medicine to help you relax during your exam):   You must get the medicine from your doctor (oral medication) before you arrive. Bring the medicine to the exam. Do not take it at home. You ll be told when to take it upon arriving for your exam.   Arrive one hour early. Bring someone who can take you home after the test. Your medicine will make you sleepy. After the exam, you may not drive, take a bus or take a taxi by yourself.  IF YOUR DOCTOR PRESCRIBED IV SEDATION:   Arrive one hour early. Bring someone who can take you home after the test. Your medicine will make you sleepy. After the exam, you may not drive, take a bus or take a taxi by yourself.   No eating 6 hours before your exam. You may have clear liquids up until 4 hours before your exam. (Clear liquids include water, clear tea, black coffee and fruit juice without pulp.)  IF YOUR DOCTOR PRESCRIBED ANESTHESIA (be asleep for your exam):   Arrive 1 1/2 hours early. Bring someone who can take you home after the test. You may not drive, take a bus or take a taxi by yourself.   No eating 8 hours before your exam. You may have clear liquids up until 4 hours before your exam. (Clear liquids include water, clear tea, black coffee and fruit juice without pulp.)   You will spend four to five hours in the recovery room.  If you have any questions, please contact your Imaging Department exam site.              Who to contact     If you have questions or need follow up information about today's clinic visit or your schedule please contact [facility] directly at [phone].  Normal or non-critical lab and imaging results will be communicated to you by Esphionhart, letter or phone within 4 business days after the clinic has received the results. If you do not hear from us within 7 days, please contact the clinic through Esphionhart or phone. If you have a critical or abnormal lab result, we will notify you by phone as soon as possible.  Submit refill requests through Check or call your pharmacy and they will forward the refill request to us. Please allow 3 business days for your refill to be completed.          Additional Information About Your Visit        Check Information     Check gives you secure access to your electronic health record. If you see a primary care provider, you can also send messages to your care team and make appointments. If you have questions, please call your primary care clinic.  If you do not have a primary care provider, please call [phone] and they will assist you.      Check is an electronic gateway that provides easy, online access to your medical records. With Check, you can request a clinic appointment, read your test results, renew a prescription or communicate with your care team.     To access your existing account, please contact your [facility] or call [phone] for assistance.        Care EveryWhere ID     This is your Care EveryWhere ID. This could be used by other organizations to access your [facility] medical records  [id number]        Your Vitals Were     Pulse Temperature Pulse Oximetry Breastfeeding? BMI (Body Mass Index)       85 97.6  F (36.4  C) (Temporal) 97% No 21.44 kg/m2        Blood Pressure from Last 3 Encounters:   09/13/18 111/70   08/28/18 112/80   06/04/18 102/70    Weight from Last 3 Encounters:   09/13/18 135 lb 14.4 oz (61.6 kg)   08/28/18 135 lb 4.8 oz (61.4 kg)   06/04/18 141 lb (64 kg)              Today, you had the following      No orders found for display         Where to get your medicines      These medications were sent to [facility] - [place], [place] - [address], [address]  [address], [address], [facility] [address]     Phone:  [phone]     venlafaxine 37.5 MG 24 hr capsule          Primary Care Provider Office Phone # Fax #    Ani Slater, APRN [phone]       [address]  [place] [address]        Equal Access to Services     : Hadii aad ku hadasho Soomaali, waaxda luqadaha, qaybta kaalmada adeegyada, morenita soares hayaddie fong . So [facility] [phone].    ATENCIÓN: Si habla español, tiene a lugo disposición servicios gratuitos de asistencia lingüística. [facility] [phone].    We comply with applicable federal civil rights laws and [place] laws. We do not discriminate on the basis of race, color, national origin, age, disability, sex, sexual orientation, or gender identity.            Thank you!     Thank you for choosing [facility]  for your care. Our goal is always to provide you with excellent care. Hearing back from our patients is one way we can continue to improve our services. Please take a few minutes to complete the written survey that you may receive in the mail after your visit with us. Thank you!             Your Updated Medication List - Protect others around you: Learn how to safely use, store and throw away your medicines at www.disposemymeds.org.          This list is accurate as of 9/13/18 12:15 PM.  Always use your most recent med list.                   Brand Name Dispense Instructions for use Diagnosis    ALEVE PO      Take 440 mg by mouth At Bedtime        diazepam 2 MG tablet    VALIUM    10 tablet    Take 1 tablet (2 mg) by mouth every 12 hours as needed for anxiety or sleep    Situational mixed anxiety and depressive disorder       fluticasone 50 MCG/ACT spray    FLONASE    48 g     USE 2 SPRAYS IN EACH NOSTRIL ONCE DAILY    Allergic rhinitis due to other allergic trigger, unspecified rhinitis seasonality       hydrOXYzine 25 MG tablet    ATARAX    30 tablet    Take 0.5-1 tablets (12.5-25 mg) by mouth nightly as needed for itching    AUDELIA (generalized anxiety disorder), Panic attack, Adjustment insomnia       LORazepam 1 MG tablet    ATIVAN     Take 1 tablet (1 mg) by mouth twice a day as needed.        omeprazole 20 MG CR capsule    priLOSEC    90 capsule    Take 1 capsule (20 mg) by mouth daily    Gastroesophageal reflux disease without esophagitis       tamoxifen 20 MG tablet    NOLVADEX    90 tablet    Take 1 tablet (20 mg) by mouth daily    Atypical hyperplasia of breast       venlafaxine 37.5 MG 24 hr capsule    EFFEXOR-XR    90 capsule    Take 1 capsule (37.5 mg) by mouth daily    Panic attack, AUDELIA (generalized anxiety disorder)

## 2018-09-13 NOTE — NURSING NOTE
"Chief Complaint   Patient presents with     ER F/U     follow-up from ER for anxiety       Initial /70 (BP Location: Right arm, Patient Position: Sitting, Cuff Size: Adult Regular)  Pulse 85  Temp 97.6  F (36.4  C) (Temporal)  Wt 135 lb 14.4 oz (61.6 kg)  SpO2 97%  Breastfeeding? No  BMI 21.44 kg/m2 Estimated body mass index is 21.44 kg/(m^2) as calculated from the following:    Height as of 6/4/18: 5' 6.75\" (1.695 m).    Weight as of this encounter: 135 lb 14.4 oz (61.6 kg).  Medication Reconciliation: complete      ALEJANDRO Bishop      "

## 2018-09-13 NOTE — PROGRESS NOTES
SUBJECTIVE:   Kaitlyn Garcia is a 42 year old female who presents to clinic today for the following health issues:    ED/UC Followup:    Facility:  [facility]  Date of visit: 8/30/18  Reason for visit: anxiety  Current Status: feeling better       Problem list and histories reviewed & adjusted, as indicated.  Additional history: as documented    Patient Active Problem List   Diagnosis     CARDIOVASCULAR SCREENING; LDL GOAL LESS THAN 160     Iron deficiency anemia     Mammographic microcalcification found on diagnostic imaging of breast     Intraductal papilloma of breast     Atypical hyperplasia of breast     Acne     Bloating symptom     Abdominal pain     Abnormal biliary HIDA scan     Family history of breast cancer     Neutropenia (H)     Choroidal nevus of right eye     Situational mixed anxiety and depressive disorder     Glaucoma suspect, bilateral     Post-concussion syndrome     Muscle spasms of head or neck     Allergic rhinitis     WBC decreased     Past Surgical History:   Procedure Laterality Date     ABDOMEN SURGERY      rectoplasty     BIOPSY BREAST NEEDLE LOCALIZATION  7/30/2012    Procedure: BIOPSY BREAST NEEDLE LOCALIZATION;  left breast excisional Biopsy with wire localization @0830;  Surgeon: Robert Lynch MD;  Location: [facility] OR     BREAST SURGERY       COSMETIC SURGERY      Rectalplasty for episiotomy repair     COSMETIC SURGERY      Rectalplasty for episiotomy repair     DILATION AND CURETTAGE, HYSTEROSCOPY, ABLATE ENDOMETRIUM NOVASURE, COMBINED  10/10/2013    Procedure: COMBINED DILATION AND CURETTAGE, HYSTEROSCOPY, ABLATE ENDOMETRIUM NOVASURE;  Endometrial ablation with Novasure;  Surgeon: Indu Birmingham DO;  Location: [facility] OR     HC TOOTH EXTRACTION W/FORCEP       LUMPECTOMY BREAST Left 2/1/2017    Procedure: LUMPECTOMY BREAST;  Surgeon: Lori Montenegro MD;  Location: [facility]     SOFT TISSUE SURGERY      lumpectomy x 2 right breast     SOFT TISSUE SURGERY       episiotomy fixed       Social History   Substance Use Topics     Smoking status: Never Smoker     Smokeless tobacco: Never Used     Alcohol use 0.0 oz/week     0 Standard drinks or equivalent per week      Comment: Less than once a month     Family History   Problem Relation Age of Onset     Hypertension Father      Lipids Father      Hyperlipidemia Father      Depression/Anxiety Father      Cerebrovascular Disease Father      TIA     Cancer Maternal Grandmother      Glaucoma Maternal Grandmother      Macular Degeneration Maternal Grandmother      Osteoporosis Maternal Grandmother      Anesthesia Reaction Paternal Grandmother      Diabetes Paternal Grandmother      Cancer Paternal Grandfather      Depression/Anxiety Mother      Depression/Anxiety Daughter      Depression Daughter      Depression Son      Cataracts Maternal Grandfather      Breast Cancer Paternal Aunt 60     x 2 aunts     Breast Cancer Other 40     Paternal cousin     Breast Cancer Other 64     Several aunts on fathers side/Several aunts on fathers side/Several aunts on fathers side/Several aunts on fathers side     Prostate Cancer Other 64     Uncle on mothers side     Asthma No family hx of      C.A.D. No family hx of      Cancer - colorectal No family hx of      Connective Tissue Disorder No family hx of      Thyroid Disease No family hx of      Coronary Artery Disease No family hx of      Ovarian Cancer No family hx of      Thyroid Disease No family hx of      Chemical Addiction No family hx of      Known Genetic Syndrome No family hx of          Current Outpatient Prescriptions   Medication Sig Dispense Refill     diazepam (VALIUM) 2 MG tablet Take 1 tablet (2 mg) by mouth every 12 hours as needed for anxiety or sleep 10 tablet 0     fluticasone (FLONASE) 50 MCG/ACT nasal spray USE 2 SPRAYS IN EACH NOSTRIL ONCE DAILY 48 g 11     hydrOXYzine (ATARAX) 25 MG tablet Take 0.5-1 tablets (12.5-25 mg) by mouth nightly as needed for itching 30 tablet 0      LORazepam (ATIVAN) 1 MG tablet Take 1 tablet (1 mg) by mouth twice a day as needed.       MELATONIN PO        Naproxen Sodium (ALEVE PO) Take 440 mg by mouth At Bedtime       omeprazole (PRILOSEC) 20 MG CR capsule Take 1 capsule (20 mg) by mouth daily 90 capsule 3     tamoxifen (NOLVADEX) 20 MG tablet Take 1 tablet (20 mg) by mouth daily 90 tablet 3     venlafaxine (EFFEXOR-XR) 37.5 MG 24 hr capsule Take 1 capsule (37.5 mg) by mouth daily 90 capsule 3     [DISCONTINUED] venlafaxine (EFFEXOR-XR) 37.5 MG 24 hr capsule Take 1 capsule (37.5 mg) by mouth daily 30 capsule 1     Allergies   Allergen Reactions     No Known Drug Allergies      Labs reviewed in EPIC    Reviewed and updated as needed this visit by clinical staff  Tobacco  Meds  Med Hx  Surg Hx  Fam Hx  Soc Hx      Reviewed and updated as needed this visit by Provider         ROS:  Constitutional, HEENT, cardiovascular, pulmonary, gi and gu systems are negative, except as otherwise noted.    OBJECTIVE:     /70 (BP Location: Right arm, Patient Position: Sitting, Cuff Size: Adult Regular)  Pulse 85  Temp 97.6  F (36.4  C) (Temporal)  Wt 135 lb 14.4 oz (61.6 kg)  SpO2 97%  Breastfeeding? No  BMI 21.44 kg/m2  Body mass index is 21.44 kg/(m^2).  GENERAL APPEARANCE: alert, active and no distress  NECK: no adenopathy and thyroid normal to palpation  RESP: lungs clear to auscultation - no rales, rhonchi or wheezes  CV: regular rates and rhythm and no murmur, click or rub  MS: extremities normal- no gross deformities noted  NEURO: Normal strength and tone, mentation intact and speech normal  PSYCH: mentation appears normal and affect normal/bright  MENTAL STATUS EXAM:  Appearance/Behavior: No apparent distress, Casually groomed and Dressed appropriately for weather  Speech: Normal  Mood/Affect: normal affect  Insight: Adequate    Diagnostic Test Results:  none     ASSESSMENT/PLAN:       Kaitlyn was seen today for er f/u.    Diagnoses and all orders  for this visit:    Situational mixed anxiety and depressive disorder    Panic attack  -     venlafaxine (EFFEXOR-XR) 37.5 MG 24 hr capsule; Take 1 capsule (37.5 mg) by mouth daily    AUDELIA (generalized anxiety disorder)  -     venlafaxine (EFFEXOR-XR) 37.5 MG 24 hr capsule; Take 1 capsule (37.5 mg) by mouth daily    PLAN:   Reviewed concept of depression as function of biochemical imbalance of neurotransmitters/rationale for treatment.  Risks and benefits of medication(s) reviewed with patient.  Questions answered.  Followup appointment in 6 month(s)  Patient instructed to call for significant side effects medications or problems  Patient advised immediate presentation to hospital for suicidal thought, etc.  The current medical regimen is effective;  continue present plan and medications.     Patient needs to follow up in if no improvement,or sooner if worsening of symptoms or other symptoms develop.  Follow up in 6 months.   See Patient Instructions    SINDHU Antonio CNP  [facility]

## 2018-09-14 ASSESSMENT — ANXIETY QUESTIONNAIRES: GAD7 TOTAL SCORE: 0

## 2018-09-14 ASSESSMENT — PATIENT HEALTH QUESTIONNAIRE - PHQ9: SUM OF ALL RESPONSES TO PHQ QUESTIONS 1-9: 2

## 2018-10-08 ENCOUNTER — RADIANT APPOINTMENT (OUTPATIENT)
Dept: ULTRASOUND IMAGING | Facility: CLINIC | Age: 43
End: 2018-10-08
Attending: NURSE PRACTITIONER
Payer: COMMERCIAL

## 2018-10-08 DIAGNOSIS — N83.209 CYST OF OVARY, UNSPECIFIED LATERALITY: ICD-10-CM

## 2018-10-08 PROCEDURE — 76856 US EXAM PELVIC COMPLETE: CPT | Mod: 59

## 2018-10-08 PROCEDURE — 76830 TRANSVAGINAL US NON-OB: CPT

## 2018-10-08 NOTE — PROGRESS NOTES
David Garcia,    Attached are your test results.  Right sided cystic area resolved but one on left increased  Lets refer you to GYN    Please call [phone] to make appointment  if you do not hear from referrals in the next few days.      Please contact us if you have any questions.    Ani Slater, CNP

## 2018-10-11 ENCOUNTER — OFFICE VISIT (OUTPATIENT)
Dept: OBGYN | Facility: CLINIC | Age: 43
End: 2018-10-11
Attending: NURSE PRACTITIONER
Payer: COMMERCIAL

## 2018-10-11 VITALS
SYSTOLIC BLOOD PRESSURE: 115 MMHG | WEIGHT: 137.9 LBS | BODY MASS INDEX: 21.76 KG/M2 | DIASTOLIC BLOOD PRESSURE: 83 MMHG | HEART RATE: 78 BPM | OXYGEN SATURATION: 97 %

## 2018-10-11 DIAGNOSIS — N83.202 LEFT OVARIAN CYST: Primary | ICD-10-CM

## 2018-10-11 LAB — CANCER AG125 SERPL-ACNC: 24 U/ML (ref 0–30)

## 2018-10-11 PROCEDURE — 86304 IMMUNOASSAY TUMOR CA 125: CPT | Performed by: OBSTETRICS & GYNECOLOGY

## 2018-10-11 PROCEDURE — 36415 COLL VENOUS BLD VENIPUNCTURE: CPT | Performed by: OBSTETRICS & GYNECOLOGY

## 2018-10-11 PROCEDURE — 99214 OFFICE O/P EST MOD 30 MIN: CPT | Performed by: OBSTETRICS & GYNECOLOGY

## 2018-10-11 NOTE — MR AVS SNAPSHOT
After Visit Summary   10/11/2018    Kaitlyn Garcia    MRN: [id number]           Patient Information     Date Of Birth          1975        Visit Information        Provider Department      10/11/2018 10:30 AM Emerita Arreaga DO [facility]        Care Instructions                                                         If you have any questions regarding your visit, Please contact your care team.    [facility] Access Services: [phone]      [facility] CLINIC HOURS TELEPHONE NUMBER   Emerita Arreaga DO.    TERESITA Marcial -    LOTTIE Yoder       [facility], Wednesday, [facility]  8:30a.m-5:00 p.m   [facility]  [address] [place], [place] [address]. [address]  [place], [place] [address] [phone] ask for [facility]    Imaging [phone]       Urgent Care locations:    [facility] Saturday and Sunday   9 am - 5 pm    Monday-Friday   12 pm - 8 pm  Saturday and Sunday   9 am - 5 pm   [phone]     [facility] Labor and Delivery:  [phone]    If you need a medication refill, please contact your pharmacy. Please allow 3 business days for your refill to be completed.  As always, Thank you for trusting us with your healthcare needs!                Follow-ups after your visit        Your next 10 appointments already scheduled     Oct 31, 2018  8:40 AM CDT   Return Visit with Kirill Nicholson OD   [facility] ([facility])    [address] [place] [id number] [phone]            Nov 28, 2018 11:00 AM CST   (Arrive by 10:45 AM)   Return Visit with Merced Wiggins PA-C   [facility] ([facility])    [address]  [address]  [facility] [address] [phone]            Nov 28, 2018 12:30 PM CST   MA SCREENING DIGITAL BILATERAL with [id number]   [facility] (  "[facility])    [address]  [facility] [address] [phone]           How do I prepare for my exam? (Food and drink instructions) No Food and Drink Restrictions.  How do I prepare for my exam? (Other instructions) Do not use any powder, lotion or deodorant under your arms or on your breast. If you do, we will ask you to remove it before your exam.  What should I wear: Wear comfortable, two-piece clothing.  How long does the exam take: Most scans will take 15 minutes.  What should I bring: Bring any previous mammograms from other facilities or have them mailed to the breast center.  Do I need a :  No  is needed.  What do I need to tell my doctor: If you have any allergies, tell your care team.  What should I do after the exam: No restrictions, You may resume normal activities.  What is this test: This test is an x-ray of the breast to look for breast disease. The breast is pressed between two plates to flatten and spread the tissue. An X-ray is taken of the breast from different angles.  Who should I call with questions: If you have any questions, please call the Imaging Department where you will have your exam. Directions, parking instructions, and other information is available on our website, [email or web address].  Other information about my exam Three-dimensional (3D) mammograms are available at [place] locations in [facility], [facility], [facility], [place], [facility] and [place]. [facility] locations include [place] and the Clinics and Surgery Center in [place].  Benefits of 3D mammograms include * Improved rate of cancer detection * Decreases your chance of having to go back for more tests, which means fewer: * \"False-positive\" results (This means that there is an abnormal area but it isn't cancer.) * Invasive testing procedures, such as a biopsy or surgery * Can provide clearer images of the breast if you have " "dense breast tissue.  *3D mammography is an optional exam that anyone can have with a 2D mammogram. It doesn't replace or take the place of a 2D mammogram. 2D mammograms remain an effective screening test for all women.  Not all insurance companies cover the cost of a 3D mammogram. Check with your insurance. Three-dimensional (3D) mammograms are available at [place] locations in [facility], [place], [place], [facility], [place], [place], and [place]. [facility] locations include [place] and [facility] in [place]. Benefits of 3D mammograms include: - Improved rate of cancer detection - Decreases your chance of having to go back for more tests, which means fewer: - \"False-positive\" results (This means that there is an abnormal area but it isn't cancer.) - Invasive testing procedures, such as a biopsy or surgery - Can provide clearer images of the breast if you have dense breast tissue. 3D mammography is an optional exam that anyone can have with a 2D mammogram. It doesn't replace or take the place of a 2D mammogram. 2D mammograms remain an effective screening test for all women.  Not all insurance companies cover the cost of a 3D mammogram. Check with your insurance.            Nov 28, 2018  1:00 PM CST   MR BREAST BILATERAL W CONTRAST with [facility] [facility] MRI ([facility] [place])    [address] [address] [phone]           How do I prepare for my exam? (Food and drink instructions) **If you will be receiving sedation or general anesthesia, please see special notes below.**  How do I prepare for my exam? (Other instructions) Take your medicines as usual, unless your doctor tells you not to. The timing of your exam may depend on the start of your last period. If you re in menopause, you may have the exam anytime.  You will have IV contrast for this exam.  You do not need to do anything special to prepare. **If " you will be receiving sedation or general anesthesia, please see special notes below.**  What should I wear:  The MRI machine uses a strong magnet. Please wear clothes without metal (snaps, zippers). A sweatsuit works well, or we may give you a hospital gown. Please remove any body piercings and hair extensions before you arrive. You will also remove watches, jewelry, hairpins, wallets, dentures, partial dental plates and hearing aids. You may wear contact lenses, and you may be able to wear your rings. We have a safe place to keep your personal items, but it is safer to leave them at home.  How long does the exam take:  Most tests take 30 to 60 minutes.  HOWEVER, IF YOUR DOCTOR PRESCRIBES ANESTHESIA please plan on spending four to five hours in the recovery room.  What should I bring: Bring a list of your current medicines to your exam (including vitamins, minerals and over-the-counter drugs). Please bring any previous mammograms or breast MRIs from other facilities to the MRI dept. Do not mail these items to us.  Do I need a : **If you will be receiving sedation or general anesthesia, please see special notes below.**  What should I do after the exam: No Restrictions, You may resume normal activities.  What is this test: MRI (magnetic resonance imaging) uses a strong magnet and radio waves to look inside the body. An MRA (magnetic resonance angiogram) does the same thing, but it lets us look at your blood vessels. A computer turns the radio waves into pictures showing cross sections of the body, much like slices of bread. This helps us see any problems more clearly. You may receive fluid (called  contrast ) before or during your scan. The fluid helps us see the pictures better. We give the fluid through an IV (small needle in your arm).  Who should I call with questions: If you have any questions, please contact your Imaging Department exam site. Directions, parking instructions, and other information is  available on our website, [email or web address].  How do I prepare if I m having sedation or anesthesia? **IMPORTANT** THE INSTRUCTIONS BELOW ARE ONLY FOR THOSE PATIENTS WHO HAVE BEEN TOLD THEY WILL RECEIVE SEDATION OR GENERAL ANESTHESIA DURING THEIR MRI PROCEDURE:  IF YOU WILL RECEIVE SEDATION (take medicine to help you relax during your exam) You must get the medicine from your doctor before you arrive. Bring the medicine to the exam. Do not take it at home. Arrive one hour early. Bring someone who can take you home after the test. Your medicine will make you sleepy. After the exam, you may not drive, take a bus or take a taxi by yourself. No eating 8 hours before your exam. You may have clear liquids up until 4 hours before your exam. (Clear liquids include water, clear tea, black coffee and fruit juice without pulp.)  IF YOU WILL RECEIVE ANESTHESIA (be asleep for your exam) Arrive 1 1/2 hours early. Bring someone who can take you home after the test. You may not drive, take a bus or take a taxi by yourself. No eating 8 hours before your exam. You may have clear liquids up until 4 hours before your exam. (Clear liquids include water, clear tea, black coffee and fruit juice without pulp.)              Who to contact     If you have questions or need follow up information about today's clinic visit or your schedule please contact [facility] directly at [phone].  Normal or non-critical lab and imaging results will be communicated to you by MyChart, letter or phone within 4 business days after the clinic has received the results. If you do not hear from us within 7 days, please contact the clinic through [employer] or phone. If you have a critical or abnormal lab result, we will notify you by phone as soon as possible.  Submit refill requests through [employer] or call your pharmacy and they will forward the refill request to us. Please allow 3 business days for your refill to be completed.           Additional Information About Your Visit        MyChart Information     [facility] [employer] gives you secure access to your electronic health record. If you see a primary care provider, you can also send messages to your care team and make appointments. If you have questions, please call your primary care clinic.  If you do not have a primary care provider, please call [phone] and they will assist you.        Care EveryWhere ID     This is your Care EveryWhere ID. This could be used by other organizations to access your [place] medical records  [id number]        Your Vitals Were     Pulse Pulse Oximetry Breastfeeding? BMI (Body Mass Index)          78 97% No 21.76 kg/m2         Blood Pressure from Last 3 Encounters:   10/11/18 115/83   09/13/18 111/70   08/28/18 112/80    Weight from Last 3 Encounters:   10/11/18 137 lb 14.4 oz (62.6 kg)   09/13/18 135 lb 14.4 oz (61.6 kg)   08/28/18 135 lb 4.8 oz (61.4 kg)              Today, you had the following     No orders found for display       Primary Care Provider Office Phone # Fax #    Ani Rossjose r Slater, APRN [facility] [phone]       [address]  [place] [address]        Equal Access to Services     ALEX ALBERTO : Hadii aad ku hadasho Soomaali, waaxda luqadaha, qaybta kaalmada adeegyada, waxay idiin hayaan ademary cagle la'addie . So [facility] [phone].    ATENCIÓN: Si habla español, tiene a lugo disposición servicios gratuitos de asistencia lingüística. Llame al [phone].    We comply with applicable federal civil rights laws and [place] laws. We do not discriminate on the basis of race, color, national origin, age, disability, sex, sexual orientation, or gender identity.            Thank you!     Thank you for choosing [facility]  for your care. Our goal is always to provide you with excellent care. Hearing back from our patients is one way we can continue to improve our services. Please take a few minutes to complete the  written survey that you may receive in the mail after your visit with us. Thank you!             Your Updated Medication List - Protect others around you: Learn how to safely use, store and throw away your medicines at www.disposemymeds.org.          This list is accurate as of 10/11/18 10:42 AM.  Always use your most recent med list.                   Brand Name Dispense Instructions for use Diagnosis    ALEVE PO      Take 440 mg by mouth At Bedtime        diazepam 2 MG tablet    VALIUM    10 tablet    Take 1 tablet (2 mg) by mouth every 12 hours as needed for anxiety or sleep    Situational mixed anxiety and depressive disorder       fluticasone 50 MCG/ACT spray    FLONASE    48 g    USE 2 SPRAYS IN EACH NOSTRIL ONCE DAILY    Allergic rhinitis due to other allergic trigger, unspecified rhinitis seasonality       hydrOXYzine 25 MG tablet    ATARAX    30 tablet    Take 0.5-1 tablets (12.5-25 mg) by mouth nightly as needed for itching    AUDELIA (generalized anxiety disorder), Panic attack, Adjustment insomnia       LORazepam 1 MG tablet    ATIVAN     Take 1 tablet (1 mg) by mouth twice a day as needed.        MELATONIN PO           omeprazole 20 MG CR capsule    priLOSEC    90 capsule    Take 1 capsule (20 mg) by mouth daily    Gastroesophageal reflux disease without esophagitis       tamoxifen 20 MG tablet    NOLVADEX    90 tablet    Take 1 tablet (20 mg) by mouth daily    Atypical hyperplasia of breast       venlafaxine 37.5 MG 24 hr capsule    EFFEXOR-XR    90 capsule    Take 1 capsule (37.5 mg) by mouth daily    Panic attack, AUDELIA (generalized anxiety disorder)

## 2018-10-11 NOTE — PROGRESS NOTES
This 44 y/o female, , s/p endometrial ablation procedure on 10/10/13, presents for f/u of a left ovarian cyst.  She states that she is currently not having any pain issues or s/s of torsion.  Her pelvic US on 18 demonstrated a 3.7 x 3 x 2.9 cm left ovarian cyst so a f/u US was performed on 10/8/18 and her right ovarian cyst had resolved but the left cyst was slightly increased at 4.3 x 4.7 x 4.5 cm with a benign-appearance.  She has not had a CA-125 marker checked yet.  She has experienced 2 episodes of bleeding since the ablation - in May and September.  However, overall her bleeding issue has substantially improved since surgery.  /83  Pulse 78  Wt 137 lb 14.4 oz (62.6 kg)  SpO2 97%  Breastfeeding? No  BMI 21.76 kg/m2  ROS:  10 systems were reviewed and the positives were listed under problems  A PE was not performed today.  Assessment - benign-appearing, symptomatic left ovarian cyst  Plan - We reviewed the 2 US results and will check a CA-125 marker test today.  If elevated, then will refer to gyn oncology for follow up.  She is taking Tamoxifen for follow up therapy for breast cancer.  She will return if she becomes symptomatic but currently is comfortable with observation.  Will recommend a repeat US in 3 months to reassess if her lab test is normal.  She declines a flu vaccine today.  This was a 30-minute visit and over 50% of the time was spent in direct pt consultation.

## 2018-10-11 NOTE — PATIENT INSTRUCTIONS
If you have any questions regarding your visit, Please contact your care team.    [place] Access Services: [phone]      [facility] CLINIC HOURS TELEPHONE NUMBER   Emerita Arreaga DO.    TERESITA Marcial -    LOTTIE Yoder       Monday, Wednesday, Thursday and Friday, [place]  8:30a.m-5:00 p.m   [facility]  [address] [place] [address]  [phone] ask for [facility]    Imaging [phone]       Urgent Care locations:    [facility] Saturday and Sunday   9 am - 5 pm    Monday-Friday   12 pm - 8 pm  Saturday and Sunday   9 am - 5 pm   [phone]     [facility] Labor and Delivery:  [phone]    If you need a medication refill, please contact your pharmacy. Please allow 3 business days for your refill to be completed.  As always, Thank you for trusting us with your healthcare needs!

## 2018-10-31 ENCOUNTER — OFFICE VISIT (OUTPATIENT)
Dept: OPTOMETRY | Facility: CLINIC | Age: 43
End: 2018-10-31
Payer: COMMERCIAL

## 2018-10-31 DIAGNOSIS — H40.003 GLAUCOMA SUSPECT, BILATERAL: ICD-10-CM

## 2018-10-31 DIAGNOSIS — H52.13 MYOPIA OF BOTH EYES: Primary | ICD-10-CM

## 2018-10-31 DIAGNOSIS — D31.31 CHOROIDAL NEVUS OF RIGHT EYE: ICD-10-CM

## 2018-10-31 DIAGNOSIS — H52.4 PRESBYOPIA: ICD-10-CM

## 2018-10-31 PROCEDURE — 92015 DETERMINE REFRACTIVE STATE: CPT | Performed by: OPTOMETRIST

## 2018-10-31 PROCEDURE — 92014 COMPRE OPH EXAM EST PT 1/>: CPT | Performed by: OPTOMETRIST

## 2018-10-31 ASSESSMENT — SLIT LAMP EXAM - LIDS
COMMENTS: NORMAL
COMMENTS: NORMAL

## 2018-10-31 ASSESSMENT — VISUAL ACUITY
OD_SC: 20/20
OS_SC: 20/20
OD_SC: 20/25
METHOD: SNELLEN - LINEAR
OS_SC: 20/20

## 2018-10-31 ASSESSMENT — EXTERNAL EXAM - LEFT EYE: OS_EXAM: NORMAL

## 2018-10-31 ASSESSMENT — CONF VISUAL FIELD
OS_NORMAL: 1
OD_NORMAL: 1

## 2018-10-31 ASSESSMENT — CUP TO DISC RATIO
OS_RATIO: 0.65
OD_RATIO: 0.6

## 2018-10-31 ASSESSMENT — TONOMETRY
IOP_METHOD: TONOPEN
OS_IOP_MMHG: 9
OD_IOP_MMHG: 9

## 2018-10-31 ASSESSMENT — REFRACTION_MANIFEST
OD_CYLINDER: SPHERE
OS_CYLINDER: SPHERE
OD_ADD: +1.25
OS_SPHERE: -0.25
OD_SPHERE: -0.25
OS_ADD: +1.25

## 2018-10-31 ASSESSMENT — EXTERNAL EXAM - RIGHT EYE: OD_EXAM: NORMAL

## 2018-10-31 NOTE — PATIENT INSTRUCTIONS
Eyeglass prescription given.  Computer/reading glasses with bluetech lens are an option.    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    You are a glaucoma suspect. Schedule a follow up OCT/HVF/ at [place].    Return in 1 year for a complete eye exam or sooner if needed.    Kirill Nicholson, DEE      The affects of the dilating drops last for 4- 6 hours.  You will be more sensitive to light and vision will be blurry up close.  Mydriatic sunglasses were given if needed.      Optometry Providers       Clinic Locations                                 Telephone Number   Dr. Sandra Arnett Harrells   [facility] [place]   [facility] [phone]     [facility] Hours:                [facility] Hours:       [facility] Hours:   [address]   [address]     [address] [address]   KAROLINE Lopez [address]    KAROLINE [place]pa [address]  Phone: [phone]                    Phone: [phone]     Phone: [phone]                      Monday 8:00-7:00                          Monday 8:00-7:00                          Monday 8:00-7:00              Tuesday 8:00-6:00                          Tuesday 8:00-7:00                          Tuesday 8:00-7:00              Wednesday 8:00-6:00                  Wednesday 8:00-7:00                   Wednesday 8:00-7:00      Thursday 8:00-6:00                        Thursday 8:00-7:00                         Thursday 8:00-7:00            Friday 8:00-5:00                              Friday 8:00-5:00                              Friday 8:00-5:00    [facility] Hours:   [address] [place]r [id number] [phone]    Monday 8:00-7:00  Tuesday 8:00-7:00  Wednesday 8:00-7:00  Thursday 8:00-7:00  Friday 8:00-5:00  Please log on to [email or web address] to order your contact lenses.  The link is found on the [facility] Care [facility] Vision Services  page.  As always, Thank you for trusting us with your health care needs!

## 2018-10-31 NOTE — MR AVS SNAPSHOT
After Visit Summary   10/31/2018    Kaitlyn Garcia    MRN: [id number]           Patient Information     Date Of Birth          1975        Visit Information        Provider Department      10/31/2018 8:40 AM Kirill Nicholson, DEE [facility]        Today's Diagnoses     Myopia of both eyes    -  1    Presbyopia        Choroidal nevus of right eye        Glaucoma suspect, bilateral          Care Instructions    Eyeglass prescription given.  Computer/reading glasses with bluetech lens are an option.    Continue care at the [facility] for choroidal nevus.    You are a glaucoma suspect. Schedule a follow up OCT/HVF/ at [place].    Return in 1 year for a complete eye exam or sooner if needed.    Kirill Nicholson, DEE      The affects of the dilating drops last for 4- 6 hours.  You will be more sensitive to light and vision will be blurry up close.  Mydriatic sunglasses were given if needed.      Optometry Providers       Clinic Locations                                 Telephone Number   Dr. Sandra Arnett [facility] [phone]     [facility] Hours:                [facility] Hours:       [facility] Hours:   [address] [facility] [address]   [address] [facility]     [address] [place] [address]   [place] [address]    [place] [address]  Phone: [phone]                    Phone: [phone]     Phone: [phone]                      Monday 8:00-7:00                          Monday 8:00-7:00                          Monday 8:00-7:00              Tuesday 8:00-6:00                          Tuesday 8:00-7:00                          Tuesday 8:00-7:00              Wednesday 8:00-6:00                  Wednesday 8:00-7:00                   Wednesday 8:00-7:00      Thursday 8:00-6:00                        Thursday 8:00-7:00                          Thursday 8:00-7:00            Friday 8:00-5:00                              Friday 8:00-5:00                              Friday 8:00-5:00    [facility] Hours:   [address] [place] [address]  [phone]    Monday 8:00-7:00  Tuesday 8:00-7:00  Wednesday 8:00-7:00  Thursday 8:00-7:00  Friday 8:00-5:00  Please log on to [email or web address] to order your contact lenses.  The link is found on the [facility] page.  As always, Thank you for trusting us with your health care needs!            Follow-ups after your visit        Follow-up notes from your care team     Return in about 1 year (around 10/31/2019) for Annual Visit.      Your next 10 appointments already scheduled     Nov 28, 2018 11:00 AM CST   (Arrive by 10:45 AM)   Return Visit with Merced Wiggins PA-C   [facility] ([facility])    [address] [place] [address]  [address]  [facility] [id number] [phone]            Nov 28, 2018 12:30 PM CST   MA SCREENING DIGITAL BILATERAL with [id number]   [facility] ([facility])    [address] [place] [address], 2nd Floor  [facility] [id number] [phone]           How do I prepare for my exam? (Food and drink instructions) No Food and Drink Restrictions.  How do I prepare for my exam? (Other instructions) Do not use any powder, lotion or deodorant under your arms or on your breast. If you do, we will ask you to remove it before your exam.  What should I wear: Wear comfortable, two-piece clothing.  How long does the exam take: Most scans will take 15 minutes.  What should I bring: Bring any previous mammograms from other facilities or have them mailed to the breast center.  Do I need a :  No  is needed.  What do I need to tell my doctor: If you have any allergies, tell your care team.  What should I do after the exam: No restrictions, You may resume normal activities.  What is this test:  "This test is an x-ray of the breast to look for breast disease. The breast is pressed between two plates to flatten and spread the tissue. An X-ray is taken of the breast from different angles.  Who should I call with questions: If you have any questions, please call the Imaging Department where you will have your exam. Directions, parking instructions, and other information is available on our website, [email or web address].  Other information about my exam Three-dimensional (3D) mammograms are available at [facility] locations in [facility], [facility], [place], [facility] and [place].  Health locations include [place] and the [facility] in [place].  Benefits of 3D mammograms include * Improved rate of cancer detection * Decreases your chance of having to go back for more tests, which means fewer: * \"False-positive\" results (This means that there is an abnormal area but it isn't cancer.) * Invasive testing procedures, such as a biopsy or surgery * Can provide clearer images of the breast if you have dense breast tissue.  *3D mammography is an optional exam that anyone can have with a 2D mammogram. It doesn't replace or take the place of a 2D mammogram. 2D mammograms remain an effective screening test for all women.  Not all insurance companies cover the cost of a 3D mammogram. Check with your insurance. Three-dimensional (3D) mammograms are available at [facility] locations in [facility], [facility], [facility], and [place]. Health locations include [place] and [facility] in [place]. Benefits of 3D mammograms include: - Improved rate of cancer detection - Decreases your chance of having to go back for more tests, which means fewer: - \"False-positive\" results (This means that there is an abnormal area but it isn't cancer.) - Invasive testing procedures, such as a biopsy or surgery - Can provide " clearer images of the breast if you have dense breast tissue. 3D mammography is an optional exam that anyone can have with a 2D mammogram. It doesn't replace or take the place of a 2D mammogram. 2D mammograms remain an effective screening test for all women.  Not all insurance companies cover the cost of a 3D mammogram. Check with your insurance.            Nov 28, 2018  1:00 PM CST   MR BREAST BILATERAL W CONTRAST with [id number]   [facility] [place] [facility] ([facility] [place])    [address] [address] [phone]           How do I prepare for my exam? (Food and drink instructions) **If you will be receiving sedation or general anesthesia, please see special notes below.**  How do I prepare for my exam? (Other instructions) Take your medicines as usual, unless your doctor tells you not to. The timing of your exam may depend on the start of your last period. If you re in menopause, you may have the exam anytime.  You will have IV contrast for this exam.  You do not need to do anything special to prepare. **If you will be receiving sedation or general anesthesia, please see special notes below.**  What should I wear:  The MRI machine uses a strong magnet. Please wear clothes without metal (snaps, zippers). A sweatsuit works well, or we may give you a hospital gown. Please remove any body piercings and hair extensions before you arrive. You will also remove watches, jewelry, hairpins, wallets, dentures, partial dental plates and hearing aids. You may wear contact lenses, and you may be able to wear your rings. We have a safe place to keep your personal items, but it is safer to leave them at home.  How long does the exam take:  Most tests take 30 to 60 minutes.  HOWEVER, IF YOUR DOCTOR PRESCRIBES ANESTHESIA please plan on spending four to five hours in the recovery room.  What should I bring: Bring a list of your current medicines to your exam (including vitamins,  minerals and over-the-counter drugs). Please bring any previous mammograms or breast MRIs from other facilities to the MRI dept. Do not mail these items to us.  Do I need a : **If you will be receiving sedation or general anesthesia, please see special notes below.**  What should I do after the exam: No Restrictions, You may resume normal activities.  What is this test: MRI (magnetic resonance imaging) uses a strong magnet and radio waves to look inside the body. An MRA (magnetic resonance angiogram) does the same thing, but it lets us look at your blood vessels. A computer turns the radio waves into pictures showing cross sections of the body, much like slices of bread. This helps us see any problems more clearly. You may receive fluid (called  contrast ) before or during your scan. The fluid helps us see the pictures better. We give the fluid through an IV (small needle in your arm).  Who should I call with questions: If you have any questions, please contact your Imaging Department exam site. Directions, parking instructions, and other information is available on our website, [email or web address].  How do I prepare if I m having sedation or anesthesia? **IMPORTANT** THE INSTRUCTIONS BELOW ARE ONLY FOR THOSE PATIENTS WHO HAVE BEEN TOLD THEY WILL RECEIVE SEDATION OR GENERAL ANESTHESIA DURING THEIR MRI PROCEDURE:  IF YOU WILL RECEIVE SEDATION (take medicine to help you relax during your exam) You must get the medicine from your doctor before you arrive. Bring the medicine to the exam. Do not take it at home. Arrive one hour early. Bring someone who can take you home after the test. Your medicine will make you sleepy. After the exam, you may not drive, take a bus or take a taxi by yourself. No eating 8 hours before your exam. You may have clear liquids up until 4 hours before your exam. (Clear liquids include water, clear tea, black coffee and fruit juice without pulp.)  IF YOU WILL RECEIVE ANESTHESIA (be  asleep for your exam) Arrive 1 1/2 hours early. Bring someone who can take you home after the test. You may not drive, take a bus or take a taxi by yourself. No eating 8 hours before your exam. You may have clear liquids up until 4 hours before your exam. (Clear liquids include water, clear tea, black coffee and fruit juice without pulp.)              Who to contact     If you have questions or need follow up information about today's clinic visit or your schedule please contact [facility] directly at [phone].  Normal or non-critical lab and imaging results will be communicated to you by [employer], letter or phone within 4 business days after the clinic has received the results. If you do not hear from us within 7 days, please contact the clinic through [employer] or phone. If you have a critical or abnormal lab result, we will notify you by phone as soon as possible.  Submit refill requests through [employer] or call your pharmacy and they will forward the refill request to us. Please allow 3 business days for your refill to be completed.          Additional Information About Your Visit        MyChart Information     [employer] gives you secure access to your electronic health record. If you see a primary care provider, you can also send messages to your care team and make appointments. If you have questions, please call your primary care clinic.  If you do not have a primary care provider, please call [phone] and they will assist you.      [employer] is an electronic gateway that provides easy, online access to your medical records. With [employer], you can request a clinic appointment, read your test results, renew a prescription or communicate with your care team.     To access your existing account, please contact your [facility] or call [phone] for assistance.        Care EveryWhere ID     This is your Care EveryWhere ID. This could be used by other organizations  to access your [facility] medical records  [id number]         Blood Pressure from Last 3 Encounters:   10/11/18 115/83   09/13/18 111/70   08/28/18 112/80    Weight from Last 3 Encounters:   10/11/18 62.6 kg (137 lb 14.4 oz)   09/13/18 61.6 kg (135 lb 14.4 oz)   08/28/18 61.4 kg (135 lb 4.8 oz)              We Performed the Following     EYE EXAM (SIMPLE-NONBILLABLE)     REFRACTION        Primary Care Provider Office Phone # Fax #    Ani Slater, APRN [phone]       [address]  [place] [address]        Equal Access to Services     ALEX ALBERTO : Hadii aad ku hadasho Soomaali, waaxda luqadaha, qaybta kaalmada adeegyada, waxromana singhin hayaddie fong . So [facility] [phone].    ATENCIÓN: Si habla español, tiene a lugo disposición servicios gratuitos de asistencia lingüística. Daiame al [phone].    We comply with applicable federal civil rights laws and [place] laws. We do not discriminate on the basis of race, color, national origin, age, disability, sex, sexual orientation, or gender identity.            Thank you!     Thank you for choosing [facility]  for your care. Our goal is always to provide you with excellent care. Hearing back from our patients is one way we can continue to improve our services. Please take a few minutes to complete the written survey that you may receive in the mail after your visit with us. Thank you!             Your Updated Medication List - Protect others around you: Learn how to safely use, store and throw away your medicines at www.disposemymeds.org.          This list is accurate as of 10/31/18 10:00 AM.  Always use your most recent med list.                   Brand Name Dispense Instructions for use Diagnosis    ALEVE PO      Take 440 mg by mouth At Bedtime        diazepam 2 MG tablet    VALIUM    10 tablet    Take 1 tablet (2 mg) by mouth every 12 hours as needed for anxiety or sleep    Situational mixed  anxiety and depressive disorder       fluticasone 50 MCG/ACT spray    FLONASE    48 g    USE 2 SPRAYS IN EACH NOSTRIL ONCE DAILY    Allergic rhinitis due to other allergic trigger, unspecified rhinitis seasonality       hydrOXYzine 25 MG tablet    ATARAX    30 tablet    Take 0.5-1 tablets (12.5-25 mg) by mouth nightly as needed for itching    AUDELIA (generalized anxiety disorder), Panic attack, Adjustment insomnia       LORazepam 1 MG tablet    ATIVAN     Take 1 tablet (1 mg) by mouth twice a day as needed.        MELATONIN PO           omeprazole 20 MG CR capsule    priLOSEC    90 capsule    Take 1 capsule (20 mg) by mouth daily    Gastroesophageal reflux disease without esophagitis       tamoxifen 20 MG tablet    NOLVADEX    90 tablet    Take 1 tablet (20 mg) by mouth daily    Atypical hyperplasia of breast       venlafaxine 37.5 MG 24 hr capsule    EFFEXOR-XR    90 capsule    Take 1 capsule (37.5 mg) by mouth daily    Panic attack, AUDELIA (generalized anxiety disorder)

## 2018-10-31 NOTE — PROGRESS NOTES
Chief Complaint   Patient presents with     COMPREHENSIVE EYE EXAM         Last Eye Exam: 12-7-2016  Dilated Previously: Yes    What are you currently using to see?  does not use glasses or contacts       Distance Vision Acuity: Satisfied with vision    Near Vision Acuity: Not satisfied, computer screen fuzzy    Eye Comfort: good  Do you use eye drops? : No  Occupation or Hobbies: [employer]          Medical, surgical and family histories reviewed and updated 10/31/2018.       OBJECTIVE: See Ophthalmology exam    ASSESSMENT:    ICD-10-CM    1. Myopia of both eyes H52.13 REFRACTION   2. Presbyopia H52.4 REFRACTION   3. Choroidal nevus of right eye D31.31 EYE EXAM (SIMPLE-NONBILLABLE)   4. Glaucoma suspect, bilateral H40.003 EYE EXAM (SIMPLE-NONBILLABLE)      PLAN:     Patient Instructions   Eyeglass prescription given.  Computer/reading glasses with bluetech lens are an option.    Continue care at the [facility] for choroidal nevus.    You are a glaucoma suspect. Schedule a follow up OCT/HVF/ at [place].    Return in 1 year for a complete eye exam or sooner if needed.    Kirill Nicholson, OD

## 2018-11-26 NOTE — PROGRESS NOTES
FOLLOW UP  Nov 28, 2018     Kaitlyn Garcia is a 42 year old woman who presents with history of left breast atypical ductal hyperplasia here for high risk screening and preventative therapy with Tamoxifen.     HPI:    History of 3 left breast needle biopsies, 2 right breast needle biopsies, and 2 excisional left breast biopsy, all benign. The left breast biopsy on 12/12/16 had 1 mm of atypical ductal hyperplasia. She has 2 paternal aunts who had breast cancer in their 60's and a paternal cousin with breast cancer in her 40's. She was seen by a genetic counselor in 2015, no genetic testing was done. She began high risk screening with annual mammogram and breast MRI in 2013. She started Tamoxifen for risk reduction 5/24/17. She had a breast MRI 5/9/17 revealed an area of enhancement biopsied to be an intraductal papilloma without atypia. She was seen for surgical consultation by Dr. Montenegro and monitoring with imaging was recommended.     She had a pelvic ultrasound 6/4/18 for vaginal bleeding that was negative. She continues to get occasional cyclical swelling.     She denies any breast masses, changes, nipple inversion, nipple discharge. She denies any skin changes.     BREAST-SPECIFIC HISTORY:    Previous breast imaging: Yes   - 5/21/12 b/l Dmammo and right u/s for right breast lump: calcifications in the upper outer quadrant of left breast spanning 5 cm. Right u/s negative. BI-RADS 4 suspicious for malignancy   - 5/23/12 left stereotactic biopsy: intraductal papilloma   - 7/30/12 left wire-placement   - 2/19/13 MRI: large segmental enhancement of left lateral breast, BI-RADS 0 incomplete   Left u/s: BI-RADS 3 probably benign  - 3/29/13 MRI: BI-RADS 2 benign findings  - 8/5/13 b/l Dmammo for fullness: BI-RADS 2 benign   - 2/5/14 MRI: cysts in left breast, BI-RADS 2 benign   - 5/9/14 Dmammo b/l: negative. Left u/s: cystic appearing structures, BI-RADS 2 benign  - 5/20/14 MRI: study not completed due to nausea, BI-RADS 0  incomplete  - 14 MRI: BI-RADS 2 benign  - 10/28/14 b/l Dmammo: BI-RADS 2 benign   - 5/7/15 MRI BI-RADS 2 benign   - 11/10/15 Smammo: bilateral calcs: BI-RADS 2 benign  - 16 left u/s for pain: multiple tiny cyst  - 16 MRI: BI-RADS 2 benign   - 16  Smammo: lateral left breast developing macrocalcifications BI-RADS 0 incomplete   - 16 Dmammo left: left 2:00 calcifications BI-RADS 4 suspicious  - 16: stereotactic biopsy: focus of ADH    - 17 wire placement   - 17 MRI: 2 adjacent mass like areas of enhancement mid central left breast BI-RADS 0 incomplete   - 17 left u/s: scattered small cysts BI-RADS 4 suspicious   - 17 MR biopsy: intraductal papilloma   - 18 MRI BI-RADS 2  - 18 left Dmammo and ultrasound for pain BI-RADS 2     Prior breast biopsies:Yes   - 2 right breast biopsies   - 12 left needle biopsy: intraductal papilloma with sclerosing features   - 12 left excisional biopsy with Dr. Lynch: focal columnar cell change with atypia, intraductal papilloma and surrounding intraductal papillomatosis, sclerosing adenosis, fibrocystic changes  - 16 left needle biopsy x2: flat epithelial atypia with focus of ADH, duct ectasia and focal sclerosing adenosis, microcalcifications.   FEA and columnar cell change with atypia  - 17 left scar excision and excisional biopsy with Dr. Montenegro: FEA, columnar cell change/hyperplasia, intraductal papillomas, usual ductal hyperplasia, calcifications  - 17 left MRI needle biopsy: intraductal papilloma, mild columnar cell hyperplasia, mild duct ectasia, focal sclerosing adenosis      Prior history of breast cancer: No  Prior radiation history: No   Self breast exams: No  Breast density: heterogeneously dense    GYN HISTORY:  . Age at 1st pregnancy: 17. Breastfeeding history: Yes.   Age at menarche: 14-14  Menopausal: premenopausal  Menopausal hormone replacement therapy: No    RISK ASSESSMENT: > 1.7% 5 year  risk and > 20% lifetime risk   Heather:2.6% 5 year risk   SEBASTIAN: 52.3% lifetime risk   Abiodun: 11.6% lifetime risk     FAMILY HISTORY:  Breast ca: Yes    - paternal aunt 60 's   - paternal aunt 60's  - paternal cousin 40   Ovarian ca: No   - ? Paternal cousin with ovarian cancer   Pancreatic ca: No   Prostate: yes  - maternal uncle 69   Gastric ca: Yes   - paternal grandfather, passed   Melanoma: No  Colon ca: No  Other cancer: Yes   - maternal grandmother skin cancer 90's  - paternal grandfather brain 60's   [place] ethnicity: No  Other genetic, testing, syndromes, or clotting disorders: No   - She saw a genetic counselor 2/2015 and no testing was recommended     PAST MEDICAL HISTORY  Past Medical History:   Diagnosis Date     Anemia      Atypical ductal hyperplasia of breast      Choroidal nevus of right eye      Concussion 10/2015     Depressive disorder 2006    Treated with celexa for two years     Family history of breast cancer 2/3/2015     Family history of breast cancer 2/3/2015     Family history of breast cancer 2/3/2015     Family history of breast cancer 2/3/2015     Gastroesophageal reflux disease      Papilloma of breast 5/23/12    Left, See Dr. Lynch at      PONV (postoperative nausea and vomiting)      S/P endometrial ablation 10/10/13    Menorrhagia     Shingles     x2 in the past     WBC decreased 2/3/2015     WBC decreased 2/3/2015     WBC decreased 2/3/2015     PAST SURGICAL HISTORY   Past Surgical History:   Procedure Laterality Date     ABDOMEN SURGERY      rectoplasty     BIOPSY BREAST NEEDLE LOCALIZATION  7/30/2012    Procedure: BIOPSY BREAST NEEDLE LOCALIZATION;  left breast excisional Biopsy with wire localization @0830;  Surgeon: Robert Lynch MD;  Location: [facility] OR     BREAST SURGERY       COSMETIC SURGERY      Rectalplasty for episiotomy repair     COSMETIC SURGERY      Rectalplasty for episiotomy repair     DILATION AND CURETTAGE, HYSTEROSCOPY, ABLATE ENDOMETRIUM NOVASURE, COMBINED   10/10/2013    Procedure: COMBINED DILATION AND CURETTAGE, HYSTEROSCOPY, ABLATE ENDOMETRIUM NOVASURE;  Endometrial ablation with Novasure;  Surgeon: Indu Birmingham DO;  Location: [facility] OR     HC TOOTH EXTRACTION W/FORCEP       LUMPECTOMY BREAST Left 2/1/2017    Procedure: LUMPECTOMY BREAST;  Surgeon: Lori Montenegro MD;  Location: [facility]     SOFT TISSUE SURGERY      lumpectomy x 2 right breast     SOFT TISSUE SURGERY      episiotomy fixed   Hysterectomy: no    MEDICATIONS  Current Outpatient Prescriptions   Medication Sig Dispense Refill     diazepam (VALIUM) 2 MG tablet Take 1 tablet (2 mg) by mouth every 12 hours as needed for anxiety or sleep 10 tablet 0     fluticasone (FLONASE) 50 MCG/ACT nasal spray USE 2 SPRAYS IN EACH NOSTRIL ONCE DAILY 48 g 11     hydrOXYzine (ATARAX) 25 MG tablet Take 0.5-1 tablets (12.5-25 mg) by mouth nightly as needed for itching 30 tablet 0     LORazepam (ATIVAN) 1 MG tablet Take 1 tablet (1 mg) by mouth twice a day as needed.       MELATONIN PO        Naproxen Sodium (ALEVE PO) Take 440 mg by mouth At Bedtime       omeprazole (PRILOSEC) 20 MG CR capsule Take 1 capsule (20 mg) by mouth daily 90 capsule 3     tamoxifen (NOLVADEX) 20 MG tablet Take 1 tablet (20 mg) by mouth daily 90 tablet 3     venlafaxine (EFFEXOR-XR) 37.5 MG 24 hr capsule Take 1 capsule (37.5 mg) by mouth daily 90 capsule 3   Serotonin specific reuptake inhibitor: on SNRI.   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    ROS:  Change in vision No  Headaches: no  Respiratory: No shortness of breath, dyspnea on exertion, cough, or hemoptysis   Cardiovascular: negative   Gastrointestinal: negative Abdominal pain: no  Breast: negative  Musculoskeletal: negative Joint pain No Back pain:  "no  Psychiatric: negative  Hematologic/Lymphatic/Immunologic: negative  Endocrine: negative    EXAM  /78 (BP Location: Right arm, Patient Position: Chair, Cuff Size: Adult Regular)  Pulse 69  Temp 97  F (36.1  C) (Oral)  Resp 14  Ht 1.695 m (5' 6.73\")  Wt 64.3 kg (141 lb 11.2 oz)  SpO2 99%  BMI 22.37 kg/m2   PHYSICAL EXAM  Respiratory: breathing non labored.   Breasts: Examination was done in both the upright and supine positions.  Breasts are symmetrical . No dominant fixed or suspicious masses noted. No skin or nipple changes. No nipple discharge.   No clavicular, cervical, or axillary lymphadenopathy.     INVESTIGATIONS:    11/28/18 screening mammogram: no concerning findings per Radiology, final report pending.      ASSESSMENT/PLAN:    Kaitlyn Garcia is a 42 year old woman who presents with history of left breast atypical ductal hyperplasia here for high risk screening and preventative therapy with Tamoxifen. Her recent breast MRI was negative.      1) Reviewed images today and results were discussed with the patient.    - No concerning findings.      2) We also reviewed that atypical ductal hyperplasia increases her baseline risk for breast cancer. Patients diagnosed with atypical hyperplasia are at higher risk for developing breast cancer in the future. She meets guidelines for high risk screening with a lifetime risk of breast cancer >20%. Discussed she meets guidelines for high risk screening with yearly mammogram alternating with Breast MRI every six months. Clinical encounter every 6-12 months including family history, risk assessment, and clinical breast exam.   - Next breast MRI with clinic visit: 5/2019, MRI ordered   - Next mammogram with clinic visit: 11/29/19, needs to be scheduled      3) She is a candidate for breast cancer risk-reduction intervention based on a Heather estimated 5 year risk. The contraindications for Tamoxifen (history of VTE, history or stroke, history of TIA, " pregnancy, and potential pregnancy without an effective nonhormal method of method of contraception) along with potential side effects (hot flashes, deep vein thrombosis, pulmonary embolis, stroke, cataracts, and endometrial cancer) including signs and symptoms were discussed.   - Tamoxifen 20 mg daily for 5 years started: 5/24/17  - Yearly gynecological examination. Prompt evaluation for any vaginal spotting.     4) Lifestyle Modifications were provided.   - Maintain a healthy weight. Your BMI is Body mass index is 22.12 kg/(m^2).. Recommended BMI is 20-25. Higher body mass index (BMI) and adult weight gain is associated with increased risk for breast cancer. This increase in risk has been attributed to increase in circulating endogenous estrogen levels from fat tissue.   - Alcohol consumption, even at moderate levels (1-2 drinks per day), increases breast cancer risk. Limit alcohol consumption to less than 1 drink per day. (1 ounce of liquor, 6 ounces of wine, or 8 ounces of beer)  - Exercise a minimum of 150 minutes per week of moderate-intensity aerobic activity.     Merced Wiggins PA-C    Total time spent face to face with the patient was 40 minutes. 30 minutes was spent counseling the patient as described above.

## 2018-11-28 ENCOUNTER — RADIANT APPOINTMENT (OUTPATIENT)
Dept: MAMMOGRAPHY | Facility: CLINIC | Age: 43
End: 2018-11-28
Payer: COMMERCIAL

## 2018-11-28 ENCOUNTER — OFFICE VISIT (OUTPATIENT)
Dept: SURGERY | Facility: CLINIC | Age: 43
End: 2018-11-28
Attending: PHYSICIAN ASSISTANT
Payer: COMMERCIAL

## 2018-11-28 VITALS
RESPIRATION RATE: 14 BRPM | OXYGEN SATURATION: 99 % | HEIGHT: 67 IN | HEART RATE: 69 BPM | DIASTOLIC BLOOD PRESSURE: 78 MMHG | WEIGHT: 141.7 LBS | BODY MASS INDEX: 22.24 KG/M2 | SYSTOLIC BLOOD PRESSURE: 115 MMHG | TEMPERATURE: 97 F

## 2018-11-28 DIAGNOSIS — Z80.3 FAMILY HISTORY OF BREAST CANCER: ICD-10-CM

## 2018-11-28 DIAGNOSIS — Z91.89 AT HIGH RISK FOR BREAST CANCER: Primary | ICD-10-CM

## 2018-11-28 DIAGNOSIS — N60.99 ATYPICAL HYPERPLASIA OF BREAST: ICD-10-CM

## 2018-11-28 DIAGNOSIS — Z12.31 VISIT FOR SCREENING MAMMOGRAM: ICD-10-CM

## 2018-11-28 PROCEDURE — 99215 OFFICE O/P EST HI 40 MIN: CPT | Mod: ZP | Performed by: PHYSICIAN ASSISTANT

## 2018-11-28 PROCEDURE — G0463 HOSPITAL OUTPT CLINIC VISIT: HCPCS | Mod: ZF

## 2018-11-28 ASSESSMENT — PAIN SCALES - GENERAL: PAINLEVEL: NO PAIN (0)

## 2018-11-28 NOTE — PATIENT INSTRUCTIONS
Kaitlyn Garcia is a 42 year old woman who presents with history of left breast atypical ductal hyperplasia here for high risk screening and preventative therapy with Tamoxifen. Her recent breast MRI was negative.      1) Reviewed images today and results were discussed with the patient.    - No concerning findings.     2) We also reviewed that atypical ductal hyperplasia increases her baseline risk for breast cancer. Patients diagnosed with atypical hyperplasia are at higher risk for developing breast cancer in the future. She meets guidelines for high risk screening with a lifetime risk of breast cancer >20%. Discussed she meets guidelines for high risk screening with yearly mammogram alternating with Breast MRI every six months. Clinical encounter every 6-12 months including family history, risk assessment, and clinical breast exam.   - Next breast MRI with clinic visit: 5/2019, MRI ordered   - Next mammogram with clinic visit: 11/29/19, needs to be scheduled      3) She is a candidate for breast cancer risk-reduction intervention based on a Heather estimated 5 year risk. The contraindications for Tamoxifen (history of VTE, history or stroke, history of TIA, pregnancy, and potential pregnancy without an effective nonhormal method of method of contraception) along with potential side effects (hot flashes, deep vein thrombosis, pulmonary embolis, stroke, cataracts, and endometrial cancer) including signs and symptoms were discussed.   - Tamoxifen 20 mg daily for 5 years started: 5/24/17  - Yearly gynecological examination. Prompt evaluation for any vaginal spotting.     4) Lifestyle Modifications were provided.   - Maintain a healthy weight. Your BMI is Body mass index is 22.12 kg/(m^2).. Recommended BMI is 20-25. Higher body mass index (BMI) and adult weight gain is associated with increased risk for breast cancer. This increase in risk has been attributed to increase in circulating endogenous estrogen levels from fat  tissue.   - Alcohol consumption, even at moderate levels (1-2 drinks per day), increases breast cancer risk. Limit alcohol consumption to less than 1 drink per day. (1 ounce of liquor, 6 ounces of wine, or 8 ounces of beer)  - Exercise a minimum of 150 minutes per week of moderate-intensity aerobic activity.

## 2018-11-28 NOTE — MR AVS SNAPSHOT
After Visit Summary   11/28/2018    Kaitlyn Garcia    MRN: [id number]           Patient Information     Date Of Birth          1975        Visit Information        Provider Department      11/28/2018 11:00 AM Merced Wiggins PA-C [facility]        Care Instructions    Kaitlyn Garcia is a 42 year old woman who presents with history of left breast atypical ductal hyperplasia here for high risk screening and preventative therapy with Tamoxifen. Her recent breast MRI was negative.      1) Reviewed images today and results were discussed with the patient.    - No concerning findings.     2) We also reviewed that atypical ductal hyperplasia increases her baseline risk for breast cancer. Patients diagnosed with atypical hyperplasia are at higher risk for developing breast cancer in the future. She meets guidelines for high risk screening with a lifetime risk of breast cancer >20%. Discussed she meets guidelines for high risk screening with yearly mammogram alternating with Breast MRI every six months. Clinical encounter every 6-12 months including family history, risk assessment, and clinical breast exam.   - Next breast MRI with clinic visit: 5/2019, MRI ordered   - Next mammogram with clinic visit: 11/29/19, needs to be scheduled      3) She is a candidate for breast cancer risk-reduction intervention based on a Heather estimated 5 year risk. The contraindications for Tamoxifen (history of VTE, history or stroke, history of TIA, pregnancy, and potential pregnancy without an effective nonhormal method of method of contraception) along with potential side effects (hot flashes, deep vein thrombosis, pulmonary embolis, stroke, cataracts, and endometrial cancer) including signs and symptoms were discussed.   - Tamoxifen 20 mg daily for 5 years started: 5/24/17  - Yearly gynecological examination. Prompt evaluation for any vaginal spotting.     4) Lifestyle Modifications were provided.    - Maintain a healthy weight. Your BMI is Body mass index is 22.12 kg/(m^2).. Recommended BMI is 20-25. Higher body mass index (BMI) and adult weight gain is associated with increased risk for breast cancer. This increase in risk has been attributed to increase in circulating endogenous estrogen levels from fat tissue.   - Alcohol consumption, even at moderate levels (1-2 drinks per day), increases breast cancer risk. Limit alcohol consumption to less than 1 drink per day. (1 ounce of liquor, 6 ounces of wine, or 8 ounces of beer)  - Exercise a minimum of 150 minutes per week of moderate-intensity aerobic activity.             Follow-ups after your visit        Your next 10 appointments already scheduled     Nov 28, 2018 12:30 PM CST   MA SCREENING DIGITAL BILATERAL with [id number]   [facility] ([facility])    [address] [place] [address], 2nd Floor  [facility] [address] [phone]           How do I prepare for my exam? (Food and drink instructions) No Food and Drink Restrictions.  How do I prepare for my exam? (Other instructions) Do not use any powder, lotion or deodorant under your arms or on your breast. If you do, we will ask you to remove it before your exam.  What should I wear: Wear comfortable, two-piece clothing.  How long does the exam take: Most scans will take 15 minutes.  What should I bring: Bring any previous mammograms from other facilities or have them mailed to the breast center.  Do I need a :  No  is needed.  What do I need to tell my doctor: If you have any allergies, tell your care team.  What should I do after the exam: No restrictions, You may resume normal activities.  What is this test: This test is an x-ray of the breast to look for breast disease. The breast is pressed between two plates to flatten and spread the tissue. An X-ray is taken of the breast from different angles.  Who should I call with questions: If you have any  "questions, please call the Imaging Department where you will have your exam. Directions, parking instructions, and other information is available on our website, [email or web address].  Other information about my exam Three-dimensional (3D) mammograms are available at [facility] locations in [facility], [facility], [place], [facility] and [place].  Health locations include [place] and [facility] in [place].  Benefits of 3D mammograms include * Improved rate of cancer detection * Decreases your chance of having to go back for more tests, which means fewer: * \"False-positive\" results (This means that there is an abnormal area but it isn't cancer.) * Invasive testing procedures, such as a biopsy or surgery * Can provide clearer images of the breast if you have dense breast tissue.  *3D mammography is an optional exam that anyone can have with a 2D mammogram. It doesn't replace or take the place of a 2D mammogram. 2D mammograms remain an effective screening test for all women.  Not all insurance companies cover the cost of a 3D mammogram. Check with your insurance. Three-dimensional (3D) mammograms are available at [facility] locations in [facility], [facility], [facility], and [place]. Health locations include [place] and [facility] in [place]. Benefits of 3D mammograms include: - Improved rate of cancer detection - Decreases your chance of having to go back for more tests, which means fewer: - \"False-positive\" results (This means that there is an abnormal area but it isn't cancer.) - Invasive testing procedures, such as a biopsy or surgery - Can provide clearer images of the breast if you have dense breast tissue. 3D mammography is an optional exam that anyone can have with a 2D mammogram. It doesn't replace or take the place of a 2D mammogram. 2D mammograms remain an effective screening test for " all women.  Not all insurance companies cover the cost of a 3D mammogram. Check with your insurance.            Nov 28, 2018  1:00 PM CST   MR BREAST BILATERAL W CONTRAST with [id number]   [facility] ([facility])    [address] [id number] [phone]           How do I prepare for my exam? (Food and drink instructions) **If you will be receiving sedation or general anesthesia, please see special notes below.**  How do I prepare for my exam? (Other instructions) Take your medicines as usual, unless your doctor tells you not to. The timing of your exam may depend on the start of your last period. If you re in menopause, you may have the exam anytime.  You will have IV contrast for this exam.  You do not need to do anything special to prepare. **If you will be receiving sedation or general anesthesia, please see special notes below.**  What should I wear:  The MRI machine uses a strong magnet. Please wear clothes without metal (snaps, zippers). A sweatsuit works well, or we may give you a hospital gown. Please remove any body piercings and hair extensions before you arrive. You will also remove watches, jewelry, hairpins, wallets, dentures, partial dental plates and hearing aids. You may wear contact lenses, and you may be able to wear your rings. We have a safe place to keep your personal items, but it is safer to leave them at home.  How long does the exam take:  Most tests take 30 to 60 minutes.  HOWEVER, IF YOUR DOCTOR PRESCRIBES ANESTHESIA please plan on spending four to five hours in the recovery room.  What should I bring: Bring a list of your current medicines to your exam (including vitamins, minerals and over-the-counter drugs). Please bring any previous mammograms or breast MRIs from other facilities to the MRI dept. Do not mail these items to us.  Do I need a : **If you will be receiving sedation or general anesthesia, please  see special notes below.**  What should I do after the exam: No Restrictions, You may resume normal activities.  What is this test: MRI (magnetic resonance imaging) uses a strong magnet and radio waves to look inside the body. An MRA (magnetic resonance angiogram) does the same thing, but it lets us look at your blood vessels. A computer turns the radio waves into pictures showing cross sections of the body, much like slices of bread. This helps us see any problems more clearly. You may receive fluid (called  contrast ) before or during your scan. The fluid helps us see the pictures better. We give the fluid through an IV (small needle in your arm).  Who should I call with questions: If you have any questions, please contact your Imaging Department exam site. Directions, parking instructions, and other information is available on our website, [email or web address].  How do I prepare if I m having sedation or anesthesia? **IMPORTANT** THE INSTRUCTIONS BELOW ARE ONLY FOR THOSE PATIENTS WHO HAVE BEEN TOLD THEY WILL RECEIVE SEDATION OR GENERAL ANESTHESIA DURING THEIR MRI PROCEDURE:  IF YOU WILL RECEIVE SEDATION (take medicine to help you relax during your exam) You must get the medicine from your doctor before you arrive. Bring the medicine to the exam. Do not take it at home. Arrive one hour early. Bring someone who can take you home after the test. Your medicine will make you sleepy. After the exam, you may not drive, take a bus or take a taxi by yourself. No eating 8 hours before your exam. You may have clear liquids up until 4 hours before your exam. (Clear liquids include water, clear tea, black coffee and fruit juice without pulp.)  IF YOU WILL RECEIVE ANESTHESIA (be asleep for your exam) Arrive 1 1/2 hours early. Bring someone who can take you home after the test. You may not drive, take a bus or take a taxi by yourself. No eating 8 hours before your exam. You may have clear liquids up until 4 hours before your  exam. (Clear liquids include water, clear tea, black coffee and fruit juice without pulp.)            Dec 12, 2018  9:20 AM CST   [facility]    [address] [place] [address] [phone]            Jan 22, 2019  9:20 AM CST   RETURN RETINA with Aliya Perez MD   [facility] ([facility])    [address]  [facility] [address] [phone]              Who to contact     If you have questions or need follow up information about today's clinic visit or your schedule please contact [facility] directly at [phone].  Normal or non-critical lab and imaging results will be communicated to you by [email or web address], letter or phone within 4 business days after the clinic has received the results. If you do not hear from us within 7 days, please contact the clinic through [email or web address] or phone. If you have a critical or abnormal lab result, we will notify you by phone as soon as possible.  Submit refill requests through [employer] or call your pharmacy and they will forward the refill request to us. Please allow 3 business days for your refill to be completed.          Additional Information About Your Visit        [employer] Information     [employer] gives you secure access to your electronic health record. If you see a primary care provider, you can also send messages to your care team and make appointments. If you have questions, please call your primary care clinic.  If you do not have a primary care provider, please call [phone] and they will assist you.        Care EveryWhere ID     This is your Care EveryWhere ID. This could be used by other organizations to access your [facility] medical records  [id number]        Your Vitals Were     Pulse Temperature Respirations Height Pulse Oximetry BMI (Body Mass Index)    69 97  F (36.1  C) (Oral)  "14 1.695 m (5' 6.73\") 99% 22.37 kg/m2       Blood Pressure from Last 3 Encounters:   11/28/18 115/78   10/11/18 115/83   09/13/18 111/70    Weight from Last 3 Encounters:   11/28/18 64.3 kg (141 lb 11.2 oz)   10/11/18 62.6 kg (137 lb 14.4 oz)   09/13/18 61.6 kg (135 lb 14.4 oz)              Today, you had the following     No orders found for display       Primary Care Provider Office Phone # Fax #    Ani Slater, APRN [phone]       [address]  [place] [address]        Equal Access to Services     ALEX ALBERTO : Hadii george poeo Soomaali, waaxda luqadaha, qaybta kaalmada adeegyada, morenita fong . So [facility] [phone].    ATENCIÓN: Si habla español, tiene a lugo disposición servicios gratuitos de asistencia lingüística. [facility] [phone].    We comply with applicable federal civil rights laws and [place] laws. We do not discriminate on the basis of race, color, national origin, age, disability, sex, sexual orientation, or gender identity.            Thank you!     Thank you for choosing [facility]  for your care. Our goal is always to provide you with excellent care. Hearing back from our patients is one way we can continue to improve our services. Please take a few minutes to complete the written survey that you may receive in the mail after your visit with us. Thank you!             Your Updated Medication List - Protect others around you: Learn how to safely use, store and throw away your medicines at www.disposemymeds.org.          This list is accurate as of 11/28/18 11:02 AM.  Always use your most recent med list.                   Brand Name Dispense Instructions for use Diagnosis    ALEVE PO      Take 440 mg by mouth At Bedtime        diazepam 2 MG tablet    VALIUM    10 tablet    Take 1 tablet (2 mg) by mouth every 12 hours as needed for anxiety or sleep    Situational mixed anxiety and depressive disorder "       fluticasone 50 MCG/ACT nasal spray    FLONASE    48 g    USE 2 SPRAYS IN EACH NOSTRIL ONCE DAILY    Allergic rhinitis due to other allergic trigger, unspecified rhinitis seasonality       hydrOXYzine 25 MG tablet    ATARAX    30 tablet    Take 0.5-1 tablets (12.5-25 mg) by mouth nightly as needed for itching    AUDELIA (generalized anxiety disorder), Panic attack, Adjustment insomnia       LORazepam 1 MG tablet    ATIVAN     Take 1 tablet (1 mg) by mouth twice a day as needed.        MELATONIN PO           omeprazole 20 MG DR capsule    priLOSEC    90 capsule    Take 1 capsule (20 mg) by mouth daily    Gastroesophageal reflux disease without esophagitis       tamoxifen 20 MG tablet    NOLVADEX    90 tablet    Take 1 tablet (20 mg) by mouth daily    Atypical hyperplasia of breast       venlafaxine 37.5 MG 24 hr capsule    EFFEXOR-XR    90 capsule    Take 1 capsule (37.5 mg) by mouth daily    Panic attack, AUDELIA (generalized anxiety disorder)

## 2018-11-28 NOTE — LETTER
11/28/2018       RE: Kaitlyn Garcia  [address] [facility] [address]     Dear Colleague,    Thank you for referring your patient, Kaitlyn Garcia, to the [facility]. Please see a copy of my visit note below.    FOLLOW UP  Nov 28, 2018     Kaitlyn Garcia is a 42 year old woman who presents with history of left breast atypical ductal hyperplasia here for high risk screening and preventative therapy with Tamoxifen.     HPI:    History of 3 left breast needle biopsies, 2 right breast needle biopsies, and 2 excisional left breast biopsy, all benign. The left breast biopsy on 12/12/16 had 1 mm of atypical ductal hyperplasia. She has 2 paternal aunts who had breast cancer in their 60's and a paternal cousin with breast cancer in her 40's. She was seen by a genetic counselor in 2015, no genetic testing was done. She began high risk screening with annual mammogram and breast MRI in 2013. She started Tamoxifen for risk reduction 5/24/17. She had a breast MRI 5/9/17 revealed an area of enhancement biopsied to be an intraductal papilloma without atypia. She was seen for surgical consultation by Dr. Montenegro and monitoring with imaging was recommended.     She had a pelvic ultrasound 6/4/18 for vaginal bleeding that was negative. She continues to get occasional cyclical swelling.     She denies any breast masses, changes, nipple inversion, nipple discharge. She denies any skin changes.     BREAST-SPECIFIC HISTORY:    Previous breast imaging: Yes   - 5/21/12 b/l Dmammo and right u/s for right breast lump: calcifications in the upper outer quadrant of left breast spanning 5 cm. Right u/s negative. BI-RADS 4 suspicious for malignancy   - 5/23/12 left stereotactic biopsy: intraductal papilloma   - 7/30/12 left wire-placement   - 2/19/13 MRI: large segmental enhancement of left lateral breast, BI-RADS 0 incomplete   Left u/s: BI-RADS 3 probably benign  - 3/29/13 MRI: BI-RADS 2 benign findings  - 8/5/13 b/l  Dmammo for fullness: BI-RADS 2 benign   - 2/5/14 MRI: cysts in left breast, BI-RADS 2 benign   - 5/9/14 Dmammo b/l: negative. Left u/s: cystic appearing structures, BI-RADS 2 benign  - 5/20/14 MRI: study not completed due to nausea, BI-RADS 0 incomplete  - 5/28/14 MRI: BI-RADS 2 benign  - 10/28/14 b/l Dmammo: BI-RADS 2 benign   - 5/7/15 MRI BI-RADS 2 benign   - 11/10/15 Smammo: bilateral calcs: BI-RADS 2 benign  - 1/25/16 left u/s for pain: multiple tiny cyst  - 5/17/16 MRI: BI-RADS 2 benign   - 12/6/16  Smammo: lateral left breast developing macrocalcifications BI-RADS 0 incomplete   - 12/7/16 Dmammo left: left 2:00 calcifications BI-RADS 4 suspicious  - 12/12/16: stereotactic biopsy: focus of ADH    - 2/1/17 wire placement   - 5/9/17 MRI: 2 adjacent mass like areas of enhancement mid central left breast BI-RADS 0 incomplete   - 5/12/17 left u/s: scattered small cysts BI-RADS 4 suspicious   - 5/18/17 MR biopsy: intraductal papilloma   - 4/30/18 MRI BI-RADS 2  - 6/6/18 left Dmammo and ultrasound for pain BI-RADS 2     Prior breast biopsies:Yes   - 2 right breast biopsies   - 5/23/12 left needle biopsy: intraductal papilloma with sclerosing features   - 7/30/12 left excisional biopsy with Dr. Lynch: focal columnar cell change with atypia, intraductal papilloma and surrounding intraductal papillomatosis, sclerosing adenosis, fibrocystic changes  - 12/12/16 left needle biopsy x2: flat epithelial atypia with focus of ADH, duct ectasia and focal sclerosing adenosis, microcalcifications.   FEA and columnar cell change with atypia  - 2/1/17 left scar excision and excisional biopsy with Dr. Montenegro: FEA, columnar cell change/hyperplasia, intraductal papillomas, usual ductal hyperplasia, calcifications  - 5/18/17 left MRI needle biopsy: intraductal papilloma, mild columnar cell hyperplasia, mild duct ectasia, focal sclerosing adenosis      Prior history of breast cancer: No  Prior radiation history: No   Self breast exams:  No  Breast density: heterogeneously dense    GYN HISTORY:  . Age at 1st pregnancy: 17. Breastfeeding history: Yes.   Age at menarche: [age]  Menopausal: premenopausal  Menopausal hormone replacement therapy: No    RISK ASSESSMENT: > 1.7% 5 year risk and > 20% lifetime risk   Heather:2.6% 5 year risk   SEBASTIAN: 52.3% lifetime risk   Abiodun: 11.6% lifetime risk     FAMILY HISTORY:  Breast ca: Yes    - paternal aunt 60 's   - paternal aunt 60's  - paternal cousin 40   Ovarian ca: No   - ? Paternal cousin with ovarian cancer   Pancreatic ca: No   Prostate: yes  - maternal uncle 69   Gastric ca: Yes   - paternal grandfather, passed   Melanoma: No  Colon ca: No  Other cancer: Yes   - maternal grandmother skin cancer 90's  - paternal grandfather brain 60's   Voodoo ethnicity: No  Other genetic, testing, syndromes, or clotting disorders: No   - She saw a genetic counselor 2015 and no testing was recommended     PAST MEDICAL HISTORY  Past Medical History:   Diagnosis Date     Anemia      Atypical ductal hyperplasia of breast      Choroidal nevus of right eye      Concussion 10/2015     Depressive disorder 2006    Treated with celexa for two years     Family history of breast cancer 2/3/2015     Family history of breast cancer 2/3/2015     Family history of breast cancer 2/3/2015     Family history of breast cancer 2/3/2015     Gastroesophageal reflux disease      Papilloma of breast 12    Left, See Dr. Lynch at      PONV (postoperative nausea and vomiting)      S/P endometrial ablation 10/10/13    Menorrhagia     Shingles     x2 in the past     WBC decreased 2/3/2015     WBC decreased 2/3/2015     WBC decreased 2/3/2015     PAST SURGICAL HISTORY   Past Surgical History:   Procedure Laterality Date     ABDOMEN SURGERY      rectoplasty     BIOPSY BREAST NEEDLE LOCALIZATION  2012    Procedure: BIOPSY BREAST NEEDLE LOCALIZATION;  left breast excisional Biopsy with wire localization @0830;  Surgeon: Robert Lynch  MD Jasvir;  Location: [facility] OR     BREAST SURGERY       COSMETIC SURGERY      Rectalplasty for episiotomy repair     COSMETIC SURGERY      Rectalplasty for episiotomy repair     DILATION AND CURETTAGE, HYSTEROSCOPY, ABLATE ENDOMETRIUM NOVASURE, COMBINED  10/10/2013    Procedure: COMBINED DILATION AND CURETTAGE, HYSTEROSCOPY, ABLATE ENDOMETRIUM NOVASURE;  Endometrial ablation with Novasure;  Surgeon: Indu Birmingham DO;  Location: [facility] OR     HC TOOTH EXTRACTION W/FORCEP       LUMPECTOMY BREAST Left 2/1/2017    Procedure: LUMPECTOMY BREAST;  Surgeon: Lori Montenegro MD;  Location: [facility]     SOFT TISSUE SURGERY      lumpectomy x 2 right breast     SOFT TISSUE SURGERY      episiotomy fixed   Hysterectomy: no    MEDICATIONS  Current Outpatient Prescriptions   Medication Sig Dispense Refill     diazepam (VALIUM) 2 MG tablet Take 1 tablet (2 mg) by mouth every 12 hours as needed for anxiety or sleep 10 tablet 0     fluticasone (FLONASE) 50 MCG/ACT nasal spray USE 2 SPRAYS IN EACH NOSTRIL ONCE DAILY 48 g 11     hydrOXYzine (ATARAX) 25 MG tablet Take 0.5-1 tablets (12.5-25 mg) by mouth nightly as needed for itching 30 tablet 0     LORazepam (ATIVAN) 1 MG tablet Take 1 tablet (1 mg) by mouth twice a day as needed.       MELATONIN PO        Naproxen Sodium (ALEVE PO) Take 440 mg by mouth At Bedtime       omeprazole (PRILOSEC) 20 MG CR capsule Take 1 capsule (20 mg) by mouth daily 90 capsule 3     tamoxifen (NOLVADEX) 20 MG tablet Take 1 tablet (20 mg) by mouth daily 90 tablet 3     venlafaxine (EFFEXOR-XR) 37.5 MG 24 hr capsule Take 1 capsule (37.5 mg) by mouth daily 90 capsule 3   Serotonin specific reuptake inhibitor: on SNRI.   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    ROS:  Change in  "vision No  Headaches: no  Respiratory: No shortness of breath, dyspnea on exertion, cough, or hemoptysis   Cardiovascular: negative   Gastrointestinal: negative Abdominal pain: no  Breast: negative  Musculoskeletal: negative Joint pain No Back pain: no  Psychiatric: negative  Hematologic/Lymphatic/Immunologic: negative  Endocrine: negative    EXAM  /78 (BP Location: Right arm, Patient Position: Chair, Cuff Size: Adult Regular)  Pulse 69  Temp 97  F (36.1  C) (Oral)  Resp 14  Ht 1.695 m (5' 6.73\")  Wt 64.3 kg (141 lb 11.2 oz)  SpO2 99%  BMI 22.37 kg/m2   PHYSICAL EXAM  Respiratory: breathing non labored.   Breasts: Examination was done in both the upright and supine positions.  Breasts are symmetrical . No dominant fixed or suspicious masses noted. No skin or nipple changes. No nipple discharge.   No clavicular, cervical, or axillary lymphadenopathy.     INVESTIGATIONS:    11/28/18 screening mammogram: no concerning findings per Radiology, final report pending.      ASSESSMENT/PLAN:    Kaitlyn Garcia is a 42 year old woman who presents with history of left breast atypical ductal hyperplasia here for high risk screening and preventative therapy with Tamoxifen. Her recent breast MRI was negative.      1) Reviewed images today and results were discussed with the patient.    - No concerning findings.      2) We also reviewed that atypical ductal hyperplasia increases her baseline risk for breast cancer. Patients diagnosed with atypical hyperplasia are at higher risk for developing breast cancer in the future. She meets guidelines for high risk screening with a lifetime risk of breast cancer >20%. Discussed she meets guidelines for high risk screening with yearly mammogram alternating with Breast MRI every six months. Clinical encounter every 6-12 months including family history, risk assessment, and clinical breast exam.   - Next breast MRI with clinic visit: 5/2019, MRI ordered   - Next mammogram with " clinic visit: 11/29/19, needs to be scheduled      3) She is a candidate for breast cancer risk-reduction intervention based on a Heather estimated 5 year risk. The contraindications for Tamoxifen (history of VTE, history or stroke, history of TIA, pregnancy, and potential pregnancy without an effective nonhormal method of method of contraception) along with potential side effects (hot flashes, deep vein thrombosis, pulmonary embolis, stroke, cataracts, and endometrial cancer) including signs and symptoms were discussed.   - Tamoxifen 20 mg daily for 5 years started: 5/24/17  - Yearly gynecological examination. Prompt evaluation for any vaginal spotting.     4) Lifestyle Modifications were provided.   - Maintain a healthy weight. Your BMI is Body mass index is 22.12 kg/(m^2).. Recommended BMI is 20-25. Higher body mass index (BMI) and adult weight gain is associated with increased risk for breast cancer. This increase in risk has been attributed to increase in circulating endogenous estrogen levels from fat tissue.   - Alcohol consumption, even at moderate levels (1-2 drinks per day), increases breast cancer risk. Limit alcohol consumption to less than 1 drink per day. (1 ounce of liquor, 6 ounces of wine, or 8 ounces of beer)  - Exercise a minimum of 150 minutes per week of moderate-intensity aerobic activity.     Merced Wiggins PA-C    Total time spent face to face with the patient was 40 minutes. 30 minutes was spent counseling the patient as described above.     Again, thank you for allowing me to participate in the care of your patient.      Sincerely,    Merced Wiggins PA-C

## 2018-11-28 NOTE — NURSING NOTE
"Oncology Rooming Note    November 28, 2018 10:52 AM   Kaitlyn Garcia is a 43 year old female who presents for:    Chief Complaint   Patient presents with     Oncology Clinic Visit     P RETURN- INTRADUCTAL PAPILLOMA OF BREAST     Initial Vitals: /78 (BP Location: Right arm, Patient Position: Chair, Cuff Size: Adult Regular)  Pulse 69  Temp 97  F (36.1  C) (Oral)  Resp 14  Ht 1.695 m (5' 6.73\")  Wt 64.3 kg (141 lb 11.2 oz)  SpO2 99%  BMI 22.37 kg/m2 Estimated body mass index is 22.37 kg/(m^2) as calculated from the following:    Height as of this encounter: 1.695 m (5' 6.73\").    Weight as of this encounter: 64.3 kg (141 lb 11.2 oz). Body surface area is 1.74 meters squared.  No Pain (0) Comment: Data Unavailable   No LMP recorded. Patient has had an ablation.  Allergies reviewed: Yes  Medications reviewed: Yes    Medications: Medication refills not needed today.  Pharmacy name entered into EPIC: Data Unavailable    Clinical concerns: No new concerns. Rosalie was notified.    10 minutes for nursing intake (face to face time)     Héctor Blanco LPN            "

## 2018-12-13 ENCOUNTER — MYC REFILL (OUTPATIENT)
Dept: PEDIATRICS | Facility: CLINIC | Age: 43
End: 2018-12-13

## 2018-12-13 DIAGNOSIS — K21.9 GASTROESOPHAGEAL REFLUX DISEASE WITHOUT ESOPHAGITIS: ICD-10-CM

## 2018-12-13 DIAGNOSIS — J30.89 ALLERGIC RHINITIS DUE TO OTHER ALLERGIC TRIGGER, UNSPECIFIED SEASONALITY: Primary | ICD-10-CM

## 2018-12-14 RX ORDER — FLUTICASONE PROPIONATE 50 MCG
2 SPRAY, SUSPENSION (ML) NASAL DAILY
Qty: 48 G | Refills: 5 | Status: SHIPPED | OUTPATIENT
Start: 2018-12-14 | End: 2021-11-30

## 2018-12-14 NOTE — TELEPHONE ENCOUNTER
Disp Refills Start End EFRAÍN   fluticasone (FLONASE) 50 MCG/ACT nasal spray 48 g 11 10/20/2016  No   Sig: USE 2 SPRAYS IN EACH NOSTRIL ONCE DAILY     Last OV with JANIS Slater CNP: 9/13/18    Next 5 appointments (look out 90 days)    Jan 02, 2019  8:20 AM CST  [facility] with Kirill Nicholson OD  [facility] [address] [place] [address] [id number] [phone]        Refilled per [facility] protocol.    Brandy Garcia RN

## 2018-12-14 NOTE — TELEPHONE ENCOUNTER
Disp Refills Start End EFRAÍN   omeprazole (PRILOSEC) 20 MG CR capsule 90 capsule 3 8/28/2018  No   Sig - Route: Take 1 capsule (20 mg) by mouth daily - Oral     Pharmacy     [facility] - [place], [place] - [address], [id number]     Patient informed. Brandy Garcia RN

## 2019-01-02 ENCOUNTER — ANCILLARY PROCEDURE (OUTPATIENT)
Dept: ULTRASOUND IMAGING | Facility: CLINIC | Age: 44
End: 2019-01-02
Attending: OBSTETRICS & GYNECOLOGY
Payer: COMMERCIAL

## 2019-01-02 ENCOUNTER — OFFICE VISIT (OUTPATIENT)
Dept: OPTOMETRY | Facility: CLINIC | Age: 44
End: 2019-01-02
Payer: COMMERCIAL

## 2019-01-02 DIAGNOSIS — H40.003 GLAUCOMA SUSPECT OF BOTH EYES: Primary | ICD-10-CM

## 2019-01-02 DIAGNOSIS — N83.202 LEFT OVARIAN CYST: ICD-10-CM

## 2019-01-02 PROCEDURE — 92083 EXTENDED VISUAL FIELD XM: CPT | Performed by: OPTOMETRIST

## 2019-01-02 PROCEDURE — 99213 OFFICE O/P EST LOW 20 MIN: CPT | Performed by: OPTOMETRIST

## 2019-01-02 PROCEDURE — 76856 US EXAM PELVIC COMPLETE: CPT | Mod: 59

## 2019-01-02 PROCEDURE — 76830 TRANSVAGINAL US NON-OB: CPT

## 2019-01-02 PROCEDURE — 92133 CPTRZD OPH DX IMG PST SGM ON: CPT | Performed by: OPTOMETRIST

## 2019-01-02 ASSESSMENT — VISUAL ACUITY
OS_SC: 20/20
OS_SC: 20/40
OD_SC: 20/60
METHOD: SNELLEN - LINEAR
OD_SC: 20/20

## 2019-01-02 ASSESSMENT — EXTERNAL EXAM - RIGHT EYE: OD_EXAM: NORMAL

## 2019-01-02 ASSESSMENT — TONOMETRY
IOP_METHOD: TONOPEN
OD_IOP_MMHG: 17
OS_IOP_MMHG: 16

## 2019-01-02 ASSESSMENT — EXTERNAL EXAM - LEFT EYE: OS_EXAM: NORMAL

## 2019-01-02 ASSESSMENT — CUP TO DISC RATIO
OD_RATIO: 0.6
OS_RATIO: 0.65

## 2019-01-02 ASSESSMENT — SLIT LAMP EXAM - LIDS
COMMENTS: NORMAL
COMMENTS: NORMAL

## 2019-01-02 NOTE — PROGRESS NOTES
CHIEF COMPLAINT:   Chief Complaint   Patient presents with     Glaucoma Suspect Evaluation          OBJECTIVE:     See ophthalmology exam    ASSESSMENT:         ICD-10-CM    1. Glaucoma suspect of both eyes H40.003    Large optic nerve cupping  IOPs within normal limits both eyes   Visual fields- within normal limits both eyes   Thin corneas both eyes   OCT- stable both eyes and within normal limits both eyes     PLAN:      Patient Instructions   Testing was stable today.  You are a glaucoma suspect.  We will continue to monitor your intraocular pressures and optic nerves.    Continue follow up at [facility] for choroidal nevus as recommended.    Return in 1 year for a complete eye exam or sooner if needed.    Kirill Nicholson, OD

## 2019-01-02 NOTE — PATIENT INSTRUCTIONS
Testing was stable today.  You are a glaucoma suspect.  We will continue to monitor your intraocular pressures and optic nerves.    Continue follow up at [facility] for choroidal nevus as recommended.    Return in 1 year for a complete eye exam or sooner if needed.    Kirill Nicholson, OD

## 2019-01-03 DIAGNOSIS — N83.202 LEFT OVARIAN CYST: Primary | ICD-10-CM

## 2019-01-22 ENCOUNTER — OFFICE VISIT (OUTPATIENT)
Dept: OPHTHALMOLOGY | Facility: CLINIC | Age: 44
End: 2019-01-22
Attending: OPHTHALMOLOGY
Payer: COMMERCIAL

## 2019-01-22 DIAGNOSIS — H43.392 VITREOUS SYNERESIS, LEFT: ICD-10-CM

## 2019-01-22 DIAGNOSIS — H43.391 VITREOUS SYNERESIS OF RIGHT EYE: ICD-10-CM

## 2019-01-22 DIAGNOSIS — D31.31 CHOROIDAL NEVUS OF RIGHT EYE: ICD-10-CM

## 2019-01-22 PROCEDURE — 92250 FUNDUS PHOTOGRAPHY W/I&R: CPT | Mod: XU | Performed by: OPHTHALMOLOGY

## 2019-01-22 PROCEDURE — G0463 HOSPITAL OUTPT CLINIC VISIT: HCPCS | Mod: ZF

## 2019-01-22 PROCEDURE — 92134 CPTRZ OPH DX IMG PST SGM RTA: CPT | Mod: ZF | Performed by: OPHTHALMOLOGY

## 2019-01-22 ASSESSMENT — CONF VISUAL FIELD
OD_NORMAL: 1
OS_NORMAL: 1

## 2019-01-22 ASSESSMENT — SLIT LAMP EXAM - LIDS
COMMENTS: NORMAL
COMMENTS: NORMAL

## 2019-01-22 ASSESSMENT — TONOMETRY
IOP_METHOD: TONOPEN
OD_IOP_MMHG: 15
OS_IOP_MMHG: 14

## 2019-01-22 ASSESSMENT — CUP TO DISC RATIO
OS_RATIO: 0.65
OD_RATIO: 0.6

## 2019-01-22 ASSESSMENT — VISUAL ACUITY
METHOD: SNELLEN - LINEAR
OD_SC: 20/20
OS_SC: 20/20

## 2019-01-22 ASSESSMENT — EXTERNAL EXAM - RIGHT EYE: OD_EXAM: NORMAL

## 2019-01-22 ASSESSMENT — EXTERNAL EXAM - LEFT EYE: OS_EXAM: NORMAL

## 2019-01-22 NOTE — NURSING NOTE
Chief Complaints and History of Present Illnesses   Patient presents with     Annual Eye Exam     Chief Complaint(s) and History of Present Illness(es)     Annual Eye Exam     Pain scale: 0/10              Comments     Annual exam for Choroidal nevus, right eye.     JANNETTE Rowan 9:52 AM 01/22/2019
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
## 2019-05-07 ENCOUNTER — ANCILLARY PROCEDURE (OUTPATIENT)
Dept: MRI IMAGING | Facility: CLINIC | Age: 44
End: 2019-05-07
Attending: PHYSICIAN ASSISTANT
Payer: COMMERCIAL

## 2019-05-07 DIAGNOSIS — Z91.89 AT HIGH RISK FOR BREAST CANCER: ICD-10-CM

## 2019-05-07 PROCEDURE — A9585 GADOBUTROL INJECTION: HCPCS | Mod: JW | Performed by: PHYSICIAN ASSISTANT

## 2019-05-07 PROCEDURE — 77049 MRI BREAST C-+ W/CAD BI: CPT | Performed by: RADIOLOGY

## 2019-05-07 RX ORDER — GADOBUTROL 604.72 MG/ML
7.5 INJECTION INTRAVENOUS ONCE
Status: COMPLETED | OUTPATIENT
Start: 2019-05-07 | End: 2019-05-07

## 2019-05-07 RX ADMIN — GADOBUTROL 6.5 ML: 604.72 INJECTION INTRAVENOUS at 14:28

## 2019-05-22 ENCOUNTER — TRANSFERRED RECORDS (OUTPATIENT)
Dept: HEALTH INFORMATION MANAGEMENT | Facility: CLINIC | Age: 44
End: 2019-05-22

## 2019-05-29 NOTE — PROGRESS NOTES
FOLLOWUP  May 30, 2019     Kaitlyn Garcia is a 43 year old woman who presents with history of left breast atypical ductal hyperplasia here for high risk screening and preventative therapy with Tamoxifen.     HPI:    History of 3 left breast needle biopsies, 2 right breast needle biopsies, and 2 excisional left breast biopsy, all benign. The left breast biopsy on 12/12/16 had 1 mm of atypical ductal hyperplasia. She has 2 paternal aunts who had breast cancer in their 60's and a paternal cousin with breast cancer in her 40's. She was seen by a genetic counselor in 2015, no genetic testing was done. She began high risk screening with annual mammogram and breast MRI in 2013. She started Tamoxifen for risk reduction 5/24/17. She had a breast MRI 5/9/17 revealed an area of enhancement biopsied to be an intraductal papilloma without atypia. She was seen for surgical consultation by Dr. Montenegro and monitoring with imaging was recommended.     She had a pelvic ultrasound 6/4/18 for vaginal bleeding that was negative.      She denies any breast masses, changes, nipple inversion, nipple discharge. She denies any skin changes. Her recent breast MRI was negative.     BREAST-SPECIFIC HISTORY:    Previous breast imaging: Yes   - 5/21/12 b/l Dmammo and right u/s for right breast lump: calcifications in the upper outer quadrant of left breast spanning 5 cm. Right u/s negative. BI-RADS 4 suspicious for malignancy   - 5/23/12 left stereotactic biopsy: intraductal papilloma   - 7/30/12 left wire-placement   - 2/19/13 MRI: large segmental enhancement of left lateral breast, BI-RADS 0 incomplete   Left u/s: BI-RADS 3 probably benign  - 3/29/13 MRI: BI-RADS 2 benign findings  - 8/5/13 b/l Dmammo for fullness: BI-RADS 2 benign   - 2/5/14 MRI: cysts in left breast, BI-RADS 2 benign   - 5/9/14 Dmammo b/l: negative. Left u/s: cystic appearing structures, BI-RADS 2 benign  - 5/20/14 MRI: study not completed due to nausea, BI-RADS 0 incomplete  -  14 MRI: BI-RADS 2 benign  - 10/28/14 b/l Dmammo: BI-RADS 2 benign   - 5/7/15 MRI BI-RADS 2 benign   - 11/10/15 Smammo: bilateral calcs: BI-RADS 2 benign  - 16 left u/s for pain: multiple tiny cyst  - 16 MRI: BI-RADS 2 benign   - 16  Smammo: lateral left breast developing macrocalcifications BI-RADS 0 incomplete   - 16 Dmammo left: left 2:00 calcifications BI-RADS 4 suspicious  - 16: stereotactic biopsy: focus of ADH    - 17 wire placement   - 17 MRI: 2 adjacent mass like areas of enhancement mid central left breast BI-RADS 0 incomplete   - 17 left u/s: scattered small cysts BI-RADS 4 suspicious   - 17 MR biopsy: intraductal papilloma   - 18 MRI BI-RADS 2  - 18 left Dmammo and ultrasound for pain BI-RADS 2  - 18 Smammo BI-RADS 2  - 19 breast MRI BI-RADS 2     Prior breast biopsies:Yes   - 2 right breast biopsies   - 12 left needle biopsy: intraductal papilloma with sclerosing features   - 12 left excisional biopsy with Dr. Lynch: focal columnar cell change with atypia, intraductal papilloma and surrounding intraductal papillomatosis, sclerosing adenosis, fibrocystic changes  - 16 left needle biopsy x2: flat epithelial atypia with focus of ADH, duct ectasia and focal sclerosing adenosis, microcalcifications.   FEA and columnar cell change with atypia  - 17 left scar excision and excisional biopsy with Dr. Montenergo: FEA, columnar cell change/hyperplasia, intraductal papillomas, usual ductal hyperplasia, calcifications  - 17 left MRI needle biopsy: intraductal papilloma, mild columnar cell hyperplasia, mild duct ectasia, focal sclerosing adenosis      Prior history of breast cancer: No  Prior radiation history: No   Self breast exams: No  Breast density: heterogeneously dense    GYN HISTORY:  . Age at 1st pregnancy: 17. Breastfeeding history: Yes.   Age at menarche: 14-14  Menopausal: premenopausal  Menopausal hormone replacement  therapy: No    RISK ASSESSMENT: > 1.7% 5 year risk and > 20% lifetime risk   Heather: 2.6% 5 year risk  SEBASTIAN: 43.8% lifetime risk   Abiodun: 11.6% lifetime risk     FAMILY HISTORY:  Breast ca: Yes    - paternal aunt 60 's   - paternal aunt 60's  - paternal cousin 40   Ovarian ca: No   - ? Paternal cousin with ovarian cancer   Pancreatic ca: No   Prostate: yes  - maternal uncle 69   Gastric ca: Yes   - paternal grandfather, passed   Melanoma: No  Colon ca: No  Other cancer: Yes   - maternal grandmother skin cancer 90's  - paternal grandfather brain 60's   [place] ethnicity: No  Other genetic, testing, syndromes, or clotting disorders: No   - She saw a genetic counselor 2/2015 and no testing was recommended     PAST MEDICAL HISTORY  Past Medical History:   Diagnosis Date     Anemia      Atypical ductal hyperplasia of breast      Choroidal nevus of right eye      Concussion 10/2015     Depressive disorder 2006    Treated with celexa for two years     Family history of breast cancer 2/3/2015     Family history of breast cancer 2/3/2015     Family history of breast cancer 2/3/2015     Family history of breast cancer 2/3/2015     Gastroesophageal reflux disease      Papilloma of breast 5/23/12    Left, See Dr. Lynch at      PONV (postoperative nausea and vomiting)      S/P endometrial ablation 10/10/13    Menorrhagia     Shingles     x2 in the past     WBC decreased 2/3/2015     WBC decreased 2/3/2015     WBC decreased 2/3/2015     PAST SURGICAL HISTORY   Past Surgical History:   Procedure Laterality Date     ABDOMEN SURGERY      rectoplasty     BIOPSY BREAST NEEDLE LOCALIZATION  7/30/2012    Procedure: BIOPSY BREAST NEEDLE LOCALIZATION;  left breast excisional Biopsy with wire localization @0830;  Surgeon: Robert Lynch MD;  Location: [facility] OR     BREAST SURGERY       COSMETIC SURGERY      Rectalplasty for episiotomy repair     COSMETIC SURGERY      Rectalplasty for episiotomy repair     DILATION AND CURETTAGE,  HYSTEROSCOPY, ABLATE ENDOMETRIUM NOVASURE, COMBINED  10/10/2013    Procedure: COMBINED DILATION AND CURETTAGE, HYSTEROSCOPY, ABLATE ENDOMETRIUM NOVASURE;  Endometrial ablation with Novasure;  Surgeon: Indu Birmingham DO;  Location: [facility] OR     HC TOOTH EXTRACTION W/FORCEP       LUMPECTOMY BREAST Left 2/1/2017    Procedure: LUMPECTOMY BREAST;  Surgeon: Lori Montenegro MD;  Location: [facility]     SOFT TISSUE SURGERY      lumpectomy x 2 right breast     SOFT TISSUE SURGERY      episiotomy fixed     MEDICATIONS  Current Outpatient Medications   Medication Sig Dispense Refill     fluticasone (FLONASE) 50 MCG/ACT nasal spray Spray 2 sprays into both nostrils daily 48 g 5     LORazepam (ATIVAN) 1 MG tablet Take 1 tablet (1 mg) by mouth twice a day as needed.       MELATONIN PO        Naproxen Sodium (ALEVE PO) Take 440 mg by mouth At Bedtime       omeprazole (PRILOSEC) 20 MG CR capsule Take 1 capsule (20 mg) by mouth daily 90 capsule 3     tamoxifen (NOLVADEX) 20 MG tablet Take 1 tablet (20 mg) by mouth daily 90 tablet 3     venlafaxine (EFFEXOR-XR) 37.5 MG 24 hr capsule Take 1 capsule (37.5 mg) by mouth daily 90 capsule 3   Serotonin specific reuptake inhibitor: on SNRI.   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   PHYSICAL EXAM  Respiratory: breathing non labored.   Breasts: Examination  was done in both the upright and supine positions.  Breasts are symmetrical . No dominant fixed or suspicious masses noted. No skin or nipple changes. No nipple discharge.   No clavicular, cervical, or axillary lymphadenopathy.     ASSESSMENT/PLAN:    Kaitlyn Garcia is a 43 year old woman who presents with history of left breast atypical ductal hyperplasia here for high risk screening and preventative therapy with Tamoxifen.      1) We also reviewed that atypical ductal hyperplasia increases her baseline risk for breast cancer. Patients diagnosed with atypical hyperplasia are at higher risk for developing breast cancer in the future. She meets guidelines for high risk screening with a lifetime risk of breast cancer >20%. Discussed she meets guidelines for high risk screening with yearly mammogram alternating with Breast MRI every six months. Clinical encounter every 6-12 months including family history, risk assessment, and clinical breast exam.   - Next mammogram with clinic visit due 11/29/19  - Next breast MRI with clinic visit: 5/2020      2) She is a candidate for breast cancer risk-reduction intervention based on a Heather estimated 5 year risk. The contraindications for Tamoxifen (history of VTE, history or stroke, history of TIA, pregnancy, and potential pregnancy without an effective nonhormal method of method of contraception) along with potential side effects (hot flashes, deep vein thrombosis, pulmonary embolis, stroke, cataracts, and endometrial cancer) including signs and symptoms were discussed. We discussed recent research and the option of switching to lower dose Tamoxifen, and she is interested in doing so.   - Tamoxifen 5 mg daily for 5 years started: 5/24/17   - Yearly gynecological examination. Prompt evaluation for any vaginal spotting.    3) Lifestyle Modifications were provided.   - Maintain a healthy weight. Your BMI is Body mass index is 22.74 kg/m . Recommended BMI is 20-25. Higher body mass  index (BMI) and adult weight gain is associated with increased risk for breast cancer. This increase in risk has been attributed to increase in circulating endogenous estrogen levels from fat tissue.   - Alcohol consumption, even at moderate levels (1-2 drinks per day), increases breast cancer risk. Limit alcohol consumption to less than 1 drink per day. (1 ounce of liquor, 6 ounces of wine, or 8 ounces of beer)  - Exercise a minimum of 3 hours per week of moderate-intensity aerobic activity.     Merced Wiggins PA-C    Total time spent face to face with the patient was 30 minutes. 20 minutes was spent counseling the patient as described above.

## 2019-05-30 ENCOUNTER — OFFICE VISIT (OUTPATIENT)
Dept: SURGERY | Facility: CLINIC | Age: 44
End: 2019-05-30
Attending: PHYSICIAN ASSISTANT
Payer: COMMERCIAL

## 2019-05-30 VITALS
WEIGHT: 144 LBS | TEMPERATURE: 97.8 F | RESPIRATION RATE: 14 BRPM | OXYGEN SATURATION: 98 % | HEART RATE: 101 BPM | DIASTOLIC BLOOD PRESSURE: 75 MMHG | HEIGHT: 67 IN | BODY MASS INDEX: 22.6 KG/M2 | SYSTOLIC BLOOD PRESSURE: 109 MMHG

## 2019-05-30 DIAGNOSIS — Z91.89 AT HIGH RISK FOR BREAST CANCER: Primary | ICD-10-CM

## 2019-05-30 PROCEDURE — 99214 OFFICE O/P EST MOD 30 MIN: CPT | Mod: ZP | Performed by: PHYSICIAN ASSISTANT

## 2019-05-30 RX ORDER — TAMOXIFEN CITRATE 10 MG/1
5 TABLET ORAL DAILY
Qty: 30 TABLET | Refills: 3 | Status: SHIPPED | OUTPATIENT
Start: 2019-05-30 | End: 2020-02-10

## 2019-05-30 ASSESSMENT — PAIN SCALES - GENERAL: PAINLEVEL: SEVERE PAIN (6)

## 2019-05-30 ASSESSMENT — MIFFLIN-ST. JEOR: SCORE: 1331.55

## 2019-05-30 NOTE — PATIENT INSTRUCTIONS
Kaitlyn Garcia is a 43 year old woman who presents with history of left breast atypical ductal hyperplasia here for high risk screening and preventative therapy with Tamoxifen.      1) We also reviewed that atypical ductal hyperplasia increases her baseline risk for breast cancer. Patients diagnosed with atypical hyperplasia are at higher risk for developing breast cancer in the future. She meets guidelines for high risk screening with a lifetime risk of breast cancer >20%. Discussed she meets guidelines for high risk screening with yearly mammogram alternating with Breast MRI every six months. Clinical encounter every 6-12 months including family history, risk assessment, and clinical breast exam.   - Next mammogram with clinic visit due 11/29/19  - Next breast MRI with clinic visit: 5/2020      2) She is a candidate for breast cancer risk-reduction intervention based on a Heather estimated 5 year risk. The contraindications for Tamoxifen (history of VTE, history or stroke, history of TIA, pregnancy, and potential pregnancy without an effective nonhormal method of method of contraception) along with potential side effects (hot flashes, deep vein thrombosis, pulmonary embolis, stroke, cataracts, and endometrial cancer) including signs and symptoms were discussed. We discussed recent research and the option of switching to lower dose Tamoxifen, and she is interested in doing so.   - Tamoxifen 5 mg daily for 5 years started: 5/24/17   - Yearly gynecological examination. Prompt evaluation for any vaginal spotting.    3) Lifestyle Modifications were provided.   - Maintain a healthy weight. Your BMI is Body mass index is 22.74 kg/m . Recommended BMI is 20-25. Higher body mass index (BMI) and adult weight gain is associated with increased risk for breast cancer. This increase in risk has been attributed to increase in circulating endogenous estrogen levels from fat tissue.   - Alcohol consumption, even at moderate levels  (1-2 drinks per day), increases breast cancer risk. Limit alcohol consumption to less than 1 drink per day. (1 ounce of liquor, 6 ounces of wine, or 8 ounces of beer)  - Exercise a minimum of 3 hours per week of moderate-intensity aerobic activity.

## 2019-05-30 NOTE — NURSING NOTE
"Oncology Rooming Note    May 30, 2019 8:31 AM   Kaitlyn Garcia is a 43 year old female who presents for:    No chief complaint on file.    Initial Vitals: /75 (BP Location: Right arm, Patient Position: Chair, Cuff Size: Adult Regular)   Pulse 101   Temp 97.8  F (36.6  C) (Oral)   Resp 14   Ht 1.695 m (5' 6.73\")   Wt 65.3 kg (144 lb)   SpO2 98%   BMI 22.74 kg/m   Estimated body mass index is 22.74 kg/m  as calculated from the following:    Height as of this encounter: 1.695 m (5' 6.73\").    Weight as of this encounter: 65.3 kg (144 lb). Body surface area is 1.75 meters squared.  Severe Pain (6) Comment: Data Unavailable   No LMP recorded. Patient has had an ablation.  Allergies reviewed: Yes  Medications reviewed: Yes    Medications: MEDICATION REFILLS NEEDED TODAY. Provider was notified.  Pharmacy name entered into EPIC: Data Unavailable    Clinical concerns: Patient needs a refill on her Tamoxifan.   Patient has no complaints or concerns today.  She is here for routine follow up for a recent MRI.   Rosalie was notified.      Leigh Moore RN              "

## 2019-05-30 NOTE — LETTER
5/30/2019       RE: Kaitlyn Garcia  [address] [facility] [address]     Dear Colleague,    Thank you for referring your patient, Kaitlyn Garcia, to the [facility]. Please see a copy of my visit note below.    FOLLOWUP  May 30, 2019     Kaitlyn Garcia is a 43 year old woman who presents with history of left breast atypical ductal hyperplasia here for high risk screening and preventative therapy with Tamoxifen.     HPI:    History of 3 left breast needle biopsies, 2 right breast needle biopsies, and 2 excisional left breast biopsy, all benign. The left breast biopsy on 12/12/16 had 1 mm of atypical ductal hyperplasia. She has 2 paternal aunts who had breast cancer in their 60's and a paternal cousin with breast cancer in her 40's. She was seen by a genetic counselor in 2015, no genetic testing was done. She began high risk screening with annual mammogram and breast MRI in 2013. She started Tamoxifen for risk reduction 5/24/17. She had a breast MRI 5/9/17 revealed an area of enhancement biopsied to be an intraductal papilloma without atypia. She was seen for surgical consultation by Dr. Montenegro and monitoring with imaging was recommended.     She had a pelvic ultrasound 6/4/18 for vaginal bleeding that was negative.      She denies any breast masses, changes, nipple inversion, nipple discharge. She denies any skin changes.  Her recent breast MRI was negative.     BREAST-SPECIFIC HISTORY:    Previous breast imaging: Yes   - 5/21/12 b/l Dmammo and right u/s for right breast lump: calcifications in the upper outer quadrant of left breast spanning 5 cm. Right u/s negative. BI-RADS 4 suspicious for malignancy   - 5/23/12 left stereotactic biopsy: intraductal papilloma   - 7/30/12 left wire-placement   - 2/19/13 MRI: large segmental enhancement of left lateral breast, BI-RADS 0 incomplete   Left u/s: BI-RADS 3 probably benign  - 3/29/13 MRI: BI-RADS 2 benign findings  - 8/5/13 b/l Dmammo for  fullness: BI-RADS 2 benign   - 2/5/14 MRI: cysts in left breast, BI-RADS 2 benign   - 5/9/14 Dmammo b/l: negative. Left u/s: cystic appearing structures, BI-RADS 2 benign  - 5/20/14 MRI: study not completed due to nausea, BI-RADS 0 incomplete  - 5/28/14 MRI: BI-RADS 2 benign  - 10/28/14 b/l Dmammo: BI-RADS 2 benign   - 5/7/15 MRI BI-RADS 2 benign   - 11/10/15 Smammo: bilateral calcs: BI-RADS 2 benign  - 1/25/16 left u/s for pain: multiple tiny cyst  - 5/17/16 MRI: BI-RADS 2 benign   - 12/6/16  Smammo: lateral left breast developing macrocalcifications BI-RADS 0 incomplete   - 12/7/16 Dmammo left: left 2:00 calcifications BI-RADS 4 suspicious  - 12/12/16: stereotactic biopsy: focus of ADH    - 2/1/17 wire placement   - 5/9/17 MRI: 2 adjacent mass like areas of enhancement mid central left breast BI-RADS 0 incomplete   - 5/12/17 left u/s: scattered small cysts BI-RADS 4 suspicious   - 5/18/17 MR biopsy: intraductal papilloma   - 4/30/18 MRI BI-RADS 2  - 6/6/18 left Dmammo and ultrasound for pain BI-RADS 2  - 11/28/18 Smammo BI-RADS 2  - 5/7/19 breast MRI BI-RADS 2     Prior breast biopsies:Yes   - 2 right breast biopsies   - 5/23/12 left needle biopsy: intraductal papilloma with sclerosing features   - 7/30/12 left excisional biopsy with Dr. Lynch: focal columnar cell change with atypia, intraductal papilloma and surrounding intraductal papillomatosis, sclerosing adenosis, fibrocystic changes  - 12/12/16 left needle biopsy x2: flat epithelial atypia with focus of ADH, duct ectasia and focal sclerosing adenosis, microcalcifications.   FEA and columnar cell change with atypia  - 2/1/17 left scar excision and excisional biopsy with Dr. Montenegro: FEA, columnar cell change/hyperplasia, intraductal papillomas, usual ductal hyperplasia, calcifications  - 5/18/17 left MRI needle biopsy: intraductal papilloma, mild columnar cell hyperplasia, mild duct ectasia, focal sclerosing adenosis      Prior history of breast cancer: No  Prior  radiation history: No   Self breast exams: No  Breast density: heterogeneously dense    GYN HISTORY:  . Age at 1st pregnancy: 17. Breastfeeding history: Yes.   Age at menarche: 14-14  Menopausal: premenopausal  Menopausal hormone replacement therapy: No    RISK ASSESSMENT: > 1.7% 5 year risk and > 20% lifetime risk   Heather: 2.6% 5 year risk  SEBASTIAN: 43.8% lifetime risk   Abiodun: 11.6% lifetime risk     FAMILY HISTORY:  Breast ca: Yes    - paternal aunt 60 's   - paternal aunt 60's  - paternal cousin 40   Ovarian ca: No   - ? Paternal cousin with ovarian cancer   Pancreatic ca: No   Prostate: yes  - maternal uncle 69   Gastric ca: Yes   - paternal grandfather, passed   Melanoma: No  Colon ca: No  Other cancer: Yes   - maternal grandmother skin cancer 90's  - paternal grandfather brain 60's   [place]ar[place] ethnicity: No  Other genetic, testing, syndromes, or clotting disorders: No   - She saw a genetic counselor 2015 and no testing was recommended     PAST MEDICAL HISTORY  Past Medical History:   Diagnosis Date     Anemia      Atypical ductal hyperplasia of breast      Choroidal nevus of right eye      Concussion 10/2015     Depressive disorder 2006    Treated with celexa for two years     Family history of breast cancer 2/3/2015     Family history of breast cancer 2/3/2015     Family history of breast cancer 2/3/2015     Family history of breast cancer 2/3/2015     Gastroesophageal reflux disease      Papilloma of breast 11    Left, See Dr. Lynch at      PONV (postoperative nausea and vomiting)      S/P endometrial ablation 10/10/13    Menorrhagia     Shingles     x2 in the past     WBC decreased 2/3/2015     WBC decreased 2/3/2015     WBC decreased 2/3/2015     PAST SURGICAL HISTORY   Past Surgical History:   Procedure Laterality Date     ABDOMEN SURGERY      rectoplasty     BIOPSY BREAST NEEDLE LOCALIZATION  2012    Procedure: BIOPSY BREAST NEEDLE LOCALIZATION;  left breast excisional Biopsy with wire  localization @0830;  Surgeon: Robert Lynch MD;  Location: [facility] OR     BREAST SURGERY       COSMETIC SURGERY      Rectalplasty for episiotomy repair     COSMETIC SURGERY      Rectalplasty for episiotomy repair     DILATION AND CURETTAGE, HYSTEROSCOPY, ABLATE ENDOMETRIUM NOVASURE, COMBINED  10/10/2013    Procedure: COMBINED DILATION AND CURETTAGE, HYSTEROSCOPY, ABLATE ENDOMETRIUM NOVASURE;  Endometrial ablation with Novasure;  Surgeon: Indu Birmingham DO;  Location: [facility] OR     HC TOOTH EXTRACTION W/FORCEP       LUMPECTOMY BREAST Left 2/1/2017    Procedure: LUMPECTOMY BREAST;  Surgeon: Lori Montenegro MD;  Location: [facility]     SOFT TISSUE SURGERY      lumpectomy x 2 right breast     SOFT TISSUE SURGERY      episiotomy fixed     MEDICATIONS  Current Outpatient Medications   Medication Sig Dispense Refill     fluticasone (FLONASE) 50 MCG/ACT nasal spray Spray 2 sprays into both nostrils daily 48 g 5     LORazepam (ATIVAN) 1 MG tablet Take 1 tablet (1 mg) by mouth twice a day as needed.       MELATONIN PO        Naproxen Sodium (ALEVE PO) Take 440 mg by mouth At Bedtime       omeprazole (PRILOSEC) 20 MG CR capsule Take 1 capsule (20 mg) by mouth daily 90 capsule 3     tamoxifen (NOLVADEX) 20 MG tablet Take 1 tablet (20 mg) by mouth daily 90 tablet 3     venlafaxine (EFFEXOR-XR) 37.5 MG 24 hr capsule Take 1 capsule (37.5 mg) by mouth daily 90 capsule 3   Serotonin specific reuptake inhibitor: on SNRI.   Contraception: partner had a vasectomy     ALLERGIES  Allergies   Allergen Reactions     No Known Drug Allergies       SOCIAL HISTORY:  Smokes: No  EtOH: Yes, less than 1 per month  Exercise: walking   Works as nurse at [employer] [facility]    ROS:  Change in vision No  Headaches: no  Respiratory: No shortness of breath, dyspnea on exertion, cough, or hemoptysis   Cardiovascular: negative   Gastrointestinal: negative Abdominal pain: no  Breast: negative  Musculoskeletal: negative  Joint pain No Back pain: no  Psychiatric: negative  Hematologic/Lymphatic/Immunologic: negative  Endocrine: negative    EXAM  There were no vitals taken for this visit.   PHYSICAL EXAM  Respiratory: breathing non labored.   Breasts: Examination was done in both the upright and supine positions.  Breasts are symmetrical . No dominant fixed or suspicious masses noted. No skin or nipple changes. No nipple discharge.   No clavicular, cervical, or axillary lymphadenopathy.     ASSESSMENT/PLAN:    Kaitlyn Garcia is a 43 year old woman who presents with history of left breast atypical ductal hyperplasia here for high risk screening and preventative therapy with Tamoxifen.      1) We also reviewed that atypical ductal hyperplasia increases her baseline risk for breast cancer. Patients diagnosed with atypical hyperplasia are at higher risk for developing breast cancer in the future. She meets guidelines for high risk screening with a lifetime risk of breast cancer >20%. Discussed she meets guidelines for high risk screening with yearly mammogram alternating with Breast MRI every six months. Clinical encounter every 6-12 months including family history, risk assessment, and clinical breast exam.   - Next mammogram with clinic visit due 11/29/19  - Next breast MRI with clinic visit: 5/2020      2) She is a candidate for breast cancer risk-reduction intervention based on a Heather estimated 5 year risk. The contraindications for Tamoxifen (history of VTE, history or stroke, history of TIA, pregnancy, and potential pregnancy without an effective nonhormal method of method of contraception) along with potential side effects (hot flashes, deep vein thrombosis, pulmonary embolis, stroke, cataracts, and endometrial cancer) including signs and symptoms were discussed. We discussed recent research and the option of switching to lower dose Tamoxifen, and she is interested in doing so.   - Tamoxifen 5 mg daily for 5 years started: 5/24/17    - Yearly gynecological examination. Prompt evaluation for any vaginal spotting.    3) Lifestyle Modifications were provided.   - Maintain a healthy weight. Your BMI is Body mass index is 22.74 kg/m . Recommended BMI is 20-25. Higher body mass index (BMI) and adult weight gain is associated with increased risk for breast cancer. This increase in risk has been attributed to increase in circulating endogenous estrogen levels from fat tissue.   - Alcohol consumption, even at moderate levels (1-2 drinks per day), increases breast cancer risk. Limit alcohol consumption to less than 1 drink per day. (1 ounce of liquor, 6 ounces of wine, or 8 ounces of beer)  - Exercise a minimum of 3 hours per week of moderate-intensity aerobic activity.     Merced Wiggins PA-C    Total time spent face to face with the patient was 30 minutes. 20 minutes was spent counseling the patient as described above.

## 2019-06-12 ENCOUNTER — ANCILLARY PROCEDURE (OUTPATIENT)
Dept: ULTRASOUND IMAGING | Facility: CLINIC | Age: 44
End: 2019-06-12
Attending: OBSTETRICS & GYNECOLOGY
Payer: COMMERCIAL

## 2019-06-12 DIAGNOSIS — N83.202 LEFT OVARIAN CYST: ICD-10-CM

## 2019-06-12 PROCEDURE — 76856 US EXAM PELVIC COMPLETE: CPT | Mod: 59 | Performed by: RADIOLOGY

## 2019-06-12 PROCEDURE — 76830 TRANSVAGINAL US NON-OB: CPT | Performed by: RADIOLOGY

## 2019-06-20 ENCOUNTER — OFFICE VISIT (OUTPATIENT)
Dept: PEDIATRICS | Facility: CLINIC | Age: 44
End: 2019-06-20
Payer: COMMERCIAL

## 2019-06-20 VITALS
SYSTOLIC BLOOD PRESSURE: 110 MMHG | BODY MASS INDEX: 22.97 KG/M2 | HEART RATE: 89 BPM | OXYGEN SATURATION: 97 % | TEMPERATURE: 98.5 F | WEIGHT: 145.5 LBS | DIASTOLIC BLOOD PRESSURE: 65 MMHG

## 2019-06-20 DIAGNOSIS — Z00.00 ROUTINE GENERAL MEDICAL EXAMINATION AT A HEALTH CARE FACILITY: Primary | ICD-10-CM

## 2019-06-20 DIAGNOSIS — J30.9 ALLERGIC RHINITIS, UNSPECIFIED SEASONALITY, UNSPECIFIED TRIGGER: ICD-10-CM

## 2019-06-20 DIAGNOSIS — F41.1 GAD (GENERALIZED ANXIETY DISORDER): ICD-10-CM

## 2019-06-20 DIAGNOSIS — F41.0 PANIC ATTACK: ICD-10-CM

## 2019-06-20 DIAGNOSIS — J01.10 ACUTE NON-RECURRENT FRONTAL SINUSITIS: ICD-10-CM

## 2019-06-20 PROCEDURE — 99214 OFFICE O/P EST MOD 30 MIN: CPT | Mod: 25 | Performed by: NURSE PRACTITIONER

## 2019-06-20 PROCEDURE — 99396 PREV VISIT EST AGE 40-64: CPT | Performed by: NURSE PRACTITIONER

## 2019-06-20 RX ORDER — VENLAFAXINE HYDROCHLORIDE 37.5 MG/1
37.5 CAPSULE, EXTENDED RELEASE ORAL DAILY
Qty: 90 CAPSULE | Refills: 3 | Status: SHIPPED | OUTPATIENT
Start: 2019-06-20 | End: 2020-06-02

## 2019-06-20 RX ORDER — AMOXICILLIN 875 MG
875 TABLET ORAL 2 TIMES DAILY
Qty: 20 TABLET | Refills: 0 | Status: SHIPPED | OUTPATIENT
Start: 2019-06-20 | End: 2019-08-21

## 2019-06-20 NOTE — PATIENT INSTRUCTIONS
PLAN:   1.   Symptomatic therapy suggested: will try course of antibiotics and start on Nasal steroid.  ,Zyrtec, Claritin or Allegra  (or generic equivalents)    2.  Orders Placed This Encounter   Medications     amoxicillin (AMOXIL) 875 MG tablet     Sig: Take 1 tablet (875 mg) by mouth 2 times daily     Dispense:  20 tablet     Refill:  0     venlafaxine (EFFEXOR-XR) 37.5 MG 24 hr capsule     Sig: Take 1 capsule (37.5 mg) by mouth daily     Dispense:  90 capsule     Refill:  3     3. Patient needs to follow up in if no improvement,or sooner if worsening of symptoms or other symptoms develop.  Follow up office visit in one year for annual health maintenance exam, sooner PRN.    Preventive Health Recommendations  Female Ages 40 to 49    Yearly exam:     See your health care provider every year in order to  1. Review health changes.   2. Discuss preventive care.    3. Review your medicines if your doctor prescribed any.      Get a Pap test every three years (unless you have an abnormal result and your provider advises testing more often).      If you get Pap tests with HPV test, you only need to test every 5 years, unless you have an abnormal result. You do not need a Pap test if your uterus was removed (hysterectomy) and you have not had cancer.      You should be tested each year for STDs (sexually transmitted diseases), if you're at risk.     Ask your doctor if you should have a mammogram.      Have a colonoscopy (test for colon cancer) if someone in your family has had colon cancer or polyps before age 50.       Have a cholesterol test every 5 years.       Have a diabetes test (fasting glucose) after age 45. If you are at risk for diabetes, you should have this test every 3 years.    Shots: Get a flu shot each year. Get a tetanus shot every 10 years.     Nutrition:     Eat at least 5 servings of fruits and vegetables each day.    Eat whole-grain bread, whole-wheat pasta and brown rice instead of white grains and  rice.    Get adequate Calcium and Vitamin D.      Lifestyle    Exercise at least 150 minutes a week (an average of 30 minutes a day, 5 days a week). This will help you control your weight and prevent disease.    Limit alcohol to one drink per day.    No smoking.     Wear sunscreen to prevent skin cancer.    See your dentist every six months for an exam and cleaning.

## 2019-06-20 NOTE — NURSING NOTE
"Chief Complaint   Patient presents with     Physical     AFE       Initial /65 (BP Location: Right arm, Patient Position: Sitting, Cuff Size: Adult Regular)   Pulse 89   Temp 98.5  F (36.9  C) (Temporal)   Wt 66 kg (145 lb 8 oz)   SpO2 97%   Breastfeeding? No   BMI 22.97 kg/m   Estimated body mass index is 22.97 kg/m  as calculated from the following:    Height as of 5/30/19: 1.695 m (5' 6.73\").    Weight as of this encounter: 66 kg (145 lb 8 oz).  Medication Reconciliation: complete      ALEJANDRO Bishop      "

## 2019-06-20 NOTE — PROGRESS NOTES
SUBJECTIVE:   CC: Kaitlyn Garcia is an 43 year old woman who presents for preventive health visit.     Healthy Habits:    Do you get at least three servings of calcium containing foods daily (dairy, green leafy vegetables, etc.)? yes    Amount of exercise or daily activities, outside of work: very little    Problems taking medications regularly No    Medication side effects: No    Have you had an eye exam in the past two years? yes    Do you see a dentist twice per year? yes    Do you have sleep apnea, excessive snoring or daytime drowsiness?yes      Today's PHQ-2 Score:   PHQ-2 ( 1999 [employer]) 9/13/2018 8/28/2018   Q1: Little interest or pleasure in doing things 0 2   Q2: Feeling down, depressed or hopeless 0 2   PHQ-2 Score 0 4       Abuse: Current or Past(Physical, Sexual or Emotional)- No  Do you feel safe in your environment? Yes    Social History     Tobacco Use     Smoking status: Never Smoker     Smokeless tobacco: Never Used   Substance Use Topics     Alcohol use: Yes     Alcohol/week: 0.0 oz     Comment: Less than once a month     If you drink alcohol do you typically have >3 drinks per day or >7 drinks per week? No                     Reviewed orders with patient.  Reviewed health maintenance and updated orders accordingly - Yes  Lab work is in process  Labs reviewed in EPIC  BP Readings from Last 3 Encounters:   06/20/19 110/65   05/30/19 109/75   11/28/18 115/78    Wt Readings from Last 3 Encounters:   06/20/19 66 kg (145 lb 8 oz)   05/30/19 65.3 kg (144 lb)   11/28/18 64.3 kg (141 lb 11.2 oz)                  Patient Active Problem List   Diagnosis     CARDIOVASCULAR SCREENING; LDL GOAL LESS THAN 160     Iron deficiency anemia     Mammographic microcalcification found on diagnostic imaging of breast     Intraductal papilloma of breast     Atypical hyperplasia of breast     Acne     Bloating symptom     Abdominal pain     Abnormal biliary HIDA scan     Family history of breast cancer     Neutropenia  (H)     Choroidal nevus of right eye     Situational mixed anxiety and depressive disorder     Glaucoma suspect, bilateral     Post-concussion syndrome     Muscle spasms of head or neck     Allergic rhinitis     WBC decreased     Past Surgical History:   Procedure Laterality Date     ABDOMEN SURGERY      rectoplasty     BIOPSY BREAST NEEDLE LOCALIZATION  7/30/2012    Procedure: BIOPSY BREAST NEEDLE LOCALIZATION;  left breast excisional Biopsy with wire localization @0830;  Surgeon: Robert Lynch MD;  Location: [facility]     BREAST SURGERY       COSMETIC SURGERY      Rectalplasty for episiotomy repair     COSMETIC SURGERY      Rectalplasty for episiotomy repair     DILATION AND CURETTAGE, HYSTEROSCOPY, ABLATE ENDOMETRIUM NOVASURE, COMBINED  10/10/2013    Procedure: COMBINED DILATION AND CURETTAGE, HYSTEROSCOPY, ABLATE ENDOMETRIUM NOVASURE;  Endometrial ablation with Novasure;  Surgeon: Indu Birmingham DO;  Location: [facility]     HC TOOTH EXTRACTION W/FORCEP       LUMPECTOMY BREAST Left 2/1/2017    Procedure: LUMPECTOMY BREAST;  Surgeon: Lori Montenegro MD;  [facility]     SOFT TISSUE SURGERY      lumpectomy x 2 right breast     SOFT TISSUE SURGERY      episiotomy fixed       Social History     Tobacco Use     Smoking status: Never Smoker     Smokeless tobacco: Never Used   Substance Use Topics     Alcohol use: Yes     Alcohol/week: 0.0 oz     Comment: Less than once a month     Family History   Problem Relation Age of Onset     Hypertension Father      Lipids Father      Hyperlipidemia Father      Depression/Anxiety Father      Cerebrovascular Disease Father         TIA     Cancer Maternal Grandmother      Glaucoma Maternal Grandmother      Macular Degeneration Maternal Grandmother      Osteoporosis Maternal Grandmother      Anesthesia Reaction Paternal Grandmother      Diabetes Paternal Grandmother      Cancer Paternal Grandfather      Depression/Anxiety Mother      Depression/Anxiety  Daughter      Depression Daughter      Depression Son      Cataracts Maternal Grandfather      Breast Cancer Paternal Aunt 60        x 2 aunts     Breast Cancer Other 40        Paternal cousin     Breast Cancer Other 64        Several aunts on fathers side/Several aunts on fathers side/Several aunts on fathers side/Several aunts on fathers side     Prostate Cancer Other 64        Uncle on mothers side     Asthma No family hx of      C.A.D. No family hx of      Cancer - colorectal No family hx of      Connective Tissue Disorder No family hx of      Thyroid Disease No family hx of      Coronary Artery Disease No family hx of      Ovarian Cancer No family hx of      Thyroid Disease No family hx of      Chemical Addiction No family hx of      Known Genetic Syndrome No family hx of          Current Outpatient Medications   Medication Sig Dispense Refill     fluticasone (FLONASE) 50 MCG/ACT nasal spray Spray 2 sprays into both nostrils daily 48 g 5     LORazepam (ATIVAN) 1 MG tablet Take 1 tablet (1 mg) by mouth twice a day as needed.       MELATONIN PO        Naproxen Sodium (ALEVE PO) Take 440 mg by mouth At Bedtime       omeprazole (PRILOSEC) 20 MG CR capsule Take 1 capsule (20 mg) by mouth daily 90 capsule 3     tamoxifen (NOLVADEX) 10 MG tablet Take 0.5 tablets (5 mg) by mouth daily 30 tablet 3     venlafaxine (EFFEXOR-XR) 37.5 MG 24 hr capsule Take 1 capsule (37.5 mg) by mouth daily 90 capsule 3     Allergies   Allergen Reactions     No Known Drug Allergies        Mammogram Screening: Patient under age 50, mutual decision reflected in health maintenance.      Pertinent mammograms are reviewed under the imaging tab.  History of abnormal Pap smear: NO - age 30- 65 PAP every 3 years recommended  PAP / HPV Latest Ref Rng & Units 6/4/2018 2/3/2015 5/21/2012   PAP - NIL NIL NIL   HPV 16 DNA NEG:Negative Negative - -   HPV 18 DNA NEG:Negative Negative - -   OTHER HR HPV NEG:Negative Negative - -     Reviewed and updated as  needed this visit by clinical staff  Tobacco  Allergies  Med Hx  Surg Hx  Fam Hx  Soc Hx        Reviewed and updated as needed this visit by Provider        Past Medical History:   Diagnosis Date     Anemia      Atypical ductal hyperplasia of breast      Choroidal nevus of right eye      Concussion 10/2015     Depressive disorder 2006    Treated with celexa for two years     Family history of breast cancer 2/3/2015     Family history of breast cancer 2/3/2015     Family history of breast cancer 2/3/2015     Family history of breast cancer 2/3/2015     Gastroesophageal reflux disease      Papilloma of breast 5/23/12    Left, See Dr. Lynch at      PONV (postoperative nausea and vomiting)      S/P endometrial ablation 10/10/13    Menorrhagia     Shingles     x2 in the past     WBC decreased 2/3/2015     WBC decreased 2/3/2015     WBC decreased 2/3/2015      Past Surgical History:   Procedure Laterality Date     ABDOMEN SURGERY      rectoplasty     BIOPSY BREAST NEEDLE LOCALIZATION  7/30/2012    Procedure: BIOPSY BREAST NEEDLE LOCALIZATION;  left breast excisional Biopsy with wire localization @0830;  Surgeon: Robert Lynch MD;  Location: [facility]     BREAST SURGERY       COSMETIC SURGERY      Rectalplasty for episiotomy repair     COSMETIC SURGERY      Rectalplasty for episiotomy repair     DILATION AND CURETTAGE, HYSTEROSCOPY, ABLATE ENDOMETRIUM NOVASURE, COMBINED  10/10/2013    Procedure: COMBINED DILATION AND CURETTAGE, HYSTEROSCOPY, ABLATE ENDOMETRIUM NOVASURE;  Endometrial ablation with Novasure;  Surgeon: Indu Birmingham DO;  Location: [facility]     HC TOOTH EXTRACTION W/FORCEP       LUMPECTOMY BREAST Left 2/1/2017    Procedure: LUMPECTOMY BREAST;  Surgeon: Lori Montenegro MD;  [facility]     SOFT TISSUE SURGERY      lumpectomy x 2 right breast     SOFT TISSUE SURGERY      episiotomy fixed       ROS:  CONSTITUTIONAL:NEGATIVE for fever, chills, change in  weight  INTEGUMENTARY/SKIN: NEGATIVE for worrisome rashes, moles or lesions  EYES: NEGATIVE for vision changes or irritation  ENT: POSITIVE for had a bad sinus illness about 5 weeks ago and still having some tenderness in the right tonsillar throat. Thinks may have some allergies  and NEGATIVE for epistaxis and fever  RESP:NEGATIVE for significant cough or SOB  BREAST: NEGATIVE for masses, tenderness or discharge  CV: NEGATIVE for chest pain, palpitations or peripheral edema  GI: POSITIVE for heartburn or reflux and Hx GERD and NEGATIVE for nausea, poor appetite, vomiting and weight loss   female: no unusual urinary symptoms, no unusual vaginal symptoms and menses: amenorrhea ablation   MUSCULOSKELETAL:NEGATIVE for significant arthralgias or myalgia  NEURO: NEGATIVE for weakness, dizziness or paresthesias  ENDOCRINE: NEGATIVE for temperature intolerance, skin/hair changes  HEME/ALLERGY/IMMUNE: NEGATIVE for bleeding problems  PSYCHIATRIC: NEGATIVE for changes in mood or affect       OBJECTIVE:   /65 (BP Location: Right arm, Patient Position: Sitting, Cuff Size: Adult Regular)   Pulse 89   Temp 98.5  F (36.9  C) (Temporal)   Wt 66 kg (145 lb 8 oz)   SpO2 97%   Breastfeeding? No   BMI 22.97 kg/m    EXAM:  GENERAL: healthy, alert and no distress  EYES: Eyes grossly normal to inspection and conjunctivae and sclerae normal  HENT: ear canals and TM's normal, nose and mouth without ulcers or lesions  NECK: no adenopathy, no asymmetry, masses, or scars and thyroid normal to palpation  RESP: lungs clear to auscultation - no rales, rhonchi or wheezes  BREAST: normal without masses, tenderness or nipple discharge and no palpable axillary masses or adenopathy  CV: regular rates and rhythm, no murmur, click or rub, peripheral pulses strong and no peripheral edema  ABDOMEN: soft, nontender, no hepatosplenomegaly, no masses and bowel sounds normal   (female): normal female external genitalia, normal urethral meatus,  vaginal mucosa pink, moist, well rugated, and normal cervix/adnexa/uterus without masses or discharge  MS: no gross musculoskeletal defects noted, no edema  SKIN: no suspicious lesions or rashes  NEURO: Normal strength and tone, mentation intact and speech normal  PSYCH: mentation appears normal, affect normal/bright  LYMPH: no cervical, supraclavicular, axillary, or inguinal adenopathy    Diagnostic Test Results:  Pending orders and results       ASSESSMENT/PLAN:   Kaitlyn was seen today for physical.    Diagnoses and all orders for this visit:    Routine general medical examination at a health care facility    Acute non-recurrent frontal sinusitis  -     amoxicillin (AMOXIL) 875 MG tablet; Take 1 tablet (875 mg) by mouth 2 times daily    Allergic rhinitis, unspecified seasonality, unspecified trigger  Discussed the nature and general treatment of allergies including avoidance, meds, and shot therapy. I recommended initially trying to control the symptoms with meds and going on to allergy treating and shot therapy if not well controlled.  Discussed the use of antihistamines, nasal steroid sprays   Continue current medication regimen unchanged.    Panic attack  -     venlafaxine (EFFEXOR-XR) 37.5 MG 24 hr capsule; Take 1 capsule (37.5 mg) by mouth daily    AUDELIA (generalized anxiety disorder)  -     venlafaxine (EFFEXOR-XR) 37.5 MG 24 hr capsule; Take 1 capsule (37.5 mg) by mouth daily  Continue current medication regimen unchanged.    PLAN:   1.   Symptomatic therapy suggested: will try course of antibiotics and start on Nasal steroid.  ,Zyrtec, Claritin or Allegra  (or generic equivalents)    2.  Orders Placed This Encounter   Medications     amoxicillin (AMOXIL) 875 MG tablet     Sig: Take 1 tablet (875 mg) by mouth 2 times daily     Dispense:  20 tablet     Refill:  0     venlafaxine (EFFEXOR-XR) 37.5 MG 24 hr capsule     Sig: Take 1 capsule (37.5 mg) by mouth daily     Dispense:  90 capsule     Refill:  3     3.  "Patient needs to follow up in if no improvement,or sooner if worsening of symptoms or other symptoms develop.  Follow up office visit in one year for annual health maintenance exam, sooner PRN.      COUNSELING:   Reviewed preventive health counseling, as reflected in patient instructions  Special attention given to:        Regular exercise       Healthy diet/nutrition       Vision screening       Osteoporosis Prevention/Bone Health       Safe sex practices/STD prevention       The 10-year ASCVD risk score (Soy OLIVAREZ Jr., et al., 2013) is: 0.3%    Values used to calculate the score:      Age: 43 years      Sex: Female      Is Non- : No      Diabetic: No      Tobacco smoker: No      Systolic Blood Pressure: 110 mmHg      Is BP treated: No      HDL Cholesterol: 75 mg/dL      Total Cholesterol: 199 mg/dL    Estimated body mass index is 22.97 kg/m  as calculated from the following:    Height as of 5/30/19: 1.695 m (5' 6.73\").    Weight as of this encounter: 66 kg (145 lb 8 oz).    Weight management plan: Discussed healthy diet and exercise guidelines     reports that she has never smoked. She has never used smokeless tobacco.      Counseling Resources:  ATP IV Guidelines  Pooled Cohorts Equation Calculator  Breast Cancer Risk Calculator  FRAX Risk Assessment  ICSI Preventive Guidelines  Dietary Guidelines for Americans, 2010  USDA's MyPlate  ASA Prophylaxis  Lung CA Screening    SINDHU Antonio CNP  [facility]  "

## 2019-07-12 ENCOUNTER — MYC MEDICAL ADVICE (OUTPATIENT)
Dept: PEDIATRICS | Facility: CLINIC | Age: 44
End: 2019-07-12

## 2019-07-12 ENCOUNTER — TRANSFERRED RECORDS (OUTPATIENT)
Dept: HEALTH INFORMATION MANAGEMENT | Facility: CLINIC | Age: 44
End: 2019-07-12

## 2019-07-12 NOTE — TELEPHONE ENCOUNTER
Please see Pressly message. No current availability on any provider schedule for today.  Routing to provider to review and advise.     Jaja Son RN, BSN, PHN  .[facility]

## 2019-07-12 NOTE — TELEPHONE ENCOUNTER
Called patient, relayed message & patient verbalized understanding. Offered today at [place] or tomorrow here. She will think about what she wants to do and schedule PRN.   Fatuma Trevino RN

## 2019-07-31 ENCOUNTER — MYC MEDICAL ADVICE (OUTPATIENT)
Dept: PEDIATRICS | Facility: CLINIC | Age: 44
End: 2019-07-31

## 2019-07-31 DIAGNOSIS — J01.10 ACUTE NON-RECURRENT FRONTAL SINUSITIS: ICD-10-CM

## 2019-08-01 RX ORDER — FLUCONAZOLE 150 MG/1
150 TABLET ORAL ONCE
Qty: 1 TABLET | Refills: 0 | Status: SHIPPED | OUTPATIENT
Start: 2019-08-01 | End: 2019-08-01

## 2019-08-01 RX ORDER — FLUCONAZOLE 150 MG/1
150 TABLET ORAL ONCE
Qty: 1 TABLET | Refills: 0 | Status: SHIPPED | OUTPATIENT
Start: 2019-08-01 | End: 2019-08-21

## 2019-08-21 ENCOUNTER — ANCILLARY PROCEDURE (OUTPATIENT)
Dept: MAMMOGRAPHY | Facility: CLINIC | Age: 44
End: 2019-08-21
Attending: OBSTETRICS & GYNECOLOGY
Payer: COMMERCIAL

## 2019-08-21 ENCOUNTER — OFFICE VISIT (OUTPATIENT)
Dept: OBGYN | Facility: CLINIC | Age: 44
End: 2019-08-21
Payer: COMMERCIAL

## 2019-08-21 ENCOUNTER — ANCILLARY PROCEDURE (OUTPATIENT)
Dept: ULTRASOUND IMAGING | Facility: CLINIC | Age: 44
End: 2019-08-21
Attending: OBSTETRICS & GYNECOLOGY
Payer: COMMERCIAL

## 2019-08-21 VITALS
HEIGHT: 67 IN | BODY MASS INDEX: 23.23 KG/M2 | SYSTOLIC BLOOD PRESSURE: 118 MMHG | HEART RATE: 79 BPM | OXYGEN SATURATION: 97 % | WEIGHT: 148 LBS | DIASTOLIC BLOOD PRESSURE: 81 MMHG

## 2019-08-21 DIAGNOSIS — N63.0 BREAST MASS: ICD-10-CM

## 2019-08-21 DIAGNOSIS — N63.0 BREAST MASS: Primary | ICD-10-CM

## 2019-08-21 PROCEDURE — 77065 DX MAMMO INCL CAD UNI: CPT

## 2019-08-21 PROCEDURE — 99214 OFFICE O/P EST MOD 30 MIN: CPT | Performed by: OBSTETRICS & GYNECOLOGY

## 2019-08-21 PROCEDURE — 76642 ULTRASOUND BREAST LIMITED: CPT | Mod: RT

## 2019-08-21 PROCEDURE — G0279 TOMOSYNTHESIS, MAMMO: HCPCS

## 2019-08-21 ASSESSMENT — MIFFLIN-ST. JEOR: SCORE: 1349.98

## 2019-08-21 NOTE — PATIENT INSTRUCTIONS
If you have any questions regarding your visit, Please contact your care team.    [facility]: [phone]      [facility] HOURS TELEPHONE NUMBER   Emerita DO Gibson.    TERESITA Marcial -    LOTTIE Roldan, LOTTIE Baker RN     Monday, Wednesday, Thursday and Friday, [place]  8:30a.m-5:00 p.m   [facility]  [address] [place] [address]  [phone] ask for [facility]    Imaging [phone]       Urgent Care locations:    [facility] Saturday and Sunday   9 am - 5 pm    Monday-Friday   12 pm - 8 pm  Saturday and Sunday   9 am - 5 pm   [phone]     [facility] Labor and Delivery:  [phone]    If you need a medication refill, please contact your pharmacy. Please allow 3 business days for your refill to be completed.  As always, Thank you for trusting us with your healthcare needs!

## 2019-08-21 NOTE — PROGRESS NOTES
Kaitlyn is a 43 year old female, , who is here for a breast and pelvic exam only, due to her current use of Tamoxifen.  Her oncologist prefers that she have these two exam performed by a gynecologist.  The patient is s/p lumpectomy but has not been performing self breast exams herself each month.  She understands that her left ovarian cyst resolved per US on 19 and denies any new symptoms.  She is not due for a pap smear today - due in 2021.     ROS: Ten point review of systems was reviewed and negative except the above.    Health Maintenance   Topic Date Due     DEPRESSION ACTION PLAN  1974     PHQ-9  2019     PREVENTIVE CARE VISIT  2019     INFLUENZA VACCINE (1) 2019     EYE EXAM  2020     PAP  2021     DTAP/TDAP/TD IMMUNIZATION (2 - Td) 2023     HIV SCREENING  Addressed     IPV IMMUNIZATION  Aged Out     MENINGITIS IMMUNIZATION  Aged Out      Last pap: not due  Last Mammogram: not due  Last Dexa: not due  Last Colonoscopy: not due  Lab Results   Component Value Date    CHOL 199 2017     Lab Results   Component Value Date    HDL 75 2017     Lab Results   Component Value Date     2017     Lab Results   Component Value Date    TRIG 77 2017     Lab Results   Component Value Date    CHOLHDLRATIO 2.7 2015         OBHX:      PSH:   Past Surgical History:   Procedure Laterality Date     ABDOMEN SURGERY      rectoplasty     BIOPSY BREAST NEEDLE LOCALIZATION  2012    Procedure: BIOPSY BREAST NEEDLE LOCALIZATION;  left breast excisional Biopsy with wire localization @0830;  Surgeon: Robert Lynch MD;  Location: [facility] OR     BREAST SURGERY       COSMETIC SURGERY      Rectalplasty for episiotomy repair     COSMETIC SURGERY      Rectalplasty for episiotomy repair     DILATION AND CURETTAGE, HYSTEROSCOPY, ABLATE ENDOMETRIUM NOVASURE, COMBINED  10/10/2013    Procedure: COMBINED DILATION AND CURETTAGE, HYSTEROSCOPY, ABLATE  "ENDOMETRIUM NOVASURE;  Endometrial ablation with Novasure;  Surgeon: Indu Birmingham DO;  Location: [facility] OR     HC TOOTH EXTRACTION W/FORCEP       LUMPECTOMY BREAST Left 2/1/2017    Procedure: LUMPECTOMY BREAST;  Surgeon: Lori Montenegro MD;  [facility]     SOFT TISSUE SURGERY      lumpectomy x 2 right breast     SOFT TISSUE SURGERY      episiotomy fixed         PMH: Her past medical, surgical, and obstetric histories were reviewed and are documented in their appropriate chart areas.    ALL/Meds: Her medication and allergy histories were reviewed and are documented in their appropriate chart areas.    SH/FMH: Her social and family history was reviewed and documented in its appropriate chart area.    PE: /81   Pulse 79   Ht 1.695 m (5' 6.75\")   Wt 67.1 kg (148 lb)   SpO2 97%   Breastfeeding? No   BMI 23.35 kg/m    Body mass index is 23.35 kg/m .    Breast: a pea-sized mass was palpated just lateral to the nipple of her right breast so she was sent for further evaluation - to Radiology.  No nipple discharge was noted and her left breast was palpably normal.  Abdomen: Benign, Soft, flat, non-tender, No masses, organomegaly, No inguinal nodes and Bowel sounds normoactive.   Pelvic:       - Ext: Vulva and perineum are normal without lesion, mass or discharge        - Urethra: normal without discharge        - Urethral Meatus: normal appearance       - Bladder: no tenderness, no masses       - Vagina: Normal mucosa, no discharge        - Cervix: normal and multiparous       - Uterus:Normal shape, position and consistency        - Adnexa: Normal without masses or tenderness       - Rectal: deferred    A/P:  Breast and Pelvic Exams, Right Breast Mass, Hx of Breast Cancer with Current Use of Tamoxifen     -  I discussed the new pap recommendations regarding screening.  Explained the rationale for increased intervals between paps.  Questions asked and answered.  She does agree to this " regiment.  Pap was not obtained   -  BC: condoms   -  No orders of the defined types were placed in this encounter.   -  Schedule a f/u diagnostic mammogram and US of right breast due to palpable mass on today's exam   -  Encouraged self-breast exam   -  Encouraged low fat diet, regular exercise, and adequate calcium intake.    Emerita Arreaga DO  FACOG, FACS

## 2019-08-26 ENCOUNTER — MYC MEDICAL ADVICE (OUTPATIENT)
Dept: PEDIATRICS | Facility: CLINIC | Age: 44
End: 2019-08-26

## 2019-08-26 DIAGNOSIS — B00.1 RECURRENT COLD SORES: ICD-10-CM

## 2019-08-26 DIAGNOSIS — J01.01 ACUTE RECURRENT MAXILLARY SINUSITIS: Primary | ICD-10-CM

## 2019-08-26 RX ORDER — VALACYCLOVIR HYDROCHLORIDE 1 G/1
2000 TABLET, FILM COATED ORAL 2 TIMES DAILY
Qty: 4 TABLET | Refills: 3 | Status: SHIPPED | OUTPATIENT
Start: 2019-08-26 | End: 2020-04-21

## 2019-09-12 ENCOUNTER — ANCILLARY PROCEDURE (OUTPATIENT)
Dept: CT IMAGING | Facility: CLINIC | Age: 44
End: 2019-09-12
Attending: NURSE PRACTITIONER
Payer: COMMERCIAL

## 2019-09-12 DIAGNOSIS — J01.01 ACUTE RECURRENT MAXILLARY SINUSITIS: ICD-10-CM

## 2019-09-12 DIAGNOSIS — K21.9 GASTROESOPHAGEAL REFLUX DISEASE WITHOUT ESOPHAGITIS: ICD-10-CM

## 2019-09-12 PROCEDURE — 70486 CT MAXILLOFACIAL W/O DYE: CPT | Performed by: RADIOLOGY

## 2019-09-12 NOTE — RESULT ENCOUNTER NOTE
David Garcia,    Attached are your test results.  Your sinuses are normal   I am thinking we should send you to allergist   Your provider has referred you to: N: [facility] [place] [phone]   [email or web address]       Please contact us if you have any questions.    Ani Slater, CNP

## 2019-11-12 ENCOUNTER — ANCILLARY PROCEDURE (OUTPATIENT)
Dept: MAMMOGRAPHY | Facility: CLINIC | Age: 44
End: 2019-11-12
Attending: PHYSICIAN ASSISTANT
Payer: COMMERCIAL

## 2019-11-12 DIAGNOSIS — Z12.31 VISIT FOR SCREENING MAMMOGRAM: ICD-10-CM

## 2019-11-12 PROCEDURE — 77063 BREAST TOMOSYNTHESIS BI: CPT | Performed by: RADIOLOGY

## 2019-11-12 PROCEDURE — 77067 SCR MAMMO BI INCL CAD: CPT | Performed by: RADIOLOGY

## 2019-11-12 NOTE — PROGRESS NOTES
FOLLOW UP  Nov 13, 2019     Kaitlyn Garcia is a 44 year old woman who presents with history of left breast atypical ductal hyperplasia here for high risk screening and preventative therapy with Tamoxifen.     HPI:    History of 3 left breast needle biopsies, 2 right breast needle biopsies, and 2 excisional left breast biopsy, all benign. The left breast biopsy on 12/12/16 had 1 mm of atypical ductal hyperplasia.     She has 2 paternal aunts who had breast cancer in their 60's and a paternal cousin with breast cancer in her 40's.     She was seen by a genetic counselor in 2015, no genetic testing was done. She began high risk screening with annual mammogram and breast MRI in 2013. She started Tamoxifen for risk reduction 5/24/17 and switched to 5 mg dosing 5/30/19. She had a breast MRI 5/9/17 revealed an area of enhancement biopsied to be an intraductal papilloma without atypia. She was seen for surgical consultation by Dr. Montenegro and monitoring with imaging was recommended.     She had a pelvic ultrasound 6/4/18 for vaginal bleeding that was negative. She had a pelvic ultrasound 6/12/19 for a left ovarian cyst, the uterus was without concerning findings.      She denies any breast masses, changes, nipple inversion, nipple discharge. She denies any skin changes. She had a screening mammogram yesterday. She is tolerating Tamoxifen. She has some increase breast pain with the reduced dose of Tamoxifen.     BREAST-SPECIFIC HISTORY:    Previous breast imaging: Yes   - 5/21/12 b/l Dmammo and right u/s for right breast lump: calcifications in the upper outer quadrant of left breast spanning 5 cm. Right u/s negative. BI-RADS 4 suspicious for malignancy   - 5/23/12 left stereotactic biopsy: intraductal papilloma   - 7/30/12 left wire-placement   - 2/19/13 MRI: large segmental enhancement of left lateral breast, BI-RADS 0 incomplete   Left u/s: BI-RADS 3 probably benign  - 3/29/13 MRI: BI-RADS 2 benign findings  - 8/5/13 b/l  Dmammo for fullness: BI-RADS 2 benign   - 2/5/14 MRI: cysts in left breast, BI-RADS 2 benign   - 5/9/14 Dmammo b/l: negative. Left u/s: cystic appearing structures, BI-RADS 2 benign  - 5/20/14 MRI: study not completed due to nausea, BI-RADS 0 incomplete  - 5/28/14 MRI: BI-RADS 2 benign  - 10/28/14 b/l Dmammo: BI-RADS 2 benign   - 5/7/15 MRI BI-RADS 2 benign   - 11/10/15 Smammo: bilateral calcs: BI-RADS 2 benign  - 1/25/16 left u/s for pain: multiple tiny cyst  - 5/17/16 MRI: BI-RADS 2 benign   - 12/6/16  Smammo: lateral left breast developing macrocalcifications BI-RADS 0 incomplete   - 12/7/16 Dmammo left: left 2:00 calcifications BI-RADS 4 suspicious  - 12/12/16: stereotactic biopsy: focus of ADH    - 2/1/17 wire placement   - 5/9/17 MRI: 2 adjacent mass like areas of enhancement mid central left breast BI-RADS 0 incomplete   - 5/12/17 left u/s: scattered small cysts BI-RADS 4 suspicious   - 5/18/17 MR biopsy: intraductal papilloma   - 4/30/18 MRI BI-RADS 2  - 6/6/18 left Dmammo and ultrasound for pain BI-RADS 2  - 11/28/18 Smammo BI-RADS 2  - 5/7/19 breast MRI BI-RADS 2  - 8/21/19 Dmammo and right breast ultrasound: BI-RADS 2     Prior breast biopsies:Yes   - 2 right breast biopsies   - 5/23/12 left needle biopsy: intraductal papilloma with sclerosing features   - 7/30/12 left excisional biopsy with Dr. Lynch: focal columnar cell change with atypia, intraductal papilloma and surrounding intraductal papillomatosis, sclerosing adenosis, fibrocystic changes  - 12/12/16 left needle biopsy x2: flat epithelial atypia with focus of ADH, duct ectasia and focal sclerosing adenosis, microcalcifications.   FEA and columnar cell change with atypia  - 2/1/17 left scar excision and excisional biopsy with Dr. Montenegro: FEA, columnar cell change/hyperplasia, intraductal papillomas, usual ductal hyperplasia, calcifications  - 5/18/17 left MRI needle biopsy: intraductal papilloma, mild columnar cell hyperplasia, mild duct ectasia, focal  sclerosing adenosis      Prior history of breast cancer: No  Prior radiation history: No   Self breast exams: No  Breast density: heterogeneously dense    GYN HISTORY:  . Age at 1st pregnancy: 17. Breastfeeding history: Yes.   Age at menarche: 14-14  Menopausal: premenopausal  Menopausal hormone replacement therapy: No    RISK ASSESSMENT: > 1.7% 5 year risk and > 20% lifetime risk   Heather:2.8% 5 year risk   SEBASTIAN: 53.9% lifetime risk   Abiodun: 11.6% lifetime risk     FAMILY HISTORY:  Breast ca: Yes    - paternal aunt 60 's   - paternal aunt 60's  - paternal cousin 40   Ovarian ca: No   - ? Paternal cousin with ovarian cancer   Pancreatic ca: No   Prostate: yes  - maternal uncle 69   Gastric ca: Yes   - paternal grandfather, passed   Melanoma: No  Colon ca: No  Other cancer: Yes   - maternal grandmother skin cancer 90's  - paternal grandfather brain 60's   Anabaptist ethnicity: No  Other genetic, testing, syndromes, or clotting disorders: No   - She saw a genetic counselor 2015 and no testing was recommended     PAST MEDICAL HISTORY  Past Medical History:   Diagnosis Date     Anemia      Atypical ductal hyperplasia of breast      Choroidal nevus of right eye      Concussion 10/2015     Depressive disorder 2006    Treated with celexa for two years     Family history of breast cancer 2/3/2015     Family history of breast cancer 2/3/2015     Family history of breast cancer 2/3/2015     Family history of breast cancer 2/3/2015     Gastroesophageal reflux disease      Papilloma of breast 11    Left, See Dr. Lynch at      PONV (postoperative nausea and vomiting)      S/P endometrial ablation 10/10/13    Menorrhagia     Shingles     x2 in the past     WBC decreased 2/3/2015     WBC decreased 2/3/2015     WBC decreased 2/3/2015     PAST SURGICAL HISTORY   Past Surgical History:   Procedure Laterality Date     ABDOMEN SURGERY      rectoplasty     BIOPSY BREAST NEEDLE LOCALIZATION  2012    Procedure: BIOPSY BREAST  NEEDLE LOCALIZATION;  left breast excisional Biopsy with wire localization @0830;  Surgeon: Robert Lynch MD;  Location: [facility] OR     BREAST SURGERY       COSMETIC SURGERY      Rectalplasty for episiotomy repair     COSMETIC SURGERY      Rectalplasty for episiotomy repair     DILATION AND CURETTAGE, HYSTEROSCOPY, ABLATE ENDOMETRIUM NOVASURE, COMBINED  10/10/2013    Procedure: COMBINED DILATION AND CURETTAGE, HYSTEROSCOPY, ABLATE ENDOMETRIUM NOVASURE;  Endometrial ablation with Novasure;  Surgeon: Indu Birmingham DO;  Location: [facility] OR     HC TOOTH EXTRACTION W/FORCEP       LUMPECTOMY BREAST Left 2/1/2017    Procedure: LUMPECTOMY BREAST;  Surgeon: Lori Montenegro MD;  Location: [facility]     SOFT TISSUE SURGERY      lumpectomy x 2 right breast     SOFT TISSUE SURGERY      episiotomy fixed     MEDICATIONS  Current Outpatient Medications   Medication Sig Dispense Refill     fluticasone (FLONASE) 50 MCG/ACT nasal spray Spray 2 sprays into both nostrils daily 48 g 5     LORazepam (ATIVAN) 1 MG tablet Take 1 tablet (1 mg) by mouth twice a day as needed.       MELATONIN PO        Naproxen Sodium (ALEVE PO) Take 440 mg by mouth At Bedtime       omeprazole (PRILOSEC) 20 MG DR capsule TAKE ONE CAPSULE BY MOUTH EVERY DAY 90 capsule 2     tamoxifen (NOLVADEX) 10 MG tablet Take 0.5 tablets (5 mg) by mouth daily 30 tablet 3     valACYclovir (VALTREX) 1000 mg tablet Take 2 tablets (2,000 mg) by mouth 2 times daily 4 tablet 3     venlafaxine (EFFEXOR-XR) 37.5 MG 24 hr capsule Take 1 capsule (37.5 mg) by mouth daily 90 capsule 3   Serotonin specific reuptake inhibitor: on SNRI.   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   Breasts: Examination was done in both the upright and supine positions.  Breasts are symmetrical . No dominant fixed or suspicious masses noted. No skin or nipple changes. No nipple discharge. Right and left breast scars well healed.   No clavicular, cervical, or axillary lymphadenopathy.     INVESTIGATIONS:    11/12/19 screening mammogram: no concerning findings per Radiology, final report pending.     ASSESSMENT/PLAN:    Kaitlyn Garcia is a 44 year old woman who presents with history of left breast atypical ductal hyperplasia here for high risk screening and preventative therapy with Tamoxifen. No concerning findings on screening mammogram yesterday.      1) We also reviewed that atypical ductal hyperplasia increases her baseline risk for breast cancer. She meets guidelines for high risk screening with a lifetime risk of breast cancer >20%. Discussed she meets guidelines for high risk screening with yearly mammogram alternating with Breast MRI every six months. Clinical encounter every 6-12 months including family history, risk assessment, and clinical breast exam.   - Next breast MRI with clinic visit due 5/2020   - Next screening mammogram with clinic visit due: 11/13/20      2) She is a candidate for breast cancer risk-reduction intervention based on a Heather estimated 5 year risk. The contraindications for Tamoxifen (history of VTE, history or stroke, history of TIA, pregnancy, and potential pregnancy without an " effective nonhormal method of method of contraception) along with potential side effects (hot flashes, deep vein thrombosis, pulmonary embolis, stroke, cataracts, and endometrial cancer) including signs and symptoms were discussed. We discussed recent research and the option of switching to lower dose Tamoxifen, and she is interested in doing so.   - Tamoxifen 5 mg daily for 5 years started: 5/24/17   - Yearly gynecological examination. Prompt evaluation for any vaginal spotting.    3) Lifestyle Modifications were provided.   - Maintain a healthy weight. Your BMI is Body mass index is 23.75 kg/m .. Recommended BMI is 20-25. Higher body mass index (BMI) and adult weight gain is associated with increased risk for breast cancer. This increase in risk has been attributed to increase in circulating endogenous estrogen levels from fat tissue.   - Alcohol consumption, even at moderate levels (1-2 drinks per day), increases breast cancer risk. Limit alcohol consumption to less than 1 drink per day. (1 ounce of liquor, 6 ounces of wine, or 8 ounces of beer)  - Exercise a minimum of 3 hours per week of moderate-intensity aerobic activity.     Merced Wiggins PA-C    Total time spent face to face with the patient was 35 minutes. 25 minutes was spent counseling the patient as described above.

## 2019-11-13 ENCOUNTER — OFFICE VISIT (OUTPATIENT)
Dept: SURGERY | Facility: CLINIC | Age: 44
End: 2019-11-13
Attending: PHYSICIAN ASSISTANT
Payer: COMMERCIAL

## 2019-11-13 VITALS
BODY MASS INDEX: 23.62 KG/M2 | DIASTOLIC BLOOD PRESSURE: 82 MMHG | HEART RATE: 81 BPM | HEIGHT: 67 IN | SYSTOLIC BLOOD PRESSURE: 115 MMHG | OXYGEN SATURATION: 100 % | WEIGHT: 150.5 LBS | TEMPERATURE: 97.8 F

## 2019-11-13 DIAGNOSIS — Z91.89 AT HIGH RISK FOR BREAST CANCER: Primary | ICD-10-CM

## 2019-11-13 PROCEDURE — 99214 OFFICE O/P EST MOD 30 MIN: CPT | Mod: ZP | Performed by: PHYSICIAN ASSISTANT

## 2019-11-13 PROCEDURE — G0463 HOSPITAL OUTPT CLINIC VISIT: HCPCS | Mod: ZF

## 2019-11-13 ASSESSMENT — PAIN SCALES - GENERAL: PAINLEVEL: MODERATE PAIN (5)

## 2019-11-13 ASSESSMENT — MIFFLIN-ST. JEOR: SCORE: 1356.32

## 2019-11-13 NOTE — PATIENT INSTRUCTIONS
Kaitlyn Gracia is a 44 year old woman who presents with history of left breast atypical ductal hyperplasia here for high risk screening and preventative therapy with Tamoxifen. No concerning findings on screening mammogram yesterday.      1) We also reviewed that atypical ductal hyperplasia increases her baseline risk for breast cancer. She meets guidelines for high risk screening with a lifetime risk of breast cancer >20%. Discussed she meets guidelines for high risk screening with yearly mammogram alternating with Breast MRI every six months. Clinical encounter every 6-12 months including family history, risk assessment, and clinical breast exam.   - Next breast MRI with clinic visit due 5/2020   - Next screening mammogram with clinic visit due: 11/13/20      2) She is a candidate for breast cancer risk-reduction intervention based on a Heather estimated 5 year risk. The contraindications for Tamoxifen (history of VTE, history or stroke, history of TIA, pregnancy, and potential pregnancy without an effective nonhormal method of method of contraception) along with potential side effects (hot flashes, deep vein thrombosis, pulmonary embolis, stroke, cataracts, and endometrial cancer) including signs and symptoms were discussed. We discussed recent research and the option of switching to lower dose Tamoxifen, and she is interested in doing so.   - Tamoxifen 5 mg daily for 5 years started: 5/24/17   - Yearly gynecological examination. Prompt evaluation for any vaginal spotting.    3) Lifestyle Modifications were provided.   - Maintain a healthy weight. Your BMI is Body mass index is 23.75 kg/m .. Recommended BMI is 20-25. Higher body mass index (BMI) and adult weight gain is associated with increased risk for breast cancer. This increase in risk has been attributed to increase in circulating endogenous estrogen levels from fat tissue.   - Alcohol consumption, even at moderate levels (1-2 drinks per day), increases breast  cancer risk. Limit alcohol consumption to less than 1 drink per day. (1 ounce of liquor, 6 ounces of wine, or 8 ounces of beer)  - Exercise a minimum of 3 hours per week of moderate-intensity aerobic activity.     Discussed unclear etiology for breast pain and that it can be difficult to treat. We discussed its association with breast cancer is low. The following was suggested.   - Pain diary   -  Supportive Bra that was professionally fit. Recommend Soma or [employer]ure Intimate Apparel.    -  Wear a high-intensity sports bra when exercising. Wear while sleeping.   -  Consider eliminating/decrease caffeine (chocolate, tea, coffee, soda) for a two week period of time to see if pain improves/resolves  -  Eat a low fat diet and improve omega 3 fatty acid intake   -  Flax seed 25 grams (2 tablespoons) daily   - Acupuncture   -  Evening Primrose Oil starting with 500 mg/day. May increase to 1000 mg 3 times daily for 6 months.   -  Vitamin E starting with 400 IU/day. Can increase to 1200 IU daily in 2-3 divided doses. For 6 months.   - Chasteberry 400-500 mg/day for cyclical breast pain  - Maintain a healthy BMI <25.  - Tylenol, ibuprofen, or neproxen.

## 2019-11-13 NOTE — LETTER
RE: Kaitlyn Garcia  [address] [facility] [address]     Dear Colleague,    Thank you for referring your patient, Kaitlyn Garcia, to the [facility]. Please see a copy of my visit note below.    FOLLOW UP  Nov 13, 2019     Kaitlyn Garcia is a 44 year old woman who presents with history of left breast atypical ductal hyperplasia here for high risk screening and preventative therapy with Tamoxifen.     HPI:    History of 3 left breast needle biopsies, 2 right breast needle biopsies, and 2 excisional left breast biopsy, all benign. The left breast biopsy on 12/12/16 had 1 mm of atypical ductal hyperplasia.     She has 2 paternal aunts who had breast cancer in their 60's and a paternal cousin with breast cancer in her 40's.     She was seen by a genetic counselor in 2015, no genetic testing was done. She began high risk screening with annual mammogram and breast MRI in 2013. She started Tamoxifen for risk reduction 5/24/17 and switched to 5 mg dosing 5/30/19. She had a breast MRI 5/9/17 revealed an area of enhancement biopsied to be an intraductal papilloma without atypia. She was seen for surgical consultation by Dr. Montenegro and monitoring with imaging was recommended.     She had a pelvic ultrasound 6/4/18 for vaginal bleeding that was negative. She had a pelvic ultrasound 6/12/19 for a left ovarian cyst, the uterus was without concerning findings.      She denies any breast masses, changes, nipple inversion, nipple discharge. She denies any skin changes. She had a screening mammogram yesterday. She is tolerating Tamoxifen. She has some increase breast pain with the reduced dose of Tamoxifen.     BREAST-SPECIFIC HISTORY:    Previous breast imaging: Yes   - 5/21/12 b/l Dmammo and right u/s for right breast lump: calcifications in the upper outer quadrant of left breast spanning 5 cm. Right u/s negative. BI-RADS 4 suspicious for malignancy   - 5/23/12 left stereotactic biopsy: intraductal  papilloma   - 7/30/12 left wire-placement   - 2/19/13 MRI: large segmental enhancement of left lateral breast, BI-RADS 0 incomplete   Left u/s: BI-RADS 3 probably benign  - 3/29/13 MRI: BI-RADS 2 benign findings  - 8/5/13 b/l Dmammo for fullness: BI-RADS 2 benign   - 2/5/14 MRI: cysts in left breast, BI-RADS 2 benign   - 5/9/14 Dmammo b/l: negative. Left u/s: cystic appearing structures, BI-RADS 2 benign  - 5/20/14 MRI: study not completed due to nausea, BI-RADS 0 incomplete  - 5/28/14 MRI: BI-RADS 2 benign  - 10/28/14 b/l Dmammo: BI-RADS 2 benign   - 5/7/15 MRI BI-RADS 2 benign   - 11/10/15 Smammo: bilateral calcs: BI-RADS 2 benign  - 1/25/16 left u/s for pain: multiple tiny cyst  - 5/17/16 MRI: BI-RADS 2 benign   - 12/6/16  Smammo: lateral left breast developing macrocalcifications BI-RADS 0 incomplete   - 12/7/16 Dmammo left: left 2:00 calcifications BI-RADS 4 suspicious  - 12/12/16: stereotactic biopsy: focus of ADH    - 2/1/17 wire placement   - 5/9/17 MRI: 2 adjacent mass like areas of enhancement mid central left breast BI-RADS 0 incomplete   - 5/12/17 left u/s: scattered small cysts BI-RADS 4 suspicious   - 5/18/17 MR biopsy: intraductal papilloma   - 4/30/18 MRI BI-RADS 2  - 6/6/18 left Dmammo and ultrasound for pain BI-RADS 2  - 11/28/18 Smammo BI-RADS 2  - 5/7/19 breast MRI BI-RADS 2  - 8/21/19 Dmammo and right breast ultrasound: BI-RADS 2     Prior breast biopsies:Yes   - 2 right breast biopsies   - 5/23/12 left needle biopsy: intraductal papilloma with sclerosing features   - 7/30/12 left excisional biopsy with Dr. Lynch: focal columnar cell change with atypia, intraductal papilloma and surrounding intraductal papillomatosis, sclerosing adenosis, fibrocystic changes  - 12/12/16 left needle biopsy x2: flat epithelial atypia with focus of ADH, duct ectasia and focal sclerosing adenosis, microcalcifications.   FEA and columnar cell change with atypia  - 2/1/17 left scar excision and excisional biopsy with  Dr. Montenegro: GENEA, columnar cell change/hyperplasia, intraductal papillomas, usual ductal hyperplasia, calcifications  - 17 left MRI needle biopsy: intraductal papilloma, mild columnar cell hyperplasia, mild duct ectasia, focal sclerosing adenosis      Prior history of breast cancer: No  Prior radiation history: No   Self breast exams: No  Breast density: heterogeneously dense    GYN HISTORY:  . Age at 1st pregnancy: 17. Breastfeeding history: Yes.   Age at menarche: 14-14  Menopausal: premenopausal  Menopausal hormone replacement therapy: No    RISK ASSESSMENT: > 1.7% 5 year risk and > 20% lifetime risk   Heather:2.8% 5 year risk   SEBASTIAN: 53.9% lifetime risk   Abiodun: 11.6% lifetime risk     FAMILY HISTORY:  Breast ca: Yes    - paternal aunt 60 's   - paternal aunt 60's  - paternal cousin 40   Ovarian ca: No   - ? Paternal cousin with ovarian cancer   Pancreatic ca: No   Prostate: yes  - maternal uncle 69   Gastric ca: Yes   - paternal grandfather, passed   Melanoma: No  Colon ca: No  Other cancer: Yes   - maternal grandmother skin cancer 90's  - paternal grandfather brain 60's   Mandaeism ethnicity: No  Other genetic, testing, syndromes, or clotting disorders: No   - She saw a genetic counselor 2015 and no testing was recommended     PAST MEDICAL HISTORY  Past Medical History:   Diagnosis Date     Anemia      Atypical ductal hyperplasia of breast      Choroidal nevus of right eye      Concussion 10/2015     Depressive disorder 2006    Treated with celexa for two years     Family history of breast cancer 2/3/2015     Family history of breast cancer 2/3/2015     Family history of breast cancer 2/3/2015     Family history of breast cancer 2/3/2015     Gastroesophageal reflux disease      Papilloma of breast 11    Left, See Dr. Lynch at      PONV (postoperative nausea and vomiting)      S/P endometrial ablation 10/10/13    Menorrhagia     Shingles     x2 in the past     WBC decreased 2/3/2015     WBC decreased  2/3/2015     WBC decreased 2/3/2015     PAST SURGICAL HISTORY   Past Surgical History:   Procedure Laterality Date     ABDOMEN SURGERY      rectoplasty     BIOPSY BREAST NEEDLE LOCALIZATION  7/30/2012    Procedure: BIOPSY BREAST NEEDLE LOCALIZATION;  left breast excisional Biopsy with wire localization @0830;  Surgeon: Robert Lynch MD;  Location: [facility] OR     BREAST SURGERY       COSMETIC SURGERY      Rectalplasty for episiotomy repair     COSMETIC SURGERY      Rectalplasty for episiotomy repair     DILATION AND CURETTAGE, HYSTEROSCOPY, ABLATE ENDOMETRIUM NOVASURE, COMBINED  10/10/2013    Procedure: COMBINED DILATION AND CURETTAGE, HYSTEROSCOPY, ABLATE ENDOMETRIUM NOVASURE;  Endometrial ablation with Novasure;  Surgeon: Indu Birmingham DO;  Location: [facility] OR     HC TOOTH EXTRACTION W/FORCEP       LUMPECTOMY BREAST Left 2/1/2017    Procedure: LUMPECTOMY BREAST;  Surgeon: Lori Montenegro MD;  Location: [facility]     SOFT TISSUE SURGERY      lumpectomy x 2 right breast     SOFT TISSUE SURGERY      episiotomy fixed     MEDICATIONS  Current Outpatient Medications   Medication Sig Dispense Refill     fluticasone (FLONASE) 50 MCG/ACT nasal spray Spray 2 sprays into both nostrils daily 48 g 5     LORazepam (ATIVAN) 1 MG tablet Take 1 tablet (1 mg) by mouth twice a day as needed.       MELATONIN PO        Naproxen Sodium (ALEVE PO) Take 440 mg by mouth At Bedtime       omeprazole (PRILOSEC) 20 MG DR capsule TAKE ONE CAPSULE BY MOUTH EVERY DAY 90 capsule 2     tamoxifen (NOLVADEX) 10 MG tablet Take 0.5 tablets (5 mg) by mouth daily 30 tablet 3     valACYclovir (VALTREX) 1000 mg tablet Take 2 tablets (2,000 mg) by mouth 2 times daily 4 tablet 3     venlafaxine (EFFEXOR-XR) 37.5 MG 24 hr capsule Take 1 capsule (37.5 mg) by mouth daily 90 capsule 3   Serotonin specific reuptake inhibitor: on SNRI.   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   Breasts: Examination was done in both the upright and supine positions.  Breasts are symmetrical . No dominant fixed or suspicious masses noted. No skin or nipple changes. No nipple discharge. Right and left breast scars well healed.   No clavicular, cervical, or axillary lymphadenopathy.     INVESTIGATIONS:    11/12/19 screening mammogram: no concerning findings per Radiology, final report pending.     ASSESSMENT/PLAN:    Kaitlyn Garcia is a 44 year old woman who presents with history of left breast atypical ductal hyperplasia here for high risk screening and preventative therapy with Tamoxifen. No concerning findings on screening mammogram yesterday.      1) We also reviewed that atypical ductal hyperplasia increases her baseline risk for breast cancer. She meets guidelines for high risk screening with a lifetime risk of breast cancer >20%. Discussed she meets guidelines for high risk screening with yearly mammogram alternating with Breast MRI every six months. Clinical encounter every 6-12 months including family history, risk assessment, and clinical breast exam.   - Next breast MRI with clinic visit due 5/2020   - Next screening mammogram with clinic visit due : 11/13/20      2) She " is a candidate for breast cancer risk-reduction intervention based on a Heather estimated 5 year risk. The contraindications for Tamoxifen (history of VTE, history or stroke, history of TIA, pregnancy, and potential pregnancy without an effective nonhormal method of method of contraception) along with potential side effects (hot flashes, deep vein thrombosis, pulmonary embolis, stroke, cataracts, and endometrial cancer) including signs and symptoms were discussed. We discussed recent research and the option of switching to lower dose Tamoxifen, and she is interested in doing so.   - Tamoxifen 5 mg daily for 5 years started: 5/24/17   - Yearly gynecological examination. Prompt evaluation for any vaginal spotting.    3) Lifestyle Modifications were provided.   - Maintain a healthy weight. Your BMI is Body mass index is 23.75 kg/m .. Recommended BMI is 20-25. Higher body mass index (BMI) and adult weight gain is associated with increased risk for breast cancer. This increase in risk has been attributed to increase in circulating endogenous estrogen levels from fat tissue.   - Alcohol consumption, even at moderate levels (1-2 drinks per day), increases breast cancer risk. Limit alcohol consumption to less than 1 drink per day. (1 ounce of liquor, 6 ounces of wine, or 8 ounces of beer)  - Exercise a minimum of 3 hours per week of moderate-intensity aerobic activity.     Merced Wiggins PA-C    Total time spent face to face with the patient was 35 minutes. 25 minutes was spent counseling the patient as described above.

## 2019-11-13 NOTE — NURSING NOTE
"Oncology Rooming Note    November 13, 2019 11:43 AM   Kaitlyn Garcia is a 44 year old female who presents for:    Chief Complaint   Patient presents with     Oncology Clinic Visit     Return visit.  Intraductal papilloma of breast.     Initial Vitals: /82   Pulse 81   Temp 97.8  F (36.6  C) (Oral)   Ht 1.695 m (5' 6.75\")   Wt 68.3 kg (150 lb 8 oz)   SpO2 100%   BMI 23.75 kg/m   Estimated body mass index is 23.75 kg/m  as calculated from the following:    Height as of this encounter: 1.695 m (5' 6.75\").    Weight as of this encounter: 68.3 kg (150 lb 8 oz). Body surface area is 1.79 meters squared.  Moderate Pain (5) Comment: Data Unavailable   No LMP recorded. Patient has had an ablation.  Allergies reviewed: Yes  Medications reviewed: Yes    Medications: Medication refills not needed today.  Pharmacy name entered into [facility]:    [facility], [place] - [address]  [facility] [place], [place] - [address]    Clinical concerns: No additional concerns.  [place] was notified.      Jessica Richard, [facility]              "

## 2019-11-14 ENCOUNTER — OFFICE VISIT (OUTPATIENT)
Dept: PEDIATRICS | Facility: CLINIC | Age: 44
End: 2019-11-14
Payer: COMMERCIAL

## 2019-11-14 ENCOUNTER — ANCILLARY PROCEDURE (OUTPATIENT)
Dept: CT IMAGING | Facility: CLINIC | Age: 44
End: 2019-11-14
Attending: NURSE PRACTITIONER
Payer: COMMERCIAL

## 2019-11-14 VITALS
DIASTOLIC BLOOD PRESSURE: 60 MMHG | SYSTOLIC BLOOD PRESSURE: 100 MMHG | WEIGHT: 149 LBS | HEART RATE: 87 BPM | BODY MASS INDEX: 23.51 KG/M2 | OXYGEN SATURATION: 100 % | TEMPERATURE: 98.9 F

## 2019-11-14 DIAGNOSIS — R10.84 ABDOMINAL PAIN, GENERALIZED: Primary | ICD-10-CM

## 2019-11-14 DIAGNOSIS — R10.2 PELVIC PAIN IN FEMALE: ICD-10-CM

## 2019-11-14 DIAGNOSIS — R10.84 ABDOMINAL PAIN, GENERALIZED: ICD-10-CM

## 2019-11-14 LAB
ALBUMIN SERPL-MCNC: 3.8 G/DL (ref 3.4–5)
ALBUMIN UR-MCNC: 10 MG/DL
ALP SERPL-CCNC: 55 U/L (ref 40–150)
ALT SERPL W P-5'-P-CCNC: 31 U/L (ref 0–50)
ANION GAP SERPL CALCULATED.3IONS-SCNC: 4 MMOL/L (ref 3–14)
APPEARANCE UR: ABNORMAL
AST SERPL W P-5'-P-CCNC: 17 U/L (ref 0–45)
BACTERIA #/AREA URNS HPF: ABNORMAL /HPF
BASOPHILS # BLD AUTO: 0 10E9/L (ref 0–0.2)
BASOPHILS NFR BLD AUTO: 0.8 %
BILIRUB SERPL-MCNC: 0.4 MG/DL (ref 0.2–1.3)
BILIRUB UR QL STRIP: NEGATIVE
BUN SERPL-MCNC: 19 MG/DL (ref 7–30)
CALCIUM SERPL-MCNC: 8.3 MG/DL (ref 8.5–10.1)
CHLORIDE SERPL-SCNC: 109 MMOL/L (ref 94–109)
CO2 SERPL-SCNC: 28 MMOL/L (ref 20–32)
COLOR UR AUTO: YELLOW
CREAT SERPL-MCNC: 0.81 MG/DL (ref 0.52–1.04)
DIFFERENTIAL METHOD BLD: ABNORMAL
EOSINOPHIL # BLD AUTO: 0.1 10E9/L (ref 0–0.7)
EOSINOPHIL NFR BLD AUTO: 1.3 %
ERYTHROCYTE [DISTWIDTH] IN BLOOD BY AUTOMATED COUNT: 11.6 % (ref 10–15)
GFR SERPL CREATININE-BSD FRML MDRD: 88 ML/MIN/{1.73_M2}
GLUCOSE SERPL-MCNC: 84 MG/DL (ref 70–99)
GLUCOSE UR STRIP-MCNC: NEGATIVE MG/DL
HCT VFR BLD AUTO: 35.9 % (ref 35–47)
HGB BLD-MCNC: 12.2 G/DL (ref 11.7–15.7)
HGB UR QL STRIP: ABNORMAL
IMM GRANULOCYTES # BLD: 0 10E9/L (ref 0–0.4)
IMM GRANULOCYTES NFR BLD: 0.3 %
KETONES UR STRIP-MCNC: NEGATIVE MG/DL
LEUKOCYTE ESTERASE UR QL STRIP: NEGATIVE
LYMPHOCYTES # BLD AUTO: 1.4 10E9/L (ref 0.8–5.3)
LYMPHOCYTES NFR BLD AUTO: 34.9 %
MCH RBC QN AUTO: 31.2 PG (ref 26.5–33)
MCHC RBC AUTO-ENTMCNC: 34 G/DL (ref 31.5–36.5)
MCV RBC AUTO: 92 FL (ref 78–100)
MONOCYTES # BLD AUTO: 0.3 10E9/L (ref 0–1.3)
MONOCYTES NFR BLD AUTO: 6.7 %
MUCOUS THREADS #/AREA URNS LPF: PRESENT /LPF
NEUTROPHILS # BLD AUTO: 2.2 10E9/L (ref 1.6–8.3)
NEUTROPHILS NFR BLD AUTO: 56 %
NITRATE UR QL: NEGATIVE
NON-SQ EPI CELLS #/AREA URNS LPF: ABNORMAL /LPF
PH UR STRIP: 6 PH (ref 5–7)
PLATELET # BLD AUTO: 208 10E9/L (ref 150–450)
POTASSIUM SERPL-SCNC: 3.8 MMOL/L (ref 3.4–5.3)
PROT SERPL-MCNC: 7 G/DL (ref 6.8–8.8)
RBC # BLD AUTO: 3.91 10E12/L (ref 3.8–5.2)
RBC #/AREA URNS AUTO: ABNORMAL /HPF
SODIUM SERPL-SCNC: 141 MMOL/L (ref 133–144)
SOURCE: ABNORMAL
SP GR UR STRIP: 1.03 (ref 1–1.03)
UROBILINOGEN UR STRIP-MCNC: NORMAL MG/DL (ref 0–2)
WBC # BLD AUTO: 3.9 10E9/L (ref 4–11)
WBC #/AREA URNS AUTO: ABNORMAL /HPF

## 2019-11-14 PROCEDURE — 87086 URINE CULTURE/COLONY COUNT: CPT | Performed by: NURSE PRACTITIONER

## 2019-11-14 PROCEDURE — 81001 URINALYSIS AUTO W/SCOPE: CPT | Performed by: NURSE PRACTITIONER

## 2019-11-14 PROCEDURE — 36415 COLL VENOUS BLD VENIPUNCTURE: CPT | Performed by: NURSE PRACTITIONER

## 2019-11-14 PROCEDURE — 99214 OFFICE O/P EST MOD 30 MIN: CPT | Performed by: NURSE PRACTITIONER

## 2019-11-14 PROCEDURE — 85025 COMPLETE CBC W/AUTO DIFF WBC: CPT | Performed by: NURSE PRACTITIONER

## 2019-11-14 PROCEDURE — 80053 COMPREHEN METABOLIC PANEL: CPT | Performed by: NURSE PRACTITIONER

## 2019-11-14 PROCEDURE — 74177 CT ABD & PELVIS W/CONTRAST: CPT | Performed by: RADIOLOGY

## 2019-11-14 RX ORDER — IOPAMIDOL 755 MG/ML
92 INJECTION, SOLUTION INTRAVASCULAR ONCE
Status: COMPLETED | OUTPATIENT
Start: 2019-11-14 | End: 2019-11-14

## 2019-11-14 RX ADMIN — IOPAMIDOL 92 ML: 755 INJECTION, SOLUTION INTRAVASCULAR at 11:41

## 2019-11-14 ASSESSMENT — ANXIETY QUESTIONNAIRES
IF YOU CHECKED OFF ANY PROBLEMS ON THIS QUESTIONNAIRE, HOW DIFFICULT HAVE THESE PROBLEMS MADE IT FOR YOU TO DO YOUR WORK, TAKE CARE OF THINGS AT HOME, OR GET ALONG WITH OTHER PEOPLE: NOT DIFFICULT AT ALL
2. NOT BEING ABLE TO STOP OR CONTROL WORRYING: NOT AT ALL
GAD7 TOTAL SCORE: 0
3. WORRYING TOO MUCH ABOUT DIFFERENT THINGS: NOT AT ALL
1. FEELING NERVOUS, ANXIOUS, OR ON EDGE: NOT AT ALL
7. FEELING AFRAID AS IF SOMETHING AWFUL MIGHT HAPPEN: NOT AT ALL
5. BEING SO RESTLESS THAT IT IS HARD TO SIT STILL: NOT AT ALL
6. BECOMING EASILY ANNOYED OR IRRITABLE: NOT AT ALL

## 2019-11-14 ASSESSMENT — PATIENT HEALTH QUESTIONNAIRE - PHQ9
SUM OF ALL RESPONSES TO PHQ QUESTIONS 1-9: 0
5. POOR APPETITE OR OVEREATING: NOT AT ALL

## 2019-11-14 NOTE — PROGRESS NOTES
Subjective     Kaitlyn Garcia is a 44 year old female who presents to clinic today for the following health issues:    HPI   ABDOMINAL and FLANK   PAIN     Onset: 2 weeks    Description:   Character: Sharp and Dull ache  Location: rika-umbilical region pelvic region  Radiation: None    Intensity: mild    Progression of Symptoms:  same    Accompanying Signs & Symptoms:  Fever/Chills?: no   Gas/Bloating: YES  Nausea: YES  Vomitting: no   Diarrhea?: no   Constipation:no   Dysuria or Hematuria: no    History:   Trauma: no   Previous similar pain: YES- years ago   Previous tests done: none    Precipitating factors:   Does the pain change with:     Food: no      BM: no     Urination: no     Alleviating factors:  none    Therapies Tried and outcome: naproxen    LMP:  Ablation    Bilateral lower abdomen pain  Hx of ovarian cyst   Pain will enough to make her nauseated   Has been everyday for the last 2 weeks   Also has to back on right flank   Patient Active Problem List   Diagnosis     CARDIOVASCULAR SCREENING; LDL GOAL LESS THAN 160     Iron deficiency anemia     Mammographic microcalcification found on diagnostic imaging of breast     Intraductal papilloma of breast     Atypical hyperplasia of breast     Acne     Bloating symptom     Abdominal pain     Abnormal biliary HIDA scan     Family history of breast cancer     Neutropenia (H)     Choroidal nevus of right eye     Situational mixed anxiety and depressive disorder     Glaucoma suspect, bilateral     Post-concussion syndrome     Muscle spasms of head or neck     Allergic rhinitis     WBC decreased     Past Surgical History:   Procedure Laterality Date     ABDOMEN SURGERY      rectoplasty     BIOPSY BREAST NEEDLE LOCALIZATION  7/30/2012    Procedure: BIOPSY BREAST NEEDLE LOCALIZATION;  left breast excisional Biopsy with wire localization @0830;  Surgeon: Robert Lynch MD;  Location: [facility] OR     BREAST SURGERY       COSMETIC SURGERY      Rectalplasty for  episiotomy repair     COSMETIC SURGERY      Rectalplasty for episiotomy repair     DILATION AND CURETTAGE, HYSTEROSCOPY, ABLATE ENDOMETRIUM NOVASURE, COMBINED  10/10/2013    Procedure: COMBINED DILATION AND CURETTAGE, HYSTEROSCOPY, ABLATE ENDOMETRIUM NOVASURE;  Endometrial ablation with Novasure;  Surgeon: Indu Birmingham DO;  Location: [facility] OR     HC TOOTH EXTRACTION W/FORCEP       LUMPECTOMY BREAST Left 2/1/2017    Procedure: LUMPECTOMY BREAST;  Surgeon: Lori Montenegro MD;  Location: [facility]     SOFT TISSUE SURGERY      lumpectomy x 2 right breast     SOFT TISSUE SURGERY      episiotomy fixed       Social History     Tobacco Use     Smoking status: Never Smoker     Smokeless tobacco: Never Used   Substance Use Topics     Alcohol use: Yes     Alcohol/week: 0.0 standard drinks     Comment: Less than once a month     Family History   Problem Relation Age of Onset     Hypertension Father      Lipids Father      Hyperlipidemia Father      Depression/Anxiety Father      Cerebrovascular Disease Father         TIA     Cancer Maternal Grandmother      Glaucoma Maternal Grandmother      Macular Degeneration Maternal Grandmother      Osteoporosis Maternal Grandmother      Anesthesia Reaction Paternal Grandmother      Diabetes Paternal Grandmother      Cancer Paternal Grandfather      Depression/Anxiety Mother      Depression/Anxiety Daughter      Depression Daughter      Depression Son      Cataracts Maternal Grandfather      Breast Cancer Paternal Aunt 60        x 2 aunts     Breast Cancer Other 40        Paternal cousin     Breast Cancer Other 64        Several aunts on fathers side/Several aunts on fathers side/Several aunts on fathers side/Several aunts on fathers side     Prostate Cancer Other 64        Uncle on mothers side     Asthma No family hx of      C.A.D. No family hx of      Cancer - colorectal No family hx of      Connective Tissue Disorder No family hx of      Thyroid Disease No family hx  of      Coronary Artery Disease No family hx of      Ovarian Cancer No family hx of      Thyroid Disease No family hx of      Chemical Addiction No family hx of      Known Genetic Syndrome No family hx of          Current Outpatient Medications   Medication Sig Dispense Refill     fluticasone (FLONASE) 50 MCG/ACT nasal spray Spray 2 sprays into both nostrils daily 48 g 5     LORazepam (ATIVAN) 1 MG tablet Take 1 tablet (1 mg) by mouth twice a day as needed.       MELATONIN PO        Naproxen Sodium (ALEVE PO) Take 440 mg by mouth At Bedtime       omeprazole (PRILOSEC) 20 MG DR capsule TAKE ONE CAPSULE BY MOUTH EVERY DAY 90 capsule 2     tamoxifen (NOLVADEX) 10 MG tablet Take 0.5 tablets (5 mg) by mouth daily 30 tablet 3     valACYclovir (VALTREX) 1000 mg tablet Take 2 tablets (2,000 mg) by mouth 2 times daily 4 tablet 3     venlafaxine (EFFEXOR-XR) 37.5 MG 24 hr capsule Take 1 capsule (37.5 mg) by mouth daily 90 capsule 3     Allergies   Allergen Reactions     No Known Drug Allergies      BP Readings from Last 3 Encounters:   11/14/19 100/60   11/13/19 115/82   08/21/19 118/81    Wt Readings from Last 3 Encounters:   11/14/19 67.6 kg (149 lb)   11/13/19 68.3 kg (150 lb 8 oz)   08/21/19 67.1 kg (148 lb)            Reviewed and updated as needed this visit by Provider         Review of Systems   ROS COMP: CONSTITUTIONAL:NEGATIVE for fever, chills, change in weight  ENT/MOUTH: NEGATIVE for ear, mouth and throat problems  RESP:NEGATIVE for significant cough or SOB  CV: NEGATIVE for chest pain, palpitations or peripheral edema  GI: POSITIVE for abdominal pain RLQ and LLQ and nausea and NEGATIVE for heartburn or reflux, jaundice, melena, vomiting and weight loss  : negative for, dysuria, hematuria and will have some mild vaginal dark discharge and had an ablation several years ago   MUSCULOSKELETAL: NEGATIVE for significant arthralgias or myalgia  ENDOCRINE: NEGATIVE for temperature intolerance, skin/hair changes       Objective    /60 (BP Location: Right arm, Patient Position: Sitting, Cuff Size: Adult Regular)   Pulse 87   Temp 98.9  F (37.2  C) (Temporal)   Wt 67.6 kg (149 lb)   SpO2 100%   Breastfeeding No   BMI 23.51 kg/m    Body mass index is 23.51 kg/m .  Physical Exam   GENERAL APPEARANCE: alert, active and no distress  NECK: normal and supple  RESP: lungs clear to auscultation - no rales, rhonchi or wheezes  CV: regular rates and rhythm and no murmur, click or rub  mild and moderate tenderness in the entire abdomen area, no rebound tenderness, no guarding or rigidity, no herniae noted, no masses noted. Slight flank pain on the right   MS: extremities normal- no gross deformities noted  SKIN: Skin color, texture, turgor normal.   NEURO: Normal strength and tone, mentation intact and speech normal  PSYCH: mentation appears normal and affect normal/bright  MENTAL STATUS EXAM:  Appearance/Behavior: No apparent distress, Casually groomed and Dressed appropriately for weather  Speech: Normal  Mood/Affect: normal affect  Insight: Adequate    Diagnostic Test Results:  Results for orders placed or performed in visit on 11/14/19   UA with Microscopic reflex to Culture     Status: Abnormal   Result Value Ref Range    Color Urine Yellow     Appearance Urine Slightly Cloudy     Glucose Urine Negative NEG^Negative mg/dL    Bilirubin Urine Negative NEG^Negative    Ketones Urine Negative NEG^Negative mg/dL    Specific Gravity Urine 1.030 1.003 - 1.035    Blood Urine Trace (A) NEG^Negative    pH Urine 6.0 5.0 - 7.0 pH    Protein Albumin Urine 10 (A) NEG^Negative mg/dL    Urobilinogen mg/dL Normal 0.0 - 2.0 mg/dL    Nitrite Urine Negative NEG^Negative    Leukocyte Esterase Urine Negative NEG^Negative    Source Midstream Urine     WBC Urine 0 - 5 OTO5^0 - 5 /HPF    RBC Urine O - 2 OTO2^O - 2 /HPF    Bacteria Urine Moderate (A) NEG^Negative /HPF    Squamous Epithelial /LPF Urine Many (A) FEW^Few /LPF    Mucous Urine Present (A)  NEG^Negative /LPF   CBC with platelets differential     Status: Abnormal   Result Value Ref Range    WBC 3.9 (L) 4.0 - 11.0 10e9/L    RBC Count 3.91 3.8 - 5.2 10e12/L    Hemoglobin 12.2 11.7 - 15.7 g/dL    Hematocrit 35.9 35.0 - 47.0 %    MCV 92 78 - 100 fl    MCH 31.2 26.5 - 33.0 pg    MCHC 34.0 31.5 - 36.5 g/dL    RDW 11.6 10.0 - 15.0 %    Platelet Count 208 150 - 450 10e9/L    Diff Method Automated Method     % Neutrophils 56.0 %    % Lymphocytes 34.9 %    % Monocytes 6.7 %    % Eosinophils 1.3 %    % Basophils 0.8 %    % Immature Granulocytes 0.3 %    Absolute Neutrophil 2.2 1.6 - 8.3 10e9/L    Absolute Lymphocytes 1.4 0.8 - 5.3 10e9/L    Absolute Monocytes 0.3 0.0 - 1.3 10e9/L    Absolute Eosinophils 0.1 0.0 - 0.7 10e9/L    Absolute Basophils 0.0 0.0 - 0.2 10e9/L    Abs Immature Granulocytes 0.0 0 - 0.4 10e9/L   Comprehensive metabolic panel     Status: Abnormal   Result Value Ref Range    Sodium 141 133 - 144 mmol/L    Potassium 3.8 3.4 - 5.3 mmol/L    Chloride 109 94 - 109 mmol/L    Carbon Dioxide 28 20 - 32 mmol/L    Anion Gap 4 3 - 14 mmol/L    Glucose 84 70 - 99 mg/dL    Urea Nitrogen 19 7 - 30 mg/dL    Creatinine 0.81 0.52 - 1.04 mg/dL    GFR Estimate 88 >60 mL/min/[1.73_m2]    GFR Estimate If Black >90 >60 mL/min/[1.73_m2]    Calcium 8.3 (L) 8.5 - 10.1 mg/dL    Bilirubin Total 0.4 0.2 - 1.3 mg/dL    Albumin 3.8 3.4 - 5.0 g/dL    Protein Total 7.0 6.8 - 8.8 g/dL    Alkaline Phosphatase 55 40 - 150 U/L    ALT 31 0 - 50 U/L    AST 17 0 - 45 U/L   Urine Culture Aerobic Bacterial     Status: None   Result Value Ref Range    Specimen Description Midstream Urine     Culture Micro No growth      Recent Results (from the past 744 hour(s))   MA Screen Bilateral w/Blossom    Narrative    Examination:  MA SCREENING BILATERAL W/ BLOSSOM, COMPUTER AIDED DETECTION  11/12/2019 9:59 AM    History/Family History: No current or new breast symptoms, screening.  History of atypical ductal hyperplasia. Family history for  breast  cancer in 2 close relatives, age 60s.    Breast Density: Extremely dense.  Technique: Standard mammographic views were performed with  tomosynthesis and 2D reconstruction.    Comparison: 8/21/2018, 11/20/2018, 11/22/2017, 11/10/2015    Findings:  There has been no significant interval change or suspicious findings.      Impression    Impression: BI-RADS CATEGORY: 1 -  Negative.    Recommended Follow-up: Annual Mammography.    The results of the examination will be sent to the patient.    CA WHALEY MD   CT Abdomen Pelvis w Contrast    Narrative    EXAMINATION: CT ABDOMEN PELVIS W CONTRAST, 11/14/2019 11:47 AM    TECHNIQUE:  Helical CT images from the lung bases through the  symphysis pubis were obtained  with IV contrast. Contrast dose: 92 ml  Isovue 370     COMPARISON: Pelvic ultrasound 6/12/2019 and 1/2/2019. CT abdomen and  pelvis 5/2/14.    HISTORY: Abdominal pain, generalized    FINDINGS:  No suspicious focal hepatic lesion. Subcentimeter hypodensity in the  left lobe of the liver is too small to characterize, but likely  represents a benign cyst. Gallbladder is normal. No intra or  extrahepatic biliary dilatation. The pancreas is normal. Small  splenules, similar in appearance to prior CT exam. The spleen is  otherwise unremarkable. The adrenal glands, kidneys, ureters, and  urinary bladder are unremarkable. Retroverted retroflexed uterus.  Right corpus luteal cyst, similar appearance prior CT exam.     Mild diverticulosis, greatest in the sigmoid colon. No evidence for  acute diverticulitis. The right lower quadrant appendix is  incompletely visualized, with tip abutting multiple loops of  fluid-filled small bowel and right ovary, however there are no  secondary signs of acute appendicitis. No dilated loop of small bowel.    No free air or free fluid within the abdomen or pelvis. The major  abdominal pelvic vasculature appears patent. No suspicious lymph node  in the abdomen or pelvis.    Bones and  soft tissues:  Mild degenerative changes of the lumbar spine, greatest at L4-5.      Impression    IMPRESSION: No CT finding to account for abdominal pain.     I have personally reviewed the examination and initial interpretation  and I agree with the findings.    BAYLEE BLAND MD   US Pelvic Complete w Transvaginal    Narrative    EXAMINATION: US PELVIC COMPLETE WITH TRANSVAGINAL, 11/18/2019 1:09 PM     COMPARISON: CT is 11/14/2019, 5/2/2014, pelvic ultrasound 6/4/2018  history of endometrial ablation personal history of atypical ductal  hyperplasia of the breast.    HISTORY: Generalized pelvic pain.    TECHNIQUE: The pelvis was scanned in standard fashion with  transabdominal and transvaginal transducer(s) using both grey scale  and color Doppler techniques.    FINDINGS  The uterus measures 9.6 x 3.6 x 5.6 cm with heterogeneous echotexture.   There is no evidence of a focal fibroid. Both cysts are visualized.  There is marked periuterine vessels.  The endometrium is ill-defined and measures 2 mm stable cystic change.  The right ovary measures 2.8 x 2.2 x 2.2 cm and the left ovary  measures 2.2 x 0.8 x 1.6 cm.  There is blood flow to both ovaries.  There is no free fluid in the pelvis. No abnormality seen in the  adnexa.      Impression    IMPRESSION:   1.  Stable ultrasound findings. No focal finding to explain the  patient's pain.    AN MD JOVANA             Assessment & Plan     Kaitlyn was seen today for abdominal pain.    Diagnoses and all orders for this visit:    Abdominal pain, generalized  -     UA with Microscopic reflex to Culture  -     Urine Culture Aerobic Bacterial  -     CBC with platelets differential  -     Comprehensive metabolic panel  -     CT Abdomen Pelvis w Contrast; Future    Pelvic pain in female  -     US Pelvic Complete w Transvaginal; Future  Order to check closer at pelvic symptoms as CT was otherwise negative     See Patient Instructions  Patient Instructions     PLAN:   1.    Symptomatic therapy suggested: rest, increase fluids and call prn if symptoms persist or worsen.  2.  Orders Placed This Encounter   Procedures     CT Abdomen Pelvis w Contrast     UA with Microscopic reflex to Culture     CBC with platelets differential     Comprehensive metabolic panel       3. Patient needs to follow up in if no improvement,or sooner if worsening of symptoms or other symptoms develop.  FURTHER TESTING:       - CT abdomen and pelvis   Will follow up and/or notify patient of  results via My Chart to determine further need for followup    No follow-ups on file.    SINDHU Antonio CNP  [facility]

## 2019-11-14 NOTE — PATIENT INSTRUCTIONS
PLAN:   1.   Symptomatic therapy suggested: rest, increase fluids and call prn if symptoms persist or worsen.  2.  Orders Placed This Encounter   Procedures     CT Abdomen Pelvis w Contrast     UA with Microscopic reflex to Culture     CBC with platelets differential     Comprehensive metabolic panel       3. Patient needs to follow up in if no improvement,or sooner if worsening of symptoms or other symptoms develop.  FURTHER TESTING:       - CT abdomen and pelvis   Will follow up and/or notify patient of  results via My Chart to determine further need for followup

## 2019-11-14 NOTE — NURSING NOTE
"Chief Complaint   Patient presents with     Abdominal Pain     pelvic pain x 2 weeks       Initial /60 (BP Location: Right arm, Patient Position: Sitting, Cuff Size: Adult Regular)   Pulse 87   Temp 98.9  F (37.2  C) (Temporal)   Wt 67.6 kg (149 lb)   SpO2 100%   Breastfeeding No   BMI 23.51 kg/m   Estimated body mass index is 23.51 kg/m  as calculated from the following:    Height as of 11/13/19: 1.695 m (5' 6.75\").    Weight as of this encounter: 67.6 kg (149 lb).  Medication Reconciliation: complete      ALEJANDRO Bishop      "

## 2019-11-15 LAB
BACTERIA SPEC CULT: NO GROWTH
SPECIMEN SOURCE: NORMAL

## 2019-11-15 ASSESSMENT — ANXIETY QUESTIONNAIRES: GAD7 TOTAL SCORE: 0

## 2019-11-15 NOTE — RESULT ENCOUNTER NOTE
David Garcia,    Attached are your test results.  -Urine culture is normal.  There is no need for antibiotics at this point.  If new, worsening or persistent symptoms occur, then you should call or return for a recheck.   Please contact us if you have any questions.    Ani Slater, CNP

## 2019-11-15 NOTE — RESULT ENCOUNTER NOTE
David Garcia,    Attached are your test results.  CT is normal except for Right corpus luteal cyst, similar appearance prior CT exam  Could do pelvic ultrasound to evaluate further   I will place order. Please call [phone] to schedule.     Please contact us if you have any questions.    Ani Slater, CNP

## 2019-11-15 NOTE — RESULT ENCOUNTER NOTE
David Garcia,    Attached are your test results.  -All of your labs are normal appearing    Please contact us if you have any questions.    Ani Slater, CNP

## 2019-11-18 ENCOUNTER — ANCILLARY PROCEDURE (OUTPATIENT)
Dept: ULTRASOUND IMAGING | Facility: CLINIC | Age: 44
End: 2019-11-18
Attending: NURSE PRACTITIONER
Payer: COMMERCIAL

## 2019-11-18 DIAGNOSIS — R10.2 PELVIC PAIN IN FEMALE: ICD-10-CM

## 2019-11-18 PROCEDURE — 76830 TRANSVAGINAL US NON-OB: CPT | Performed by: RADIOLOGY

## 2019-11-18 PROCEDURE — 76856 US EXAM PELVIC COMPLETE: CPT | Mod: 59 | Performed by: RADIOLOGY

## 2019-11-19 NOTE — RESULT ENCOUNTER NOTE
David Garcia,    Attached are your test results.  Pelvic ultrasound appears stable    Please contact us if you have any questions.    Ani Slater, CNP

## 2019-12-18 ENCOUNTER — OFFICE VISIT (OUTPATIENT)
Dept: OPTOMETRY | Facility: CLINIC | Age: 44
End: 2019-12-18
Payer: COMMERCIAL

## 2019-12-18 DIAGNOSIS — Z01.00 EXAMINATION OF EYES AND VISION: Primary | ICD-10-CM

## 2019-12-18 DIAGNOSIS — H40.003 GLAUCOMA SUSPECT OF BOTH EYES: ICD-10-CM

## 2019-12-18 DIAGNOSIS — H52.13 MYOPIA OF BOTH EYES: ICD-10-CM

## 2019-12-18 DIAGNOSIS — H52.4 PRESBYOPIA: ICD-10-CM

## 2019-12-18 DIAGNOSIS — D31.31 CHOROIDAL NEVUS OF RIGHT EYE: ICD-10-CM

## 2019-12-18 PROCEDURE — 92014 COMPRE OPH EXAM EST PT 1/>: CPT | Performed by: OPTOMETRIST

## 2019-12-18 PROCEDURE — 92015 DETERMINE REFRACTIVE STATE: CPT | Performed by: OPTOMETRIST

## 2019-12-18 ASSESSMENT — REFRACTION_MANIFEST
OD_ADD: +1.50
OD_AXIS: 090
OS_ADD: +1.50
OD_CYLINDER: +0.25
OD_SPHERE: -0.25
OS_SPHERE: -0.25

## 2019-12-18 ASSESSMENT — VISUAL ACUITY
OS_SC: 20/20
METHOD: SNELLEN - LINEAR
OD_SC: 20/40
OD_SC+: -2
OS_SC+: -1
OD_SC: 20/20
OS_SC: 20/30

## 2019-12-18 ASSESSMENT — REFRACTION_WEARINGRX
SPECS_TYPE: COMPUTER/READING
OS_SPHERE: +1.00
OD_SPHERE: +1.00

## 2019-12-18 ASSESSMENT — EXTERNAL EXAM - LEFT EYE: OS_EXAM: NORMAL

## 2019-12-18 ASSESSMENT — CUP TO DISC RATIO
OS_RATIO: 0.65
OD_RATIO: 0.6

## 2019-12-18 ASSESSMENT — TONOMETRY
OD_IOP_MMHG: 15
IOP_METHOD: TONOPEN
OS_IOP_MMHG: 12

## 2019-12-18 ASSESSMENT — CONF VISUAL FIELD
OS_NORMAL: 1
OD_NORMAL: 1

## 2019-12-18 ASSESSMENT — SLIT LAMP EXAM - LIDS
COMMENTS: NORMAL
COMMENTS: NORMAL

## 2019-12-18 ASSESSMENT — EXTERNAL EXAM - RIGHT EYE: OD_EXAM: NORMAL

## 2019-12-18 NOTE — PATIENT INSTRUCTIONS
Eyeglass prescription given.  Your options are a bifocal which would allow you to see distance and near vision or separate glasses for reading.  OTC readers ok but you may get some distortion from poor quality lenses.  +1.25 both eyes.    Keep appointment with retina to follow up on choroidal nevus.    Return 1/20 for glaucoma testing.    Return in 1 year for a complete eye exam or sooner if needed.    Kirill Nicholson, OD    The affects of the dilating drops last for 4- 6 hours.  You will be more sensitive to light and vision will be blurry up close.  Mydriatic sunglasses were given if needed.      Optometry Providers       Clinic Locations                                 Telephone Number   Dr. Norma Galeas and [place]   [place] [phone]     [facility] Hours:                [facility] Hours:       [facility] Hours:   [address]   [address] [address]     [address]  [place] [address]   [place] [address]    [place] [address]  Phone: [phone]                    Phone: [phone]     Phone: [phone]                      Monday 8:00-7:00                          Monday 8:00-7:00                          Monday 8:00-7:00              Tuesday 8:00-6:00                          Tuesday 8:00-7:00                          Tuesday 8:00-7:00              Wednesday 8:00-6:00                  Wednesday 8:00-7:00                   Wednesday 8:00-7:00      Thursday 8:00-6:00                        Thursday 8:00-7:00                         Thursday 8:00-7:00            Friday 8:00-5:00                              Friday 8:00-5:00                              Friday 8:00-5:00    [facility] Hours:   [address] [place] [id number] [phone]    Monday 8:00-7:00  Tuesday 8:00-7:00  Wednesday 8:00-7:00  Thursday 8:00-7:00  Friday 8:00-5:00  Please log on to [email or web address] to  order your contact lenses.  The link is found on the [facility] page.  As always, Thank you for trusting us with your health care needs!

## 2019-12-18 NOTE — PROGRESS NOTES
Chief Complaint   Patient presents with     Annual Eye Exam      Accompanied by self  Last Eye Exam: 10-  Dilated Previously: Yes    What are you currently using to see?  does not use glasses or contacts       Distance Vision Acuity: Satisfied with vision    Near Vision Acuity: Not satisfied     Eye Comfort: good,pt is having headaches on right side x 1 month  Do you use eye drops? : No  Occupation or Hobbies: [employer]          Medical, surgical and family histories reviewed and updated 12/18/2019.       OBJECTIVE: See Ophthalmology exam    ASSESSMENT:    ICD-10-CM    1. Examination of eyes and vision Z01.00 EYE EXAM (SIMPLE-NONBILLABLE)   2. Myopia of both eyes H52.13 REFRACTION   3. Presbyopia H52.4 REFRACTION   4. Choroidal nevus of right eye D31.31 EYE EXAM (SIMPLE-NONBILLABLE)   5. Glaucoma suspect of both eyes H40.003 EYE EXAM (SIMPLE-NONBILLABLE)      PLAN:     Patient Instructions   Eyeglass prescription given.  Your options are a bifocal which would allow you to see distance and near vision or separate glasses for reading.  OTC readers ok but you may get some distortion from poor quality lenses.  +1.25 both eyes.    Keep appointment with retina to follow up on choroidal nevus.    Return 1/20 for glaucoma testing.    Return in 1 year for a complete eye exam or sooner if needed.    Kirill Nicholson, OD
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
## 2020-01-20 ENCOUNTER — TELEPHONE (OUTPATIENT)
Dept: OPTOMETRY | Facility: CLINIC | Age: 45
End: 2020-01-20

## 2020-01-20 NOTE — TELEPHONE ENCOUNTER
[facility] Call Center    Phone Message    May a detailed message be left on voicemail: yes    Reason for Call: Questions    Patient calling to schedule appointment, but said that she needs a test? Not sure which test she needs. Please call to advise/schedule.      Action Taken: Message routed to:  Adult Clinics: Eye p [phone]

## 2020-01-21 ENCOUNTER — OFFICE VISIT (OUTPATIENT)
Dept: OPHTHALMOLOGY | Facility: CLINIC | Age: 45
End: 2020-01-21
Attending: OPHTHALMOLOGY
Payer: COMMERCIAL

## 2020-01-21 DIAGNOSIS — D31.31 CHOROIDAL NEVUS OF RIGHT EYE: ICD-10-CM

## 2020-01-21 PROCEDURE — G0463 HOSPITAL OUTPT CLINIC VISIT: HCPCS | Mod: ZF

## 2020-01-21 PROCEDURE — 92134 CPTRZ OPH DX IMG PST SGM RTA: CPT | Mod: ZF | Performed by: OPHTHALMOLOGY

## 2020-01-21 PROCEDURE — 76510 OPH US DX B-SCAN&QUAN A-SCAN: CPT | Mod: ZF | Performed by: OPHTHALMOLOGY

## 2020-01-21 PROCEDURE — 92250 FUNDUS PHOTOGRAPHY W/I&R: CPT | Mod: ZF | Performed by: OPHTHALMOLOGY

## 2020-01-21 ASSESSMENT — CONF VISUAL FIELD
METHOD: COUNTING FINGERS
OS_NORMAL: 1
OD_NORMAL: 1

## 2020-01-21 ASSESSMENT — EXTERNAL EXAM - LEFT EYE: OS_EXAM: NORMAL

## 2020-01-21 ASSESSMENT — VISUAL ACUITY
OS_SC: 20/20
OD_SC: 20/20
OD_SC+: -1
METHOD: SNELLEN - LINEAR

## 2020-01-21 ASSESSMENT — TONOMETRY
OD_IOP_MMHG: 13
OS_IOP_MMHG: 18
IOP_METHOD: TONOPEN

## 2020-01-21 ASSESSMENT — CUP TO DISC RATIO
OS_RATIO: 0.65
OD_RATIO: 0.6

## 2020-01-21 ASSESSMENT — SLIT LAMP EXAM - LIDS
COMMENTS: NORMAL
COMMENTS: NORMAL

## 2020-01-21 ASSESSMENT — EXTERNAL EXAM - RIGHT EYE: OD_EXAM: NORMAL
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
## 2020-01-21 NOTE — NURSING NOTE
Chief Complaints and History of Present Illnesses   Patient presents with     Follow Up     1 year follow-up Choroidal nevus, right eye     Chief Complaint(s) and History of Present Illness(es)     Follow Up     Laterality: right eye    Course: stable    Associated symptoms: discharge and dryness.  Negative for eye pain, tearing, flashes and floaters    Treatments tried: no treatments    Pain scale: 0/10    Comments: 1 year follow-up Choroidal nevus, right eye              Comments     Pt denies any significant vision changes in either eye since last visit.  Complains of BE feeling dry and has some discharge BE x 1 week.  Denies any flashes, floaters, pain, pressure, and tearing.  Does not use any eye medications at this time.    Paulina Ceballos OT 1:34 PM January 21, 2020

## 2020-01-23 NOTE — TELEPHONE ENCOUNTER
Left voicemail for patient to call back to schedule glaucoma testing with Dr. Nicholson. Advised patient that as of 2/21/2020 Dr. Nicholson will no longer be seeing patient's in [place] but will be full time [facility]. Left phone number for BP clinic for patient to call to schedule.    Melanie Jeans, OA, 11:23 AM 01/23/2020

## 2020-01-23 NOTE — TELEPHONE ENCOUNTER
Just found out from Dr. Nicholson that the [place] location does not have the OCT or VF equipment needed for glaucoma testing. 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 Garland in [place] for that testing. Left voicemail with patient stating this information and gave her the call center's number with directions to call and state she needs glaucoma testing with Dr. Garland.     Melanie Jeans, MADALYN, 11:43 AM 01/23/2020

## 2020-02-04 ENCOUNTER — OFFICE VISIT (OUTPATIENT)
Dept: OPHTHALMOLOGY | Facility: CLINIC | Age: 45
End: 2020-02-04
Payer: COMMERCIAL

## 2020-02-04 DIAGNOSIS — H40.003 GLAUCOMA SUSPECT, BILATERAL: Primary | ICD-10-CM

## 2020-02-04 PROCEDURE — 92133 CPTRZD OPH DX IMG PST SGM ON: CPT | Performed by: OPHTHALMOLOGY

## 2020-02-04 PROCEDURE — 92083 EXTENDED VISUAL FIELD XM: CPT | Performed by: OPHTHALMOLOGY

## 2020-02-04 PROCEDURE — 92012 INTRM OPH EXAM EST PATIENT: CPT | Performed by: OPHTHALMOLOGY

## 2020-02-04 ASSESSMENT — VISUAL ACUITY
METHOD: SNELLEN - LINEAR
OD_SC: 20/20
OS_SC: 20/20
OS_SC+: -1

## 2020-02-04 ASSESSMENT — SLIT LAMP EXAM - LIDS
COMMENTS: NORMAL
COMMENTS: NORMAL

## 2020-02-04 ASSESSMENT — TONOMETRY
IOP_METHOD: ICARE
OD_IOP_MMHG: 11
OS_IOP_MMHG: 13

## 2020-02-04 ASSESSMENT — CUP TO DISC RATIO
OS_RATIO: 0.65
OD_RATIO: 0.6

## 2020-02-04 ASSESSMENT — EXTERNAL EXAM - LEFT EYE: OS_EXAM: NORMAL

## 2020-02-04 ASSESSMENT — EXTERNAL EXAM - RIGHT EYE: OD_EXAM: NORMAL

## 2020-02-04 NOTE — NURSING NOTE
Chief Complaints and History of Present Illnesses   Patient presents with     Glaucoma Suspect Follow Up       Chief Complaint(s) and History of Present Illness(es)     Glaucoma Suspect Follow Up               Comments     Patient is here for glaucoma testing at the request of Dr. Nicholson. Patient also saw Dr. Perez recently for a choroidal nevus right eye.     Ocular meds: none                Melanie Jeans, OA

## 2020-02-04 NOTE — PROGRESS NOTES
Assessment & Plan   Kaitlyn Garcia is a 44 year old female who presents with:   Review of systems for the eyes was negative other than the pertinent positives and negatives noted in the HPI.    Glaucoma suspect, bilateral  - Excellent IOP's  - HVF 24-2 each eye - WNL  - OCT Optic Nerve RNFL Optovue OU (both eyes) - WNL    Choroidal nevus  - Followed annually by retina at [facility]    Early presbyopia  - Continue +1.25 OTC readers    Return in 12 months for annual exam with glaucoma testing (ONH photos next visit, HVF 24-2, pachymetry)      Attending Physician Attestation:  Complete documentation of historical and exam elements from today's encounter can be found in the full encounter summary report (not reduplicated in this progress note).  I personally obtained the chief complaint(s) and history of present illness.  I confirmed and edited as necessary the review of systems, past medical/surgical history, family history, social history, and examination findings as documented by others; and I examined the patient myself.  I personally reviewed the relevant tests, images, and reports as documented above.  I formulated and edited as necessary the assessment and plan and discussed the findings and management plan with the patient and family. - Mir Garland MD

## 2020-02-06 DIAGNOSIS — Z91.89 AT HIGH RISK FOR BREAST CANCER: ICD-10-CM

## 2020-02-10 RX ORDER — TAMOXIFEN CITRATE 10 MG/1
5 TABLET ORAL DAILY
Qty: 30 TABLET | Refills: 3 | Status: SHIPPED | OUTPATIENT
Start: 2020-02-10 | End: 2020-10-09

## 2020-03-01 ENCOUNTER — HEALTH MAINTENANCE LETTER (OUTPATIENT)
Age: 45
End: 2020-03-01

## 2020-04-20 DIAGNOSIS — B00.1 RECURRENT COLD SORES: ICD-10-CM

## 2020-04-21 RX ORDER — VALACYCLOVIR HYDROCHLORIDE 1 G/1
TABLET, FILM COATED ORAL
Qty: 4 TABLET | Refills: 3 | Status: SHIPPED | OUTPATIENT
Start: 2020-04-21 | End: 2021-07-02

## 2020-05-03 ENCOUNTER — MYC MEDICAL ADVICE (OUTPATIENT)
Dept: SURGERY | Facility: CLINIC | Age: 45
End: 2020-05-03

## 2020-05-03 DIAGNOSIS — Z91.89 AT HIGH RISK FOR BREAST CANCER: Primary | ICD-10-CM

## 2020-05-20 ENCOUNTER — ANCILLARY PROCEDURE (OUTPATIENT)
Dept: MRI IMAGING | Facility: CLINIC | Age: 45
End: 2020-05-20
Attending: PHYSICIAN ASSISTANT
Payer: COMMERCIAL

## 2020-05-20 DIAGNOSIS — Z91.89 AT HIGH RISK FOR BREAST CANCER: ICD-10-CM

## 2020-05-20 PROCEDURE — A9585 GADOBUTROL INJECTION: HCPCS | Performed by: RADIOLOGY

## 2020-05-20 PROCEDURE — 77049 MRI BREAST C-+ W/CAD BI: CPT | Performed by: RADIOLOGY

## 2020-05-20 RX ORDER — GADOBUTROL 604.72 MG/ML
7.5 INJECTION INTRAVENOUS ONCE
Status: COMPLETED | OUTPATIENT
Start: 2020-05-20 | End: 2020-05-20

## 2020-05-20 RX ADMIN — GADOBUTROL 6.8 ML: 604.72 INJECTION INTRAVENOUS at 15:23

## 2020-05-31 DIAGNOSIS — F41.0 PANIC ATTACK: ICD-10-CM

## 2020-05-31 DIAGNOSIS — F41.1 GAD (GENERALIZED ANXIETY DISORDER): ICD-10-CM

## 2020-06-01 NOTE — TELEPHONE ENCOUNTER
Hello,  last fill date:03-  Last quantity:90    Thank You,  Shama German  Pharmacy Technician  [facility]  [phone]

## 2020-06-02 RX ORDER — VENLAFAXINE HYDROCHLORIDE 37.5 MG/1
37.5 CAPSULE, EXTENDED RELEASE ORAL DAILY
Qty: 90 CAPSULE | Refills: 0 | Status: SHIPPED | OUTPATIENT
Start: 2020-06-02 | End: 2020-09-01

## 2020-06-02 NOTE — TELEPHONE ENCOUNTER
venlafaxine (EFFEXOR-XR) 37.5 MG 24 hr capsule    Take 1 capsule (37.5 mg) by mouth daily      Last Written Prescription Date:   6/20/19,  Last Fill Quantity: 90 refills: #3  Last Office Visit : 11/14/19  Future Office visit:  none    Refill to pharmacy.

## 2020-08-13 ENCOUNTER — TRANSFERRED RECORDS (OUTPATIENT)
Dept: HEALTH INFORMATION MANAGEMENT | Facility: CLINIC | Age: 45
End: 2020-08-13

## 2020-08-14 ENCOUNTER — TRANSFERRED RECORDS (OUTPATIENT)
Dept: HEALTH INFORMATION MANAGEMENT | Facility: CLINIC | Age: 45
End: 2020-08-14

## 2020-08-14 LAB
CREAT SERPL-MCNC: 0.73 MG/DL (ref 0.6–1.02)
GLUCOSE SERPL-MCNC: 83 MG/DL (ref 74–100)
POTASSIUM SERPL-SCNC: 4.1 MMOL/L (ref 3.5–5.1)

## 2020-08-26 DIAGNOSIS — F41.0 PANIC ATTACK: ICD-10-CM

## 2020-08-26 DIAGNOSIS — F41.1 GAD (GENERALIZED ANXIETY DISORDER): ICD-10-CM

## 2020-09-01 RX ORDER — VENLAFAXINE HYDROCHLORIDE 37.5 MG/1
CAPSULE, EXTENDED RELEASE ORAL
Qty: 90 CAPSULE | Refills: 0 | Status: SHIPPED | OUTPATIENT
Start: 2020-09-01 | End: 2020-09-01

## 2020-09-01 RX ORDER — VENLAFAXINE HYDROCHLORIDE 37.5 MG/1
CAPSULE, EXTENDED RELEASE ORAL
Qty: 90 CAPSULE | Refills: 1 | Status: SHIPPED | OUTPATIENT
Start: 2020-09-01 | End: 2020-12-09

## 2020-09-01 NOTE — TELEPHONE ENCOUNTER
Can you resend script, looks like script was sent e-scribed but remove by the nurse. Pharmacy unable to fill script since order was removed. Please advise.    ALEJANDRO Gamez

## 2020-09-01 NOTE — TELEPHONE ENCOUNTER
Panic attack [F41.0]        AUDELIA (generalized anxiety disorder) [F41.1]         [facility]/IM/PED [facility] 11/14/2019. 90 RF x 1 now.

## 2020-10-08 DIAGNOSIS — Z91.89 AT HIGH RISK FOR BREAST CANCER: ICD-10-CM

## 2020-10-09 RX ORDER — TAMOXIFEN CITRATE 10 MG/1
5 TABLET ORAL DAILY
Qty: 30 TABLET | Refills: 3 | Status: SHIPPED | OUTPATIENT
Start: 2020-10-09 | End: 2021-06-01

## 2020-10-12 ENCOUNTER — TRANSFERRED RECORDS (OUTPATIENT)
Dept: HEALTH INFORMATION MANAGEMENT | Facility: CLINIC | Age: 45
End: 2020-10-12

## 2020-10-13 ENCOUNTER — TRANSFERRED RECORDS (OUTPATIENT)
Dept: HEALTH INFORMATION MANAGEMENT | Facility: CLINIC | Age: 45
End: 2020-10-13

## 2020-10-27 ENCOUNTER — MYC MEDICAL ADVICE (OUTPATIENT)
Dept: SURGERY | Facility: CLINIC | Age: 45
End: 2020-10-27

## 2020-10-27 DIAGNOSIS — N63.20 LEFT BREAST LUMP: Primary | ICD-10-CM

## 2020-11-02 NOTE — PROGRESS NOTES
FOLLOW UP  Nov 4, 2020     Kaitlyn Garcia is a 45 year old woman who presents with new left breast density and history of atypical ductal hyperplasia.     HPI:     History of 3 left breast needle biopsies, 2 right breast needle biopsies, and 2 excisional left breast biopsy, all benign. The left breast biopsy on 12/12/16 had 1 mm of atypical ductal hyperplasia.      Family history of 2 paternal aunts who had breast cancer in their 60's and a paternal cousin with breast cancer in her 40's. Kaitlyn  was seen by a genetic counselor in 2015, no genetic testing was done.     She began high risk screening with annual mammogram and breast MRI in 2013. She started Tamoxifen for risk reduction 5/24/17 and switched to 5 mg dosing 5/30/19. She had a breast MRI 5/9/17 revealed an area of enhancement biopsied to be an intraductal papilloma without atypia. She was seen for surgical consultation by Dr. Montenegro and monitoring with imaging was recommended.     She had a pelvic ultrasound 6/4/18 for vaginal bleeding that was negative. She had a pelvic ultrasound 6/12/19 for a left ovarian cyst, the uterus was without concerning findings.     About 1 week ago she began having vaginal spotting that she noticed as dark blood when she wipes. She had a uterine ablation 7 years ago. She has stopped Tamoxifen and has a transvaginal ultrasound scheduled. About 1 week ago she also palpated a left breast mass at her scar about the size of a pea. She can no longer feel this area. She denies any pain, skin change, nipple inversion or nipple discharge.     BREAST-SPECIFIC HISTORY:     Previous breast imaging: Yes   - 5/21/12 b/l Dmammo and right u/s for right breast lump: calcifications in the upper outer quadrant of left breast spanning 5 cm. Right u/s negative. BI-RADS 4 suspicious for malignancy   - 5/23/12 left stereotactic biopsy: intraductal papilloma   - 7/30/12 left wire-placement   - 2/19/13 MRI: large segmental enhancement of left lateral  breast, BI-RADS 0 incomplete   Left u/s: BI-RADS 3 probably benign  - 3/29/13 MRI: BI-RADS 2 benign findings  - 8/5/13 b/l Dmammo for fullness: BI-RADS 2 benign   - 2/5/14 MRI: cysts in left breast, BI-RADS 2 benign   - 5/9/14 Dmammo b/l: negative. Left u/s: cystic appearing structures, BI-RADS 2 benign  - 5/20/14 MRI: study not completed due to nausea, BI-RADS 0 incomplete  - 5/28/14 MRI: BI-RADS 2 benign  - 10/28/14 b/l Dmammo: BI-RADS 2 benign   - 5/7/15 MRI BI-RADS 2 benign   - 11/10/15 Smammo: bilateral calcs: BI-RADS 2 benign  - 1/25/16 left u/s for pain: multiple tiny cyst  - 5/17/16 MRI: BI-RADS 2 benign   - 12/6/16  Smammo: lateral left breast developing macrocalcifications BI-RADS 0 incomplete   - 12/7/16 Dmammo left: left 2:00 calcifications BI-RADS 4 suspicious  - 12/12/16: stereotactic biopsy: focus of ADH    - 2/1/17 wire placement   - 5/9/17 MRI: 2 adjacent mass like areas of enhancement mid central left breast BI-RADS 0 incomplete   - 5/12/17 left u/s: scattered small cysts BI-RADS 4 suspicious   - 5/18/17 MR biopsy: intraductal papilloma   - 4/30/18 MRI BI-RADS 2  - 6/6/18 left Dmammo and ultrasound for pain BI-RADS 2  - 11/28/18 Smammo BI-RADS 2  - 5/7/19 breast MRI BI-RADS 2  - 8/21/19 Dmammo and right breast ultrasound: BI-RADS 2  - 11/12/19 Smammo BI-RADS 1  - 5/20/20 breast MRI BI-ARDS 2     Prior breast biopsies:Yes   - 2 right breast biopsies   - 5/23/12 left needle biopsy: intraductal papilloma with sclerosing features   - 7/30/12 left excisional biopsy with Dr. Lynch: focal columnar cell change with atypia, intraductal papilloma and surrounding intraductal papillomatosis, sclerosing adenosis, fibrocystic changes  - 12/12/16 left needle biopsy x2: flat epithelial atypia with focus of ADH, duct ectasia and focal sclerosing adenosis, microcalcifications.   FEA and columnar cell change with atypia  - 2/1/17 left scar excision and excisional biopsy with Dr. Montenegro: FEA, columnar cell  change/hyperplasia, intraductal papillomas, usual ductal hyperplasia, calcifications  - 17 left MRI needle biopsy: intraductal papilloma, mild columnar cell hyperplasia, mild duct ectasia, focal sclerosing adenosis      Prior history of breast cancer: No  Prior radiation history: No   Self breast exams: No  Breast density: heterogeneously dense     GYN HISTORY:  . Age at 1st pregnancy: 17. Breastfeeding history: Yes.   Age at menarche: 14-14  Menopausal: premenopausal  Menopausal hormone replacement therapy: No     RISK ASSESSMENT: > 1.7% 5 year risk and > 20% lifetime risk   Heather:2.6% 5 year risk   SEBASTIAN: 32.7% lifetime risk   Abiodun: 11.6% lifetime risk      FAMILY HISTORY:  Breast ca: Yes    - paternal aunt 60 's   - paternal aunt 60's  - paternal cousin 40   Ovarian ca: No   - ? Paternal cousin with ovarian cancer   Pancreatic ca: No   Prostate: yes  - maternal uncle 69   Gastric ca: Yes   - paternal grandfather, passed   Melanoma: No  Colon ca: No  Other cancer: Yes   - maternal grandmother skin cancer 90's  - paternal grandfather brain 60's   Pentecostal ethnicity: No  Other genetic, testing, syndromes, or clotting disorders: No   - She saw a genetic counselor 2015 and no testing was recommended     PAST MEDICAL HISTORY  Past Medical History:   Diagnosis Date     Anemia      Atypical ductal hyperplasia of breast      Choroidal nevus of right eye      Concussion 10/2015     Depressive disorder 2006    Treated with celexa for two years     Family history of breast cancer 2/3/2015     Family history of breast cancer 2/3/2015     Family history of breast cancer 2/3/2015     Family history of breast cancer 2/3/2015     Gastroesophageal reflux disease      Glaucoma suspect of both eyes      Papilloma of breast 11    Left, See Dr. Lynch at      PONV (postoperative nausea and vomiting)      S/P endometrial ablation 10/10/13    Menorrhagia     Shingles     x2 in the past     WBC decreased 2/3/2015     WBC  decreased 2/3/2015     WBC decreased 2/3/2015     PAST SURGICAL HISTORY   Past Surgical History:   Procedure Laterality Date     ABDOMEN SURGERY      rectoplasty     BIOPSY BREAST NEEDLE LOCALIZATION  7/30/2012    Procedure: BIOPSY BREAST NEEDLE LOCALIZATION;  left breast excisional Biopsy with wire localization @0830;  Surgeon: Robert Lynch MD;  Location: [facility]     BREAST SURGERY       COSMETIC SURGERY      Rectalplasty for episiotomy repair     COSMETIC SURGERY      Rectalplasty for episiotomy repair     DILATION AND CURETTAGE, HYSTEROSCOPY, ABLATE ENDOMETRIUM NOVASURE, COMBINED  10/10/2013    Procedure: COMBINED DILATION AND CURETTAGE, HYSTEROSCOPY, ABLATE ENDOMETRIUM NOVASURE;  Endometrial ablation with Novasure;  Surgeon: Indu Birmingham DO;  Location: [facility] OR     HC TOOTH EXTRACTION W/FORCEP       LUMPECTOMY BREAST Left 2/1/2017    Procedure: LUMPECTOMY BREAST;  Surgeon: Lori Montenegro MD;  [facility]     SOFT TISSUE SURGERY      lumpectomy x 2 right breast     SOFT TISSUE SURGERY      episiotomy fixed     MEDICATIONS  Current Outpatient Medications   Medication Sig Dispense Refill     LORazepam (ATIVAN) 1 MG tablet Take 1 tablet (1 mg) by mouth twice a day as needed.       MELATONIN PO        Naproxen Sodium (ALEVE PO) Take 440 mg by mouth At Bedtime       tamoxifen (NOLVADEX) 10 MG tablet Take 0.5 tablets (5 mg) by mouth daily 30 tablet 3     valACYclovir (VALTREX) 1000 mg tablet TAKE TWO TABLETS BY MOUTH TWICE A DAY 4 tablet 3     venlafaxine (EFFEXOR-XR) 37.5 MG 24 hr capsule TAKE ONE CAPSULE BY MOUTH ONCE DAILY 90 capsule 1     fluticasone (FLONASE) 50 MCG/ACT nasal spray Spray 2 sprays into both nostrils daily (Patient not taking: Reported on 11/3/2020) 48 g 5     omeprazole (PRILOSEC) 20 MG DR capsule TAKE ONE CAPSULE BY MOUTH EVERY DAY (Patient not taking: Reported on 11/3/2020) 90 capsule 2   Serotonin specific reuptake inhibitor: on SNRI.   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   Breasts: Examination was done in both the upright and supine positions.  Breasts are symmetrical . No dominant fixed or suspicious masses noted. No skin or nipple changes. No nipple discharge. Left breast scar at 1-3:00 at [facility] well healed. At the superior medial portion of the scar there is a ridges of firm tissue. No mass. This is where she left the mass 1 week ago.   No clavicular, cervical, or axillary lymphadenopathy.     INVESTIGATIONS:    11/4/20 bilateral diagnotic mammogram and left breast ultrasound: no concerning findings per Radiology, final report pending.     ASSESSMENT/PLAN:    Kaitlyn Garcia is a 45 year old woman history of left breast atypical ductal hyperplasia here for high risk screening and preventative therapy with Tamoxifen. She is currently holding Tamoxifen pending ultrasound work up of vaginal bleeding. She also self palpated a left breast mass at her scar that has resolved. Breast imaging today without concerning findings.      1) Left breast mass - resolved. No concerning findings on mammogram and ultrasound today.     2) Atypical ductal hyperplasia and family history of breast  cancer.   We also reviewed that atypical ductal hyperplasia increases her baseline risk for breast cancer. She meets guidelines for high risk screening with a lifetime risk of breast cancer >20%. Discussed she meets guidelines for high risk screening with yearly mammogram alternating with Breast MRI every six months. Clinical encounter every 6-12 months including family history, risk assessment, and clinical breast exam.   - Breast MRI with clinic visit due 5/2021  - Screening mammogram with clinic visit due: 11/5/21      3) Risk reduction   She is a candidate for breast cancer risk-reduction intervention based on a Heather estimated 5 year risk. The contraindications for Tamoxifen (history of VTE, history or stroke, history of TIA, pregnancy, and potential pregnancy without an effective nonhormal method of method of contraception) along with potential side effects (hot flashes, deep vein thrombosis, pulmonary embolis, stroke, cataracts, and endometrial cancer) including signs and symptoms were discussed. Tamoxifen 5 mg daily for 5 years started: 5/24/17.  - Hold Tamoxifen for work up of vaginal bleeding.   - Yearly gynecological examination. Prompt evaluation for any vaginal spotting.    3) Lifestyle Modifications were provided.   - Maintain a healthy weight. Your Body mass index is 23.04 kg/m .  . Recommended BMI is 20-25. Higher body mass index (BMI) and adult weight gain is associated with increased risk for breast cancer. This increase in risk has been attributed to increase in circulating endogenous estrogen levels from fat tissue.   - Alcohol consumption, even at moderate levels (1-2 drinks per day), increases breast cancer risk. Limit alcohol consumption to less than 1 drink per day. (1 ounce of liquor, 6 ounces of wine, or 8 ounces of beer)  - Exercise a minimum of 3 hours per week of moderate-intensity aerobic activity.     Merced Wiggins PA-C    Total time spent face to face with the patient was 35 minutes.  25 minutes was spent counseling the patient as described above.

## 2020-11-03 ENCOUNTER — OFFICE VISIT (OUTPATIENT)
Dept: OBGYN | Facility: CLINIC | Age: 45
End: 2020-11-03
Payer: COMMERCIAL

## 2020-11-03 VITALS
BODY MASS INDEX: 23.09 KG/M2 | SYSTOLIC BLOOD PRESSURE: 122 MMHG | HEART RATE: 79 BPM | DIASTOLIC BLOOD PRESSURE: 86 MMHG | WEIGHT: 146.3 LBS

## 2020-11-03 DIAGNOSIS — N92.0 SPOTTING: Primary | ICD-10-CM

## 2020-11-03 PROCEDURE — 99214 OFFICE O/P EST MOD 30 MIN: CPT | Performed by: OBSTETRICS & GYNECOLOGY

## 2020-11-03 ASSESSMENT — ENCOUNTER SYMPTOMS
HOT FLASHES: 0
BREAST PAIN: 0
DECREASED LIBIDO: 0
BREAST MASS: 1

## 2020-11-04 ENCOUNTER — OFFICE VISIT (OUTPATIENT)
Dept: SURGERY | Facility: CLINIC | Age: 45
End: 2020-11-04
Attending: PHYSICIAN ASSISTANT
Payer: COMMERCIAL

## 2020-11-04 ENCOUNTER — ANCILLARY PROCEDURE (OUTPATIENT)
Dept: ULTRASOUND IMAGING | Facility: CLINIC | Age: 45
End: 2020-11-04
Attending: OBSTETRICS & GYNECOLOGY
Payer: COMMERCIAL

## 2020-11-04 ENCOUNTER — ANCILLARY PROCEDURE (OUTPATIENT)
Dept: MAMMOGRAPHY | Facility: CLINIC | Age: 45
End: 2020-11-04
Attending: PHYSICIAN ASSISTANT
Payer: COMMERCIAL

## 2020-11-04 ENCOUNTER — TRANSFERRED RECORDS (OUTPATIENT)
Dept: HEALTH INFORMATION MANAGEMENT | Facility: CLINIC | Age: 45
End: 2020-11-04

## 2020-11-04 VITALS
TEMPERATURE: 97.9 F | WEIGHT: 146 LBS | DIASTOLIC BLOOD PRESSURE: 80 MMHG | OXYGEN SATURATION: 99 % | SYSTOLIC BLOOD PRESSURE: 118 MMHG | HEART RATE: 89 BPM | BODY MASS INDEX: 23.04 KG/M2 | RESPIRATION RATE: 16 BRPM

## 2020-11-04 DIAGNOSIS — Z80.3 FAMILY HISTORY OF BREAST CANCER: ICD-10-CM

## 2020-11-04 DIAGNOSIS — N92.0 SPOTTING: ICD-10-CM

## 2020-11-04 DIAGNOSIS — N60.99 ATYPICAL HYPERPLASIA OF BREAST: ICD-10-CM

## 2020-11-04 DIAGNOSIS — Z91.89 AT HIGH RISK FOR BREAST CANCER: Primary | ICD-10-CM

## 2020-11-04 DIAGNOSIS — N63.20 LEFT BREAST LUMP: ICD-10-CM

## 2020-11-04 PROCEDURE — 77066 DX MAMMO INCL CAD BI: CPT | Performed by: RADIOLOGY

## 2020-11-04 PROCEDURE — G0279 TOMOSYNTHESIS, MAMMO: HCPCS | Performed by: RADIOLOGY

## 2020-11-04 PROCEDURE — 76830 TRANSVAGINAL US NON-OB: CPT | Performed by: RADIOLOGY

## 2020-11-04 PROCEDURE — 99214 OFFICE O/P EST MOD 30 MIN: CPT | Performed by: PHYSICIAN ASSISTANT

## 2020-11-04 PROCEDURE — 76856 US EXAM PELVIC COMPLETE: CPT | Performed by: RADIOLOGY

## 2020-11-04 PROCEDURE — 76642 ULTRASOUND BREAST LIMITED: CPT | Mod: LT | Performed by: RADIOLOGY

## 2020-11-04 ASSESSMENT — PAIN SCALES - GENERAL: PAINLEVEL: NO PAIN (0)

## 2020-11-04 NOTE — LETTER
11/4/2020         RE: Kaitlyn Garcia  [address]  [facility] [address]        Dear Colleague,    Thank you for referring your patient, Kaitlyn Garcia, to the [facility]. Please see a copy of my visit note below.    FOLLOW UP  Nov 4, 2020     Kaitlyn Garcia is a 45 year old woman who presents with new left breast density and history of atypical ductal hyperplasia.     HPI:     History of 3 left breast needle biopsies, 2 right breast needle biopsies, and 2 excisional left breast biopsy, all benign. The left breast biopsy on 12/12/16 had 1 mm of atypical ductal hyperplasia.      Family history of 2 paternal aunts who had breast cancer in their 60's and a paternal cousin with breast cancer in her 40's. Kaitlyn  was seen by a genetic counselor in 2015, no genetic testing was done.     She began high risk screening with annual mammogram and breast MRI in 2013. She started Tamoxifen for risk reduction 5/24/17 and switched to 5 mg dosing 5/30/19. She had a breast MRI 5/9/17 revealed an area of enhancement biopsied to be an intraductal papilloma without atypia. She was seen for surgical consultation by Dr. Montenegro and monitoring with imaging was recommended.     She had a pelvic ultrasound 6/4/18 for vaginal bleeding that was negative. She had a pelvic ultrasound 6/12/19 for a left ovarian cyst, the uterus was without concerning findings.     About 1 week ago she began having vaginal spotting that she noticed as dark blood when she wipes. She had a uterine ablation 7 years ago. She has stopped Tamoxifen and has a transvaginal ultrasound scheduled. About 1 week ago she also palpated a left breast mass at her scar about the size of a pea. She can no longer feel this area. She denies any pain, skin change, nipple inversion or nipple discharge.     BREAST-SPECIFIC HISTORY:     Previous breast imaging: Yes   - 5/21/12 b/l Dmammo and right u/s for right breast lump: calcifications in the upper  outer quadrant of left breast spanning 5 cm. Right u/s negative. BI-RADS 4 suspicious for malignancy   - 5/23/12 left stereotactic biopsy: intraductal papilloma   - 7/30/12 left wire-placement   - 2/19/13 MRI: large segmental enhancement of left lateral breast, BI-RADS 0 incomplete   Left u/s: BI-RADS 3 probably benign  - 3/29/13 MRI: BI-RADS 2 benign findings  - 8/5/13 b/l Dmammo for fullness: BI-RADS 2 benign   - 2/5/14 MRI: cysts in left breast, BI-RADS 2 benign   - 5/9/14 Dmammo b/l: negative. Left u/s: cystic appearing structures, BI-RADS 2 benign  - 5/20/14 MRI: study not completed due to nausea, BI-RADS 0 incomplete  - 5/28/14 MRI: BI-RADS 2 benign  - 10/28/14 b/l Dmammo: BI-RADS 2 benign   - 5/7/15 MRI BI-RADS 2 benign   - 11/10/15 Smammo: bilateral calcs: BI-RADS 2 benign  - 1/25/16 left u/s for pain: multiple tiny cyst  - 5/17/16 MRI: BI-RADS 2 benign   - 12/6/16  Smammo: lateral left breast developing macrocalcifications BI-RADS 0 incomplete   - 12/7/16 Dmammo left: left 2:00 calcifications BI-RADS 4 suspicious  - 12/12/16: stereotactic biopsy: focus of ADH    - 2/1/17 wire placement   - 5/9/17 MRI: 2 adjacent mass like areas of enhancement mid central left breast BI-RADS 0 incomplete   - 5/12/17 left u/s: scattered small cysts BI-RADS 4 suspicious   - 5/18/17 MR biopsy: intraductal papilloma   - 4/30/18 MRI BI-RADS 2  - 6/6/18 left Dmammo and ultrasound for pain BI-RADS 2  - 11/28/18 Smammo BI-RADS 2  - 5/7/19 breast MRI BI-RADS 2  - 8/21/19 Dmammo and right breast ultrasound: BI-RADS 2  - 11/12/19 Smammo BI-RADS 1  - 5/20/20 breast MRI BI-ARDS 2     Prior breast biopsies:Yes   - 2 right breast biopsies   - 5/23/12 left needle biopsy: intraductal papilloma with sclerosing features   - 7/30/12 left excisional biopsy with Dr. Lynch: focal columnar cell change with atypia, intraductal papilloma and surrounding intraductal papillomatosis, sclerosing adenosis, fibrocystic changes  - 12/12/16 left needle biopsy  x2: flat epithelial atypia with focus of ADH, duct ectasia and focal sclerosing adenosis, microcalcifications.   FEA and columnar cell change with atypia  - 17 left scar excision and excisional biopsy with Dr. Montenegro: FEA, columnar cell change/hyperplasia, intraductal papillomas, usual ductal hyperplasia, calcifications  - 17 left MRI needle biopsy: intraductal papilloma, mild columnar cell hyperplasia, mild duct ectasia, focal sclerosing adenosis      Prior history of breast cancer: No  Prior radiation history: No   Self breast exams: No  Breast density: heterogeneously dense     GYN HISTORY:  . Age at 1st pregnancy: 17. Breastfeeding history: Yes.   Age at menarche: 14-15  Menopausal: premenopausal  Menopausal hormone replacement therapy: No     RISK ASSESSMENT: > 1.7% 5 year risk and > 20% lifetime risk   Heather:2.6% 5 year risk   SEBASTIAN: 32.7% lifetime risk   Abiodun: 11.6% lifetime risk      FAMILY HISTORY:  Breast ca: Yes    - paternal aunt 60 's   - paternal aunt 60's  - paternal cousin 40   Ovarian ca: No   - ? Paternal cousin with ovarian cancer   Pancreatic ca: No   Prostate: yes  - maternal uncle 69   Gastric ca: Yes   - paternal grandfather, passed   Melanoma: No  Colon ca: No  Other cancer: Yes   - maternal grandmother skin cancer 90's  - paternal grandfather brain 60's   [place] ethnicity: No  Other genetic, testing, syndromes, or clotting disorders: No   - She saw a genetic counselor 2015 and no testing was recommended     PAST MEDICAL HISTORY  Past Medical History:   Diagnosis Date     Anemia      Atypical ductal hyperplasia of breast      Choroidal nevus of right eye      Concussion 10/2015     Depressive disorder 2006    Treated with celexa for two years     Family history of breast cancer 2/3/2015     Family history of breast cancer 2/3/2015     Family history of breast cancer 2/3/2015     Family history of breast cancer 2/3/2015     Gastroesophageal reflux disease      Glaucoma suspect of  both eyes      Papilloma of breast 5/23/12    Left, See Dr. Lynch at      PONV (postoperative nausea and vomiting)      S/P endometrial ablation 10/10/13    Menorrhagia     Shingles     x2 in the past     WBC decreased 2/3/2015     WBC decreased 2/3/2015     WBC decreased 2/3/2015     PAST SURGICAL HISTORY   Past Surgical History:   Procedure Laterality Date     ABDOMEN SURGERY      rectoplasty     BIOPSY BREAST NEEDLE LOCALIZATION  7/30/2012    Procedure: BIOPSY BREAST NEEDLE LOCALIZATION;  left breast excisional Biopsy with wire localization @0830;  Surgeon: Robert Lynch MD;  Location: [facility] OR     BREAST SURGERY       COSMETIC SURGERY      Rectalplasty for episiotomy repair     COSMETIC SURGERY      Rectalplasty for episiotomy repair     DILATION AND CURETTAGE, HYSTEROSCOPY, ABLATE ENDOMETRIUM NOVASURE, COMBINED  10/10/2013    Procedure: COMBINED DILATION AND CURETTAGE, HYSTEROSCOPY, ABLATE ENDOMETRIUM NOVASURE;  Endometrial ablation with Novasure;  Surgeon: Indu Birmingham DO;  Location: [facility] OR     HC TOOTH EXTRACTION W/FORCEP       LUMPECTOMY BREAST Left 2/1/2017    Procedure: LUMPECTOMY BREAST;  Surgeon: Lori Montenegro MD;  Location: [facility]     SOFT TISSUE SURGERY      lumpectomy x 2 right breast     SOFT TISSUE SURGERY      episiotomy fixed     MEDICATIONS  Current Outpatient Medications   Medication Sig Dispense Refill     LORazepam (ATIVAN) 1 MG tablet Take 1 tablet (1 mg) by mouth twice a day as needed.       MELATONIN PO        Naproxen Sodium (ALEVE PO) Take 440 mg by mouth At Bedtime       tamoxifen (NOLVADEX) 10 MG tablet Take 0.5 tablets (5 mg) by mouth daily 30 tablet 3     valACYclovir (VALTREX) 1000 mg tablet TAKE TWO TABLETS BY MOUTH TWICE A DAY 4 tablet 3     venlafaxine (EFFEXOR-XR) 37.5 MG 24 hr capsule TAKE ONE CAPSULE BY MOUTH ONCE DAILY 90 capsule 1     fluticasone (FLONASE) 50 MCG/ACT nasal spray Spray 2 sprays into both nostrils daily (Patient not taking:  Reported on 11/3/2020) 48 g 5     omeprazole (PRILOSEC) 20 MG DR capsule TAKE ONE CAPSULE BY MOUTH EVERY DAY (Patient not taking: Reported on 11/3/2020) 90 capsule 2   Serotonin specific reuptake inhibitor: on SNRI.   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   Breasts: Examination was done in both the upright and supine positions.  Breasts are symmetrical . No dominant fixed or suspicious masses noted. No skin or nipple changes. No nipple discharge. Left breast scar at 1-3:00 at [facility] well healed. At the superior medial portion of the scar there is a ridges of firm tissue. No mass. This is where she left the mass 1 week ago.   No clavicular, cervical, or axillary lymphadenopathy.     INVESTIGATIONS:    11/4/20 bilateral diagnotic mammogram and left breast ultrasound: no concerning findings per Radiology, final report pending.     ASSESSMENT/PLAN:    Kaitlyn Garcia is a 45 year old woman history of left breast atypical ductal hyperplasia here for high risk screening and preventative therapy with Tamoxifen. She is currently holding Tamoxifen pending ultrasound work up of vaginal bleeding. She also self palpated a left breast mass  at her scar that has resolved. Breast imaging today without concerning findings.      1) Left breast mass - resolved. No concerning findings on mammogram and ultrasound today.     2) Atypical ductal hyperplasia and family history of breast cancer.   We also reviewed that atypical ductal hyperplasia increases her baseline risk for breast cancer. She meets guidelines for high risk screening with a lifetime risk of breast cancer >20%. Discussed she meets guidelines for high risk screening with yearly mammogram alternating with Breast MRI every six months. Clinical encounter every 6-12 months including family history, risk assessment, and clinical breast exam.   - Breast MRI with clinic visit due 5/2021  - Screening mammogram with clinic visit due: 11/5/21      3) Risk reduction   She is a candidate for breast cancer risk-reduction intervention based on a Heather estimated 5 year risk. The contraindications for Tamoxifen (history of VTE, history or stroke, history of TIA, pregnancy, and potential pregnancy without an effective nonhormal method of method of contraception) along with potential side effects (hot flashes, deep vein thrombosis, pulmonary embolis, stroke, cataracts, and endometrial cancer) including signs and symptoms were discussed. Tamoxifen 5 mg daily for 5 years started: 5/24/17.  - Hold Tamoxifen for work up of vaginal bleeding.   - Yearly gynecological examination. Prompt evaluation for any vaginal spotting.    3) Lifestyle Modifications were provided.   - Maintain a healthy weight. Your Body mass index is 23.04 kg/m .  . Recommended BMI is 20-25. Higher body mass index (BMI) and adult weight gain is associated with increased risk for breast cancer. This increase in risk has been attributed to increase in circulating endogenous estrogen levels from fat tissue.   - Alcohol consumption, even at moderate levels (1-2 drinks per day), increases breast cancer risk. Limit alcohol consumption to less than 1 drink  per day. (1 ounce of liquor, 6 ounces of wine, or 8 ounces of beer)  - Exercise a minimum of 3 hours per week of moderate-intensity aerobic activity.     Merced Wiggins PA-C    Total time spent face to face with the patient was 35 minutes. 25 minutes was spent counseling the patient as described above.       Again, thank you for allowing me to participate in the care of your patient.        Sincerely,        Merced Wiggins PA-C

## 2020-11-04 NOTE — PATIENT INSTRUCTIONS
Kaitlyn Garcia is a 45 year old woman history of left breast atypical ductal hyperplasia here for high risk screening and preventative therapy with Tamoxifen. She is currently holding Tamoxifen pending ultrasound work up of vaginal bleeding. She also self palpated a left breast mass at her scar that has resolved. Breast imaging today without concerning findings.      1) Left breast mass - resolved. No concerning findings on mammogram and ultrasound today.     2) Atypical ductal hyperplasia and family history of breast cancer.   We also reviewed that atypical ductal hyperplasia increases her baseline risk for breast cancer. She meets guidelines for high risk screening with a lifetime risk of breast cancer >20%. Discussed she meets guidelines for high risk screening with yearly mammogram alternating with Breast MRI every six months. Clinical encounter every 6-12 months including family history, risk assessment, and clinical breast exam.   - Breast MRI with clinic visit due 5/2021  - Screening mammogram with clinic visit due: 11/5/21      3) Risk reduction   She is a candidate for breast cancer risk-reduction intervention based on a Heather estimated 5 year risk. The contraindications for Tamoxifen (history of VTE, history or stroke, history of TIA, pregnancy, and potential pregnancy without an effective nonhormal method of method of contraception) along with potential side effects (hot flashes, deep vein thrombosis, pulmonary embolis, stroke, cataracts, and endometrial cancer) including signs and symptoms were discussed. Tamoxifen 5 mg daily for 5 years started: 5/24/17.  - Hold Tamoxifen for work up of vaginal bleeding.   - Yearly gynecological examination. Prompt evaluation for any vaginal spotting.    3) Lifestyle Modifications were provided.   - Maintain a healthy weight. Your Body mass index is 23.04 kg/m .  . Recommended BMI is 20-25. Higher body mass index (BMI) and adult weight gain is associated with increased  risk for breast cancer. This increase in risk has been attributed to increase in circulating endogenous estrogen levels from fat tissue.   - Alcohol consumption, even at moderate levels (1-2 drinks per day), increases breast cancer risk. Limit alcohol consumption to less than 1 drink per day. (1 ounce of liquor, 6 ounces of wine, or 8 ounces of beer)  - Exercise a minimum of 3 hours per week of moderate-intensity aerobic activity.

## 2020-11-12 ENCOUNTER — E-VISIT (OUTPATIENT)
Dept: URGENT CARE | Facility: URGENT CARE | Age: 45
End: 2020-11-12
Payer: COMMERCIAL

## 2020-11-12 DIAGNOSIS — Z20.822 CLOSE EXPOSURE TO 2019 NOVEL CORONAVIRUS: Primary | ICD-10-CM

## 2020-11-12 PROCEDURE — 99207 PR NO BILLABLE SERVICE THIS VISIT: CPT | Performed by: PHYSICIAN ASSISTANT

## 2020-11-12 NOTE — PATIENT INSTRUCTIONS
Dear Kaitlyn Garcia,    Based on your exposure to COVID-19 (coronavirus), we would like to test you for this virus. I have placed an order for this test and please call [phone] to schedule testing. [employer] employees please call [phone].  [employer] employees call [phone]. The optimal time to test after exposure is 5-7 days from the exposure.    If you know you have had close contact with someone who tested positive, you should be quarantined for 14 days after this exposure. You should stay in quarantine for the14 days even if the covid test is negative.     Quarantine means:  Stay home and away from others. Don't go to school or anywhere else. Generally quarantine means staying home from work but there are some exceptions to this. Please contact your workplace.  No hugging, kissing or shaking hands.  Don't let anyone visit.  Cover your mouth and nose with a mask, tissue or washcloth to avoid spreading germs.  Wash your hands and face often. Use soap and water.    What are the symptoms of COVID-19?  The most common symptoms are cough, fever and trouble breathing. Less common symptoms include headache, body aches, fatigue (feeling very tired), chills, sore throat, stuffy or runny nose, diarrhea (loose poop), loss of taste or smell, belly pain, and nausea or vomiting (feeling sick to your stomach or throwing up).  After 14 days, if you have still don't have symptoms, you likely don't have this virus.  If you develop symptoms, follow these guidelines.  If you're normally healthy: Please start another eVisit.  If you have a serious health problem (like cancer, heart failure, an organ transplant or kidney disease): Call your specialty clinic. Let them know that you might have COVID-19.    When it's time for your COVID test:  Stay at least 6 feet away from others. (If someone will drive you to your test, stay in the backseat, as far away from the  as you can.)  Cover your mouth and nose  with a mask, tissue or washcloth.  Go straight to the testing site. Don't make any stops on the way there or back.    Please note  Patients in these groups are at risk for severe illness due to COVID-19:    People 65 years and older    People who live in a nursing home or long-term care facility    People with chronic disease (lung, heart, cancer, diabetes, kidney, liver, immunologic)    People who have a weakened immune system, including those who:  o Are in cancer treatment  o Take medicine that weakens the immune system, such as corticosteroids  o Had a bone marrow or organ transplant  o Have an immune deficiency  o Have poorly controlled HIV or AIDS  o Are obese (body mass index of 40 or higher)  o Smoke regularly    Where can I get more information?   [employer] - About COVID-19: [email or web address]  CDC - What to Do If You're Sick: www.cdc.gov/coronavirus/2019-ncov/about/steps-when-sick.html  CDC - Ending Home Isolation: www.cdc.gov/coronavirus/2019-ncov/hcp/disposition-in-home-patients.html  CDC - Caring for Someone: www.cdc.gov/coronavirus/2019-ncov/if-you-are-sick/care-for-someone.html  [facility] - Interim Guidance for Hospital Discharge to Home: www.health.[facility].[place]/diseases/coronavirus/hcp/hospdischarge.pdf  [facility] clinical trials (COVID-19 research studies): clinicalaffairs.[facility]-clinical-trials  Below are the COVID-19 hotlines at the [facility] ([facility]). Interpreters are available.  For health questions: Call [phone] or 1-247.657.5728 (7 a.m. to 7 p.m.)  For questions about schools and childcare: Call 534-358-4605 or 1-218.800.4263 (7 a.m. to 7 p.m.)    November 12, 2020    RE:  Kaitlyn Garcia                                                                                                                                                       [address]  [facility] [address]        To whom it may concern:    Kaitlyn has been exposed to COVID  and per CDC guidelines must isolate at home for 14 days after the date of the exposure.      Sincerely,  Martita Blancas PA-C

## 2020-12-07 DIAGNOSIS — F41.1 GAD (GENERALIZED ANXIETY DISORDER): ICD-10-CM

## 2020-12-07 DIAGNOSIS — F41.0 PANIC ATTACK: ICD-10-CM

## 2020-12-09 RX ORDER — VENLAFAXINE HYDROCHLORIDE 37.5 MG/1
CAPSULE, EXTENDED RELEASE ORAL
Qty: 90 CAPSULE | Refills: 0 | OUTPATIENT
Start: 2020-12-09

## 2020-12-09 RX ORDER — VENLAFAXINE HYDROCHLORIDE 37.5 MG/1
CAPSULE, EXTENDED RELEASE ORAL
Qty: 90 CAPSULE | Refills: 0 | Status: SHIPPED | OUTPATIENT
Start: 2020-12-09 | End: 2020-12-15

## 2020-12-10 NOTE — TELEPHONE ENCOUNTER
Telephone visit scheduled for 12/15/2020 with provider. Patient had to cancel her physical appointment since her son tested positive for covid.    ALEJANDRO Gamez

## 2020-12-10 NOTE — TELEPHONE ENCOUNTER
Refill completed.   PHQ-9 SCORE 8/28/2018 9/13/2018 11/14/2019   PHQ-9 Total Score - - -   PHQ-9 Total Score 18 2 0       AUDELIA-7 SCORE 8/28/2018 9/13/2018 11/14/2019   Total Score - - -   Total Score 20 0 0       [facility] Follow-up to PHQ 8/28/2018 9/13/2018 11/14/2019   PHQ-9 9. Suicide Ideation past 2 weeks Not at all Not at all Not at all   Needs  follow up appointment OK to do virtually

## 2020-12-14 ENCOUNTER — HEALTH MAINTENANCE LETTER (OUTPATIENT)
Age: 45
End: 2020-12-14

## 2020-12-15 ENCOUNTER — VIRTUAL VISIT (OUTPATIENT)
Dept: PEDIATRICS | Facility: CLINIC | Age: 45
End: 2020-12-15
Payer: COMMERCIAL

## 2020-12-15 DIAGNOSIS — F41.0 PANIC ATTACK: ICD-10-CM

## 2020-12-15 DIAGNOSIS — F41.1 GAD (GENERALIZED ANXIETY DISORDER): Primary | ICD-10-CM

## 2020-12-15 PROCEDURE — 96127 BRIEF EMOTIONAL/BEHAV ASSMT: CPT | Performed by: NURSE PRACTITIONER

## 2020-12-15 PROCEDURE — 99213 OFFICE O/P EST LOW 20 MIN: CPT | Mod: TEL | Performed by: NURSE PRACTITIONER

## 2020-12-15 RX ORDER — VENLAFAXINE HYDROCHLORIDE 37.5 MG/1
CAPSULE, EXTENDED RELEASE ORAL
Qty: 90 CAPSULE | Refills: 3 | Status: SHIPPED | OUTPATIENT
Start: 2020-12-15 | End: 2021-11-30

## 2020-12-15 ASSESSMENT — ANXIETY QUESTIONNAIRES

## 2020-12-15 ASSESSMENT — PATIENT HEALTH QUESTIONNAIRE - PHQ9
SUM OF ALL RESPONSES TO PHQ QUESTIONS 1-9: 0
5. POOR APPETITE OR OVEREATING: NOT AT ALL

## 2020-12-15 NOTE — PATIENT INSTRUCTIONS
PLAN:   1.   Symptomatic therapy suggested: The current medical regimen is effective.  Continue current medication regimen unchanged.    2.  Orders Placed This Encounter   Medications     venlafaxine (EFFEXOR-XR) 37.5 MG 24 hr capsule     Sig: TAKE ONE CAPSULE BY MOUTH ONCE DAILY     Dispense:  90 capsule     Refill:  3     3. Patient needs to follow up in if no improvement,or sooner if worsening of symptoms or other symptoms develop.  Follow up for physical

## 2020-12-15 NOTE — PROGRESS NOTES
"Kaitlyn Garcia is a 45 year old female who is being evaluated via a billable telephone visit.      The patient has been notified of following:     \"This telephone visit will be conducted via a call between you and your physician/provider. We have found that certain health care needs can be provided without the need for a physical exam.  This service lets us provide the care you need with a short phone conversation.  If a prescription is necessary we can send it directly to your pharmacy.  If lab work is needed we can place an order for that and you can then stop by our lab to have the test done at a later time.    Telephone visits are billed at different rates depending on your insurance coverage. During this emergency period, for some insurers they may be billed the same as an in-person visit.  Please reach out to your insurance provider with any questions.    If during the course of the call the physician/provider feels a telephone visit is not appropriate, you will not be charged for this service.\"    Patient has given verbal consent for Telephone visit?  Yes    What phone number would you like to be contacted at? [phone]    How would you like to obtain your AVS? [place]     Kaitlyn Garcia is a 45 year old female who presents via phone visit today for the following health issues:    HPI     Anxiety Follow-Up    How are you doing with your anxiety since your last visit? Improved    Are you having other symptoms that might be associated with anxiety? No    Have you had a significant life event? No     Are you feeling depressed? No    Do you have any concerns with your use of alcohol or other drugs? No    Social History     Tobacco Use     Smoking status: Never Smoker     Smokeless tobacco: Never Used   Substance Use Topics     Alcohol use: Yes     Alcohol/week: 0.0 standard drinks     Comment: Less than once a month     Drug use: No     AUDELIA-7 SCORE 9/13/2018 11/14/2019 12/15/2020   Total Score - " - -   Total Score 0 0 0     PHQ 9/13/2018 11/14/2019 12/15/2020   PHQ-9 Total Score 2 0 0   Q9: Thoughts of better off dead/self-harm past 2 weeks Not at all Not at all Not at all     Last PHQ-9 12/15/2020   1.  Little interest or pleasure in doing things 0   2.  Feeling down, depressed, or hopeless 0   3.  Trouble falling or staying asleep, or sleeping too much 0   4.  Feeling tired or having little energy 0   5.  Poor appetite or overeating 0   6.  Feeling bad about yourself 0   7.  Trouble concentrating 0   8.  Moving slowly or restless 0   Q9: Thoughts of better off dead/self-harm past 2 weeks 0   PHQ-9 Total Score 0   Difficulty at work, home, or with people Not difficult at all     AUDELIA-7  12/15/2020   1. Feeling nervous, anxious, or on edge 0   2. Not being able to stop or control worrying 0   3. Worrying too much about different things 0   4. Trouble relaxing 0   5. Being so restless that it is hard to sit still 0   6. Becoming easily annoyed or irritable 0   7. Feeling afraid, as if something awful might happen 0   AUDELIA-7 Total Score 0   If you checked any problems, how difficult have they made it for you to do your work, take care of things at home, or get along with other people? Not difficult at all         How many servings of fruits and vegetables do you eat daily?  4 or more    On average, how many sweetened beverages do you drink each day (Examples: soda, juice, sweet tea, etc.  Do NOT count diet or artificially sweetened beverages)?   1    How many days per week do you exercise enough to make your heart beat faster? 3 or less    How many minutes a day do you exercise enough to make your heart beat faster? 9 or less    How many days per week do you miss taking your medication? 0         Review of Systems   Constitutional, HEENT, cardiovascular, pulmonary, gi and gu systems are negative, except as otherwise noted.       Objective          Vitals:  No vitals were obtained today due to virtual  visit.    alert, active and no distress  PSYCH: Alert and oriented times 3; coherent speech, normal   rate and volume, able to articulate logical thoughts, able   to abstract reason, no tangential thoughts, no hallucinations   or delusions  Her affect is normal  RESP: No cough, no audible wheezing, able to talk in full sentences  Remainder of exam unable to be completed due to telephone visits    Diagnostic Test Results:  Labs reviewed in Epic  none           Assessment & Plan     Kaitlyn was seen today for recheck medication.    Diagnoses and all orders for this visit:    AUDELIA (generalized anxiety disorder)  -     venlafaxine (EFFEXOR-XR) 37.5 MG 24 hr capsule; TAKE ONE CAPSULE BY MOUTH ONCE DAILY    Panic attack  -     venlafaxine (EFFEXOR-XR) 37.5 MG 24 hr capsule; TAKE ONE CAPSULE BY MOUTH ONCE DAILY  PLAN:   Discussed the pathophysiology of anxiety episodes and the various symptoms seen associated with anxiety episodes.  Discussed possible triggers including fatigue, depression, stress, and chemicals such as alcohol, caffeine and certain drugs.  Discussed the treatment including an aerobic exercise program, adequate rest, and both rescue meds and maintenance meds.    Reviewed concept of depression as function of biochemical imbalance of neurotransmitters/rationale for treatment.  Risks and benefits of medication(s) reviewed with patient.  Questions answered.  Counseling advised  Followup appointment in 1 year(s)  Patient instructed to call for significant side effects medications or problems  Patient advised immediate presentation to hospital for suicidal thought, etc.    See Patient Instructions  Patient Instructions     PLAN:   1.   Symptomatic therapy suggested: The current medical regimen is effective.  Continue current medication regimen unchanged.    2.  Orders Placed This Encounter   Medications     venlafaxine (EFFEXOR-XR) 37.5 MG 24 hr capsule     Sig: TAKE ONE CAPSULE BY MOUTH ONCE DAILY     Dispense:   90 capsule     Refill:  3     3. Patient needs to follow up in if no improvement,or sooner if worsening of symptoms or other symptoms develop.        No follow-ups on file.    SINDHU Antonio [facility]    Phone call duration:  10 minutes

## 2020-12-16 ASSESSMENT — ANXIETY QUESTIONNAIRES: GAD7 TOTAL SCORE: 0

## 2021-01-26 ENCOUNTER — OFFICE VISIT (OUTPATIENT)
Dept: OPHTHALMOLOGY | Facility: CLINIC | Age: 46
End: 2021-01-26
Attending: OPHTHALMOLOGY
Payer: COMMERCIAL

## 2021-01-26 DIAGNOSIS — D31.31 CHOROIDAL NEVUS OF RIGHT EYE: ICD-10-CM

## 2021-01-26 DIAGNOSIS — H40.003 GLAUCOMA SUSPECT, BILATERAL: Primary | ICD-10-CM

## 2021-01-26 PROCEDURE — 92134 CPTRZ OPH DX IMG PST SGM RTA: CPT | Performed by: OPHTHALMOLOGY

## 2021-01-26 PROCEDURE — 92250 FUNDUS PHOTOGRAPHY W/I&R: CPT | Performed by: OPHTHALMOLOGY

## 2021-01-26 PROCEDURE — G0463 HOSPITAL OUTPT CLINIC VISIT: HCPCS

## 2021-01-26 PROCEDURE — 92014 COMPRE OPH EXAM EST PT 1/>: CPT | Mod: GC | Performed by: OPHTHALMOLOGY

## 2021-01-26 PROCEDURE — 99207 FUNDUS PHOTOS OU (BOTH EYES): CPT | Performed by: OPHTHALMOLOGY

## 2021-01-26 ASSESSMENT — EXTERNAL EXAM - LEFT EYE: OS_EXAM: NORMAL

## 2021-01-26 ASSESSMENT — SLIT LAMP EXAM - LIDS
COMMENTS: NORMAL
COMMENTS: NORMAL

## 2021-01-26 ASSESSMENT — CUP TO DISC RATIO
OD_RATIO: 0.6
OS_RATIO: 0.6

## 2021-01-26 ASSESSMENT — VISUAL ACUITY
OS_SC: 20/20
OD_SC: 20/20
METHOD: SNELLEN - LINEAR

## 2021-01-26 ASSESSMENT — EXTERNAL EXAM - RIGHT EYE: OD_EXAM: NORMAL

## 2021-01-26 ASSESSMENT — TONOMETRY
IOP_METHOD: TONOPEN
OD_IOP_MMHG: 15
OS_IOP_MMHG: 15

## 2021-01-26 ASSESSMENT — CONF VISUAL FIELD
OS_NORMAL: 1
OD_NORMAL: 1
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
## 2021-01-26 NOTE — NURSING NOTE
Chief Complaints and History of Present Illnesses   Patient presents with     Sven. Neopl Choroid/nevus Follow Up     Chief Complaint(s) and History of Present Illness(es)     Sven. Neopl Choroid/nevus Follow Up     Laterality: right eye    Duration: 1 year              Comments     Pt. States that she is doing well. No change in VA BE. No pain BE. No flashes or floaters BE.  Clarissa King COT 12:28 PM January 26, 2021

## 2021-03-08 ENCOUNTER — OFFICE VISIT (OUTPATIENT)
Dept: OPTOMETRY | Facility: CLINIC | Age: 46
End: 2021-03-08
Payer: COMMERCIAL

## 2021-03-08 DIAGNOSIS — Z01.00 EXAMINATION OF EYES AND VISION: Primary | ICD-10-CM

## 2021-03-08 DIAGNOSIS — H40.003 GLAUCOMA SUSPECT OF BOTH EYES: ICD-10-CM

## 2021-03-08 DIAGNOSIS — H52.4 PRESBYOPIA: ICD-10-CM

## 2021-03-08 DIAGNOSIS — D31.31 CHOROIDAL NEVUS OF RIGHT EYE: ICD-10-CM

## 2021-03-08 DIAGNOSIS — H02.889 MEIBOMIAN GLAND DYSFUNCTION: ICD-10-CM

## 2021-03-08 PROCEDURE — 92015 DETERMINE REFRACTIVE STATE: CPT | Performed by: OPTOMETRIST

## 2021-03-08 PROCEDURE — 92014 COMPRE OPH EXAM EST PT 1/>: CPT | Performed by: OPTOMETRIST

## 2021-03-08 ASSESSMENT — REFRACTION_WEARINGRX
OS_SPHERE: -0.25
OD_CYLINDER: +0.25
OD_ADD: +1.50
OD_SPHERE: -0.25
OD_AXIS: 090
OS_ADD: +1.50

## 2021-03-08 ASSESSMENT — REFRACTION_MANIFEST
OD_ADD: +1.50
OD_SPHERE: PLANO
OS_SPHERE: PLANO
OS_ADD: +1.50

## 2021-03-08 ASSESSMENT — CONF VISUAL FIELD
OS_NORMAL: 1
OD_NORMAL: 1

## 2021-03-08 ASSESSMENT — TONOMETRY
OS_IOP_MMHG: 14
IOP_METHOD: TONOPEN
OD_IOP_MMHG: 17

## 2021-03-08 ASSESSMENT — VISUAL ACUITY
OD_SC: 20/20
OD_SC+: -1
OD_SC: 20/15
OS_SC: 20/15
METHOD: SNELLEN - LINEAR
OS_SC: 20/20
OS_SC+: -1

## 2021-03-08 ASSESSMENT — SLIT LAMP EXAM - LIDS
COMMENTS: MEIBOMIAN GLAND DYSFUNCTION
COMMENTS: MEIBOMIAN GLAND DYSFUNCTION

## 2021-03-08 ASSESSMENT — EXTERNAL EXAM - LEFT EYE: OS_EXAM: NORMAL

## 2021-03-08 ASSESSMENT — CUP TO DISC RATIO
OS_RATIO: 0.65
OD_RATIO: 0.6

## 2021-03-08 ASSESSMENT — EXTERNAL EXAM - RIGHT EYE: OD_EXAM: NORMAL

## 2021-03-08 NOTE — PROGRESS NOTES
Chief Complaint   Patient presents with     Annual Eye Exam         Last Eye Exam: 1 year  Dilated Previously: Yes, OK to dilate    What are you currently using to see?  does not use glasses or contacts       Distance Vision Acuity: Satisfied with vision    Near Vision Acuity: Not satisfied, wearing cheater +1.50    Eye Comfort: good  Do you use eye drops? : No  Occupation or Hobbies: Dogs    Saw retina [facility] the [facility] 1/2021.    [facility]      Medical, surgical and family histories reviewed and updated 3/8/2021.       OBJECTIVE: See Ophthalmology exam    ASSESSMENT:    ICD-10-CM    1. Examination of eyes and vision  Z01.00 EYE EXAM (SIMPLE-NONBILLABLE)   2. Presbyopia  H52.4 REFRACTION   3. Meibomian gland dysfunction  H02.889 EYE EXAM (SIMPLE-NONBILLABLE)   4. Glaucoma suspect of both eyes  H40.003 EYE EXAM (SIMPLE-NONBILLABLE)     EYE ADULT REFERRAL   5. Choroidal nevus of right eye  D31.31 EYE EXAM (SIMPLE-NONBILLABLE)      PLAN:     Patient Instructions   Eyeglass prescription given.    Reading glasses with blue blocking tint.    Heat to the eyes daily for 10-15 minutes nightly with warm washcloth or reusable gel masks from the pharmacy or  [employer] heat masks can be purchased at Amazon.    Thera tears- 1 drop both eyes 2-4 x daily or other artificial tear.    Referral to Dr. Carlyn Everett at [facility] ophthalmology ([phone]) for follow up glaucoma testing.    Continue follow up as recommended with retinal specialist.    Return in 1 year for a complete eye exam or sooner if needed.    Kirill Nicholson, OD

## 2021-03-08 NOTE — PATIENT INSTRUCTIONS
Eyeglass prescription given.    Reading glasses with blue blocking tint.    Heat to the eyes daily for 10-15 minutes nightly with warm washcloth or reusable gel masks from the pharmacy or  [employer] heat masks can be purchased at Amazon.    Thera tears- 1 drop both eyes 2-4 x daily or other artificial tear.    Referral to Dr. Carlyn Everett at [facility] ophthalmology ([phone]) for follow up glaucoma testing.    Continue follow up as recommended with retinal specialist.    Return in 1 year for a complete eye exam or sooner if needed.    Kirill Nicholson, OD    There is a combination of three treatments which can greatly improve symptoms of dry eyes.    1.  Artificial tears  2. Heat (eyes closed)  3. Eyelid and eyelash cleansing (eyes closed)     Use one drop of artificial tears both eyes 4 x daily.  Once in the morning, lunch, dinner and bedtime. Continue to use the drops regardless if your eyes are comfortable or not.  Artificial tears work best as a preventative and not as well after your eyes are starting to bother you.  It may take 4- 6 weeks of using the drops before you notice improvement.  If after that time you are still having problems schedule an appointment for an evaluation and discussion of different treatments such as Restasis or Xiidra.  Dry eyes are a chronic condition and you may have more symptoms at certain times of the year.    Excess tearing can be due to the right tears not working properly or a blockage in the tear drainage system.  You can try using artificial tears 1 drop right eye 4 x day.  If the excess tearing is bothersome after 4-6 weeks of treatment then we can send you for further testing.  This would entail a referral to our oculoplastic specialist Dr. Comfort Rivera at the [facility]-[phone].    Recommended brands are:    Systane Complete  Systane Ultra  Systane Balance  Refresh Advanced Optive  Refresh Relieva  Blink    Recommended brands for contact lens wearers  are:    Systane contacts  Refresh contacts  Blink contacts    If you are using drops more than 4 x day or have sensitivities to preservatives I recommend non preserved artificial tears.  These come in 1 use vials.  They can be used every 1-2 hours.  Do not reuse the vials.    Recommended brands are:    Refresh Optive Jens-3  Systane- preservative free  Refresh-  preservative free  Blink- preservative free    Gels or ointment can be used at night.    Recommended brands are:    Systane Gel  Refresh Gel  Blink Gel  Genteal Gel    Systane night time (ointment)  Refresh Celluvisc  Refresh PM (ointment)      Visine, Clear Eyes or Murine (drops that get the red out) can irritate the eyes and cause a rebound effect where the eyes become more red and you end up using more drops.  Avoid drops containing tetrahydrozoline, naphazoline, phenylephrine, oxymetazoline.      OTC Lumify is a newer product that gives immediate redness relief without the rebound effect.  Use as needed to take the redness out.    Artificial tears may be used with other drops (such as allergy, glaucoma, antibiotics) around the same time.  Be sure to wait 5 minutes in between drops.    Heat to the eyelids can also improve your symptoms of dry eyes.  Patricia heat masks can be purchased at Amazon to be used nightly for 10-15 minutes.  Other options are gel masks that can be put in the microwave and purchased at most pharmacies.      Tea Tree Oil eyelid cleansers recommended are Ocusoft Oust foam cleanser to cleanse eyelids/lashes at night and in the am. Other options are Blephadex or Cliradex eyelid wipes.  KEEP EYES CLOSED when using these products.  These can be purchased on amazon.com   A good product for make up remover with tea tree oil is WeLoveEyes.  This can be found at www.[employer] or [employer].    Other good eyelid cleansers have hypochlorous acid which removes excess bacteria and is safe around the eyes. Products are Avenova, Ocusoft  Hypochlor or Heyedrate. Spray solution onto cotton pad, close eyes and gently apply to eyelids and eyelashes using side to side motion.  You can also KEEP EYES CLOSED spray and rub into eyelashes.  You do not need to rinse it off. Use morning and evening. These products can be found on Amazon.  You can check with your local pharmacy and see if they can order if for you if they don't have it.    Other brands of eyelid cleansing wipes are:    Ocusoft wipes  Systane wipes    A great eye make up line is [email or web address]

## 2021-03-08 NOTE — LETTER
3/8/2021         RE: Kaitlyn Garcia  [address]  [facility] [address]        Dear Colleague,    Thank you for referring your patient, Kaitlyn Garcia, to the [facility]. Please see a copy of my visit note below.    Chief Complaint   Patient presents with     Annual Eye Exam         Last Eye Exam: 1 year  Dilated Previously: Yes, OK to dilate    What are you currently using to see?  does not use glasses or contacts       Distance Vision Acuity: Satisfied with vision    Near Vision Acuity: Not satisfied, wearing cheater +1.50    Eye Comfort: good  Do you use eye drops? : No  Occupation or Hobbies: Dogs    Saw retina clinic at the [facility] 1/2021.    Nalini Harpersaraialexys      Medical, surgical and family histories reviewed and updated 3/8/2021.       OBJECTIVE: See Ophthalmology exam    ASSESSMENT:    ICD-10-CM    1. Examination of eyes and vision  Z01.00 EYE EXAM (SIMPLE-NONBILLABLE)   2. Presbyopia  H52.4 REFRACTION   3. Meibomian gland dysfunction  H02.889 EYE EXAM (SIMPLE-NONBILLABLE)   4. Glaucoma suspect of both eyes  H40.003 EYE EXAM (SIMPLE-NONBILLABLE)     EYE ADULT REFERRAL   5. Choroidal nevus of right eye  D31.31 EYE EXAM (SIMPLE-NONBILLABLE)      PLAN:     Patient Instructions   Eyeglass prescription given.    Reading glasses with blue blocking tint.    Heat to the eyes daily for 10-15 minutes nightly with warm washcloth or reusable gel masks from the pharmacy or  [employer] heat masks can be purchased at [employer].    Thera tears- 1 drop both eyes 2-4 x daily or other artificial tear.    Referral to Dr. Carlyn Everett at [facility] ophthalmology ([phone]) for follow up glaucoma testing.    Continue follow up as recommended with retinal specialist.    Return in 1 year for a complete eye exam or sooner if needed.    Kirill Nicholson, OD                  Again, thank you for allowing me to participate in the care of your patient.        Sincerely,        Kirill Nicholson,  OD

## 2021-04-17 ENCOUNTER — HEALTH MAINTENANCE LETTER (OUTPATIENT)
Age: 46
End: 2021-04-17

## 2021-04-29 NOTE — TELEPHONE ENCOUNTER
Hello,  last fill date:11-  Last quantity:30    Thank You,  Shama German  Pharmacy Technician  [facility]  [phone]    
English

## 2021-05-03 NOTE — PROGRESS NOTES
FOLLOW UP  May 5, 2021     Kaitlyn Garcia is a 45 year old woman who presents with history of atypical ductal hyperplasia.      HPI:     History of 3 left breast needle biopsies, 2 right breast needle biopsies, and 2 excisional left breast biopsy, all benign. The left breast biopsy on 12/12/16 had 1 mm of atypical ductal hyperplasia.      Family history of 2 paternal aunts who had breast cancer in their 60's and a paternal cousin with breast cancer in her 40's. Kaitlyn  was seen by a genetic counselor in 2015, no genetic testing was done.      She began high risk screening with annual mammogram and breast MRI in 2013. She started Tamoxifen for risk reduction 5/24/17 and switched to 5 mg dosing 5/30/19. She had a breast MRI 5/9/17 revealed an area of enhancement biopsied to be an intraductal papilloma without atypia. She was seen for surgical consultation by Dr. Montenegro and monitoring with imaging was recommended.     She had a pelvic ultrasound 6/4/18, 6/12/19, and 11/4/20 that are stable.      Today she had a breast MRI. She denies any breast concerns including mass, skin change, nipple inversion or nipple discharge.      BREAST-SPECIFIC HISTORY:     Previous breast imaging: Yes   - 5/21/12 b/l Dmammo and right u/s for right breast lump: calcifications in the upper outer quadrant of left breast spanning 5 cm. Right u/s negative. BI-RADS 4 suspicious for malignancy   - 5/23/12 left stereotactic biopsy: intraductal papilloma   - 7/30/12 left wire-placement   - 2/19/13 MRI: large segmental enhancement of left lateral breast, BI-RADS 0 incomplete   Left u/s: BI-RADS 3 probably benign  - 3/29/13 MRI: BI-RADS 2 benign findings  - 8/5/13 b/l Dmammo for fullness: BI-RADS 2 benign   - 2/5/14 MRI: cysts in left breast, BI-RADS 2 benign   - 5/9/14 Dmammo b/l: negative. Left u/s: cystic appearing structures, BI-RADS 2 benign  - 5/20/14 MRI: study not completed due to nausea, BI-RADS 0 incomplete  - 5/28/14 MRI: BI-RADS 2 benign  -  10/28/14 b/l Dmammo: BI-RADS 2 benign   - 5/7/15 MRI BI-RADS 2 benign   - 11/10/15 Smammo: bilateral calcs: BI-RADS 2 benign  - 1/25/16 left u/s for pain: multiple tiny cyst  - 5/17/16 MRI: BI-RADS 2 benign   - 12/6/16  Smammo: lateral left breast developing macrocalcifications BI-RADS 0 incomplete   - 12/7/16 Dmammo left: left 2:00 calcifications BI-RADS 4 suspicious  - 12/12/16: stereotactic biopsy: focus of ADH    - 2/1/17 wire placement   - 5/9/17 MRI: 2 adjacent mass like areas of enhancement mid central left breast BI-RADS 0 incomplete   - 5/12/17 left u/s: scattered small cysts BI-RADS 4 suspicious   - 5/18/17 MR biopsy: intraductal papilloma   - 4/30/18 MRI BI-RADS 2  - 6/6/18 left Dmammo and ultrasound for pain BI-RADS 2  - 11/28/18 Smammo BI-RADS 2  - 5/7/19 breast MRI BI-RADS 2  - 8/21/19 Dmammo and right breast ultrasound: BI-RADS 2  - 11/12/19 Smammo BI-RADS 1  - 5/20/20 breast MRI BI-ARDS 2  - 11/4/20 b/l Dmammo and left breast ultrasound for lump: BI-RADS 1     Prior breast biopsies:Yes   - 2 right breast biopsies   - 5/23/12 left needle biopsy: intraductal papilloma with sclerosing features   - 7/30/12 left excisional biopsy with Dr. Lynch: focal columnar cell change with atypia, intraductal papilloma and surrounding intraductal papillomatosis, sclerosing adenosis, fibrocystic changes  - 12/12/16 left needle biopsy x2: flat epithelial atypia with focus of ADH, duct ectasia and focal sclerosing adenosis, microcalcifications.   FEA and columnar cell change with atypia  - 2/1/17 left scar excision and excisional biopsy with Dr. Montenegro: FEA, columnar cell change/hyperplasia, intraductal papillomas, usual ductal hyperplasia, calcifications  - 5/18/17 left MRI needle biopsy: intraductal papilloma, mild columnar cell hyperplasia, mild duct ectasia, focal sclerosing adenosis      Prior history of breast cancer: No  Prior radiation history: No   Self breast exams: No  Breast density: heterogeneously dense     GYN  HISTORY:  . Age at 1st pregnancy: 17. Breastfeeding history: Yes.   Age at menarche: 14-14  Menopausal: premenopausal  Menopausal hormone replacement therapy: No     RISK ASSESSMENT: > 1.7% 5 year risk and > 20% lifetime risk   Heather:2.6% 5 year risk   SEBASTIAN: 43.7% lifetime risk   Abiodun: 11.6% lifetime risk      FAMILY HISTORY:  Breast ca: Yes    - paternal aunt 60 's   - paternal aunt 60's  - paternal cousin 40   Ovarian ca: No   - ? Paternal cousin with ovarian cancer   Pancreatic ca: No   Prostate: yes  - maternal uncle 69   Gastric ca: Yes   - paternal grandfather, passed   Melanoma: No  Colon ca: No  Other cancer: Yes   - maternal grandmother skin cancer 90's  - paternal grandfather brain 60's   Denominational ethnicity: No  Other genetic, testing, syndromes, or clotting disorders: No   - She saw a genetic counselor 2015 and no testing was recommended     PAST MEDICAL HISTORY  Past Medical History:   Diagnosis Date     Anemia      Atypical ductal hyperplasia of breast      Choroidal nevus of right eye      Concussion 10/2015     Depressive disorder 2006    Treated with celexa for two years     Family history of breast cancer 2/3/2015     Family history of breast cancer 2/3/2015     Family history of breast cancer 2/3/2015     Family history of breast cancer 2/3/2015     Gastroesophageal reflux disease      Glaucoma suspect of both eyes      Papilloma of breast 11    Left, See Dr. Lynch at      PONV (postoperative nausea and vomiting)      S/P endometrial ablation 10/10/13    Menorrhagia     Shingles     x2 in the past     WBC decreased 2/3/2015     WBC decreased 2/3/2015     WBC decreased 2/3/2015     PAST SURGICAL HISTORY   Past Surgical History:   Procedure Laterality Date     ABDOMEN SURGERY      rectoplasty     BIOPSY BREAST NEEDLE LOCALIZATION  2012    Procedure: BIOPSY BREAST NEEDLE LOCALIZATION;  left breast excisional Biopsy with wire localization @0830;  Surgeon: Robert Lynch MD;   Location: [facility] OR     BREAST SURGERY       COSMETIC SURGERY      Rectalplasty for episiotomy repair     COSMETIC SURGERY      Rectalplasty for episiotomy repair     DILATION AND CURETTAGE, HYSTEROSCOPY, ABLATE ENDOMETRIUM NOVASURE, COMBINED  10/10/2013    Procedure: COMBINED DILATION AND CURETTAGE, HYSTEROSCOPY, ABLATE ENDOMETRIUM NOVASURE;  Endometrial ablation with Novasure;  Surgeon: Indu Birmingham DO;  Location: [facility] OR     HC TOOTH EXTRACTION W/FORCEP       LUMPECTOMY BREAST Left 2/1/2017    Procedure: LUMPECTOMY BREAST;  Surgeon: Lori Montenegro MD;  Location: [facility]     SOFT TISSUE SURGERY      lumpectomy x 2 right breast     SOFT TISSUE SURGERY      episiotomy fixed     MEDICATIONS  Current Outpatient Medications   Medication Sig Dispense Refill     fluticasone (FLONASE) 50 MCG/ACT nasal spray Spray 2 sprays into both nostrils daily (Patient taking differently: Spray 2 sprays into both nostrils as needed ) 48 g 5     LORazepam (ATIVAN) 1 MG tablet Take 1 tablet (1 mg) by mouth twice a day as needed.       MELATONIN PO Take by mouth nightly as needed        Naproxen Sodium (ALEVE PO) Take 440 mg by mouth as needed        omeprazole (PRILOSEC) 20 MG DR capsule TAKE ONE CAPSULE BY MOUTH EVERY DAY (Patient not taking: Reported on 11/3/2020) 90 capsule 2     tamoxifen (NOLVADEX) 10 MG tablet Take 0.5 tablets (5 mg) by mouth daily 30 tablet 3     valACYclovir (VALTREX) 1000 mg tablet TAKE TWO TABLETS BY MOUTH TWICE A DAY (Patient taking differently: as needed ) 4 tablet 3     venlafaxine (EFFEXOR-XR) 37.5 MG 24 hr capsule TAKE ONE CAPSULE BY MOUTH ONCE DAILY 90 capsule 3      ALLERGIES  Allergies   Allergen Reactions     No Known Drug Allergies       SOCIAL HISTORY:  Smokes: No  EtOH: Yes, less than 1 per month  Exercise: walking   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   Breasts: Examination was done in both the upright and supine positions.  Breasts are symmetrical . No dominant fixed or suspicious masses noted. No skin or nipple changes. No nipple discharge. Left breast scar well healed.   No clavicular, cervical, or axillary lymphadenopathy.     INVESTIGATIONS:    5/5/21 breast MRI: no concerning findings per Radiology, final report pending.     ASSESSMENT/PLAN:    Kaitlyn Garcia is a 45 year old woman with history of left breast atypical ductal hyperplasia here for high risk screening and preventative therapy with Tamoxifen.      1) Atypical ductal hyperplasia and family history of breast cancer.   We also reviewed that atypical ductal hyperplasia increases her baseline risk for breast cancer. She meets guidelines for high risk screening with a lifetime risk of breast cancer >20%. Discussed she meets guidelines for high risk screening with yearly mammogram alternating with Breast MRI every six months. Clinical encounter every 6-12 months including family history, risk assessment, and clinical breast exam.   - Screening mammogram with clinic visit due: 11/5/21  - Breast MRI with clinic visit due 5/2022      2) Risk reduction   She is a candidate for breast cancer risk-reduction intervention based on a Heather estimated 5 year risk. The contraindications for Tamoxifen (history of VTE, history or stroke, history of TIA, pregnancy, and potential pregnancy without an effective nonhormal method of method of contraception) along with potential side  effects (hot flashes, deep vein thrombosis, pulmonary embolis, stroke, cataracts, and endometrial cancer) including signs and symptoms were discussed.   - Tamoxifen 5 mg daily for 5 years started: 5/24/17.  - Yearly gynecological examination. Prompt evaluation for any vaginal spotting.    3) Lifestyle Modifications were provided.   - Maintain a healthy weight (BMI 20-25). Your Body mass index is 24.33 kg/m .   Higher body mass index (BMI) and adult weight gain is associated with increased risk for breast cancer. This increase in risk has been attributed to increase in circulating endogenous estrogen levels from fat tissue.   - Alcohol consumption, even at moderate levels (1-2 drinks per day), increases breast cancer risk and are best avoided. If you choose to drink alcohol limit alcohol consumption to less than 1 drink per day. (1 ounce of liquor, 6 ounces of wine, or 8 ounces of beer)  - Be active daily and void being sedentary. Take part in at least 150-300 minutes of moderate-intensity physical activity per week.     Merced Wiggins PA-C    15 minutes spent on the date of the encounter doing chart review, review of test results, interpretation of tests, patient visit and documentation.

## 2021-05-05 ENCOUNTER — OFFICE VISIT (OUTPATIENT)
Dept: SURGERY | Facility: CLINIC | Age: 46
End: 2021-05-05
Attending: PHYSICIAN ASSISTANT
Payer: COMMERCIAL

## 2021-05-05 ENCOUNTER — ANCILLARY PROCEDURE (OUTPATIENT)
Dept: MRI IMAGING | Facility: CLINIC | Age: 46
End: 2021-05-05
Attending: PHYSICIAN ASSISTANT
Payer: COMMERCIAL

## 2021-05-05 VITALS
DIASTOLIC BLOOD PRESSURE: 83 MMHG | HEART RATE: 82 BPM | RESPIRATION RATE: 18 BRPM | BODY MASS INDEX: 24.33 KG/M2 | SYSTOLIC BLOOD PRESSURE: 125 MMHG | WEIGHT: 154.2 LBS | OXYGEN SATURATION: 98 % | TEMPERATURE: 98.6 F

## 2021-05-05 DIAGNOSIS — Z80.3 FAMILY HISTORY OF BREAST CANCER: ICD-10-CM

## 2021-05-05 DIAGNOSIS — Z91.89 AT HIGH RISK FOR BREAST CANCER: Primary | ICD-10-CM

## 2021-05-05 DIAGNOSIS — N60.99 ATYPICAL HYPERPLASIA OF BREAST: ICD-10-CM

## 2021-05-05 DIAGNOSIS — Z91.89 AT HIGH RISK FOR BREAST CANCER: ICD-10-CM

## 2021-05-05 PROCEDURE — G0463 HOSPITAL OUTPT CLINIC VISIT: HCPCS

## 2021-05-05 PROCEDURE — A9585 GADOBUTROL INJECTION: HCPCS | Performed by: RADIOLOGY

## 2021-05-05 PROCEDURE — 99213 OFFICE O/P EST LOW 20 MIN: CPT | Performed by: PHYSICIAN ASSISTANT

## 2021-05-05 PROCEDURE — 77049 MRI BREAST C-+ W/CAD BI: CPT | Performed by: RADIOLOGY

## 2021-05-05 RX ORDER — GADOBUTROL 604.72 MG/ML
7.5 INJECTION INTRAVENOUS ONCE
Status: COMPLETED | OUTPATIENT
Start: 2021-05-05 | End: 2021-05-05

## 2021-05-05 RX ADMIN — GADOBUTROL 7 ML: 604.72 INJECTION INTRAVENOUS at 13:34

## 2021-05-05 ASSESSMENT — PAIN SCALES - GENERAL: PAINLEVEL: NO PAIN (0)

## 2021-05-05 NOTE — DISCHARGE INSTRUCTIONS
MRI Contrast Discharge Instructions    The IV contrast you received today will pass out of your body in your  urine. This will happen in the next 24 hours. You will not feel this process.  Your urine will not change color.    Drink at least 4 extra glasses of water or juice today (unless your doctor  has restricted your fluids). This reduces the stress on your kidneys.  You may take your regular medicines.    If you are on dialysis: It is best to have dialysis today.    If you have a reaction: Most reactions happen right away. If you have  any new symptoms after leaving the hospital (such as hives or swelling),  call your hospital at the correct number below. Or call your family doctor.  If you have breathing distress or wheezing, call 911.    Special instructions: ***    I have read and understand the above information.    Signature:______________________________________ Date:___________    Staff:__________________________________________ Date:___________     Time:__________    [facility] Departments:    ___[place]: [phone]  ___[facility]: [phone]  ___[place]: [phone] ___[facility]: [phone]  ___[facility]: [phone]  ___[facility]: [phone]  ___[phone]  ___[facility]: [phone]  ___[phone]

## 2021-05-05 NOTE — PATIENT INSTRUCTIONS
Kaitlyn Garcia is a 45 year old woman with history of left breast atypical ductal hyperplasia here for high risk screening and preventative therapy with Tamoxifen.      1) Atypical ductal hyperplasia and family history of breast cancer.   We also reviewed that atypical ductal hyperplasia increases her baseline risk for breast cancer. She meets guidelines for high risk screening with a lifetime risk of breast cancer >20%. Discussed she meets guidelines for high risk screening with yearly mammogram alternating with Breast MRI every six months. Clinical encounter every 6-12 months including family history, risk assessment, and clinical breast exam.   - Screening mammogram with clinic visit due: 11/5/21  - Breast MRI with clinic visit due 5/2022      2) Risk reduction   She is a candidate for breast cancer risk-reduction intervention based on a Heather estimated 5 year risk. The contraindications for Tamoxifen (history of VTE, history or stroke, history of TIA, pregnancy, and potential pregnancy without an effective nonhormal method of method of contraception) along with potential side effects (hot flashes, deep vein thrombosis, pulmonary embolis, stroke, cataracts, and endometrial cancer) including signs and symptoms were discussed.   - Tamoxifen 5 mg daily for 5 years started: 5/24/17.  - Yearly gynecological examination. Prompt evaluation for any vaginal spotting.    3) Lifestyle Modifications were provided.   - Maintain a healthy weight (BMI 20-25). Your Body mass index is 24.33 kg/m .   Higher body mass index (BMI) and adult weight gain is associated with increased risk for breast cancer. This increase in risk has been attributed to increase in circulating endogenous estrogen levels from fat tissue.   - Alcohol consumption, even at moderate levels (1-2 drinks per day), increases breast cancer risk and are best avoided. If you choose to drink alcohol limit alcohol consumption to less than 1 drink per day. (1 ounce of  liquor, 6 ounces of wine, or 8 ounces of beer)  - Be active daily and void being sedentary. Take part in at least 150-300 minutes of moderate-intensity physical activity per week.

## 2021-05-05 NOTE — LETTER
Kaitlyn Garcia  [address]  [facility] [address]          December 5, 2022    Dear Kaitlyn:    Our clinic records indicate we have attempted to contact you three (3) or more times to schedule a follow up appointment. Unfortunately, we have been unable to reach you. To prevent further delays in your care, please contact our office to schedule your appointment at your earliest convience. You can send a message via [employer] or call our scheduling line at [phone], hit option 5 then option 2.      Sincerely,       Treasure QUINTERO [facility]

## 2021-05-05 NOTE — NURSING NOTE
"Oncology Rooming Note    May 5, 2021 2:06 PM   Kaitlyn Garcia is a 45 year old female who presents for:    Chief Complaint   Patient presents with     RECHECK     BREAST CA     Initial Vitals: /83   Pulse 82   Temp 98.6  F (37  C) (Tympanic)   Resp 18   Wt 69.9 kg (154 lb 3.2 oz)   SpO2 98%   BMI 24.33 kg/m   Estimated body mass index is 24.33 kg/m  as calculated from the following:    Height as of 11/13/19: 1.695 m (5' 6.75\").    Weight as of this encounter: 69.9 kg (154 lb 3.2 oz). Body surface area is 1.81 meters squared.  No Pain (0) Comment: Data Unavailable   No LMP recorded. Patient has had an ablation.  Allergies reviewed: Yes  Medications reviewed: Yes    Medications: MEDICATION REFILLS NEEDED TODAY. Provider was notified.  Pharmacy name entered into [facility]:    [facility] [place] - [place], [place] - [address], [address]  [facility] [place], [place] - [address]    Clinical concerns: REFILL TAMOXIFEN    Marysol Hackett CMA                      "

## 2021-05-05 NOTE — LETTER
5/5/2021         RE: Kaitlyn Garcia  [address]  [facility] [address]        Dear Colleague,    Thank you for referring your patient, Kaitlyn Garcia, to the [facility]. Please see a copy of my visit note below.    FOLLOW UP  May 5, 2021     Kaitlyn Garcia is a 45 year old woman who presents with history of atypical ductal hyperplasia.      HPI:     History of 3 left breast needle biopsies, 2 right breast needle biopsies, and 2 excisional left breast biopsy, all benign. The left breast biopsy on 12/12/16 had 1 mm of atypical ductal hyperplasia.      Family history of 2 paternal aunts who had breast cancer in their 60's and a paternal cousin with breast cancer in her 40's. Kaitlyn  was seen by a genetic counselor in 2015, no genetic testing was done.      She began high risk screening with annual mammogram and breast MRI in 2013. She started Tamoxifen for risk reduction 5/24/17 and switched to 5 mg dosing 5/30/19. She had a breast MRI 5/9/17 revealed an area of enhancement biopsied to be an intraductal papilloma without atypia. She was seen for surgical consultation by Dr. Montenegro and monitoring with imaging was recommended.     She had a pelvic ultrasound 6/4/18, 6/12/19, and 11/4/20 that are stable.      Today she had a breast MRI. She denies any breast concerns including mass, skin change, nipple inversion or nipple discharge.      BREAST-SPECIFIC HISTORY:     Previous breast imaging: Yes   - 5/21/12 b/l Dmammo and right u/s for right breast lump: calcifications in the upper outer quadrant of left breast spanning 5 cm. Right u/s negative. BI-RADS 4 suspicious for malignancy   - 5/23/12 left stereotactic biopsy: intraductal papilloma   - 7/30/12 left wire-placement   - 2/19/13 MRI: large segmental enhancement of left lateral breast, BI-RADS 0 incomplete   Left u/s: BI-RADS 3 probably benign  - 3/29/13 MRI: BI-RADS 2 benign findings  - 8/5/13 b/l Dmammo for fullness: BI-RADS 2 benign    - 2/5/14 MRI: cysts in left breast, BI-RADS 2 benign   - 5/9/14 Dmammo b/l: negative. Left u/s: cystic appearing structures, BI-RADS 2 benign  - 5/20/14 MRI: study not completed due to nausea, BI-RADS 0 incomplete  - 5/28/14 MRI: BI-RADS 2 benign  - 10/28/14 b/l Dmammo: BI-RADS 2 benign   - 5/7/15 MRI BI-RADS 2 benign   - 11/10/15 Smammo: bilateral calcs: BI-RADS 2 benign  - 1/25/16 left u/s for pain: multiple tiny cyst  - 5/17/16 MRI: BI-RADS 2 benign   - 12/6/16  Smammo: lateral left breast developing macrocalcifications BI-RADS 0 incomplete   - 12/7/16 Dmammo left: left 2:00 calcifications BI-RADS 4 suspicious  - 12/12/16: stereotactic biopsy: focus of ADH    - 2/1/17 wire placement   - 5/9/17 MRI: 2 adjacent mass like areas of enhancement mid central left breast BI-RADS 0 incomplete   - 5/12/17 left u/s: scattered small cysts BI-RADS 4 suspicious   - 5/18/17 MR biopsy: intraductal papilloma   - 4/30/18 MRI BI-RADS 2  - 6/6/18 left Dmammo and ultrasound for pain BI-RADS 2  - 11/28/18 Smammo BI-RADS 2  - 5/7/19 breast MRI BI-RADS 2  - 8/21/19 Dmammo and right breast ultrasound: BI-RADS 2  - 11/12/19 Smammo BI-RADS 1  - 5/20/20 breast MRI BI-ARDS 2  - 11/4/20 b/l Dmammo and left breast ultrasound for lump: BI-RADS 1     Prior breast biopsies:Yes   - 2 right breast biopsies   - 5/23/12 left needle biopsy: intraductal papilloma with sclerosing features   - 7/30/12 left excisional biopsy with Dr. Lynch: focal columnar cell change with atypia, intraductal papilloma and surrounding intraductal papillomatosis, sclerosing adenosis, fibrocystic changes  - 12/12/16 left needle biopsy x2: flat epithelial atypia with focus of ADH, duct ectasia and focal sclerosing adenosis, microcalcifications.   FEA and columnar cell change with atypia  - 2/1/17 left scar excision and excisional biopsy with Dr. Montenegro: FEA, columnar cell change/hyperplasia, intraductal papillomas, usual ductal hyperplasia, calcifications  - 5/18/17 left MRI  needle biopsy: intraductal papilloma, mild columnar cell hyperplasia, mild duct ectasia, focal sclerosing adenosis      Prior history of breast cancer: No  Prior radiation history: No   Self breast exams: No  Breast density: heterogeneously dense     GYN HISTORY:  . Age at 1st pregnancy: 17. Breastfeeding history: Yes.   Age at menarche: 14-14  Menopausal: premenopausal  Menopausal hormone replacement therapy: No     RISK ASSESSMENT: > 1.7% 5 year risk and > 20% lifetime risk   Heather:2.6% 5 year risk   SEBASTIAN: 43.7% lifetime risk   Abiodun: 11.6% lifetime risk      FAMILY HISTORY:  Breast ca: Yes    - paternal aunt 60 's   - paternal aunt 60's  - paternal cousin 40   Ovarian ca: No   - ? Paternal cousin with ovarian cancer   Pancreatic ca: No   Prostate: yes  - maternal uncle 69   Gastric ca: Yes   - paternal grandfather, passed   Melanoma: No  Colon ca: No  Other cancer: Yes   - maternal grandmother skin cancer 90's  - paternal grandfather brain 60's   Oriental orthodox ethnicity: No  Other genetic, testing, syndromes, or clotting disorders: No   - She saw a genetic counselor 2015 and no testing was recommended     PAST MEDICAL HISTORY  Past Medical History:   Diagnosis Date     Anemia      Atypical ductal hyperplasia of breast      Choroidal nevus of right eye      Concussion 10/2015     Depressive disorder 2006    Treated with celexa for two years     Family history of breast cancer 2/3/2015     Family history of breast cancer 2/3/2015     Family history of breast cancer 2/3/2015     Family history of breast cancer 2/3/2015     Gastroesophageal reflux disease      Glaucoma suspect of both eyes      Papilloma of breast 11    Left, See Dr. Lynch at      PONV (postoperative nausea and vomiting)      S/P endometrial ablation 10/10/13    Menorrhagia     Shingles     x2 in the past     WBC decreased 2/3/2015     WBC decreased 2/3/2015     WBC decreased 2/3/2015     PAST SURGICAL HISTORY   Past Surgical History:   Procedure  Laterality Date     ABDOMEN SURGERY      rectoplasty     BIOPSY BREAST NEEDLE LOCALIZATION  7/30/2012    Procedure: BIOPSY BREAST NEEDLE LOCALIZATION;  left breast excisional Biopsy with wire localization @0830;  Surgeon: Robert Lynch MD;  Location: [facility] OR     BREAST SURGERY       COSMETIC SURGERY      Rectalplasty for episiotomy repair     COSMETIC SURGERY      Rectalplasty for episiotomy repair     DILATION AND CURETTAGE, HYSTEROSCOPY, ABLATE ENDOMETRIUM NOVASURE, COMBINED  10/10/2013    Procedure: COMBINED DILATION AND CURETTAGE, HYSTEROSCOPY, ABLATE ENDOMETRIUM NOVASURE;  Endometrial ablation with Novasure;  Surgeon: Indu Birmingham DO;  Location: [facility] OR     HC TOOTH EXTRACTION W/FORCEP       LUMPECTOMY BREAST Left 2/1/2017    Procedure: LUMPECTOMY BREAST;  Surgeon: Lori Montenegro MD;  Location: [facility]     SOFT TISSUE SURGERY      lumpectomy x 2 right breast     SOFT TISSUE SURGERY      episiotomy fixed     MEDICATIONS  Current Outpatient Medications   Medication Sig Dispense Refill     fluticasone (FLONASE) 50 MCG/ACT nasal spray Spray 2 sprays into both nostrils daily (Patient taking differently: Spray 2 sprays into both nostrils as needed ) 48 g 5     LORazepam (ATIVAN) 1 MG tablet Take 1 tablet (1 mg) by mouth twice a day as needed.       MELATONIN PO Take by mouth nightly as needed        Naproxen Sodium (ALEVE PO) Take 440 mg by mouth as needed        omeprazole (PRILOSEC) 20 MG DR capsule TAKE ONE CAPSULE BY MOUTH EVERY DAY (Patient not taking: Reported on 11/3/2020) 90 capsule 2     tamoxifen (NOLVADEX) 10 MG tablet Take 0.5 tablets (5 mg) by mouth daily 30 tablet 3     valACYclovir (VALTREX) 1000 mg tablet TAKE TWO TABLETS BY MOUTH TWICE A DAY (Patient taking differently: as needed ) 4 tablet 3     venlafaxine (EFFEXOR-XR) 37.5 MG 24 hr capsule TAKE ONE CAPSULE BY MOUTH ONCE DAILY 90 capsule 3      ALLERGIES  Allergies   Allergen Reactions     No Known Drug Allergies        SOCIAL HISTORY:  Smokes: No  EtOH: Yes, less than 1 per month  Exercise: walking   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   Breasts: Examination was done in both the upright and supine positions.  Breasts are symmetrical . No dominant fixed or suspicious masses noted. No skin or nipple changes. No nipple discharge. Left breast scar well healed.   No clavicular, cervical, or axillary lymphadenopathy.     INVESTIGATIONS:    5/5/21 breast MRI: no concerning findings per Radiology, final report pending.     ASSESSMENT/PLAN:    Kaitlyn Garcia is a 45 year old woman with history of left breast atypical ductal hyperplasia here for high risk screening and preventative therapy with Tamoxifen.      1) Atypical ductal hyperplasia and family history of breast cancer.   We also reviewed that atypical ductal hyperplasia increases her baseline risk for breast cancer. She meets guidelines for high risk screening with a lifetime risk of breast cancer >20%. Discussed she meets guidelines for high risk screening with yearly mammogram alternating with Breast MRI every six months. Clinical encounter every 6-12 months including family history, risk assessment, and clinical breast exam.   - Screening mammogram with clinic visit due: 11/5/21  - Breast MRI with clinic visit due 5/2022      2) Risk reduction   She is a candidate for breast cancer risk-reduction intervention  based on a Heather estimated 5 year risk. The contraindications for Tamoxifen (history of VTE, history or stroke, history of TIA, pregnancy, and potential pregnancy without an effective nonhormal method of method of contraception) along with potential side effects (hot flashes, deep vein thrombosis, pulmonary embolis, stroke, cataracts, and endometrial cancer) including signs and symptoms were discussed.   - Tamoxifen 5 mg daily for 5 years started: 5/24/17.  - Yearly gynecological examination. Prompt evaluation for any vaginal spotting.    3) Lifestyle Modifications were provided.   - Maintain a healthy weight (BMI 20-25). Your Body mass index is 24.33 kg/m .   Higher body mass index (BMI) and adult weight gain is associated with increased risk for breast cancer. This increase in risk has been attributed to increase in circulating endogenous estrogen levels from fat tissue.   - Alcohol consumption, even at moderate levels (1-2 drinks per day), increases breast cancer risk and are best avoided. If you choose to drink alcohol limit alcohol consumption to less than 1 drink per day. (1 ounce of liquor, 6 ounces of wine, or 8 ounces of beer)  - Be active daily and void being sedentary. Take part in at least 150-300 minutes of moderate-intensity physical activity per week.     Merced Wiggins PA-C    15 minutes spent on the date of the encounter doing chart review, review of test results, interpretation of tests, patient visit and documentation.         Again, thank you for allowing me to participate in the care of your patient.        Sincerely,        Merced Wiggins PA-C

## 2021-05-29 DIAGNOSIS — Z91.89 AT HIGH RISK FOR BREAST CANCER: ICD-10-CM

## 2021-06-01 RX ORDER — TAMOXIFEN CITRATE 10 MG/1
TABLET ORAL
Qty: 30 TABLET | Refills: 3 | Status: SHIPPED | OUTPATIENT
Start: 2021-06-01 | End: 2022-02-07

## 2021-07-02 DIAGNOSIS — B00.1 RECURRENT COLD SORES: ICD-10-CM

## 2021-07-02 RX ORDER — VALACYCLOVIR HYDROCHLORIDE 1 G/1
TABLET, FILM COATED ORAL
Qty: 4 TABLET | Refills: 3 | Status: SHIPPED | OUTPATIENT
Start: 2021-07-02 | End: 2021-11-30

## 2021-07-02 NOTE — TELEPHONE ENCOUNTER
Medication: Valtrew 1gm    Last Fill: 04/21/20       Qty: 4   Last Rx Date: 12/15/20     Last MD Visit: 12/15/20

## 2021-07-02 NOTE — PROGRESS NOTES
HPI:  Kaitlyn Garcia is a 45 year old female presenting for glaucoma suspect exam.    Last saw Dr. Perez 1/2021 for DFE and monitoring of right eye choroidal nevus.  Previously followed with Dr. Nicholson for glaucoma suspect based on nerve appearance.    Last visit with Dr. Nicholson 3/8/2021 with updated glasses Rx. Recommended evaluation here for glaucoma testing.    No changes in vision. No eye pain. No concerns.    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 Glaucoma suspect of both eyes  - based on nerve appearance with large CD ratio with healthy nerve rims, no notches, no hemes  - gonio 7/5/21 wide open to CBB each eye  - pachymetry 501/500 (was 480s in 2015); thin each eye  - IOP today 10/11   - Tmax 17/18  - personally reviewed fields from 2017, 2019, 2020: full or essentially full with nonspecific defects not repeatable on subsequent fields  - no FHx glaucoma  - OCT RNFL 7/5/21  Right Eye: avg thickness 97, no thinning, GCC normal, stable compared to tests in 2020, 2019, 2017, 2016, 2015  Left Eye: avg thickness 95, no thinning, GCC normal, stable compared to tests from 2020, 2019, 2017, 2016, 2015    --> discussed with patient; given stability of optic nerve appearance x many years, normal untreated IOP, low overall suspicion for glaucoma  --> monitor for now without treatment  --> will plan to coordinate appointments with Dr. Perez and me at [facility] for patient convenience; will repeat OCT RNFL q2 years and continue annual monitoring of choroidal nevus with Dr. BRYAN    2. Choroidal nevus of right eye  - follows with Dr. Perez annually; last visit 1/2021    Follow up with Dr. Perez annually in January as scheduled  Follow up with me January 2023 in conjunction with Dr. BRYAN  or sooner PRN        -----------------------------------------------------------------------------------    Attestation:  Complete documentation of historical and exam elements from today's encounter can be found in the full encounter summary report (not reduplicated in this progress note). I personally obtained the chief complaint(s) and history of present illness.  I confirmed and edited as necessary the review of systems, past medical/surgical history, family history, social history, and examination findings as documented by others; and I examined the patient myself. I personally reviewed the relevant tests, images, and reports as documented above.     I formulated and edited as necessary the assessment and plan and discussed the findings and management plan with the patient and family.      Carlyn Everett MD

## 2021-07-05 ENCOUNTER — OFFICE VISIT (OUTPATIENT)
Dept: OPHTHALMOLOGY | Facility: CLINIC | Age: 46
End: 2021-07-05
Payer: COMMERCIAL

## 2021-07-05 DIAGNOSIS — D31.31 CHOROIDAL NEVUS OF RIGHT EYE: ICD-10-CM

## 2021-07-05 DIAGNOSIS — H40.003 GLAUCOMA SUSPECT OF BOTH EYES: Primary | ICD-10-CM

## 2021-07-05 PROCEDURE — 92133 CPTRZD OPH DX IMG PST SGM ON: CPT | Performed by: OPHTHALMOLOGY

## 2021-07-05 PROCEDURE — 99214 OFFICE O/P EST MOD 30 MIN: CPT | Performed by: OPHTHALMOLOGY

## 2021-07-05 ASSESSMENT — VISUAL ACUITY
OS_SC: 20/25
OD_SC: 20/25
METHOD: SNELLEN - LINEAR

## 2021-07-05 ASSESSMENT — CONF VISUAL FIELD
METHOD: COUNTING FINGERS
OD_NORMAL: 1
OS_NORMAL: 1

## 2021-07-05 ASSESSMENT — GONIOSCOPY
OD_NASAL: OPEN TO CBB, NO NODULES, NO PAS
OD_SUPERIOR: OPEN TO CBB, NO NODULES, NO PAS
OD_TEMPORAL: OPEN TO CBB, NO NODULES, NO PAS
OD_INFERIOR: OPEN TO CBB, NO NODULES, NO PAS
OS_TEMPORAL: OPEN TO CBB, NO NODULES, NO PAS
OS_INFERIOR: OPEN TO CBB, NO NODULES, NO PAS
OS_SUPERIOR: OPEN TO CBB, NO NODULES, NO PAS
OS_NASAL: OPEN TO CBB, NO NODULES, NO PAS

## 2021-07-05 ASSESSMENT — PACHYMETRY
OS_CT(UM): 500
OD_CT(UM): 501

## 2021-07-05 ASSESSMENT — SLIT LAMP EXAM - LIDS
COMMENTS: NORMAL
COMMENTS: NORMAL

## 2021-07-05 ASSESSMENT — TONOMETRY
OD_IOP_MMHG: 10
IOP_METHOD: APPLANATION
OS_IOP_MMHG: 11

## 2021-07-05 ASSESSMENT — EXTERNAL EXAM - RIGHT EYE: OD_EXAM: NORMAL

## 2021-07-05 ASSESSMENT — EXTERNAL EXAM - LEFT EYE: OS_EXAM: NORMAL

## 2021-07-05 ASSESSMENT — CUP TO DISC RATIO
OD_RATIO: 0.7
OS_RATIO: 0.65

## 2021-07-05 NOTE — NURSING NOTE
Chief Complaints and History of Present Illnesses   Patient presents with     Glaucoma       Chief Complaint(s) and History of Present Illness(es)     Glaucoma     Laterality: both eyes              Comments     Patient referred by Dr. Nicholson for glaucoma evaluation. Last exam with Dr. Nicholson was 03/08/2021. No correction worn. Patient will use lubricating drops as needed.   No family history of glaucoma. Patient has no concerns for today.                   Zak Pierre, Ophthalmic Assistant

## 2021-10-02 ENCOUNTER — HEALTH MAINTENANCE LETTER (OUTPATIENT)
Age: 46
End: 2021-10-02

## 2021-11-09 NOTE — PROGRESS NOTES
FOLLOW UP  Nov 10, 2021     Kaitlyn Garcia is a 46 year old woman who presents with history of atypical ductal hyperplasia.      HPI:     History of 3 left breast needle biopsies, 2 right breast needle biopsies, and 2 excisional left breast biopsy, all benign. The left breast biopsy on 12/12/16 had 1 mm of atypical ductal hyperplasia.      Family history of 2 paternal aunts who had breast cancer in their 60's and a paternal cousin with breast cancer in her 40's. Kaitlyn  was seen by a genetic counselor in 2015, no genetic testing was done.      She is doing high risk screening with breast MRI and mammogram. She started Tamoxifen for risk reduction 5/24/17 and switched to 5 mg dosing 5/30/19. She had a breast MRI 5/9/17 revealed an area of enhancement biopsied to be an intraductal papilloma without atypia. She was seen for surgical consultation by Dr. Montenegro and monitoring with imaging was recommended.     She had a pelvic ultrasound 6/4/18, 6/12/19, and 11/4/20 that are stable.      She denies any breast concerns including mass, skin change, nipple inversion or nipple discharge.      BREAST-SPECIFIC HISTORY:     Previous breast imaging: Yes   - 5/21/12 b/l Dmammo and right u/s for right breast lump: calcifications in the upper outer quadrant of left breast spanning 5 cm. Right u/s negative. BI-RADS 4 suspicious for malignancy   - 5/23/12 left stereotactic biopsy: intraductal papilloma   - 7/30/12 left wire-placement   - 2/19/13 MRI: large segmental enhancement of left lateral breast, BI-RADS 0 incomplete   Left u/s: BI-RADS 3 probably benign  - 3/29/13 MRI: BI-RADS 2 benign findings  - 8/5/13 b/l Dmammo for fullness: BI-RADS 2 benign   - 2/5/14 MRI: cysts in left breast, BI-RADS 2 benign   - 5/9/14 Dmammo b/l: negative. Left u/s: cystic appearing structures, BI-RADS 2 benign  - 5/20/14 MRI: study not completed due to nausea, BI-RADS 0 incomplete  - 5/28/14 MRI: BI-RADS 2 benign  - 10/28/14 b/l Dmammo: BI-RADS 2 benign    - 5/7/15 MRI BI-RADS 2 benign   - 11/10/15 Smammo: bilateral calcs: BI-RADS 2 benign  - 1/25/16 left u/s for pain: multiple tiny cyst  - 5/17/16 MRI: BI-RADS 2 benign   - 12/6/16  Smammo: lateral left breast developing macrocalcifications BI-RADS 0 incomplete   - 12/7/16 Dmammo left: left 2:00 calcifications BI-RADS 4 suspicious  - 12/12/16: stereotactic biopsy: focus of ADH    - 2/1/17 wire placement   - 5/9/17 MRI: 2 adjacent mass like areas of enhancement mid central left breast BI-RADS 0 incomplete   - 5/12/17 left u/s: scattered small cysts BI-RADS 4 suspicious   - 5/18/17 MR biopsy: intraductal papilloma   - 4/30/18 MRI BI-RADS 2  - 6/6/18 left Dmammo and ultrasound for pain BI-RADS 2  - 11/28/18 Smammo BI-RADS 2  - 5/7/19 breast MRI BI-RADS 2  - 8/21/19 Dmammo and right breast ultrasound: BI-RADS 2  - 11/12/19 Smammo BI-RADS 1  - 5/20/20 breast MRI BI-ARDS 2  - 11/4/20 b/l Dmammo and left breast ultrasound for lump: BI-RADS 1  - 5/5/21 Smammo BI-RADS 1     Prior breast biopsies:Yes   - 2 right breast biopsies   - 5/23/12 left needle biopsy: intraductal papilloma with sclerosing features   - 7/30/12 left excisional biopsy with Dr. Lynch: focal columnar cell change with atypia, intraductal papilloma and surrounding intraductal papillomatosis, sclerosing adenosis, fibrocystic changes  - 12/12/16 left needle biopsy x2: flat epithelial atypia with focus of ADH, duct ectasia and focal sclerosing adenosis, microcalcifications.   FEA and columnar cell change with atypia  - 2/1/17 left scar excision and excisional biopsy with Dr. Montenegro: FEA, columnar cell change/hyperplasia, intraductal papillomas, usual ductal hyperplasia, calcifications  - 5/18/17 left MRI needle biopsy: intraductal papilloma, mild columnar cell hyperplasia, mild duct ectasia, focal sclerosing adenosis      Prior history of breast cancer: No  Prior radiation history: No   Self breast exams: No  Breast density: heterogeneously dense     GYN  HISTORY:  . Age at 1st pregnancy: 17. Breastfeeding history: Yes.   Age at menarche: 14-14  Menopausal: premenopausal  Menopausal hormone replacement therapy: No     RISK ASSESSMENT: < 3% 5 year risk and > 20% lifetime risk   Heather: 2.4% 5 year risk   SEBASTIAN: 43.7% lifetime risk   Abiodun: 11.6% lifetime risk      FAMILY HISTORY:  Breast ca: Yes    - paternal aunt 60 's   - paternal aunt 60's  - paternal cousin 40 (aunt)  Ovarian ca: No   - ? Paternal cousin with ovarian cancer   Pancreatic ca: No   Prostate: yes  - maternal uncle 69   Gastric ca: Yes   - paternal grandfather, passed   Melanoma: No  Colon ca: No  Other cancer: Yes   - maternal grandmother skin cancer 90's  - paternal grandfather brain 60's   Denominational ethnicity: No  Other genetic, testing, syndromes, or clotting disorders: No   - She saw a genetic counselor 2015 and no testing was recommended     PAST MEDICAL HISTORY  Past Medical History:   Diagnosis Date     Anemia      Atypical ductal hyperplasia of breast      Choroidal nevus of right eye      Concussion 10/2015     Depressive disorder 2006    Treated with celexa for two years     Family history of breast cancer 2/3/2015     Family history of breast cancer 2/3/2015     Family history of breast cancer 2/3/2015     Family history of breast cancer 2/3/2015     Gastroesophageal reflux disease      Glaucoma suspect of both eyes      Papilloma of breast 11    Left, See Dr. Lynch at      PONV (postoperative nausea and vomiting)      S/P endometrial ablation 10/10/13    Menorrhagia     Shingles     x2 in the past     WBC decreased 2/3/2015     WBC decreased 2/3/2015     WBC decreased 2/3/2015     PAST SURGICAL HISTORY   Past Surgical History:   Procedure Laterality Date     ABDOMEN SURGERY      rectoplasty     BIOPSY BREAST NEEDLE LOCALIZATION  2012    Procedure: BIOPSY BREAST NEEDLE LOCALIZATION;  left breast excisional Biopsy with wire localization @0830;  Surgeon: Robert Lynch MD;   Location: [facility] OR     BREAST SURGERY       COSMETIC SURGERY      Rectalplasty for episiotomy repair     COSMETIC SURGERY      Rectalplasty for episiotomy repair     DILATION AND CURETTAGE, HYSTEROSCOPY, ABLATE ENDOMETRIUM NOVASURE, COMBINED  10/10/2013    Procedure: COMBINED DILATION AND CURETTAGE, HYSTEROSCOPY, ABLATE ENDOMETRIUM NOVASURE;  Endometrial ablation with Novasure;  Surgeon: Indu Birmingham DO;  Location: [facility] OR     HC TOOTH EXTRACTION W/FORCEP       LUMPECTOMY BREAST Left 2/1/2017    Procedure: LUMPECTOMY BREAST;  Surgeon: Lori Montenegro MD;  Location: [facility]     SOFT TISSUE SURGERY      lumpectomy x 2 right breast     SOFT TISSUE SURGERY      episiotomy fixed     MEDICATIONS  Current Outpatient Medications   Medication Sig Dispense Refill     fluticasone (FLONASE) 50 MCG/ACT nasal spray Spray 2 sprays into both nostrils daily 48 g 5     LORazepam (ATIVAN) 1 MG tablet Take 1 tablet (1 mg) by mouth twice a day as needed.       MELATONIN PO Take by mouth nightly as needed        Naproxen Sodium (ALEVE PO) Take 440 mg by mouth as needed        tamoxifen (NOLVADEX) 10 MG tablet TAKE ONE-HALF TABLET BY MOUTH EVERY DAY 30 tablet 3     valACYclovir (VALTREX) 1000 mg tablet TAKE TWO TABLETS BY MOUTH TWICE A DAY 4 tablet 3     venlafaxine (EFFEXOR-XR) 37.5 MG 24 hr capsule TAKE ONE CAPSULE BY MOUTH ONCE DAILY 90 capsule 3     omeprazole (PRILOSEC) 20 MG DR capsule TAKE ONE CAPSULE BY MOUTH EVERY DAY (Patient not taking: Reported on 11/3/2020) 90 capsule 2     ALLERGIES  Allergies   Allergen Reactions     No Known Drug Allergies       SOCIAL HISTORY:  Smokes: No  EtOH: Yes, less than 1 per month  Exercise: walking   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 Her  has lymphoma.     ROS:  Change in vision No  Headaches: no  Respiratory: No shortness of breath, dyspnea on exertion, cough, or hemoptysis   Cardiovascular: negative   Gastrointestinal: negative Abdominal pain:  no  Breast: negative  Vaginal bleeding: mild spotting, resolved.   Musculoskeletal: negative Joint pain No Back pain: no  Psychiatric: negative  Hematologic/Lymphatic/Immunologic: negative  Endocrine: negative    EXAM  /88 (BP Location: Right arm, Patient Position: Sitting, Cuff Size: Adult Regular)   Pulse 91   Temp 97.9  F (36.6  C) (Oral)   Resp 16   Wt 67.6 kg (149 lb 1.6 oz)   SpO2 98%   BMI 23.53 kg/m     PHYSICAL EXAM  Respiratory: breathing non labored.   Breasts: Examination was done in both the upright and supine positions.  Breasts are symmetrical . No dominant fixed or suspicious masses noted. No skin or nipple changes. No nipple discharge.   No clavicular, cervical, or axillary lymphadenopathy.     INVESTIGATIONS:    11/10/21 screening mammogram: per Radiology, no concerning findings, final report pending.     ASSESSMENT/PLAN:    Kaitlyn Garcia is a 46 year old woman with history of left breast atypical ductal hyperplasia here for high risk screening and preventative therapy with Tamoxifen.      1) Atypical ductal hyperplasia and family history of breast cancer.   We also reviewed that atypical ductal hyperplasia increases her baseline risk for breast cancer. She meets guidelines for high risk screening with a lifetime risk of breast cancer >20%. Discussed she meets guidelines for high risk screening with yearly mammogram alternating with Breast MRI every six months. Clinical encounter every 6-12 months including family history, risk assessment, and clinical breast exam.   - Breast MRI with clinic visit due 5/2022  - Screening mammogram with clinic visit due 11/11/22      2) Risk reduction   She is a candidate for breast cancer risk-reduction intervention based on a Heather estimated 5 year risk. The contraindications for Tamoxifen (history of VTE, history or stroke, history of TIA, pregnancy, and potential pregnancy without an effective nonhormal method of method of contraception) along with  potential side effects (hot flashes, deep vein thrombosis, pulmonary embolis, stroke, cataracts, and endometrial cancer) including signs and symptoms were discussed.   - Tamoxifen 5 mg daily for 5 years started: 5/24/17.  - Yearly gynecological examination. Prompt evaluation for any vaginal spotting.    3) Lifestyle Modifications were provided.   - Maintain a healthy weight (BMI 20-25). Higher body mass index (BMI) and adult weight gain is associated with increased risk for breast cancer. This increase in risk has been attributed to increase in circulating endogenous estrogen levels from fat tissue.   - Alcohol consumption, even at moderate levels (1-2 drinks per day), increases breast cancer risk and are best avoided. If you choose to drink alcohol limit alcohol consumption to less than 1 drink per day. (1 ounce of liquor, 6 ounces of wine, or 8 ounces of beer)  - Be active daily and void being sedentary. Take part in at least 150-300 minutes of moderate-intensity physical activity per week.     Merced Wiggins PA-C    30 minutes spent on the date of the encounter doing chart review, review of test results, interpretation of tests, patient visit and documentation.

## 2021-11-10 ENCOUNTER — ANCILLARY PROCEDURE (OUTPATIENT)
Dept: MAMMOGRAPHY | Facility: CLINIC | Age: 46
End: 2021-11-10
Payer: COMMERCIAL

## 2021-11-10 ENCOUNTER — OFFICE VISIT (OUTPATIENT)
Dept: SURGERY | Facility: CLINIC | Age: 46
End: 2021-11-10
Attending: PHYSICIAN ASSISTANT
Payer: COMMERCIAL

## 2021-11-10 VITALS
BODY MASS INDEX: 23.53 KG/M2 | SYSTOLIC BLOOD PRESSURE: 124 MMHG | HEART RATE: 91 BPM | RESPIRATION RATE: 16 BRPM | WEIGHT: 149.1 LBS | TEMPERATURE: 97.9 F | DIASTOLIC BLOOD PRESSURE: 88 MMHG | OXYGEN SATURATION: 98 %

## 2021-11-10 DIAGNOSIS — Z91.89 AT HIGH RISK FOR BREAST CANCER: Primary | ICD-10-CM

## 2021-11-10 DIAGNOSIS — Z12.31 VISIT FOR SCREENING MAMMOGRAM: ICD-10-CM

## 2021-11-10 PROCEDURE — 77063 BREAST TOMOSYNTHESIS BI: CPT | Performed by: RADIOLOGY

## 2021-11-10 PROCEDURE — 99214 OFFICE O/P EST MOD 30 MIN: CPT | Performed by: PHYSICIAN ASSISTANT

## 2021-11-10 PROCEDURE — G0463 HOSPITAL OUTPT CLINIC VISIT: HCPCS

## 2021-11-10 PROCEDURE — 77067 SCR MAMMO BI INCL CAD: CPT | Performed by: RADIOLOGY

## 2021-11-10 ASSESSMENT — PAIN SCALES - GENERAL: PAINLEVEL: MODERATE PAIN (4)

## 2021-11-10 NOTE — PATIENT INSTRUCTIONS
Kaitlyn Garcia is a 46 year old woman with history of left breast atypical ductal hyperplasia here for high risk screening and preventative therapy with Tamoxifen.      1) Atypical ductal hyperplasia and family history of breast cancer.   We also reviewed that atypical ductal hyperplasia increases her baseline risk for breast cancer. She meets guidelines for high risk screening with a lifetime risk of breast cancer >20%. Discussed she meets guidelines for high risk screening with yearly mammogram alternating with Breast MRI every six months. Clinical encounter every 6-12 months including family history, risk assessment, and clinical breast exam.   - Breast MRI with clinic visit due 5/2022  - Screening mammogram with clinic visit due 11/11/22      2) Risk reduction   She is a candidate for breast cancer risk-reduction intervention based on a Heather estimated 5 year risk. The contraindications for Tamoxifen (history of VTE, history or stroke, history of TIA, pregnancy, and potential pregnancy without an effective nonhormal method of method of contraception) along with potential side effects (hot flashes, deep vein thrombosis, pulmonary embolis, stroke, cataracts, and endometrial cancer) including signs and symptoms were discussed.   - Tamoxifen 5 mg daily for 5 years started: 5/24/17.  - Yearly gynecological examination. Prompt evaluation for any vaginal spotting.    3) Lifestyle Modifications were provided.   - Maintain a healthy weight (BMI 20-25). Higher body mass index (BMI) and adult weight gain is associated with increased risk for breast cancer. This increase in risk has been attributed to increase in circulating endogenous estrogen levels from fat tissue.   - Alcohol consumption, even at moderate levels (1-2 drinks per day), increases breast cancer risk and are best avoided. If you choose to drink alcohol limit alcohol consumption to less than 1 drink per day. (1 ounce of liquor, 6 ounces of wine, or 8 ounces of  beer)  - Be active daily and void being sedentary. Take part in at least 150-300 minutes of moderate-intensity physical activity per week.

## 2021-11-10 NOTE — LETTER
11/10/2021         RE: Kaitlyn Garcia  [address]  [facility] [address]        Dear Colleague,    Thank you for referring your patient, Kaitlyn Garcia, to the [facility]. Please see a copy of my visit note below.    FOLLOW UP  Nov 10, 2021     Kaitlyn Garcia is a 46 year old woman who presents with history of atypical ductal hyperplasia.      HPI:     History of 3 left breast needle biopsies, 2 right breast needle biopsies, and 2 excisional left breast biopsy, all benign. The left breast biopsy on 12/12/16 had 1 mm of atypical ductal hyperplasia.      Family history of 2 paternal aunts who had breast cancer in their 60's and a paternal cousin with breast cancer in her 40's. Kaitlyn  was seen by a genetic counselor in 2015, no genetic testing was done.      She is doing high risk screening with breast MRI and mammogram. She started Tamoxifen for risk reduction 5/24/17 and switched to 5 mg dosing 5/30/19. She had a breast MRI 5/9/17 revealed an area of enhancement biopsied to be an intraductal papilloma without atypia. She was seen for surgical consultation by Dr. Montenegro and monitoring with imaging was recommended.     She had a pelvic ultrasound 6/4/18, 6/12/19, and 11/4/20 that are stable.      She denies any breast concerns including mass, skin change, nipple inversion or nipple discharge.      BREAST-SPECIFIC HISTORY:     Previous breast imaging: Yes   - 5/21/12 b/l Dmammo and right u/s for right breast lump: calcifications in the upper outer quadrant of left breast spanning 5 cm. Right u/s negative. BI-RADS 4 suspicious for malignancy   - 5/23/12 left stereotactic biopsy: intraductal papilloma   - 7/30/12 left wire-placement   - 2/19/13 MRI: large segmental enhancement of left lateral breast, BI-RADS 0 incomplete   Left u/s: BI-RADS 3 probably benign  - 3/29/13 MRI: BI-RADS 2 benign findings  - 8/5/13 b/l Dmammo for fullness: BI-RADS 2 benign   - 2/5/14 MRI: cysts in left  breast, BI-RADS 2 benign   - 5/9/14 Dmammo b/l: negative. Left u/s: cystic appearing structures, BI-RADS 2 benign  - 5/20/14 MRI: study not completed due to nausea, BI-RADS 0 incomplete  - 5/28/14 MRI: BI-RADS 2 benign  - 10/28/14 b/l Dmammo: BI-RADS 2 benign   - 5/7/15 MRI BI-RADS 2 benign   - 11/10/15 Smammo: bilateral calcs: BI-RADS 2 benign  - 1/25/16 left u/s for pain: multiple tiny cyst  - 5/17/16 MRI: BI-RADS 2 benign   - 12/6/16  Smammo: lateral left breast developing macrocalcifications BI-RADS 0 incomplete   - 12/7/16 Dmammo left: left 2:00 calcifications BI-RADS 4 suspicious  - 12/12/16: stereotactic biopsy: focus of ADH    - 2/1/17 wire placement   - 5/9/17 MRI: 2 adjacent mass like areas of enhancement mid central left breast BI-RADS 0 incomplete   - 5/12/17 left u/s: scattered small cysts BI-RADS 4 suspicious   - 5/18/17 MR biopsy: intraductal papilloma   - 4/30/18 MRI BI-RADS 2  - 6/6/18 left Dmammo and ultrasound for pain BI-RADS 2  - 11/28/18 Smammo BI-RADS 2  - 5/7/19 breast MRI BI-RADS 2  - 8/21/19 Dmammo and right breast ultrasound: BI-RADS 2  - 11/12/19 Smammo BI-RADS 1  - 5/20/20 breast MRI BI-ARDS 2  - 11/4/20 b/l Dmammo and left breast ultrasound for lump: BI-RADS 1  - 5/5/21 Smammo BI-RADS 1     Prior breast biopsies:Yes   - 2 right breast biopsies   - 5/23/12 left needle biopsy: intraductal papilloma with sclerosing features   - 7/30/12 left excisional biopsy with Dr. Lynch: focal columnar cell change with atypia, intraductal papilloma and surrounding intraductal papillomatosis, sclerosing adenosis, fibrocystic changes  - 12/12/16 left needle biopsy x2: flat epithelial atypia with focus of ADH, duct ectasia and focal sclerosing adenosis, microcalcifications.   FEA and columnar cell change with atypia  - 2/1/17 left scar excision and excisional biopsy with Dr. Montenegro: FEA, columnar cell change/hyperplasia, intraductal papillomas, usual ductal hyperplasia, calcifications  - 5/18/17 left MRI  needle biopsy: intraductal papilloma, mild columnar cell hyperplasia, mild duct ectasia, focal sclerosing adenosis      Prior history of breast cancer: No  Prior radiation history: No   Self breast exams: No  Breast density: heterogeneously dense     GYN HISTORY:  . Age at 1st pregnancy: 17. Breastfeeding history: Yes.   Age at menarche: 14-14  Menopausal: premenopausal  Menopausal hormone replacement therapy: No     RISK ASSESSMENT: < 3% 5 year risk and > 20% lifetime risk   Heather: 2.4% 5 year risk   SEBASTIAN: 43.7% lifetime risk   Abiodun: 11.6% lifetime risk      FAMILY HISTORY:  Breast ca: Yes    - paternal aunt 60 's   - paternal aunt 60's  - paternal cousin 40 (aunt)  Ovarian ca: No   - ? Paternal cousin with ovarian cancer   Pancreatic ca: No   Prostate: yes  - maternal uncle 69   Gastric ca: Yes   - paternal grandfather, passed   Melanoma: No  Colon ca: No  Other cancer: Yes   - maternal grandmother skin cancer 90's  - paternal grandfather brain 60's   Congregation ethnicity: No  Other genetic, testing, syndromes, or clotting disorders: No   - She saw a genetic counselor 2015 and no testing was recommended     PAST MEDICAL HISTORY  Past Medical History:   Diagnosis Date     Anemia      Atypical ductal hyperplasia of breast      Choroidal nevus of right eye      Concussion 10/2015     Depressive disorder 2006    Treated with celexa for two years     Family history of breast cancer 2/3/2015     Family history of breast cancer 2/3/2015     Family history of breast cancer 2/3/2015     Family history of breast cancer 2/3/2015     Gastroesophageal reflux disease      Glaucoma suspect of both eyes      Papilloma of breast 11    Left, See Dr. Lynch at      PONV (postoperative nausea and vomiting)      S/P endometrial ablation 10/10/13    Menorrhagia     Shingles     x2 in the past     WBC decreased 2/3/2015     WBC decreased 2/3/2015     WBC decreased 2/3/2015     PAST SURGICAL HISTORY   Past Surgical History:    Procedure Laterality Date     ABDOMEN SURGERY      rectoplasty     BIOPSY BREAST NEEDLE LOCALIZATION  7/30/2012    Procedure: BIOPSY BREAST NEEDLE LOCALIZATION;  left breast excisional Biopsy with wire localization @0830;  Surgeon: Robert Lynhc MD;  Location: [facility] OR     BREAST SURGERY       COSMETIC SURGERY      Rectalplasty for episiotomy repair     COSMETIC SURGERY      Rectalplasty for episiotomy repair     DILATION AND CURETTAGE, HYSTEROSCOPY, ABLATE ENDOMETRIUM NOVASURE, COMBINED  10/10/2013    Procedure: COMBINED DILATION AND CURETTAGE, HYSTEROSCOPY, ABLATE ENDOMETRIUM NOVASURE;  Endometrial ablation with Novasure;  Surgeon: Indu Birmingham DO;  Location: [facility] OR     HC TOOTH EXTRACTION W/FORCEP       LUMPECTOMY BREAST Left 2/1/2017    Procedure: LUMPECTOMY BREAST;  Surgeon: Lori Montenegro MD;  Location: [facility]     SOFT TISSUE SURGERY      lumpectomy x 2 right breast     SOFT TISSUE SURGERY      episiotomy fixed     MEDICATIONS  Current Outpatient Medications   Medication Sig Dispense Refill     fluticasone (FLONASE) 50 MCG/ACT nasal spray Spray 2 sprays into both nostrils daily 48 g 5     LORazepam (ATIVAN) 1 MG tablet Take 1 tablet (1 mg) by mouth twice a day as needed.       MELATONIN PO Take by mouth nightly as needed        Naproxen Sodium (ALEVE PO) Take 440 mg by mouth as needed        tamoxifen (NOLVADEX) 10 MG tablet TAKE ONE-HALF TABLET BY MOUTH EVERY DAY 30 tablet 3     valACYclovir (VALTREX) 1000 mg tablet TAKE TWO TABLETS BY MOUTH TWICE A DAY 4 tablet 3     venlafaxine (EFFEXOR-XR) 37.5 MG 24 hr capsule TAKE ONE CAPSULE BY MOUTH ONCE DAILY 90 capsule 3     omeprazole (PRILOSEC) 20 MG DR capsule TAKE ONE CAPSULE BY MOUTH EVERY DAY (Patient not taking: Reported on 11/3/2020) 90 capsule 2     ALLERGIES  Allergies   Allergen Reactions     No Known Drug Allergies       SOCIAL HISTORY:  Smokes: No  EtOH: Yes, less than 1 per month  Exercise: walking   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 Her  has lymphoma.     ROS:  Change in vision No  Headaches: no  Respiratory: No shortness of breath, dyspnea on exertion, cough, or hemoptysis   Cardiovascular: negative   Gastrointestinal: negative Abdominal pain: no  Breast: negative  Vaginal bleeding: mild spotting, resolved.   Musculoskeletal: negative Joint pain No Back pain: no  Psychiatric: negative  Hematologic/Lymphatic/Immunologic: negative  Endocrine: negative    EXAM  /88 (BP Location: Right arm, Patient Position: Sitting, Cuff Size: Adult Regular)   Pulse 91   Temp 97.9  F (36.6  C) (Oral)   Resp 16   Wt 67.6 kg (149 lb 1.6 oz)   SpO2 98%   BMI 23.53 kg/m     PHYSICAL EXAM  Respiratory: breathing non labored.   Breasts: Examination was done in both the upright and supine positions.  Breasts are symmetrical . No dominant fixed or suspicious masses noted. No skin or nipple changes. No nipple discharge.   No clavicular, cervical, or axillary lymphadenopathy.     INVESTIGATIONS:    11/10/21 screening mammogram: per Radiology, no concerning findings, final report pending.     ASSESSMENT/PLAN:    Kaitlyn Garcia is a 46 year old woman with history of left breast atypical ductal hyperplasia here for high risk screening and preventative therapy with Tamoxifen.      1) Atypical ductal hyperplasia and family history of breast cancer.   We also reviewed that atypical ductal hyperplasia increases her baseline risk for breast cancer. She meets guidelines for high risk screening with a lifetime risk of breast cancer >20%. Discussed she meets guidelines for high risk screening with yearly mammogram alternating with Breast MRI every six months. Clinical encounter every 6-12 months including family history, risk assessment, and clinical breast exam.   - Breast MRI with clinic visit due 5/2022  - Screening mammogram with clinic visit due 11/11/22      2) Risk reduction   She is a candidate for breast cancer  risk-reduction intervention based on a Heather estimated 5 year risk. The contraindications for Tamoxifen (history of VTE, history or stroke, history of TIA, pregnancy, and potential pregnancy without an effective nonhormal method of method of contraception) along with potential side effects (hot flashes, deep vein thrombosis, pulmonary embolis, stroke, cataracts, and endometrial cancer) including signs and symptoms were discussed.   - Tamoxifen 5 mg daily for 5 years started: 5/24/17.  - Yearly gynecological examination. Prompt evaluation for any vaginal spotting.    3) Lifestyle Modifications were provided.   - Maintain a healthy weight (BMI 20-25). Higher body mass index (BMI) and adult weight gain is associated with increased risk for breast cancer. This increase in risk has been attributed to increase in circulating endogenous estrogen levels from fat tissue.   - Alcohol consumption, even at moderate levels (1-2 drinks per day), increases breast cancer risk and are best avoided. If you choose to drink alcohol limit alcohol consumption to less than 1 drink per day. (1 ounce of liquor, 6 ounces of wine, or 8 ounces of beer)  - Be active daily and void being sedentary. Take part in at least 150-300 minutes of moderate-intensity physical activity per week.     Merced Wiggins PA-C    30 minutes spent on the date of the encounter doing chart review, review of test results, interpretation of tests, patient visit and documentation.

## 2021-11-10 NOTE — NURSING NOTE
"Oncology Rooming Note    November 10, 2021 1:10 PM   Kaitlyn Garcia is a 46 year old female who presents for:    Chief Complaint   Patient presents with     Oncology Clinic Visit     HIGH RISK FOR BREAST CANCER     Initial Vitals: /88 (BP Location: Right arm, Patient Position: Sitting, Cuff Size: Adult Regular)   Pulse 91   Temp 97.9  F (36.6  C) (Oral)   Resp 16   Wt 67.6 kg (149 lb 1.6 oz)   SpO2 98%   BMI 23.53 kg/m   Estimated body mass index is 23.53 kg/m  as calculated from the following:    Height as of 11/13/19: 1.695 m (5' 6.75\").    Weight as of this encounter: 67.6 kg (149 lb 1.6 oz). Body surface area is 1.78 meters squared.  Moderate Pain (4) Comment: Data Unavailable   No LMP recorded. Patient has had an ablation.  Allergies reviewed: Yes  Medications reviewed: Yes    Medications: Medication refills not needed today.  Pharmacy name entered into [facility]:    [facility] - [place] - [id number]  [facility] [place], [place] - [address] [id number]    Clinical concerns: None.        Jsesica Murray [facility]            "

## 2021-11-24 NOTE — PATIENT INSTRUCTIONS
PLAN:   1.   Symptomatic therapy suggested: Increase calcium to 1000mg and 1000iu Vit D  Continue current medications as prescribed.     2.  Orders Placed This Encounter   Medications     venlafaxine (EFFEXOR-XR) 37.5 MG 24 hr capsule     Sig: TAKE ONE CAPSULE BY MOUTH ONCE DAILY     Dispense:  90 capsule     Refill:  3     valACYclovir (VALTREX) 1000 mg tablet     Sig: TAKE TWO TABLETS BY MOUTH TWICE A DAY     Dispense:  4 tablet     Refill:  3     Orders Placed This Encounter   Procedures     REVIEW OF HEALTH MAINTENANCE PROTOCOL ORDERS     Hepatitis C Screen Reflex to HCV RNA Quant and Genotype     Lipid panel reflex to direct LDL Fasting     Comprehensive metabolic panel     TSH with free T4 reflex     SLEEP EVALUATION & MANAGEMENT REFERRAL - ADULT -       3. Patient needs to follow up in if no improvement,or sooner if worsening of symptoms or other symptoms develop.  Will follow up and/or notify patient of  results via My Chart to determine further need for followup  Follow up office visit in one year for annual health maintenance exam, sooner PRN.    Preventive Health Recommendations  Female Ages 40 to 49    Yearly exam:     See your health care provider every year in order to  1. Review health changes.   2. Discuss preventive care.    3. Review your medicines if your doctor prescribed any.      Get a Pap test every three years (unless you have an abnormal result and your provider advises testing more often).      If you get Pap tests with HPV test, you only need to test every 5 years, unless you have an abnormal result. You do not need a Pap test if your uterus was removed (hysterectomy) and you have not had cancer.      You should be tested each year for STDs (sexually transmitted diseases), if you're at risk.     Ask your doctor if you should have a mammogram.      Have a colonoscopy (test for colon cancer) if someone in your family has had colon cancer or polyps before age 50.       Have a cholesterol test  every 5 years.       Have a diabetes test (fasting glucose) after age 45. If you are at risk for diabetes, you should have this test every 3 years.    Shots: Get a flu shot each year. Get a tetanus shot every 10 years.     Nutrition:     Eat at least 5 servings of fruits and vegetables each day.    Eat whole-grain bread, whole-wheat pasta and brown rice instead of white grains and rice.    Get adequate Calcium and Vitamin D.      Lifestyle    Exercise at least 150 minutes a week (an average of 30 minutes a day, 5 days a week). This will help you control your weight and prevent disease.    Limit alcohol to one drink per day.    No smoking.     Wear sunscreen to prevent skin cancer.    See your dentist every six months for an exam and cleaning.

## 2021-11-24 NOTE — PROGRESS NOTES
SUBJECTIVE:   CC: Kaitlyn Garcia is an 46 year old woman who presents for preventive health visit.       Patient has been advised of split billing requirements and indicates understanding: Yes  Healthy Habits:     Getting at least 3 servings of Calcium per day:  Yes    Bi-annual eye exam:  Yes    Dental care twice a year:  Yes    Sleep apnea or symptoms of sleep apnea:  Excessive snoring    Diet:  Regular (no restrictions)    Frequency of exercise:  2-3 days/week    Duration of exercise:  15-30 minutes    Taking medications regularly:  Yes    PHQ-2 Total Score: 0    Additional concerns today:  Yes      -pt states that she notices she snores at night and wakes frequently, would like referral for sleep study    Today's PHQ-2 Score:   PHQ-2 ( 1999 [employer]) 11/30/2021   Q1: Little interest or pleasure in doing things 0   Q2: Feeling down, depressed or hopeless 0   PHQ-2 Score 0   PHQ-2 Total Score (12-17 Years)- Positive if 3 or more points; Administer PHQ-A if positive -   Q1: Little interest or pleasure in doing things Not at all   Q2: Feeling down, depressed or hopeless Not at all   PHQ-2 Score 0       Abuse: Current or Past (Physical, Sexual or Emotional) - No  Do you feel safe in your environment? Yes    Have you ever done Advance Care Planning? (For example, a Health Directive, POLST, or a discussion with a medical provider or your loved ones about your wishes): No, advance care planning information given to patient to review.  Patient plans to discuss their wishes with loved ones or provider.      Social History     Tobacco Use     Smoking status: Never Smoker     Smokeless tobacco: Never Used   Substance Use Topics     Alcohol use: Yes     Alcohol/week: 0.0 standard drinks     Comment: Less than once a month     If you drink alcohol do you typically have >3 drinks per day or >7 drinks per week? No    Alcohol Use 11/30/2021   Prescreen: >3 drinks/day or >7 drinks/week? No   Prescreen: >3 drinks/day or >7  drinks/week? -       Reviewed orders with patient.  Reviewed health maintenance and updated orders accordingly - Yes  Lab work is in process  Labs reviewed in EPIC  BP Readings from Last 3 Encounters:   11/30/21 112/70   11/10/21 124/88   05/05/21 125/83    Wt Readings from Last 3 Encounters:   11/30/21 67.1 kg (148 lb)   11/10/21 67.6 kg (149 lb 1.6 oz)   05/05/21 69.9 kg (154 lb 3.2 oz)                  Patient Active Problem List   Diagnosis     CARDIOVASCULAR SCREENING; LDL GOAL LESS THAN 160     Iron deficiency anemia     Mammographic microcalcification found on diagnostic imaging of breast     Intraductal papilloma of breast     Atypical hyperplasia of breast     Acne     Bloating symptom     Abdominal pain     Abnormal biliary HIDA scan     Family history of breast cancer     Neutropenia (H)     Choroidal nevus of right eye     Situational mixed anxiety and depressive disorder     Glaucoma suspect, bilateral     Post-concussion syndrome     Muscle spasms of head or neck     Allergic rhinitis     WBC decreased     Past Surgical History:   Procedure Laterality Date     ABDOMEN SURGERY      rectoplasty     BIOPSY BREAST NEEDLE LOCALIZATION  7/30/2012    Procedure: BIOPSY BREAST NEEDLE LOCALIZATION;  left breast excisional Biopsy with wire localization @0830;  Surgeon: Robert Lynch MD;  Location: [facility] OR     BREAST SURGERY       COSMETIC SURGERY      Rectalplasty for episiotomy repair     COSMETIC SURGERY      Rectalplasty for episiotomy repair     DILATION AND CURETTAGE, HYSTEROSCOPY, ABLATE ENDOMETRIUM NOVASURE, COMBINED  10/10/2013    Procedure: COMBINED DILATION AND CURETTAGE, HYSTEROSCOPY, ABLATE ENDOMETRIUM NOVASURE;  Endometrial ablation with Novasure;  Surgeon: Indu Birmingham DO;  Location: [facility] OR     HC TOOTH EXTRACTION W/FORCEP       LUMPECTOMY BREAST Left 2/1/2017    Procedure: LUMPECTOMY BREAST;  Surgeon: Lroi Montenegro MD;  Location: [facility]     SOFT TISSUE SURGERY       lumpectomy x 2 right breast     SOFT TISSUE SURGERY      episiotomy fixed       Social History     Tobacco Use     Smoking status: Never Smoker     Smokeless tobacco: Never Used   Substance Use Topics     Alcohol use: Yes     Alcohol/week: 0.0 standard drinks     Comment: Less than once a month     Family History   Problem Relation Age of Onset     Hypertension Father      Lipids Father      Hyperlipidemia Father      Depression/Anxiety Father      Cerebrovascular Disease Father         TIA     Cancer Maternal Grandmother      Glaucoma Maternal Grandmother      Macular Degeneration Maternal Grandmother      Osteoporosis Maternal Grandmother      Anesthesia Reaction Paternal Grandmother      Diabetes Paternal Grandmother      Cancer Paternal Grandfather      Depression/Anxiety Mother      Depression/Anxiety Daughter      Depression Daughter      Depression Son      Cataracts Maternal Grandfather      Breast Cancer Paternal Aunt 60        x 2 aunts     Breast Cancer Other 40        Paternal cousin     Breast Cancer Other 64        Several aunts on fathers side/Several aunts on fathers side/Several aunts on fathers side/Several aunts on fathers side     Prostate Cancer Other 64        Uncle on mothers side     Asthma No family hx of      C.A.D. No family hx of      Cancer - colorectal No family hx of      Connective Tissue Disorder No family hx of      Thyroid Disease No family hx of      Coronary Artery Disease No family hx of      Ovarian Cancer No family hx of      Thyroid Disease No family hx of      Chemical Addiction No family hx of      Known Genetic Syndrome No family hx of          Current Outpatient Medications   Medication Sig Dispense Refill     LORazepam (ATIVAN) 1 MG tablet Take 1 tablet (1 mg) by mouth twice a day as needed.       MELATONIN PO Take by mouth nightly as needed        Naproxen Sodium (ALEVE PO) Take 440 mg by mouth as needed        tamoxifen (NOLVADEX) 10 MG tablet TAKE ONE-HALF TABLET BY  MOUTH EVERY DAY 30 tablet 3     valACYclovir (VALTREX) 1000 mg tablet TAKE TWO TABLETS BY MOUTH TWICE A DAY 4 tablet 3     venlafaxine (EFFEXOR-XR) 37.5 MG 24 hr capsule TAKE ONE CAPSULE BY MOUTH ONCE DAILY 90 capsule 3     Allergies   Allergen Reactions     No Known Drug Allergies        Breast Cancer Screening:  Any new diagnosis of family breast, ovarian, or bowel cancer? No    FHS-7:   Breast CA Risk Assessment (FHS-7) 11/10/2021   Did any of your first-degree relatives have breast or ovarian cancer? No   Did any of your relatives have bilateral breast cancer? No   Did any man in your family have breast cancer? No   Did any woman in your family have breast and ovarian cancer? No   Did any woman in your family have breast cancer before age 50 y? No   Do you have 2 or more relatives with breast and/or ovarian cancer? Yes   Do you have 2 or more relatives with breast and/or bowel cancer? Yes       Mammogram Screening: Recommended annual mammography  Pertinent mammograms are reviewed under the imaging tab.    History of abnormal Pap smear: NO - age 30-65 PAP every 5 years with negative HPV co-testing recommended  PAP / HPV Latest Ref Rng & Units 6/4/2018 2/3/2015 5/21/2012   PAP (Historical) - NIL NIL NIL   HPV16 NEG:Negative Negative - -   HPV18 NEG:Negative Negative - -   HRHPV NEG:Negative Negative - -     Reviewed and updated as needed this visit by clinical staff                Reviewed and updated as needed this visit by Provider               Past Medical History:   Diagnosis Date     Anemia      Atypical ductal hyperplasia of breast      Choroidal nevus of right eye      Concussion 10/2015     Depressive disorder 2006    Treated with celexa for two years     Family history of breast cancer 2/3/2015     Family history of breast cancer 2/3/2015     Family history of breast cancer 2/3/2015     Family history of breast cancer 2/3/2015     Gastroesophageal reflux disease      Glaucoma suspect of both eyes       Papilloma of breast 5/23/12    Left, See Dr. Lynch at      PONV (postoperative nausea and vomiting)      S/P endometrial ablation 10/10/13    Menorrhagia     Shingles     x2 in the past     WBC decreased 2/3/2015     WBC decreased 2/3/2015     WBC decreased 2/3/2015      Past Surgical History:   Procedure Laterality Date     ABDOMEN SURGERY      rectoplasty     BIOPSY BREAST NEEDLE LOCALIZATION  7/30/2012    Procedure: BIOPSY BREAST NEEDLE LOCALIZATION;  left breast excisional Biopsy with wire localization @0830;  Surgeon: Robert Lynch MD;  Location: [facility]     BREAST SURGERY       COSMETIC SURGERY      Rectalplasty for episiotomy repair     COSMETIC SURGERY      Rectalplasty for episiotomy repair     DILATION AND CURETTAGE, HYSTEROSCOPY, ABLATE ENDOMETRIUM NOVASURE, COMBINED  10/10/2013    Procedure: COMBINED DILATION AND CURETTAGE, HYSTEROSCOPY, ABLATE ENDOMETRIUM NOVASURE;  Endometrial ablation with Novasure;  Surgeon: Indu Birmingham DO;  Location: [facility] OR     HC TOOTH EXTRACTION W/FORCEP       LUMPECTOMY BREAST Left 2/1/2017    Procedure: LUMPECTOMY BREAST;  Surgeon: Lori Montenegro MD;  Location: [facility]     SOFT TISSUE SURGERY      lumpectomy x 2 right breast     SOFT TISSUE SURGERY      episiotomy fixed       Review of Systems   Constitutional: Negative for chills and fever.   HENT: Negative for congestion, ear pain, hearing loss and sore throat.    Eyes: Negative for pain and visual disturbance.   Respiratory: Negative for cough and shortness of breath.    Cardiovascular: Negative for chest pain, palpitations and peripheral edema.   Gastrointestinal: Negative for abdominal pain, constipation, diarrhea, heartburn, hematochezia and nausea.   Breasts:  Negative for tenderness, breast mass and discharge.   Genitourinary: Negative for dysuria, frequency, genital sores, hematuria, pelvic pain, urgency, vaginal bleeding and vaginal discharge.   Musculoskeletal: Negative for arthralgias,  "joint swelling and myalgias.   Skin: Negative for rash.   Neurological: Negative for dizziness, weakness, headaches and paresthesias.   Psychiatric/Behavioral: Negative for mood changes. The patient is not nervous/anxious.        OBJECTIVE:   /70   Pulse 74   Temp 98  F (36.7  C) (Tympanic)   Resp 16   Ht 1.638 m (5' 4.5\")   Wt 67.1 kg (148 lb)   SpO2 97%   BMI 25.01 kg/m    Physical Exam  GENERAL: healthy, alert and no distress  EYES: Eyes grossly normal to inspection and conjunctivae and sclerae normal  HENT: ear canals and TM's normal, nose and mouth without ulcers or lesions  NECK: no adenopathy, no asymmetry, masses, or scars and thyroid normal to palpation  RESP: lungs clear to auscultation - no rales, rhonchi or wheezes  BREAST: normal without masses, tenderness or nipple discharge and no palpable axillary masses or adenopathy  CV: regular rates and rhythm, no murmur, click or rub, peripheral pulses strong and no peripheral edema  ABDOMEN: soft, nontender, no hepatosplenomegaly, no masses and bowel sounds normal   (female): normal female external genitalia, normal urethral meatus, vaginal mucosa pink, moist, well rugated, and normal cervix/adnexa/uterus without masses or discharge  MS: no gross musculoskeletal defects noted, no edema  SKIN: no suspicious lesions or rashes  NEURO: Normal strength and tone, mentation intact and speech normal  PSYCH: mentation appears normal, affect normal/bright  LYMPH: no cervical, supraclavicular, axillary, or inguinal adenopathy    Diagnostic Test Results:  Labs reviewed in Epic  Results for orders placed or performed in visit on 11/30/21   TSH with free T4 reflex     Status: Normal   Result Value Ref Range    TSH 1.24 0.40 - 4.00 mU/L   Comprehensive metabolic panel     Status: Normal   Result Value Ref Range    Sodium 138 133 - 144 mmol/L    Potassium 4.0 3.4 - 5.3 mmol/L    Chloride 107 94 - 109 mmol/L    Carbon Dioxide (CO2) 25 20 - 32 mmol/L    Anion Gap " 6 3 - 14 mmol/L    Urea Nitrogen 17 7 - 30 mg/dL    Creatinine 0.79 0.52 - 1.04 mg/dL    Calcium 8.6 8.5 - 10.1 mg/dL    Glucose 89 70 - 99 mg/dL    Alkaline Phosphatase 49 40 - 150 U/L    AST 16 0 - 45 U/L    ALT 32 0 - 50 U/L    Protein Total 7.3 6.8 - 8.8 g/dL    Albumin 3.9 3.4 - 5.0 g/dL    Bilirubin Total 0.4 0.2 - 1.3 mg/dL    GFR Estimate 89 >60 mL/min/1.73m2   Lipid panel reflex to direct LDL Fasting     Status: Abnormal   Result Value Ref Range    Cholesterol 193 <200 mg/dL    Triglycerides 125 <150 mg/dL    Direct Measure HDL 59 >=50 mg/dL    LDL Cholesterol Calculated 109 (H) <=100 mg/dL    Non HDL Cholesterol 134 (H) <130 mg/dL    Patient Fasting > 8hrs? Yes     Narrative    Cholesterol  Desirable:  <200 mg/dL    Triglycerides  Normal:  Less than 150 mg/dL  Borderline High:  150-199 mg/dL  High:  200-499 mg/dL  Very High:  Greater than or equal to 500 mg/dL    Direct Measure HDL  Female:  Greater than or equal to 50 mg/dL   Male:  Greater than or equal to 40 mg/dL    LDL Cholesterol  Desirable:  <100mg/dL  Above Desirable:  100-129 mg/dL   Borderline High:  130-159 mg/dL   High:  160-189 mg/dL   Very High:  >= 190 mg/dL    Non HDL Cholesterol  Desirable:  130 mg/dL  Above Desirable:  130-159 mg/dL  Borderline High:  160-189 mg/dL  High:  190-219 mg/dL  Very High:  Greater than or equal to 220 mg/dL   Hepatitis C Screen Reflex to HCV RNA Quant and Genotype     Status: Normal   Result Value Ref Range    Hepatitis C Antibody Nonreactive Nonreactive    Narrative    Assay performance characteristics have not been established for newborns, infants, and children.       ASSESSMENT/PLAN:   Kaitlyn was seen today for physical.    Diagnoses and all orders for this visit:    Routine general medical examination at a health care facility  -     REVIEW OF HEALTH MAINTENANCE PROTOCOL ORDERS    Need for hepatitis C screening test  -     Hepatitis C Screen Reflex to HCV RNA Quant and Genotype    CARDIOVASCULAR SCREENING;  LDL GOAL LESS THAN 160  -     Lipid panel reflex to direct LDL Fasting    Screening for thyroid disorder  -     TSH with free T4 reflex    Screening for diabetes mellitus (DM)  -     Comprehensive metabolic panel    Snoring  -     SLEEP EVALUATION & MANAGEMENT REFERRAL - ADULT -; Future    Panic attack  -     venlafaxine (EFFEXOR-XR) 37.5 MG 24 hr capsule; TAKE ONE CAPSULE BY MOUTH ONCE DAILY    AUDELIA (generalized anxiety disorder)  -     venlafaxine (EFFEXOR-XR) 37.5 MG 24 hr capsule; TAKE ONE CAPSULE BY MOUTH ONCE DAILY  The current medical regimen is effective.  Continue current medication regimen unchanged.    Recurrent cold sores  -     valACYclovir (VALTREX) 1000 mg tablet; TAKE TWO TABLETS BY MOUTH TWICE A DAY  The current medical regimen is effective.  Continue current medication regimen unchanged.    PLAN:   . Patient needs to follow up in if no improvement,or sooner if worsening of symptoms or other symptoms develop.  Will follow up and/or notify patient of  results via My Chart to determine further need for followup  Follow up office visit in one year for annual health maintenance exam, sooner PRN.      Patient has been advised of split billing requirements and indicates understanding: Yes  COUNSELING:  Reviewed preventive health counseling, as reflected in patient instructions  Special attention given to:        Regular exercise       Healthy diet/nutrition       Vision screening       Osteoporosis prevention/bone health       Colon cancer screening       Consider Hep C screening for all patients one time for ages 18-79 years       The 10-year ASCVD risk score (Soy OLIVAREZ Jr., et al., 2013) is: 0.6%    Values used to calculate the score:      Age: 46 years      Sex: Female      Is Non- : No      Diabetic: No      Tobacco smoker: No      Systolic Blood Pressure: 112 mmHg      Is BP treated: No      HDL Cholesterol: 59 mg/dL      Total Cholesterol: 193 mg/dL       Advance Care  "Planning    Estimated body mass index is 25.01 kg/m  as calculated from the following:    Height as of this encounter: 1.638 m (5' 4.5\").    Weight as of this encounter: 67.1 kg (148 lb).    Weight management plan: Discussed healthy diet and exercise guidelines    She reports that she has never smoked. She has never used smokeless tobacco.      Counseling Resources:  ATP IV Guidelines  Pooled Cohorts Equation Calculator  Breast Cancer Risk Calculator  BRCA-Related Cancer Risk Assessment: FHS-7 Tool  FRAX Risk Assessment  ICSI Preventive Guidelines  Dietary Guidelines for Americans, 2010  USDA's MyPlate  ASA Prophylaxis  Lung CA Screening    SINDHU Antonio CNP  [facility]  "

## 2021-11-30 ENCOUNTER — OFFICE VISIT (OUTPATIENT)
Dept: FAMILY MEDICINE | Facility: CLINIC | Age: 46
End: 2021-11-30
Payer: COMMERCIAL

## 2021-11-30 VITALS
HEIGHT: 65 IN | BODY MASS INDEX: 24.66 KG/M2 | OXYGEN SATURATION: 97 % | DIASTOLIC BLOOD PRESSURE: 70 MMHG | RESPIRATION RATE: 16 BRPM | SYSTOLIC BLOOD PRESSURE: 112 MMHG | WEIGHT: 148 LBS | HEART RATE: 74 BPM | TEMPERATURE: 98 F

## 2021-11-30 DIAGNOSIS — F41.0 PANIC ATTACK: ICD-10-CM

## 2021-11-30 DIAGNOSIS — B00.1 RECURRENT COLD SORES: ICD-10-CM

## 2021-11-30 DIAGNOSIS — Z00.00 ROUTINE GENERAL MEDICAL EXAMINATION AT A HEALTH CARE FACILITY: Primary | ICD-10-CM

## 2021-11-30 DIAGNOSIS — Z13.1 SCREENING FOR DIABETES MELLITUS (DM): ICD-10-CM

## 2021-11-30 DIAGNOSIS — Z13.6 CARDIOVASCULAR SCREENING; LDL GOAL LESS THAN 160: ICD-10-CM

## 2021-11-30 DIAGNOSIS — R06.83 SNORING: ICD-10-CM

## 2021-11-30 DIAGNOSIS — Z11.59 NEED FOR HEPATITIS C SCREENING TEST: ICD-10-CM

## 2021-11-30 DIAGNOSIS — F41.1 GAD (GENERALIZED ANXIETY DISORDER): ICD-10-CM

## 2021-11-30 DIAGNOSIS — Z13.29 SCREENING FOR THYROID DISORDER: ICD-10-CM

## 2021-11-30 LAB
ALBUMIN SERPL-MCNC: 3.9 G/DL (ref 3.4–5)
ALP SERPL-CCNC: 49 U/L (ref 40–150)
ALT SERPL W P-5'-P-CCNC: 32 U/L (ref 0–50)
ANION GAP SERPL CALCULATED.3IONS-SCNC: 6 MMOL/L (ref 3–14)
AST SERPL W P-5'-P-CCNC: 16 U/L (ref 0–45)
BILIRUB SERPL-MCNC: 0.4 MG/DL (ref 0.2–1.3)
BUN SERPL-MCNC: 17 MG/DL (ref 7–30)
CALCIUM SERPL-MCNC: 8.6 MG/DL (ref 8.5–10.1)
CHLORIDE BLD-SCNC: 107 MMOL/L (ref 94–109)
CHOLEST SERPL-MCNC: 193 MG/DL
CO2 SERPL-SCNC: 25 MMOL/L (ref 20–32)
CREAT SERPL-MCNC: 0.79 MG/DL (ref 0.52–1.04)
FASTING STATUS PATIENT QL REPORTED: YES
GFR SERPL CREATININE-BSD FRML MDRD: 89 ML/MIN/1.73M2
GLUCOSE BLD-MCNC: 89 MG/DL (ref 70–99)
HDLC SERPL-MCNC: 59 MG/DL
LDLC SERPL CALC-MCNC: 109 MG/DL
NONHDLC SERPL-MCNC: 134 MG/DL
POTASSIUM BLD-SCNC: 4 MMOL/L (ref 3.4–5.3)
PROT SERPL-MCNC: 7.3 G/DL (ref 6.8–8.8)
SODIUM SERPL-SCNC: 138 MMOL/L (ref 133–144)
TRIGL SERPL-MCNC: 125 MG/DL
TSH SERPL DL<=0.005 MIU/L-ACNC: 1.24 MU/L (ref 0.4–4)

## 2021-11-30 PROCEDURE — 36415 COLL VENOUS BLD VENIPUNCTURE: CPT | Performed by: NURSE PRACTITIONER

## 2021-11-30 PROCEDURE — 84443 ASSAY THYROID STIM HORMONE: CPT | Performed by: NURSE PRACTITIONER

## 2021-11-30 PROCEDURE — 80053 COMPREHEN METABOLIC PANEL: CPT | Performed by: NURSE PRACTITIONER

## 2021-11-30 PROCEDURE — 80061 LIPID PANEL: CPT | Performed by: NURSE PRACTITIONER

## 2021-11-30 PROCEDURE — 86803 HEPATITIS C AB TEST: CPT | Performed by: NURSE PRACTITIONER

## 2021-11-30 PROCEDURE — 99396 PREV VISIT EST AGE 40-64: CPT | Performed by: NURSE PRACTITIONER

## 2021-11-30 RX ORDER — VALACYCLOVIR HYDROCHLORIDE 1 G/1
TABLET, FILM COATED ORAL
Qty: 4 TABLET | Refills: 3 | Status: SHIPPED | OUTPATIENT
Start: 2021-11-30 | End: 2023-12-20

## 2021-11-30 RX ORDER — VENLAFAXINE HYDROCHLORIDE 37.5 MG/1
CAPSULE, EXTENDED RELEASE ORAL
Qty: 90 CAPSULE | Refills: 3 | Status: SHIPPED | OUTPATIENT
Start: 2021-11-30 | End: 2023-01-28

## 2021-11-30 ASSESSMENT — MIFFLIN-ST. JEOR: SCORE: 1299.26

## 2021-11-30 ASSESSMENT — ENCOUNTER SYMPTOMS
PARESTHESIAS: 0
FREQUENCY: 0
DYSURIA: 0
HEMATURIA: 0
HEARTBURN: 0
PALPITATIONS: 0
DIZZINESS: 0
CHILLS: 0
HEMATOCHEZIA: 0
SORE THROAT: 0
ARTHRALGIAS: 0
BREAST MASS: 0
SHORTNESS OF BREATH: 0
FEVER: 0
CONSTIPATION: 0
NAUSEA: 0
WEAKNESS: 0
HEADACHES: 0
JOINT SWELLING: 0
COUGH: 0
EYE PAIN: 0
NERVOUS/ANXIOUS: 0
DIARRHEA: 0
MYALGIAS: 0
ABDOMINAL PAIN: 0

## 2021-12-01 LAB — HCV AB SERPL QL IA: NONREACTIVE

## 2022-01-21 DIAGNOSIS — D31.31 CHOROIDAL NEVUS OF RIGHT EYE: Primary | ICD-10-CM

## 2022-01-28 ENCOUNTER — OFFICE VISIT (OUTPATIENT)
Dept: OPHTHALMOLOGY | Facility: CLINIC | Age: 47
End: 2022-01-28
Attending: OPHTHALMOLOGY
Payer: COMMERCIAL

## 2022-01-28 DIAGNOSIS — D31.31 CHOROIDAL NEVUS OF RIGHT EYE: ICD-10-CM

## 2022-01-28 PROCEDURE — 99213 OFFICE O/P EST LOW 20 MIN: CPT | Performed by: OPHTHALMOLOGY

## 2022-01-28 PROCEDURE — G0463 HOSPITAL OUTPT CLINIC VISIT: HCPCS

## 2022-01-28 PROCEDURE — 92250 FUNDUS PHOTOGRAPHY W/I&R: CPT | Performed by: OPHTHALMOLOGY

## 2022-01-28 PROCEDURE — 92134 CPTRZ OPH DX IMG PST SGM RTA: CPT | Performed by: OPHTHALMOLOGY

## 2022-01-28 ASSESSMENT — SLIT LAMP EXAM - LIDS
COMMENTS: NORMAL
COMMENTS: NORMAL

## 2022-01-28 ASSESSMENT — CONF VISUAL FIELD
OS_NORMAL: 1
METHOD: COUNTING FINGERS
OD_NORMAL: 1

## 2022-01-28 ASSESSMENT — CUP TO DISC RATIO
OS_RATIO: 0.6
OD_RATIO: 0.6

## 2022-01-28 ASSESSMENT — EXTERNAL EXAM - LEFT EYE: OS_EXAM: NORMAL

## 2022-01-28 ASSESSMENT — VISUAL ACUITY
OD_SC: 20/20
METHOD: SNELLEN - LINEAR
OS_SC: 20/250

## 2022-01-28 ASSESSMENT — TONOMETRY
OD_IOP_MMHG: 12
IOP_METHOD: TONOPEN
OS_IOP_MMHG: 14

## 2022-01-28 ASSESSMENT — EXTERNAL EXAM - RIGHT EYE: OD_EXAM: NORMAL
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
## 2022-01-28 NOTE — NURSING NOTE
Chief Complaints and History of Present Illnesses   Patient presents with     Follow Up     Choroidal nevus, right eye:      Chief Complaint(s) and History of Present Illness(es)     Follow Up     Laterality: right eye    Course: stable    Associated symptoms: Negative for eye pain, floaters, headache and flashes    Treatments tried: no treatments    Pain scale: 0/10    Comments: Choroidal nevus, right eye:               Comments     Pt states no change in VA since last visit  Pt states no flashes, floaters eye pain or headaches    Bernarda Welch COT 10:32 AM January 28, 2022

## 2022-01-29 DIAGNOSIS — Z91.89 AT HIGH RISK FOR BREAST CANCER: ICD-10-CM

## 2022-02-04 NOTE — PROGRESS NOTES
"Kaitlyn Garcia is a 46 year old female who is being evaluated via a billable video visit.       The patient has been notified of following:      \"This video visit will be conducted via a call between you and your physician/provider. We have found that certain health care needs can be provided without the need for an in-person physical exam.  This service lets us provide the care you need with a video conversation.  If a prescription is necessary we can send it directly to your pharmacy.  If lab work is needed we can place an order for that and you can then stop by our lab to have the test done at a later time.     Video visits are billed at different rates depending on your insurance coverage.  Please reach out to your insurance provider with any questions.     If during the course of the call the physician/provider feels a video visit is not appropriate, you will not be charged for this service.\"     Patient has given verbal consent for Video visit? Yes  How would you like to obtain your AVS? Mail a copy  If you are dropped from the video visit, the video invite should be resent to: Text to cell phone: -  Will anyone else be joining your video visit? No  If patient encounters technical issues they should call [phone]      Video-Visit Details     Type of service:  Video Visit     Video Start Time: 10am  Video End Time: 10:45am    Originating Location (pt. Location): Home     Distant Location (provider location):  [facility]      Platform used for Video Visit: Wangdaizhijia    Virtual visit for increased clinical concern for sleep disordered breathing, chronic daytime fatigue.     Assessment / Plan:    1.)  Increased clinical suspicion for obstructive sleep apnea  -With report of snoring, daytime fatigue, observed apnea, comorbid suspected glaucoma, I feel it is reasonable to proceed with a repeat in lab PSG with a STOP-BANG score of at least 3.  Orders placed today.    2.)  History of " increased periodic limb movements, iron deficiency with borderline anemia with recent D&C and endometrial ablation  -Hemoglobin normal at 12.2 on 2019, but with ferritin of 56 on 2016  -I did recommend getting an up-to-date ferritin level, will place as a future lab order.    3.)  Chronic sleep onset but primarily sleep maintenance insomnia  -Suspect this is multifactorial with potential sleep disordered breathing, potential periodic limb movements, but also components of psychophysiological insomnia.  -We will rule out sleep apnea as above, but we also spent a symptomatic time reviewing cognitive behavioral therapy for insomnia and reviewing the core tenants of behavioral modification for chronic insomnia.  Our focus was on sleep hygiene, stimulus control and also sleep restriction.    SUBJECTIVE:  Kaitlyn Garcia is a 46 year old female.    Pertinent PMHx of suspected glaucoma, depression, iron deficiency anemia.    Prior Sleep Testin2013 - PSG with weight 137 lbs, BMI 22.  AHI 0, RDI 4.5.  Vinay SpO2 94%.  PLM index 52.3 with some associated cortical arousals.    Last visit with sleep clinic with Misael Rodriguez on 2014.  I have copied her history below:  ---  Patient was originally seen for complaints of snoring, persistent disorder of initiating and maintaining sleep, shift work, insufficient sleep times (4 hours on days working night shift and 6.5-7 hours on day shift), sleepiness, RLS, potential leg movements.  Snoring, persistent disorder of initiating and maintaining sleep due to shift work, insufficient sleep times, and sleepiness are resolved.  Please see previous notes for more detail.     We continue to follow for RLS and PLMD.  In review, a polysomnogram was recommended and completed on 2013.  During the study night the patient was found to have no significant sleep disordered breathing (AHI 0, RDI 4.5, O2 vinay 94%).  She did have frequent PLMs with a PLM index  of 52.3 and a PLMAI of 23.2.  The patient reported symptoms of RLS at night when she was getting into bed to go to sleep and this happened most during her menses and when she was sleep deprived and most tired.   It was at times prolonging sleep latency.  She was using Benadryl often for sleep initiation and maintenance and was advised to stop this.  She has a hx of iron deficiency and menorrhagia.   Ferritin level was low at 6.  Her HGB was 11.9.  She was started on Ferrous Sulfate 325 mg PO BID along with Vitamin C 500 mg.  She has just undergone a D&C and endometrial ablation.  Her Ferritin level was rechecked and was 71 today.  ---    Today -we reviewed her primary sleep concerns today which include chronic daytime fatigue, but also increasing snoring and newly observed apnea.    She feels that these issues have been changing and worsening over the last 5 years.  She has become increasing concerned about her fatigue, but increasing reports from other people about her snoring and now observed breathing pauses.    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  As of October, she is now working 3 12-hour shifts per week, these are typically back-to-back, hours are 7 AM-7 PM.    On work nights she would typically be in bed at 8:30 PM, she will then use her phone until lights out at 915-9:30 PM.  She estimates that she will then fall asleep in 30 minutes or less after lights out.  She will typically have at least 2 awakenings per night, and she feels that these will usually last at least 60 minutes.  During this time, she will stay in bed, watch the clock and toss and turn.  On workdays she will awaken by 5:30 AM by alarm.    On weekends or her off days, in bed closer to 9:30 PM, and believes that she is asleep usually closer to 10:30 PM.  She will still have 2 awakenings, but feels that they may be slightly shorter in duration.  She was seem to awaken between 8-9 AM naturally.  She largely denies any  regular daytime napping.    Caffeine use is 2 cans of Diet Coke per day, use around 9 AM and noon.    She does not seem to report any significant restless leg type symptoms, she denies any reports of frequent leg movements during sleep.    As above, she has been told that she snores regularly and now has observed apnea.    Weight today 147 lbs.      Insomnia Severity Index    Difficulty falling asleep 2    Difficulty staying asleep 3    Problems waking up too early 2    How SATISFIED/DISSATISFIED are you with your CURRENT sleep pattern? 3    How NOTICEABLE to others do you think your sleep problem is in terms of impairing the quality of your life? 4    How WORRIED/DISTRESSED are you about your current sleep problem? 3    To what extent do you consider your sleep problem to INTERFERE with your daily functioning (e.g. daytime fatigue, mood, ability to function at work/daily chores, concentration, memory, mood, etc.) CURRENTLY? 3    Total Score 20          Past medical history:    Patient Active Problem List    Diagnosis Date Noted     Allergic rhinitis 04/25/2016     Priority: Medium     Post-concussion syndrome 11/06/2015     Priority: Medium     Muscle spasms of head or neck 11/06/2015     Priority: Medium     Glaucoma suspect, bilateral 10/14/2015     Priority: Medium     Situational mixed anxiety and depressive disorder 04/30/2015     Priority: Medium     Choroidal nevus of right eye 02/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 Vallejo       Atypical hyperplasia of breast 08/14/2012     Priority: Medium     Mammographic  microcalcification found on diagnostic imaging of breast 05/29/2012     Priority: Medium     Intraductal papilloma of breast 05/29/2012     Priority: Medium     Iron deficiency anemia 05/21/2012     Priority: Medium     CARDIOVASCULAR SCREENING; LDL GOAL LESS THAN 160 03/08/2012     Priority: Medium       10 point ROS of systems including Constitutional, Eyes, Respiratory, Cardiovascular, Gastroenterology, Genitourinary, Integumentary, Muscularskeletal, Psychiatric were all negative except for pertinent positives noted in my HPI.    Current Outpatient Medications   Medication Sig Dispense Refill     LORazepam (ATIVAN) 1 MG tablet Take 1 tablet (1 mg) by mouth twice a day as needed.       MELATONIN PO Take by mouth nightly as needed        Naproxen Sodium (ALEVE PO) Take 440 mg by mouth as needed        tamoxifen (NOLVADEX) 10 MG tablet TAKE ONE-HALF TABLET BY MOUTH EVERY DAY 30 tablet 3     valACYclovir (VALTREX) 1000 mg tablet TAKE TWO TABLETS BY MOUTH TWICE A DAY 4 tablet 3     venlafaxine (EFFEXOR-XR) 37.5 MG 24 hr capsule TAKE ONE CAPSULE BY MOUTH ONCE DAILY 90 capsule 3       OBJECTIVE:  There were no vitals taken for this visit.    Physical Exam     ---  This note was written with the assistance of the Dragon voice-dictation technology software. The final document, although reviewed, may contain errors. For corrections, please contact the office.    Jasvir Power MD    Sleep Medicine  [facility]  ([phone])  [facility]  ([phone])    Time spent on the date of service:  50 minutes.

## 2022-02-04 NOTE — TELEPHONE ENCOUNTER
FOLLOWING UP ON THIS MEDICATION REQUEST   OUR RECORD'S SHOW WE ARE STILL AWAITING A REPLY ON THIS MEDICATION                                                        THANK YOU  original request on 01/29/2022

## 2022-02-06 ASSESSMENT — SLEEP AND FATIGUE QUESTIONNAIRES
HOW LIKELY ARE YOU TO NOD OFF OR FALL ASLEEP WHILE SITTING AND TALKING TO SOMEONE: SLIGHT CHANCE OF DOZING
HOW LIKELY ARE YOU TO NOD OFF OR FALL ASLEEP WHILE LYING DOWN TO REST IN THE AFTERNOON WHEN CIRCUMSTANCES PERMIT: HIGH CHANCE OF DOZING
HOW LIKELY ARE YOU TO NOD OFF OR FALL ASLEEP IN A CAR, WHILE STOPPED FOR A FEW MINUTES IN TRAFFIC: WOULD NEVER DOZE
HOW LIKELY ARE YOU TO NOD OFF OR FALL ASLEEP WHILE SITTING AND READING: MODERATE CHANCE OF DOZING
HOW LIKELY ARE YOU TO NOD OFF OR FALL ASLEEP WHEN YOU ARE A PASSENGER IN A CAR FOR AN HOUR WITHOUT A BREAK: MODERATE CHANCE OF DOZING
HOW LIKELY ARE YOU TO NOD OFF OR FALL ASLEEP WHILE SITTING INACTIVE IN A PUBLIC PLACE: MODERATE CHANCE OF DOZING
HOW LIKELY ARE YOU TO NOD OFF OR FALL ASLEEP WHILE WATCHING TV: SLIGHT CHANCE OF DOZING
HOW LIKELY ARE YOU TO NOD OFF OR FALL ASLEEP WHILE SITTING QUIETLY AFTER LUNCH WITHOUT ALCOHOL: HIGH CHANCE OF DOZING

## 2022-02-07 ENCOUNTER — VIRTUAL VISIT (OUTPATIENT)
Dept: SLEEP MEDICINE | Facility: CLINIC | Age: 47
End: 2022-02-07
Payer: COMMERCIAL

## 2022-02-07 VITALS
SYSTOLIC BLOOD PRESSURE: 115 MMHG | DIASTOLIC BLOOD PRESSURE: 60 MMHG | BODY MASS INDEX: 23.63 KG/M2 | WEIGHT: 147 LBS | HEIGHT: 66 IN

## 2022-02-07 DIAGNOSIS — R06.83 SNORING: ICD-10-CM

## 2022-02-07 DIAGNOSIS — R06.81 APNEA: ICD-10-CM

## 2022-02-07 DIAGNOSIS — R53.82 CHRONIC FATIGUE: ICD-10-CM

## 2022-02-07 DIAGNOSIS — D50.0 IRON DEFICIENCY ANEMIA DUE TO CHRONIC BLOOD LOSS: Primary | ICD-10-CM

## 2022-02-07 DIAGNOSIS — G47.61 PLMD (PERIODIC LIMB MOVEMENT DISORDER): ICD-10-CM

## 2022-02-07 PROCEDURE — 99204 OFFICE O/P NEW MOD 45 MIN: CPT | Mod: 95 | Performed by: FAMILY MEDICINE

## 2022-02-07 RX ORDER — TAMOXIFEN CITRATE 10 MG/1
TABLET ORAL
Qty: 30 TABLET | Refills: 3 | Status: SHIPPED | OUTPATIENT
Start: 2022-02-07 | End: 2022-11-14

## 2022-02-07 ASSESSMENT — MIFFLIN-ST. JEOR: SCORE: 1318.54

## 2022-02-07 NOTE — PATIENT INSTRUCTIONS
Your Body mass index is 23.73 kg/m .  Weight management is a personal decision.  If you are interested in exploring weight loss strategies, the following discussion covers the approaches that may be successful. Body mass index (BMI) is one way to tell whether you are at a healthy weight, overweight, or obese. It measures your weight in relation to your height.  A BMI of 18.5 to 24.9 is in the healthy range. A person with a BMI of 25 to 29.9 is considered overweight, and someone with a BMI of 30 or greater is considered obese. More than two-thirds of American adults are considered overweight or obese.  Being overweight or obese increases the risk for further weight gain. Excess weight may lead to heart disease and diabetes.  Creating and following plans for healthy eating and physical activity may help you improve your health.  Weight control is part of healthy lifestyle and includes exercise, emotional health, and healthy eating habits. Careful eating habits lifelong are the mainstay of weight control. Though there are significant health benefits from weight loss, long-term weight loss with diet alone may be very difficult to achieve- studies show long-term success with dietary management in less than 10% of people. Attaining a healthy weight may be especially difficult to achieve in those with severe obesity. In some cases, medications, devices and surgical management might be considered.  What can you do?  If you are overweight or obese and are interested in methods for weight loss, you should discuss this with your provider.     Consider reducing daily calorie intake by 500 calories.     Keep a food journal.     Avoiding skipping meals, consider cutting portions instead.    Diet combined with exercise helps maintain muscle while optimizing fat loss. Strength training is particularly important for building and maintaining muscle mass. Exercise helps reduce stress, increase energy, and improves fitness.  Increasing exercise without diet control, however, may not burn enough calories to loose weight.       Start walking three days a week 10-20 minutes at a time    Work towards walking thirty minutes five days a week     Eventually, increase the speed of your walking for 1-2 minutes at time    In addition, we recommend that you review healthy lifestyles and methods for weight loss available through the National Institutes of Health patient information sites:  http://win.niddk.nih.gov/publications/index.htm    And look into health and wellness programs that may be available through your health insurance provider, employer, local community center, or [employer]ine club.

## 2022-02-07 NOTE — PROGRESS NOTES
"Kaitlyn is a 46 year old who is being evaluated via a billable video visit.      How would you like to obtain your AVS? MyChart  If the video visit is dropped, the invitation should be resent by: Send to e-mail at: [email or web address]  Will anyone else be joining your video visit? No  {If patient encounters technical issues they should call [phone] :[id number]}    Video Start Time: {video visit start/end time for provider to select:152948}  Video-Visit Details    Type of service:  Video Visit    Video End Time:{video visit start/end time for provider to select:152948}    Originating Location (pt. Location): {video visit patient location:254518::\"Home\"}    Distant Location (provider location):  [facility]     Platform used for Video Visit: {Virtual Visit Platforms:[id number]::\"TrumpIT\"}  "

## 2022-03-27 ENCOUNTER — THERAPY VISIT (OUTPATIENT)
Dept: SLEEP MEDICINE | Facility: CLINIC | Age: 47
End: 2022-03-27
Payer: COMMERCIAL

## 2022-03-27 DIAGNOSIS — D50.0 IRON DEFICIENCY ANEMIA DUE TO CHRONIC BLOOD LOSS: ICD-10-CM

## 2022-03-27 DIAGNOSIS — R06.81 APNEA: ICD-10-CM

## 2022-03-27 DIAGNOSIS — R53.82 CHRONIC FATIGUE: ICD-10-CM

## 2022-03-27 DIAGNOSIS — R06.83 SNORING: ICD-10-CM

## 2022-03-27 DIAGNOSIS — G47.61 PLMD (PERIODIC LIMB MOVEMENT DISORDER): ICD-10-CM

## 2022-03-27 PROCEDURE — 95810 POLYSOM 6/> YRS 4/> PARAM: CPT | Performed by: FAMILY MEDICINE

## 2022-03-29 ENCOUNTER — LAB (OUTPATIENT)
Dept: LAB | Facility: CLINIC | Age: 47
End: 2022-03-29
Payer: COMMERCIAL

## 2022-03-29 DIAGNOSIS — G47.61 PLMD (PERIODIC LIMB MOVEMENT DISORDER): ICD-10-CM

## 2022-03-29 DIAGNOSIS — D50.0 IRON DEFICIENCY ANEMIA DUE TO CHRONIC BLOOD LOSS: ICD-10-CM

## 2022-03-29 LAB — FERRITIN SERPL-MCNC: 71 NG/ML (ref 8–252)

## 2022-03-29 PROCEDURE — 36415 COLL VENOUS BLD VENIPUNCTURE: CPT

## 2022-03-29 PROCEDURE — 82728 ASSAY OF FERRITIN: CPT

## 2022-03-30 NOTE — RESULT ENCOUNTER NOTE
David Sahni,    The ferritin level (estimate of body iron stores) returned in the normal range and was above 60, where we typically don't need additional iron supplementation.

## 2022-04-04 LAB — SLPCOMP: NORMAL

## 2022-05-04 NOTE — PROGRESS NOTES
"Kaitlyn Garcia is a 46 year old female who is being evaluated via a billable video visit.       The patient has been notified of following:      \"This video visit will be conducted via a call between you and your physician/provider. We have found that certain health care needs can be provided without the need for an in-person physical exam.  This service lets us provide the care you need with a video conversation.  If a prescription is necessary we can send it directly to your pharmacy.  If lab work is needed we can place an order for that and you can then stop by our lab to have the test done at a later time.     Video visits are billed at different rates depending on your insurance coverage.  Please reach out to your insurance provider with any questions.     If during the course of the call the physician/provider feels a video visit is not appropriate, you will not be charged for this service.\"     Patient has given verbal consent for Video visit? Yes  How would you like to obtain your AVS? Mail a copy  If you are dropped from the video visit, the video invite should be resent to: Text to cell phone: -  Will anyone else be joining your video visit? No  If patient encounters technical issues they should call [phone]      Video-Visit Details     Type of service:  Video Visit     Video Start Time: 11am  Video End Time: 11:25am    Originating Location (pt. Location): Home     Distant Location (provider location):  [facility]      Platform used for Video Visit: [employer]    Virtual visit for review of PSG results.     1.)  No sleep disordered breathing observed on repeat PSG performed 3/27/2022     2.)  Suspected periodic limb movement disorder  - Ferritin of 71 on 3/29/2022.  -Seen on initial and repeat PSG to have frequent PLM's that are associated with cortical arousals  - Given co-morbid sleep maintenance insomnia, daytime fatigue, I would consider the consistent findings " supportive of diagnosis of periodic limb movement disorder.  -She is in agreement, so we will start a trial of ropinirole 0.5-1 mg at bedtime.  - She will give an update via "[employer] in 2-4 weeks.     3.)  Chronic sleep onset but primarily sleep maintenance insomnia  -Suspect this is multifactorial with potential sleep disordered breathing, potential periodic limb movements, but also components of psychophysiological insomnia.  -As above, we will start trial of treatment for presumed periodic limb movement disorder, but likely will also involve some amount of behavioral modification.    SUBJECTIVE:  Kaitlyn aGrcia is a 46 year old female.    Pertinent PMHx of suspected glaucoma, depression, iron deficiency anemia.     Prior Sleep Testin2013 - PSG with weight 137 lbs, BMI 22.  AHI 0, RDI 4.5.  Vinay SpO2 94%.  PLM index 52.3 with some associated cortical arousals.     Last visit with sleep clinic with Misael Rodriguez on 2014.  I have copied her history below:  ---  Patient was originally seen for complaints of snoring, persistent disorder of initiating and maintaining sleep, shift work, insufficient sleep times (4 hours on days working night shift and 6.5-7 hours on day shift), sleepiness, RLS, potential leg movements.  Snoring, persistent disorder of initiating and maintaining sleep due to shift work, insufficient sleep times, and sleepiness are resolved.  Please see previous notes for more detail.     We continue to follow for RLS and PLMD.  In review, a polysomnogram was recommended and completed on 2013.  During the study night the patient was found to have no significant sleep disordered breathing (AHI 0, RDI 4.5, O2 vinay 94%).  She did have frequent PLMs with a PLM index of 52.3 and a PLMAI of 23.2.  The patient reported symptoms of RLS at night when she was getting into bed to go to sleep and this happened most during her menses and when she was sleep deprived and most tired.   It was at  times prolonging sleep latency.  She was using Benadryl often for sleep initiation and maintenance and was advised to stop this.  She has a hx of iron deficiency and menorrhagia.   Ferritin level was low at 6.  Her HGB was 11.9.  She was started on Ferrous Sulfate 325 mg PO BID along with Vitamin C 500 mg.  She has just undergone a D&C and endometrial ablation.  Her Ferritin level was rechecked and was 71 today.  ---     2/7/2022 -we reviewed her primary sleep concerns today which include chronic daytime fatigue, but also increasing snoring and newly observed apnea.     She feels that these issues have been changing and worsening over the last 5 years.  She has become increasing concerned about her fatigue, but increasing reports from other people about her snoring and now observed breathing pauses.     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  As of October, she is now working 3 12-hour shifts per week, these are typically back-to-back, hours are 7 AM-7 PM.     On work nights she would typically be in bed at 8:30 PM, she will then use her phone until lights out at 915-9:30 PM.  She estimates that she will then fall asleep in 30 minutes or less after lights out.  She will typically have at least 2 awakenings per night, and she feels that these will usually last at least 60 minutes.  During this time, she will stay in bed, watch the clock and toss and turn.  On workdays she will awaken by 5:30 AM by alarm.     On weekends or her off days, in bed closer to 9:30 PM, and believes that she is asleep usually closer to 10:30 PM.  She will still have 2 awakenings, but feels that they may be slightly shorter in duration.  She was seem to awaken between 8-9 AM naturally.  She largely denies any regular daytime napping.     Caffeine use is 2 cans of Diet Coke per day, use around 9 AM and noon.     She does not seem to report any significant restless leg type symptoms, she denies any reports of frequent leg  "movements during sleep.     As above, she has been told that she snores regularly and now has observed apnea.    A/P for repeat in-lab PSG given increased clinical concern for BRE (snoring, fatigue, observed apnea, co-morbid suspected closed angle glaucoma).    Today -we reviewed her diagnostic sleep study results in detail.      Insomnia Severity Index    Difficulty falling asleep Moderate    Difficulty staying asleep Severe    Problems waking up too early Moderate    How SATISFIED/DISSATISFIED are you with your CURRENT sleep pattern? Dissatisfied    How NOTICEABLE to others do you think your sleep problem is in terms of impairing the quality of your life? Very Much Noticeable    How WORRIED/DISTRESSED are you about your current sleep problem? Much    To what extent do you consider your sleep problem to INTERFERE with your daily functioning (e.g. daytime fatigue, mood, ability to function at work/daily chores, concentration, memory, mood, etc.) CURRENTLY? Much    Total Score 20         SLEEP STUDY INTERPRETATION  DIAGNOSTIC POLYSOMNOGRAPHY REPORT      Patient: BRANDO BARTON  YOB: 1975  Study Date: 3/27/2022  MRN: [id number]  Referring Provider: Jasvir Power MD  Ordering Provider: Jsavir Power MD    Indications for Polysomnography: The patient is a 46 year old Female who is 5' 6\" and weighs 147.0 lbs. Her BMI is 23.9, Savannah sleepiness scale 0 and neck circumference is 33 cm. Relevant medical history includes suspected glaucoma, depression, iron deficiency anemia. A diagnostic polysomnogram was performed to evaluate for sleep apnea.    Prior Sleep Testin2013 - PSG with weight 137 lbs, BMI 22.  AHI 0, RDI 4.5.  Ernesto SpO2 94%.  PLM index 52.3 with some associated cortical arousals.    Polysomnogram Data: A full night polysomnogram recorded the standard physiologic parameters including EEG, EOG, EMG, ECG, nasal and oral airflow. Respiratory parameters of chest and abdominal " movements were recorded with respiratory inductance plethysmography. Oxygen saturation was recorded by pulse oximetry. Hypopnea scoring rule used: 1B 4%.    Sleep Architecture: Sleep latency mildly decreased, increased arousal index, supine REM was observed.  The total recording time of the polysomnogram was 524.9 minutes. The total sleep time was 446.0 minutes. Sleep latency was mildly decreased at 11.0 minutes with the use of a sleep aid (melatonin 5mg). REM latency was 78.5 minutes. Arousal index was increased at 32.2 arousals per hour. Sleep efficiency was normal at 85.0%. Wake after sleep onset was 67.0 minutes. The patient spent 6.7% of total sleep time in Stage N1, 58.3% in Stage N2, 12.6% in Stage N3, and 22.4% in REM. Time in REM supine was 45.5 minutes.    Respiration: No significant sleep-disordered breathing (AHI 3.4) without sleep-associated hypoxemia.    Events ? The polysomnogram revealed a presence of 8 obstructive, 16 central, and - mixed apneas resulting in an apnea index of 3.2 events per hour. There were 1 obstructive hypopneas and - central hypopneas resulting in an obstructive hypopnea index of 0.1 and central hypopnea index of - events per hour. The combined apnea/hypopnea index was 3.4 events per hour (central apnea/hypopnea index was 2.2 events per hour). The REM AHI was 11.4 events per hour. The supine AHI was 7.2 events per hour. The RERA index was - events per hour.  The RDI was 3.4 events per hour.    Snoring - was reported as mild to moderate.    Respiratory rate and pattern - was notable for normal respiratory rate and pattern.    Sustained Sleep Associated Hypoventilation - Transcutaneous carbon dioxide monitoring was not used, however significant hypoventilation was not suggested by oximetry.    Sleep Associated Hypoxemia - (Greater than 5 minutes O2 sat at or below 88%) was not present. Baseline oxygen saturation was 94.6%. Lowest oxygen saturation was 91.0%. Time spent less than or  equal to 88% was 0 minutes. Time spent less than or equal to 89% was 0 minutes.    Movement Activity: Frequent PLM s (index 19.4) with infrequent associated cortical arousals (index 4.3).    Periodic Limb Activity - There were 144 PLMs during the entire study. The PLM index was 19.4 movements per hour. The PLM Arousal Index was 4.3 per hour.    REM EMG Activity - Excessive transient/sustained muscle activity was not present.    Nocturnal Behavior - Abnormal sleep related behaviors were not noted during/arising out of NREM / REM sleep.     Bruxism - None apparent.    Cardiac Summary: Appears NSR  The average pulse rate was 74.7 bpm. The minimum pulse rate was 58.0 bpm while the maximum pulse rate was 107.0 bpm.      Assessment:     Sleep latency mildly decreased, increased arousal index, supine REM was observed.    No significant sleep-disordered breathing (AHI 3.4) without sleep-associated hypoxemia.    Frequent PLM s (index 19.4) with infrequent associated cortical arousals (index 4.3).    Recommendations:    Advice regarding the risks of drowsy driving.    Suggest optimizing sleep schedule and avoiding sleep deprivation.    Weight management (if BMI > 30).    Pharmacologic therapy should be used for management of restless legs syndrome only if present and clinically indicated and not based on the presence of periodic limb movements alone.    Diagnostic Codes:   Unspecified Sleep Disturbance G47.9  Periodic Limb Movement Disorder G47.61     _____________________________________   Electronically Signed By: Jasvir Power MD (4/3/2022)               Past medical history:    Patient Active Problem List    Diagnosis Date Noted     Allergic rhinitis 04/25/2016     Priority: Medium     Post-concussion syndrome 11/06/2015     Priority: Medium     Muscle spasms of head or neck 11/06/2015     Priority: Medium     Glaucoma suspect, bilateral 10/14/2015     Priority: Medium     Situational mixed anxiety and depressive  disorder 04/30/2015     Priority: Medium     Choroidal nevus of right eye 02/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 Vallejo       Atypical hyperplasia of breast 08/14/2012     Priority: Medium     Mammographic microcalcification found on diagnostic imaging of breast 05/29/2012     Priority: Medium     Intraductal papilloma of breast 05/29/2012     Priority: Medium     Iron deficiency anemia 05/21/2012     Priority: Medium     CARDIOVASCULAR SCREENING; LDL GOAL LESS THAN 160 03/08/2012     Priority: Medium       10 point ROS of systems including Constitutional, Eyes, Respiratory, Cardiovascular, Gastroenterology, Genitourinary, Integumentary, Muscularskeletal, Psychiatric were all negative except for pertinent positives noted in my HPI.    Current Outpatient Medications   Medication Sig Dispense Refill     LORazepam (ATIVAN) 1 MG tablet Take 1 tablet (1 mg) by mouth twice a day as needed.       MELATONIN PO Take by mouth nightly as needed        Naproxen Sodium (ALEVE PO) Take 440 mg by mouth as needed        tamoxifen (NOLVADEX) 10 MG tablet TAKE ONE-HALF TABLET BY MOUTH EVERY DAY 30 tablet 3     valACYclovir (VALTREX) 1000 mg tablet TAKE TWO TABLETS BY MOUTH TWICE A DAY 4 tablet 3     venlafaxine (EFFEXOR-XR) 37.5 MG 24 hr capsule TAKE ONE CAPSULE BY MOUTH ONCE DAILY 90 capsule 3       OBJECTIVE:  There were no vitals taken for this visit.    Physical Exam     ---  This note was written with the assistance of the Dragon voice-dictation technology software. The final document, although reviewed, may contain errors. For corrections,  please contact the office.    Jasvir Power MD    Sleep Medicine  [facility]  ([phone])  [facility]  ([phone])    Time spent on the date of service:  30 minutes.

## 2022-05-05 ENCOUNTER — VIRTUAL VISIT (OUTPATIENT)
Dept: SLEEP MEDICINE | Facility: CLINIC | Age: 47
End: 2022-05-05
Payer: COMMERCIAL

## 2022-05-05 VITALS — HEIGHT: 66 IN | WEIGHT: 146 LBS | BODY MASS INDEX: 23.46 KG/M2

## 2022-05-05 DIAGNOSIS — G47.61 PLMD (PERIODIC LIMB MOVEMENT DISORDER): Primary | ICD-10-CM

## 2022-05-05 PROCEDURE — 99214 OFFICE O/P EST MOD 30 MIN: CPT | Mod: 95 | Performed by: FAMILY MEDICINE

## 2022-05-05 RX ORDER — ROPINIROLE 0.5 MG/1
TABLET, FILM COATED ORAL
Qty: 60 TABLET | Refills: 1 | Status: SHIPPED | OUTPATIENT
Start: 2022-05-05 | End: 2022-06-30

## 2022-05-05 ASSESSMENT — SLEEP AND FATIGUE QUESTIONNAIRES
HOW LIKELY ARE YOU TO NOD OFF OR FALL ASLEEP WHILE SITTING INACTIVE IN A PUBLIC PLACE: MODERATE CHANCE OF DOZING
HOW LIKELY ARE YOU TO NOD OFF OR FALL ASLEEP WHILE SITTING AND TALKING TO SOMEONE: SLIGHT CHANCE OF DOZING
HOW LIKELY ARE YOU TO NOD OFF OR FALL ASLEEP WHILE SITTING AND READING: MODERATE CHANCE OF DOZING
HOW LIKELY ARE YOU TO NOD OFF OR FALL ASLEEP IN A CAR, WHILE STOPPED FOR A FEW MINUTES IN TRAFFIC: SLIGHT CHANCE OF DOZING
HOW LIKELY ARE YOU TO NOD OFF OR FALL ASLEEP WHILE SITTING QUIETLY AFTER LUNCH WITHOUT ALCOHOL: SLIGHT CHANCE OF DOZING
HOW LIKELY ARE YOU TO NOD OFF OR FALL ASLEEP WHILE WATCHING TV: MODERATE CHANCE OF DOZING
HOW LIKELY ARE YOU TO NOD OFF OR FALL ASLEEP WHILE LYING DOWN TO REST IN THE AFTERNOON WHEN CIRCUMSTANCES PERMIT: MODERATE CHANCE OF DOZING
HOW LIKELY ARE YOU TO NOD OFF OR FALL ASLEEP WHEN YOU ARE A PASSENGER IN A CAR FOR AN HOUR WITHOUT A BREAK: MODERATE CHANCE OF DOZING

## 2022-05-05 NOTE — PROGRESS NOTES
"Kaitlyn is a 46 year old who is being evaluated via a billable video visit.      How would you like to obtain your AVS? MyChart  If the video visit is dropped, the invitation should be resent by: Text to cell phone: [phone]   Will anyone else be joining your video visit? No  {If patient encounters technical issues they should call [phone] :[id number]}    Video Start Time: {video visit start/end time for provider to select:152948}  Video-Visit Details    Type of service:  Video Visit    Video End Time:{video visit start/end time for provider to select:152948}    Originating Location (pt. Location): {video visit patient location:118417::\"Home\"}    Distant Location (provider location):  [facility]     Platform used for Video Visit: {Virtual Visit Platforms:814848::\"Savioke\"}  "

## 2022-05-09 NOTE — PROGRESS NOTES
FOLLOW UP  May 11, 2022     Kaitlyn Garcia is a 46 year old woman who presents with history of atypical ductal hyperplasia.      HPI:     History of 3 left breast needle biopsies, 2 right breast needle biopsies, and 2 excisional left breast biopsy, all benign. The left breast biopsy on 12/12/16 had 1 mm of atypical ductal hyperplasia.      Family history of 2 paternal aunts who had breast cancer in their 60's and a paternal cousin with breast cancer in her 40's. Kaitlyn  was seen by a genetic counselor in 2015, no genetic testing was done.      She is doing high risk screening with breast MRI and mammogram. She started Tamoxifen for risk reduction 5/24/17 and switched to 5 mg dosing 5/30/19. She had a breast MRI 5/9/17 revealed an area of enhancement biopsied to be an intraductal papilloma without atypia. She was seen for surgical consultation by Dr. Montenegro and monitoring with imaging was recommended.     She had a pelvic ultrasound 6/4/18, 6/12/19, and 11/4/20 that are stable.      She denies any breast concerns including mass, skin change, nipple inversion or nipple discharge.      BREAST-SPECIFIC HISTORY:     Previous breast imaging: Yes   - 5/21/12 b/l Dmammo and right u/s for right breast lump: calcifications in the upper outer quadrant of left breast spanning 5 cm. Right u/s negative. BI-RADS 4 suspicious for malignancy   - 5/23/12 left stereotactic biopsy: intraductal papilloma   - 7/30/12 left wire-placement   - 2/19/13 MRI: large segmental enhancement of left lateral breast, BI-RADS 0 incomplete   Left u/s: BI-RADS 3 probably benign  - 3/29/13 MRI: BI-RADS 2 benign findings  - 8/5/13 b/l Dmammo for fullness: BI-RADS 2 benign   - 2/5/14 MRI: cysts in left breast, BI-RADS 2 benign   - 5/9/14 Dmammo b/l: negative. Left u/s: cystic appearing structures, BI-RADS 2 benign  - 5/20/14 MRI: study not completed due to nausea, BI-RADS 0 incomplete  - 5/28/14 MRI: BI-RADS 2 benign  - 10/28/14 b/l Dmammo: BI-RADS 2 benign    - 5/7/15 MRI BI-RADS 2 benign   - 11/10/15 Smammo: bilateral calcs: BI-RADS 2 benign  - 1/25/16 left u/s for pain: multiple tiny cyst  - 5/17/16 MRI: BI-RADS 2 benign   - 12/6/16  Smammo: lateral left breast developing macrocalcifications BI-RADS 0 incomplete   - 12/7/16 Dmammo left: left 2:00 calcifications BI-RADS 4 suspicious  - 12/12/16: stereotactic biopsy: focus of ADH    - 2/1/17 wire placement   - 5/9/17 MRI: 2 adjacent mass like areas of enhancement mid central left breast BI-RADS 0 incomplete   - 5/12/17 left u/s: scattered small cysts BI-RADS 4 suspicious   - 5/18/17 MR biopsy: intraductal papilloma   - 4/30/18 MRI BI-RADS 2  - 6/6/18 left Dmammo and ultrasound for pain BI-RADS 2  - 11/28/18 Smammo BI-RADS 2  - 5/7/19 breast MRI BI-RADS 2  - 8/21/19 Dmammo and right breast ultrasound: BI-RADS 2  - 11/12/19 Smammo BI-RADS 1  - 5/20/20 breast MRI BI-ARDS 2  - 11/4/20 b/l Dmammo and left breast ultrasound for lump: BI-RADS 1  - 5/5/21 Breast MRI BI-RADS 1  - 11/10/21 Smammo BI-RADS 2     Prior breast biopsies:Yes   - 2 right breast biopsies   - 5/23/12 left needle biopsy: intraductal papilloma with sclerosing features   - 7/30/12 left excisional biopsy with Dr. Lynch: focal columnar cell change with atypia, intraductal papilloma and surrounding intraductal papillomatosis, sclerosing adenosis, fibrocystic changes  - 12/12/16 left needle biopsy x2: flat epithelial atypia with focus of ADH, duct ectasia and focal sclerosing adenosis, microcalcifications.   FEA and columnar cell change with atypia  - 2/1/17 left scar excision and excisional biopsy with Dr. Montenegro: FEA, columnar cell change/hyperplasia, intraductal papillomas, usual ductal hyperplasia, calcifications  - 5/18/17 left MRI needle biopsy: intraductal papilloma, mild columnar cell hyperplasia, mild duct ectasia, focal sclerosing adenosis      Prior history of breast cancer: No  Prior radiation history: No   Self breast exams: No  Breast density:  heterogeneously dense     GYN HISTORY:  . Age at 1st pregnancy: 17. Breastfeeding history: Yes.   Age at menarche: [age]  Menopausal: premenopausal  Menopausal hormone replacement therapy: No     RISK ASSESSMENT: < 3% 5 year risk and > 20% lifetime risk   Heather: 2.4% 5 year risk   SEBASTIAN: 43.7% lifetime risk   Abiodun: 11.6% lifetime risk      FAMILY HISTORY:  Breast ca: Yes    - paternal aunt 60 's   - paternal aunt 60's  - paternal cousin 40 (aunt)  Ovarian ca: No   - ? Paternal cousin with ovarian cancer   Pancreatic ca: No   Prostate: yes  - maternal uncle 69   Gastric ca: Yes   - paternal grandfather, passed   Melanoma: No  Colon ca: No  Other cancer: Yes   - maternal grandmother skin cancer 90's  - paternal grandfather brain 60's   Sabianist ethnicity: No  Other genetic, testing, syndromes, or clotting disorders: No   - She saw a genetic counselor 2015 and no testing was recommended     PAST MEDICAL HISTORY  Past Medical History:   Diagnosis Date     Anemia      Atypical ductal hyperplasia of breast      Choroidal nevus of right eye      Concussion 10/2015     Depressive disorder 2006    Treated with celexa for two years     Family history of breast cancer 2/3/2015     Family history of breast cancer 2/3/2015     Family history of breast cancer 2/3/2015     Family history of breast cancer 2/3/2015     Gastroesophageal reflux disease      Glaucoma suspect of both eyes      Papilloma of breast 11    Left, See Dr. Lynch at      PONV (postoperative nausea and vomiting)      S/P endometrial ablation 10/10/13    Menorrhagia     Shingles     x2 in the past     WBC decreased 2/3/2015     WBC decreased 2/3/2015     WBC decreased 2/3/2015     PAST SURGICAL HISTORY   Past Surgical History:   Procedure Laterality Date     ABDOMEN SURGERY      rectoplasty     BIOPSY BREAST NEEDLE LOCALIZATION  2012    Procedure: BIOPSY BREAST NEEDLE LOCALIZATION;  left breast excisional Biopsy with wire localization @0830;   Surgeon: Robert Lynch MD;  Location: [facility]     BREAST SURGERY       COSMETIC SURGERY      Rectalplasty for episiotomy repair     COSMETIC SURGERY      Rectalplasty for episiotomy repair     DILATION AND CURETTAGE, HYSTEROSCOPY, ABLATE ENDOMETRIUM NOVASURE, COMBINED  10/10/2013    Procedure: COMBINED DILATION AND CURETTAGE, HYSTEROSCOPY, ABLATE ENDOMETRIUM NOVASURE;  Endometrial ablation with Novasure;  Surgeon: Indu Birmingham DO;  Location: [facility] OR     HC TOOTH EXTRACTION W/FORCEP       LUMPECTOMY BREAST Left 2/1/2017    Procedure: LUMPECTOMY BREAST;  Surgeon: Lori Montenegro MD;  [facility]     SOFT TISSUE SURGERY      lumpectomy x 2 right breast     SOFT TISSUE SURGERY      episiotomy fixed     MEDICATIONS  Current Outpatient Medications   Medication Sig Dispense Refill     LORazepam (ATIVAN) 1 MG tablet Take 1 tablet (1 mg) by mouth twice a day as needed.       MELATONIN PO Take by mouth nightly as needed        Naproxen Sodium (ALEVE PO) Take 440 mg by mouth as needed        rOPINIRole (REQUIP) 0.5 MG tablet Take 1-2 tablets by mouth at bed time. 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 Her  has lymphoma.     ROS:  Change in vision No  Headaches: no  Respiratory: No shortness of breath, dyspnea on exertion, cough, or hemoptysis   Cardiovascular: negative   Gastrointestinal: negative Abdominal pain: no  Breast: negative  Musculoskeletal: negative Joint pain No Back pain: no  Psychiatric:  negative  Hematologic/Lymphatic/Immunologic: negative  Endocrine: negative    EXAM  /78   Pulse 84   Temp 98.3  F (36.8  C) (Oral)   Wt 68 kg (150 lb)   SpO2 98%   BMI 24.21 kg/m     PHYSICAL EXAM  Respiratory: breathing non labored.   Breasts: Examination was done in both the upright and supine positions.  Breasts are symmetrical . No dominant fixed or suspicious masses noted. No skin or nipple changes. No nipple discharge. Left breast scar well healed.   No clavicular, cervical, or axillary lymphadenopathy.     INVESTIGATIONS:    5/11/22 breast MRI: Per Radiology no concerning findings, final report pending.     ASSESSMENT/PLAN:    Kaitlyn Garcia is a 46 year old woman with history of left breast atypical ductal hyperplasia. She meets NCCN guidelines for high risk screening and risk reduction. She completed 5 years of low dose Tamoxifen.       1) Atypical ductal hyperplasia and family history of breast cancer.   We also reviewed that atypical ductal hyperplasia increases her baseline risk for breast cancer. She meets guidelines for high risk screening with a lifetime risk of breast cancer >20%. Discussed she meets guidelines for high risk screening with yearly mammogram alternating with Breast MRI every six months. Clinical encounter every 6-12 months including family history, risk assessment, and clinical breast exam.   - Screening mammogram with clinic visit due 11/11/22  - Breast MRI with clinic visit due 5/2023     2) Lifestyle Modifications were provided. - Maintain your best healthy weight. Higher body fat and adult weight gain is associated with increased risk for breast cancer. This increase in risk has been attributed to increase in circulating endogenous estrogen levels from fat tissue.   - Alcohol can raise estrogen. Alcohol consumption, even at moderate levels (1-2 drinks per day), increases breast cancer risk and are best avoided. If you choose to drink alcohol limit alcohol consumption to  less than 1 drink per day. (1 ounce of liquor, 6 ounces of wine, or 8 ounces of beer).  - Be active daily and void being sedentary.     Merced Wiggins PA-C    20 minutes spent on the date of the encounter doing chart review, review of test results, interpretation of tests, patient visit and documentation.

## 2022-05-11 ENCOUNTER — ANCILLARY PROCEDURE (OUTPATIENT)
Dept: MRI IMAGING | Facility: CLINIC | Age: 47
End: 2022-05-11
Attending: PHYSICIAN ASSISTANT
Payer: COMMERCIAL

## 2022-05-11 ENCOUNTER — OFFICE VISIT (OUTPATIENT)
Dept: SURGERY | Facility: CLINIC | Age: 47
End: 2022-05-11
Attending: PHYSICIAN ASSISTANT
Payer: COMMERCIAL

## 2022-05-11 VITALS
OXYGEN SATURATION: 98 % | HEART RATE: 84 BPM | WEIGHT: 150 LBS | BODY MASS INDEX: 24.21 KG/M2 | SYSTOLIC BLOOD PRESSURE: 112 MMHG | TEMPERATURE: 98.3 F | DIASTOLIC BLOOD PRESSURE: 78 MMHG

## 2022-05-11 DIAGNOSIS — Z91.89 AT HIGH RISK FOR BREAST CANCER: Primary | ICD-10-CM

## 2022-05-11 DIAGNOSIS — N60.99 ATYPICAL HYPERPLASIA OF BREAST: ICD-10-CM

## 2022-05-11 DIAGNOSIS — Z91.89 AT HIGH RISK FOR BREAST CANCER: ICD-10-CM

## 2022-05-11 PROCEDURE — A9585 GADOBUTROL INJECTION: HCPCS

## 2022-05-11 PROCEDURE — 99213 OFFICE O/P EST LOW 20 MIN: CPT | Performed by: PHYSICIAN ASSISTANT

## 2022-05-11 PROCEDURE — G0463 HOSPITAL OUTPT CLINIC VISIT: HCPCS

## 2022-05-11 PROCEDURE — 77049 MRI BREAST C-+ W/CAD BI: CPT | Mod: GC

## 2022-05-11 RX ORDER — GADOBUTROL 604.72 MG/ML
7.5 INJECTION INTRAVENOUS ONCE
Status: COMPLETED | OUTPATIENT
Start: 2022-05-11 | End: 2022-05-11

## 2022-05-11 RX ADMIN — GADOBUTROL 7 ML: 604.72 INJECTION INTRAVENOUS at 13:15

## 2022-05-11 ASSESSMENT — PAIN SCALES - GENERAL: PAINLEVEL: NO PAIN (0)

## 2022-05-11 NOTE — DISCHARGE INSTRUCTIONS
MRI Contrast Discharge Instructions    The IV contrast you received today will pass out of your body in your  urine. This will happen in the next 24 hours. You will not feel this process.  Your urine will not change color.    Drink at least 4 extra glasses of water or juice today (unless your doctor  has restricted your fluids). This reduces the stress on your kidneys.  You may take your regular medicines.    If you are on dialysis: It is best to have dialysis today.    If you have a reaction: Most reactions happen right away. If you have  any new symptoms after leaving the hospital (such as hives or swelling),  call your hospital at the correct number below. Or call your family doctor.  If you have breathing distress or wheezing, call 911.    Special instructions: ***    I have read and understand the above information.    Signature:______________________________________ Date:___________    Staff:__________________________________________ Date:___________     Time:__________    [place] [facility] Departments:    ___[place]: [phone]  ___[facility]: [phone]  ___[place]: [phone] ___[facility]: [phone]  ___[facility]: [phone]  ___[facility]: [phone]  ___[phone]  ___[facility]: [phone]  ___[phone]

## 2022-05-11 NOTE — LETTER
5/11/2022     RE: Kaitlyn Garcia  [address]  [place] [address]    Dear Colleague,    Thank you for referring your patient, Kaitlyn Garcia, to the [facility]. Please see a copy of my visit note below.    FOLLOW UP  May 11, 2022     Kaitlyn Garcia is a 46 year old woman who presents with history of atypical ductal hyperplasia.      HPI:     History of 3 left breast needle biopsies, 2 right breast needle biopsies, and 2 excisional left breast biopsy, all benign. The left breast biopsy on 12/12/16 had 1 mm of atypical ductal hyperplasia.      Family history of 2 paternal aunts who had breast cancer in their 60's and a paternal cousin with breast cancer in her 40's. Kaitlyn  was seen by a genetic counselor in 2015, no genetic testing was done.      She is doing high risk screening with breast MRI and mammogram. She started Tamoxifen for risk reduction 5/24/17 and switched to 5 mg dosing 5/30/19. She had a breast MRI 5/9/17 revealed an area of enhancement biopsied to be an intraductal papilloma without atypia. She was seen for surgical consultation by Dr. Montenegro and monitoring with imaging was recommended.     She had a pelvic ultrasound 6/4/18, 6/12/19, and 11/4/20 that are stable.      She denies any breast concerns including mass, skin change, nipple inversion or nipple discharge.      BREAST-SPECIFIC HISTORY:     Previous breast imaging: Yes   - 5/21/12 b/l Dmammo and right u/s for right breast lump: calcifications in the upper outer quadrant of left breast spanning 5 cm. Right u/s negative. BI-RADS 4 suspicious for malignancy   - 5/23/12 left stereotactic biopsy: intraductal papilloma   - 7/30/12 left wire-placement   - 2/19/13 MRI: large segmental enhancement of left lateral breast, BI-RADS 0 incomplete   Left u/s: BI-RADS 3 probably benign  - 3/29/13 MRI: BI-RADS 2 benign findings  - 8/5/13 b/l Dmammo for fullness: BI-RADS 2 benign   - 2/5/14 MRI: cysts in left breast, BI-RADS 2  benign   - 5/9/14 Dmammo b/l: negative. Left u/s: cystic appearing structures, BI-RADS 2 benign  - 5/20/14 MRI: study not completed due to nausea, BI-RADS 0 incomplete  - 5/28/14 MRI: BI-RADS 2 benign  - 10/28/14 b/l Dmammo: BI-RADS 2 benign   - 5/7/15 MRI BI-RADS 2 benign   - 11/10/15 Smammo: bilateral calcs: BI-RADS 2 benign  - 1/25/16 left u/s for pain: multiple tiny cyst  - 5/17/16 MRI: BI-RADS 2 benign   - 12/6/16  Smammo: lateral left breast developing macrocalcifications BI-RADS 0 incomplete   - 12/7/16 Dmammo left: left 2:00 calcifications BI-RADS 4 suspicious  - 12/12/16: stereotactic biopsy: focus of ADH    - 2/1/17 wire placement   - 5/9/17 MRI: 2 adjacent mass like areas of enhancement mid central left breast BI-RADS 0 incomplete   - 5/12/17 left u/s: scattered small cysts BI-RADS 4 suspicious   - 5/18/17 MR biopsy: intraductal papilloma   - 4/30/18 MRI BI-RADS 2  - 6/6/18 left Dmammo and ultrasound for pain BI-RADS 2  - 11/28/18 Smammo BI-RADS 2  - 5/7/19 breast MRI BI-RADS 2  - 8/21/19 Dmammo and right breast ultrasound: BI-RADS 2  - 11/12/19 Smammo BI-RADS 1  - 5/20/20 breast MRI BI-ARDS 2  - 11/4/20 b/l Dmammo and left breast ultrasound for lump: BI-RADS 1  - 5/5/21 Breast MRI BI-RADS 1  - 11/10/21 Smammo BI-RADS 2     Prior breast biopsies:Yes   - 2 right breast biopsies   - 5/23/12 left needle biopsy: intraductal papilloma with sclerosing features   - 7/30/12 left excisional biopsy with Dr. Lynch: focal columnar cell change with atypia, intraductal papilloma and surrounding intraductal papillomatosis, sclerosing adenosis, fibrocystic changes  - 12/12/16 left needle biopsy x2: flat epithelial atypia with focus of ADH, duct ectasia and focal sclerosing adenosis, microcalcifications.   FEA and columnar cell change with atypia  - 2/1/17 left scar excision and excisional biopsy with Dr. Montenegro: FEA, columnar cell change/hyperplasia, intraductal papillomas, usual ductal hyperplasia, calcifications  - 5/18/17  left MRI needle biopsy: intraductal papilloma, mild columnar cell hyperplasia, mild duct ectasia, focal sclerosing adenosis      Prior history of breast cancer: No  Prior radiation history: No   Self breast exams: No  Breast density: heterogeneously dense     GYN HISTORY:  . Age at 1st pregnancy: 17. Breastfeeding history: Yes.   Age at menarche: 13-15  Menopausal: premenopausal  Menopausal hormone replacement therapy: No     RISK ASSESSMENT: < 3% 5 year risk and > 20% lifetime risk   Heather: 2.4% 5 year risk   SEBASTIAN: 43.7% lifetime risk   Abiodun: 11.6% lifetime risk      FAMILY HISTORY:  Breast ca: Yes    - paternal aunt 60 's   - paternal aunt 60's  - paternal cousin 40 (aunt)  Ovarian ca: No   - ? Paternal cousin with ovarian cancer   Pancreatic ca: No   Prostate: yes  - maternal uncle 69   Gastric ca: Yes   - paternal grandfather, passed   Melanoma: No  Colon ca: No  Other cancer: Yes   - maternal grandmother skin cancer 90's  - paternal grandfather brain 60's   Christianity ethnicity: No  Other genetic, testing, syndromes, or clotting disorders: No   - She saw a genetic counselor 2015 and no testing was recommended     PAST MEDICAL HISTORY  Past Medical History:   Diagnosis Date     Anemia      Atypical ductal hyperplasia of breast      Choroidal nevus of right eye      Concussion 10/2015     Depressive disorder 2006    Treated with celexa for two years     Family history of breast cancer 2/3/2015     Family history of breast cancer 2/3/2015     Family history of breast cancer 2/3/2015     Family history of breast cancer 2/3/2015     Gastroesophageal reflux disease      Glaucoma suspect of both eyes      Papilloma of breast 11    Left, See Dr. Lynch at      PONV (postoperative nausea and vomiting)      S/P endometrial ablation 10/10/13    Menorrhagia     Shingles     x2 in the past     WBC decreased 2/3/2015     WBC decreased 2/3/2015     WBC decreased 2/3/2015     PAST SURGICAL HISTORY   Past Surgical  History:   Procedure Laterality Date     ABDOMEN SURGERY      rectoplasty     BIOPSY BREAST NEEDLE LOCALIZATION  7/30/2012    Procedure: BIOPSY BREAST NEEDLE LOCALIZATION;  left breast excisional Biopsy with wire localization @0830;  Surgeon: Robert Lynch MD;  Location: [facility] OR     BREAST SURGERY       COSMETIC SURGERY      Rectalplasty for episiotomy repair     COSMETIC SURGERY      Rectalplasty for episiotomy repair     DILATION AND CURETTAGE, HYSTEROSCOPY, ABLATE ENDOMETRIUM NOVASURE, COMBINED  10/10/2013    Procedure: COMBINED DILATION AND CURETTAGE, HYSTEROSCOPY, ABLATE ENDOMETRIUM NOVASURE;  Endometrial ablation with Novasure;  Surgeon: Indu Birmingham DO;  Location: [facility] OR     HC TOOTH EXTRACTION W/FORCEP       LUMPECTOMY BREAST Left 2/1/2017    Procedure: LUMPECTOMY BREAST;  Surgeon: Lori Montenegro MD;  [facility]     SOFT TISSUE SURGERY      lumpectomy x 2 right breast     SOFT TISSUE SURGERY      episiotomy fixed     MEDICATIONS  Current Outpatient Medications   Medication Sig Dispense Refill     LORazepam (ATIVAN) 1 MG tablet Take 1 tablet (1 mg) by mouth twice a day as needed.       MELATONIN PO Take by mouth nightly as needed        Naproxen Sodium (ALEVE PO) Take 440 mg by mouth as needed        rOPINIRole (REQUIP) 0.5 MG tablet Take 1-2 tablets by mouth at bed time. 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     ROS:  Change in vision No  Headaches: no  Respiratory: No shortness of  breath, dyspnea on exertion, cough, or hemoptysis   Cardiovascular: negative   Gastrointestinal: negative Abdominal pain: no  Breast: negative  Musculoskeletal: negative Joint pain No Back pain: no  Psychiatric: negative  Hematologic/Lymphatic/Immunologic: negative  Endocrine: negative    EXAM  /78   Pulse 84   Temp 98.3  F (36.8  C) (Oral)   Wt 68 kg (150 lb)   SpO2 98%   BMI 24.21 kg/m     PHYSICAL EXAM  Respiratory: breathing non labored.   Breasts: Examination was done in both the upright and supine positions.  Breasts are symmetrical . No dominant fixed or suspicious masses noted. No skin or nipple changes. No nipple discharge. Left breast scar well healed.   No clavicular, cervical, or axillary lymphadenopathy.     INVESTIGATIONS:    5/11/22 breast MRI: Per Radiology no concerning findings, final report pending.     ASSESSMENT/PLAN:    Kaitlyn Gacria is a 46 year old woman with history of left breast atypical ductal hyperplasia. She meets NCCN guidelines for high risk screening and risk reduction. She completed 5 years of low dose Tamoxifen.       1) Atypical ductal hyperplasia and family history of breast cancer.   We also reviewed that atypical ductal hyperplasia increases her baseline risk for breast cancer. She meets guidelines for high risk screening with a lifetime risk of breast cancer >20%. Discussed she meets guidelines for high risk screening with yearly mammogram alternating with Breast MRI every six months. Clinical encounter every 6-12 months including family history, risk assessment, and clinical breast exam.   - Screening mammogram with clinic visit due 11/11/22  - Breast MRI with clinic visit due 5/2023     2) Lifestyle Modifications were provided. - Maintain your best healthy weight. Higher body fat and adult weight gain is associated with increased risk for breast cancer. This increase in risk has been attributed to increase in circulating endogenous estrogen levels from fat  tissue.   - Alcohol can raise estrogen. Alcohol consumption, even at moderate levels (1-2 drinks per day), increases breast cancer risk and are best avoided. If you choose to drink alcohol limit alcohol consumption to less than 1 drink per day. (1 ounce of liquor, 6 ounces of wine, or 8 ounces of beer).  - Be active daily and void being sedentary.     Merced Wiggins PA-C    20 minutes spent on the date of the encounter doing chart review, review of test results, interpretation of tests, patient visit and documentation.

## 2022-05-11 NOTE — NURSING NOTE
"Oncology Rooming Note    May 11, 2022 1:36 PM   Kaitlyn Garcia is a 46 year old female who presents for:    Chief Complaint   Patient presents with     Oncology Clinic Visit     Intraductal papilloma of breast     Initial Vitals: /78   Pulse 84   Temp 98.3  F (36.8  C) (Oral)   Wt 68 kg (150 lb)   SpO2 98%   BMI 24.21 kg/m   Estimated body mass index is 24.21 kg/m  as calculated from the following:    Height as of 5/5/22: 1.676 m (5' 6\").    Weight as of this encounter: 68 kg (150 lb). Body surface area is 1.78 meters squared.  No Pain (0) Comment: Data Unavailable   No LMP recorded. Patient has had an ablation.  Allergies reviewed: Yes  Medications reviewed: Yes    Medications: Medication refills not needed today.  Pharmacy name entered into [facility]:    [facility] [place], [place] - [address], [address]  [facility] [place], [place] - [address] [address]    Clinical concerns: none       Emily Lemos CMA            "

## 2022-05-11 NOTE — PATIENT INSTRUCTIONS
Kaitlyn Garcia is a 46 year old woman with history of left breast atypical ductal hyperplasia. She meets NCCN guidelines for high risk screening and risk reduction. She completed 5 years of low dose Tamoxifen.       1) Atypical ductal hyperplasia and family history of breast cancer.   We also reviewed that atypical ductal hyperplasia increases her baseline risk for breast cancer. She meets guidelines for high risk screening with a lifetime risk of breast cancer >20%. Discussed she meets guidelines for high risk screening with yearly mammogram alternating with Breast MRI every six months. Clinical encounter every 6-12 months including family history, risk assessment, and clinical breast exam.   - Screening mammogram with clinic visit due 11/11/22  - Breast MRI with clinic visit due 5/2023     2) Lifestyle Modifications were provided. - Maintain your best healthy weight. Higher body fat and adult weight gain is associated with increased risk for breast cancer. This increase in risk has been attributed to increase in circulating endogenous estrogen levels from fat tissue.   - Alcohol can raise estrogen. Alcohol consumption, even at moderate levels (1-2 drinks per day), increases breast cancer risk and are best avoided. If you choose to drink alcohol limit alcohol consumption to less than 1 drink per day. (1 ounce of liquor, 6 ounces of wine, or 8 ounces of beer).  - Be active daily and void being sedentary.

## 2022-06-21 ENCOUNTER — OFFICE VISIT (OUTPATIENT)
Dept: FAMILY MEDICINE | Facility: CLINIC | Age: 47
End: 2022-06-21
Payer: COMMERCIAL

## 2022-06-21 VITALS
BODY MASS INDEX: 23.86 KG/M2 | OXYGEN SATURATION: 98 % | TEMPERATURE: 98.1 F | DIASTOLIC BLOOD PRESSURE: 76 MMHG | HEIGHT: 67 IN | HEART RATE: 92 BPM | RESPIRATION RATE: 18 BRPM | SYSTOLIC BLOOD PRESSURE: 108 MMHG | WEIGHT: 152 LBS

## 2022-06-21 DIAGNOSIS — R06.02 SHORTNESS OF BREATH: Primary | ICD-10-CM

## 2022-06-21 DIAGNOSIS — R00.2 PALPITATIONS: ICD-10-CM

## 2022-06-21 PROCEDURE — 99214 OFFICE O/P EST MOD 30 MIN: CPT | Performed by: FAMILY MEDICINE

## 2022-06-21 RX ORDER — ALBUTEROL SULFATE 90 UG/1
2 AEROSOL, METERED RESPIRATORY (INHALATION) EVERY 6 HOURS
Qty: 18 G | Refills: 0 | Status: SHIPPED | OUTPATIENT
Start: 2022-06-21 | End: 2022-11-14

## 2022-06-21 RX ORDER — ALBUTEROL SULFATE 90 UG/1
2 AEROSOL, METERED RESPIRATORY (INHALATION) EVERY 6 HOURS
Qty: 18 G | Refills: 1 | Status: SHIPPED | OUTPATIENT
Start: 2022-06-21 | End: 2022-11-14

## 2022-06-21 ASSESSMENT — PAIN SCALES - GENERAL: PAINLEVEL: EXTREME PAIN (8)

## 2022-06-21 NOTE — PROGRESS NOTES
"  Assessment & Plan     Shortness of breath  - albuterol (PROAIR HFA/PROVENTIL HFA/VENTOLIN HFA) 108 (90 Base) MCG/ACT inhaler; Inhale 2 puffs into the lungs every 6 hours  - General PFT Lab (Please always keep checked); Future  - Zio Patch Holter 48 Hour Adult Pediatric; Future  - albuterol (PROAIR HFA/PROVENTIL HFA/VENTOLIN HFA) 108 (90 Base) MCG/ACT inhaler; Inhale 2 puffs into the lungs every 6 hours    Palpitations  - albuterol (PROAIR HFA/PROVENTIL HFA/VENTOLIN HFA) 108 (90 Base) MCG/ACT inhaler; Inhale 2 puffs into the lungs every 6 hours  - Zio Patch Holter 48 Hour Adult Pediatric; Future    Return in about 6 weeks (around 8/2/2022) for as needed or if symptoms worsen or persist..    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 No chest pain, orthopnea, pedal edema, diaphoresis or syncope noted.    History of Present Illness       Reason for visit:  Post COVID symptoms. Tachycardia and SOB and cough    She eats 2-3 servings of fruits and vegetables daily.She consumes 1 sweetened beverage(s) daily.She exercises with enough effort to increase her heart rate 9 or less minutes per day.  She exercises with enough effort to increase her heart rate 3 or less days per week.   She is taking medications regularly.             Review of Systems   Constitutional, HEENT, cardiovascular, pulmonary, GI, , musculoskeletal, neuro, skin, endocrine and psych systems are negative, except as otherwise noted.      Objective    /76 (BP Location: Left arm, Patient Position: Chair, Cuff Size: Adult Regular)   Pulse 92   Temp 98.1  F (36.7  C) (Temporal)   Resp 18   Ht 1.689 m (5' 6.5\")   Wt 68.9 kg (152 lb)   LMP  (Exact Date)   " SpO2 98%   Breastfeeding No   BMI 24.17 kg/m    Body mass index is 24.17 kg/m .  Physical Exam   GENERAL: healthy, alert and no distress  EYES: Eyes grossly normal to inspection, PERRL and conjunctivae and sclerae normal  HENT: ear canals and TM's normal, nose and mouth without ulcers or lesions  NECK: no adenopathy, no asymmetry, masses, or scars and thyroid normal to palpation  RESP: lungs clear to auscultation - no rales, rhonchi or wheezes  CV: regular rate and rhythm, normal S1 S2, no S3 or S4, no murmur, click or rub, no peripheral edema and peripheral pulses strong  ABDOMEN: soft, nontender, no hepatosplenomegaly, no masses and bowel sounds normal  MS: no gross musculoskeletal defects noted, no edema  NEURO: Normal strength and tone, mentation intact and speech normal  PSYCH: mentation appears normal, affect normal/bright                .  ..

## 2022-06-28 ENCOUNTER — ANCILLARY PROCEDURE (OUTPATIENT)
Dept: CARDIOLOGY | Facility: CLINIC | Age: 47
End: 2022-06-28
Attending: FAMILY MEDICINE
Payer: COMMERCIAL

## 2022-06-28 DIAGNOSIS — R06.02 SHORTNESS OF BREATH: ICD-10-CM

## 2022-06-28 DIAGNOSIS — R00.2 PALPITATIONS: ICD-10-CM

## 2022-06-28 PROCEDURE — 93224 XTRNL ECG REC UP TO 48 HRS: CPT | Performed by: INTERNAL MEDICINE

## 2022-06-28 NOTE — PATIENT INSTRUCTIONS
Patient has been prescribed a ZioPatch holter for 2 days.  Patient was instructed regarding the indication, function, care and prompt return of the ZioPatch holter monitor. The monitor, with S/N [id number],  was placed on the patient with instructions regarding care of the skin, electrodes, and monitor, as well as documentation in the patient diary. Patient demonstrated understanding of this information and agreed to call [employer] with further questions or concerns.

## 2022-06-30 DIAGNOSIS — G47.61 PLMD (PERIODIC LIMB MOVEMENT DISORDER): ICD-10-CM

## 2022-06-30 RX ORDER — ROPINIROLE 0.5 MG/1
TABLET, FILM COATED ORAL
Qty: 60 TABLET | Refills: 1 | Status: SHIPPED | OUTPATIENT
Start: 2022-06-30 | End: 2022-09-16

## 2022-06-30 NOTE — TELEPHONE ENCOUNTER
Pending Prescriptions:                       Disp   Refills    rOPINIRole (REQUIP) 0.5 MG tablet         60 tab*1            Sig: Take 1-2 tablets by mouth at bed time.    Last Written Prescription Date:  5/5/2022  Last Fill Quantity: 60,   # refills: 1  Last Office Visit with [facility], ROBERTOP or [facility] prescribing provider: 5/5/2022  Future Office visit: No follow up scheduled at this time.      ALEJANDRO Mars

## 2022-07-10 DIAGNOSIS — R06.02 SHORTNESS OF BREATH: Primary | ICD-10-CM

## 2022-07-13 NOTE — TELEPHONE ENCOUNTER
"Pending Prescriptions:                       Disp   Refills    albuterol (PROAIR HFA/PROVENTIL HFA/VENTOL*8.5 g  0        Sig: INHALE 2 PUFFS INTO THE LUNGS EVERY 6 HOURS    Routing refill request to provider for review/approval because:  Asthma is not listed on problem listed.  No ACT on file    Requested Prescriptions   Pending Prescriptions Disp Refills    albuterol (PROAIR HFA/PROVENTIL HFA/VENTOLIN HFA) 108 (90 Base) MCG/ACT inhaler [Pharmacy Med Name: ALBUTEROL SULFATE  AERS] 8.5 g 0     Sig: INHALE 2 PUFFS INTO THE LUNGS EVERY 6 HOURS        Asthma Maintenance Inhalers - Anticholinergics Passed - 7/10/2022  5:02 AM        Passed - Patient is age 12 years or older        Passed - Recent (12 mo) or future (30 days) visit within the authorizing provider's specialty     Patient has had an office visit with the authorizing provider or a provider within the authorizing providers department within the previous 12 mos or has a future within next 30 days. See \"Patient Info\" tab in inbasket, or \"Choose Columns\" in Meds & Orders section of the refill encounter.              Passed - Medication is active on med list       Short-Acting Beta Agonist Inhalers Protocol  Passed - 7/10/2022  5:02 AM        Passed - Patient is age 12 or older        Passed - Recent (12 mo) or future (30 days) visit within the authorizing provider's specialty     Patient has had an office visit with the authorizing provider or a provider within the authorizing providers department within the previous 12 mos or has a future within next 30 days. See \"Patient Info\" tab in inbasket, or \"Choose Columns\" in Meds & Orders section of the refill encounter.              Passed - Medication is active on med list                    "

## 2022-07-14 RX ORDER — ALBUTEROL SULFATE 90 UG/1
AEROSOL, METERED RESPIRATORY (INHALATION)
Qty: 8.5 G | Refills: 0 | Status: SHIPPED | OUTPATIENT
Start: 2022-07-14 | End: 2024-02-19

## 2022-07-20 ENCOUNTER — OFFICE VISIT (OUTPATIENT)
Dept: OPTOMETRY | Facility: CLINIC | Age: 47
End: 2022-07-20
Payer: COMMERCIAL

## 2022-07-20 DIAGNOSIS — H02.889 MEIBOMIAN GLAND DYSFUNCTION: ICD-10-CM

## 2022-07-20 DIAGNOSIS — H40.003 GLAUCOMA SUSPECT OF BOTH EYES: ICD-10-CM

## 2022-07-20 DIAGNOSIS — H52.4 PRESBYOPIA: ICD-10-CM

## 2022-07-20 DIAGNOSIS — D31.31 CHOROIDAL NEVUS OF RIGHT EYE: ICD-10-CM

## 2022-07-20 DIAGNOSIS — Z01.00 EXAMINATION OF EYES AND VISION: Primary | ICD-10-CM

## 2022-07-20 PROCEDURE — 92014 COMPRE OPH EXAM EST PT 1/>: CPT | Performed by: OPTOMETRIST

## 2022-07-20 PROCEDURE — 92015 DETERMINE REFRACTIVE STATE: CPT | Performed by: OPTOMETRIST

## 2022-07-20 ASSESSMENT — VISUAL ACUITY
OS_SC: 20/70
OD_SC: 20/70
METHOD: SNELLEN - LINEAR
OD_SC+: -1
OS_SC+: +3
OS_SC: 20/25
OD_SC: 20/20

## 2022-07-20 ASSESSMENT — REFRACTION_WEARINGRX
OD_SPHERE: +1.25
OS_SPHERE: +1.50
OS_SPHERE: PLANO
OD_SPHERE: +1.50
OD_SPHERE: PLANO
OS_SPHERE: +1.25
OS_SPHERE: PLANO
OD_SPHERE: PLANO
SPECS_TYPE: NEAR
SPECS_TYPE: OTC READERS

## 2022-07-20 ASSESSMENT — CUP TO DISC RATIO
OD_RATIO: 0.6
OS_RATIO: 0.65

## 2022-07-20 ASSESSMENT — REFRACTION_MANIFEST
OD_SPHERE: -0.25
OS_AXIS: 080
OD_ADD: +1.75
OS_ADD: +1.75
OS_SPHERE: -0.50
OD_CYLINDER: SPHERE
OS_CYLINDER: +0.50

## 2022-07-20 ASSESSMENT — SLIT LAMP EXAM - LIDS
COMMENTS: MEIBOMIAN GLAND DYSFUNCTION, COLLARETTES
COMMENTS: MEIBOMIAN GLAND DYSFUNCTION, COLLARETTES

## 2022-07-20 ASSESSMENT — EXTERNAL EXAM - RIGHT EYE: OD_EXAM: NORMAL

## 2022-07-20 ASSESSMENT — CONF VISUAL FIELD
OD_NORMAL: 1
OS_NORMAL: 1

## 2022-07-20 ASSESSMENT — EXTERNAL EXAM - LEFT EYE: OS_EXAM: NORMAL

## 2022-07-20 ASSESSMENT — TONOMETRY
OS_IOP_MMHG: 15
IOP_METHOD: TONOPEN
OD_IOP_MMHG: 15

## 2022-07-20 NOTE — PATIENT INSTRUCTIONS
Eyeglass prescription given.  Your options are a no line bifocal which would allow you to see distance and near vision or separate glasses for distance and reading.    Heat to the eyes daily for 10-15 minutes nightly with warm washcloth or reusable gel masks from the pharmacy or  [employer] heat masks can be purchased at [employer].    Ocusoft lid scrubs at night.  (Allergy formula or other tea tree oil eyelid/lash product)    Artificial tears - 1 drop in am and pm daily and then 1 drop both eyes 2x  as needed during the day.    Referral to Dr. Cullen Nelson at the [facility] for OCT and visual field.  You are due January 2023.    Follow up on choroidal nevus (freckle)  yearly as recommended by the retinal specialist.    Return in 1 year for a complete eye exam or sooner if needed.    Kirill Nicholson, OD    There is a combination of three treatments which can greatly improve symptoms of dry eyes.     Artificial tears  Heat (eyes closed)  Eyelid and eyelash cleansing (eyes closed)     Use one drop of artificial tears both eyes 4 x daily.  Once in the morning, lunch, dinner and bedtime. Continue to use the drops regardless if your eyes are comfortable or not.  Artificial tears work best as a preventative and not as well after your eyes are starting to bother you.  It may take 4- 6 weeks of using the drops before you notice improvement.  If after that time you are still having problems schedule an appointment for an evaluation and discussion of different treatments such as Restasis or Xiidra.  Dry eyes are a chronic condition and you may have more symptoms at certain times of the year.    Excess tearing can be due to the right tears not working properly or a blockage in the tear drainage system.  You can try using artificial tears 1 drop both eyes 4 x day.  If the excess tearing is bothersome after 4-6 weeks of treatment then we can send you for further testing.  This would entail a referral to our  oculoplastic specialist Dr. Comfort Rivera at the [facility]-[phone].    Recommended brands are:    Systane Complete  Systane Ultra  Systane Balance  Refresh Advanced Optive  Refresh Relieva  Blink    Recommended brands for contact lens wearers are:    Systane contacts  Refresh contacts  Blink contacts    If you are using drops more than 4 x day or have sensitivities to preservatives I recommend non preserved artificial tears.  These come in 1 use vials.  They can be used every 1-2 hours.  Do not reuse the vials.    Recommended brands are:    Refresh Optive Jens-3  Systane- preservative free  Refresh-  preservative free  Blink- preservative free    Gels or ointment can be used at night.    Recommended brands are:    Systane Gel  Refresh Gel  Blink Gel  Genteal Gel    Systane night time (ointment)  Refresh Celluvisc  Refresh PM (ointment)      Visine, Clear Eyes or Murine (drops that get the red out) can irritate the eyes and cause a rebound effect where the eyes become more red and you end up using more drops.  Avoid drops containing tetrahydrozoline, naphazoline, phenylephrine, oxymetazoline.      OTC Lumify is a newer product that gives immediate redness relief without the rebound effect.  Use as needed to take the redness out.    Artificial tears may be used with other drops (such as allergy, glaucoma, antibiotics) around the same time.  Be sure to wait 5 minutes in between drops.    Heat to the eyelids can also improve your symptoms of dry eyes.  Patricia heat masks can be purchased at Amazon to be used nightly for 10-15 minutes.  Other options are gel masks that can be put in the microwave and purchased at most pharmacies.      Tea Tree Oil eyelid cleansers recommended are Ocusoft Oust foam cleanser to cleanse eyelids/lashes at night and in the am. Other options are Blephadex or Cliradex eyelid wipes.  KEEP EYES CLOSED when using these products.  These can be purchased on amazon.com   A good product  for make up remover with tea tree oil is SpencerEyes.  This can be found at www."[employer] or Flexusp[employer].    Other good eyelid cleansers have hypochlorous which removes excess bacteria and is safe around the eyes. Products are Avenova, Ocusoft Hypochlor or Heyedrate. Spray solution onto cotton pad, close eyes and gently apply to eyelids and eyelashes using side to side motion.  You can also KEEP EYES CLOSED spray and rub into eyelashes.  You do not need to rinse it off. Use morning and evening. These products can be found on Amazon.  You can check with your local pharmacy and see if they can order if for you if they don't have it.    Other brands of eyelid cleansing wipes are:    Ocusoft wipes  Systane wipes    A great eye make up line is [email or web address]      Optometry Providers       Clinic Locations                                 Telephone Number   Dr. Norma Arnett [facility] [phone]     [facility] Hours:                [facility] Hours:       [facility] Hours:   [address]   [address]      [address] [place] [address]   [place] [address]    [place] [address]  Phone: [phone]                    Phone: [phone]     Phone: [phone]                      Monday 8:00-6:00                          Monday 8:00-6:00                          Monday 8:00-6:00              Tuesday 8:00-6:00                          Tuesday 8:00-6:00                          Tuesday 8:00-6:00              Wednesday 8:00-6:00                  Wednesday 8:00-6:00                   Wednesday 8:00-6:00      Thursday 8:00-6:00                        Thursday 8:00-6:00                         Thursday 8:00-6:00            Friday 8:00-5:00                              Friday 8:00-5:00                              Friday  8:00-5:00    [facility] Hours:   [address] [place] [address]  [phone]    Monday 9:00-6:00  Tuesday 9:00-6:00  Wednesday 9:00-6:00  Thursday 9:00-6:00  Friday 9:00-5:00  Please log on to [email or web address] to order your contact lenses.  The link is found on the [facility] page.  As always, Thank you for trusting us with your health care needs!

## 2022-07-20 NOTE — LETTER
7/20/2022         RE: Kaitlyn Garcia  [address]  [facility] [address]        Dear Colleague,    Thank you for referring your patient, Kaitlyn Garcia, to the [facility]. Please see a copy of my visit note below.    Chief Complaint   Patient presents with     COMPREHENSIVE EYE EXAM         Last Eye Exam: 3/8/212  Dilated Previously: Yes    What are you currently using to see?  Readers +1.25       Distance Vision Acuity: Satisfied with vision    Near Vision Acuity: Not satisfied, used to just wear them for small print but now needs them for the computer too    Eye Comfort: good  Do you use eye drops? : No  Occupation or Hobbies: nurse    Radha Lamprecht  Optometric Assistant, [facility]    Choroidal nevus right eye followed by Dr. Perez at the [facility].  Last dilated visit  was 1/28/2022- nevus was stable with 1 year recall.    Glaucoma suspect- last OCT/VF 7/5/2021 with Dr. Everett at the [facility]- no treatment - recommended monitoring.  She was going to coordinate so patient could do retina appt and glaucoma appt on the same day in January 2023  but patient prefers to do that follow up at the [facility].  (sister was recently told she also has large optic nerves)      Medical, surgical and family histories reviewed and updated 7/20/2022.       OBJECTIVE: See Ophthalmology exam    ASSESSMENT:    ICD-10-CM    1. Examination of eyes and vision  Z01.00    2. Presbyopia  H52.4    3. Meibomian gland dysfunction  H02.889    4. Glaucoma suspect of both eyes  H40.003    5. Choroidal nevus of right eye  D31.31        PLAN:     Patient Instructions   Eyeglass prescription given.  Your options are a no line bifocal which would allow you to see distance and near vision or separate glasses for distance and reading.    Heat to the eyes daily for 10-15 minutes nightly with warm washcloth or reusable gel masks from the pharmacy or  Patricia heat  masks can be purchased at Amazon.    Ocusoft lid scrubs at night.  (Allergy formula or other tea tree oil eyelid/lash product)    Artificial tears - 1 drop in am and pm daily and then 1 drop both eyes 2x  as needed during the day.    Referral to Dr. Cullen Nelson at the [facility] for OCT and visual field.  You are due January 2023.    Follow up on choroidal nevus (freckle)  yearly as recommended by the retinal specialist.    Return in 1 year for a complete eye exam or sooner if needed.    Kirill Nicholson, OD               Again, thank you for allowing me to participate in the care of your patient.        Sincerely,        Kirill Nicholson, OD

## 2022-07-20 NOTE — PROGRESS NOTES
Chief Complaint   Patient presents with     COMPREHENSIVE EYE EXAM         Last Eye Exam: 3/8/212  Dilated Previously: Yes    What are you currently using to see?  Readers +1.25       Distance Vision Acuity: Satisfied with vision    Near Vision Acuity: Not satisfied, used to just wear them for small print but now needs them for the computer too    Eye Comfort: good  Do you use eye drops? : No  Occupation or Hobbies: nurse    Radha Tony  Optometric Assistant, [facility]    Choroidal nevus right eye followed by Dr. Perez at the [facility] clinic.  Last dilated visit  was 1/28/2022- nevus was stable with 1 year recall.    Glaucoma suspect- last OCT/VF 7/5/2021 with Dr. Everett at the [facility]- no treatment - recommended monitoring.  She was going to coordinate so patient could do retina appt and glaucoma appt on the same day in January 2023  but patient prefers to do that follow up at the [facility].  (sister was recently told she also has large optic nerves)      Medical, surgical and family histories reviewed and updated 7/20/2022.       OBJECTIVE: See Ophthalmology exam    ASSESSMENT:    ICD-10-CM    1. Examination of eyes and vision  Z01.00    2. Presbyopia  H52.4    3. Meibomian gland dysfunction  H02.889    4. Glaucoma suspect of both eyes  H40.003    5. Choroidal nevus of right eye  D31.31        PLAN:     Patient Instructions   Eyeglass prescription given.  Your options are a no line bifocal which would allow you to see distance and near vision or separate glasses for distance and reading.    Heat to the eyes daily for 10-15 minutes nightly with warm washcloth or reusable gel masks from the pharmacy or  Zigabid heat masks can be purchased at Amazon.    Ocusoft lid scrubs at night.  (Allergy formula or other tea tree oil eyelid/lash product)    Artificial tears - 1 drop in am and pm daily and then 1 drop both eyes 2x  as needed during the day.    Referral to Dr. Berman  Leslie at the [facility] for OCT and visual field.  You are due January 2023.    Follow up on choroidal nevus (freckle)  yearly as recommended by the retinal specialist.    Return in 1 year for a complete eye exam or sooner if needed.    Kirill Nicholson, OD

## 2022-09-03 ENCOUNTER — HEALTH MAINTENANCE LETTER (OUTPATIENT)
Age: 47
End: 2022-09-03

## 2022-09-05 ENCOUNTER — OFFICE VISIT (OUTPATIENT)
Dept: URGENT CARE | Facility: URGENT CARE | Age: 47
End: 2022-09-05
Payer: COMMERCIAL

## 2022-09-05 VITALS
DIASTOLIC BLOOD PRESSURE: 85 MMHG | RESPIRATION RATE: 16 BRPM | TEMPERATURE: 97.8 F | HEART RATE: 88 BPM | OXYGEN SATURATION: 98 % | SYSTOLIC BLOOD PRESSURE: 122 MMHG

## 2022-09-05 DIAGNOSIS — L71.0 PERIORAL DERMATITIS: ICD-10-CM

## 2022-09-05 DIAGNOSIS — J01.90 ACUTE SINUSITIS WITH SYMPTOMS > 10 DAYS: Primary | ICD-10-CM

## 2022-09-05 PROCEDURE — 99213 OFFICE O/P EST LOW 20 MIN: CPT | Performed by: EMERGENCY MEDICINE

## 2022-09-05 RX ORDER — DOXYCYCLINE 100 MG/1
100 CAPSULE ORAL 2 TIMES DAILY
Qty: 14 CAPSULE | Refills: 0 | Status: SHIPPED | OUTPATIENT
Start: 2022-09-05 | End: 2022-09-12

## 2022-09-05 NOTE — PROGRESS NOTES
Assessment & Plan     Diagnosis:    (J01.90) Acute sinusitis with symptoms > 10 days  (primary encounter diagnosis)  Plan: doxycycline hyclate (VIBRAMYCIN) 100 MG capsule    (L71.0) Perioral dermatitis      Medical Decision Making:  Kaitlyn Garcia is a 46 year old female who presents to clinic today for evaluation of facial pain/pressure.  Signs and symptoms are consistent with sinusitis.  Discussed viral vs bacterial sinusitis with the patient.  URI symptoms noted include ear pain, cough and rhinorrhea. No hx of seasonal allergies to suggest allergic rhinitis. Given duration >10 days and  I discussed antibiotics for bacterial sinusitis. Outpatient medications ordered as noted above.  No evidence of fungal sinusitis, meningitis, encephalitis, cavernous sinus thrombosis, ocular pathology, intracerebral bleed, serious bacterial infection otherwise.  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  Supportive outpatient management indicated.  Patient verbalizes understanding and agreement with the plan including reasons to go to the ER. All questions answered.       [facility]    Subjective     Kaitlyn Garcia is a 46 year old female who presents to clinic today for the following health issues:  Chief Complaint   Patient presents with     Urgent Care     Sinus Problem     Per pt states she has been having sinus symptoms for 3 weeks sinus pain, headaches and pressure        HPI    URI Adult    Onset of symptoms was 2.5 week(s) ago.  Course of illness is worsening.    Severity moderate  Current and Associated symptoms: fever, runny nose, stuffy nose, sore throat, facial pain/pressure and  headache  Treatment measures tried include Tylenol/Ibuprofen and Decongestants.  Predisposing factors include None.    Patient denies any chest pain, shortness of breath, vision changes, neck pain or stiffness.  She has a history of seasonal allergies, but symptoms usually never last this long after she tries her antihistamine, decongestant etc.     Review of Systems    See HPI    Objective      Vitals: /85   Pulse 88   Temp 97.8  F (36.6  C) (Tympanic)   Resp 16   SpO2 98%   Resp: 14    Vital signs reviewed by: Ori Min PA-C    Physical Exam   Constitutional: Patient is alert and cooperative. No acute distress.  HENT:   Right Ear: External ear normal. TM is pale/grey.  Left Ear: External ear normal. TM is pale/grey.  Nose: Nasal turbinates are boggy and erythematous.  Maxillary sinus tenderness to percussion bilaterally.  Yellow/green mucus noted in the nares bilaterally, no septal masses, purulent discharge or epistaxis.  Mouth: Normal tongue and tonsil. Posterior oropharynx is clear.  Eyes: Conjunctivae, EOMI and lids are normal. PERRL.  Cardiovascular: Regular rate and rhythm  Pulmonary/Chest: Lungs are clear to auscultation throughout. Effort normal. No respiratory distress. No wheezes, rales or rhonchi.  Neurological: Alert and oriented x3. CN 3-7 and 9-12 intact.   Musculoskeletal: Normal range of motion. Normal tone.  Skin: No rash noted on visualized skin.  Psychiatric:The patient has a normal mood and affect.       Ori Min PA-C, September 5, 2022

## 2022-09-14 ASSESSMENT — REFRACTION_WEARINGRX
OS_ADD: +1.50
OD_ADD: +1.50
OD_ADD: +1.50
OS_ADD: +1.50

## 2022-09-15 DIAGNOSIS — G47.61 PLMD (PERIODIC LIMB MOVEMENT DISORDER): ICD-10-CM

## 2022-09-15 NOTE — TELEPHONE ENCOUNTER
Pending Prescriptions:                       Disp   Refills    rOPINIRole (REQUIP) 0.5 MG tablet         60 tab*1            Sig: Take 1-2 tablets by mouth at bed time.    Last Written Prescription Date:  6/30/33  Last Fill Quantity: 60,   # refills: 1  Last Office Visit with FMLEXI, HILDA or [facility] prescribing provider: 5/5/22  Future Office visit:

## 2022-09-16 RX ORDER — ROPINIROLE 0.5 MG/1
TABLET, FILM COATED ORAL
Qty: 60 TABLET | Refills: 1 | Status: SHIPPED | OUTPATIENT
Start: 2022-09-16 | End: 2022-11-21

## 2022-11-14 ENCOUNTER — OFFICE VISIT (OUTPATIENT)
Dept: INTERNAL MEDICINE | Facility: CLINIC | Age: 47
End: 2022-11-14
Payer: COMMERCIAL

## 2022-11-14 VITALS
HEIGHT: 67 IN | DIASTOLIC BLOOD PRESSURE: 66 MMHG | OXYGEN SATURATION: 98 % | WEIGHT: 156.2 LBS | SYSTOLIC BLOOD PRESSURE: 112 MMHG | BODY MASS INDEX: 24.52 KG/M2 | TEMPERATURE: 97.9 F | HEART RATE: 76 BPM

## 2022-11-14 DIAGNOSIS — Z12.31 VISIT FOR SCREENING MAMMOGRAM: ICD-10-CM

## 2022-11-14 DIAGNOSIS — L71.0 PERIORAL DERMATITIS: Primary | ICD-10-CM

## 2022-11-14 PROCEDURE — 91312 COVID-19,PF,PFIZER BOOSTER BIVALENT: CPT | Performed by: INTERNAL MEDICINE

## 2022-11-14 PROCEDURE — 90686 IIV4 VACC NO PRSV 0.5 ML IM: CPT | Performed by: INTERNAL MEDICINE

## 2022-11-14 PROCEDURE — 99213 OFFICE O/P EST LOW 20 MIN: CPT | Mod: 25 | Performed by: INTERNAL MEDICINE

## 2022-11-14 PROCEDURE — 0124A COVID-19,PF,PFIZER BOOSTER BIVALENT: CPT | Performed by: INTERNAL MEDICINE

## 2022-11-14 PROCEDURE — 90471 IMMUNIZATION ADMIN: CPT | Performed by: INTERNAL MEDICINE

## 2022-11-14 RX ORDER — METRONIDAZOLE 7.5 MG/G
GEL TOPICAL 2 TIMES DAILY
Qty: 45 G | Refills: 1 | Status: SHIPPED | OUTPATIENT
Start: 2022-11-14 | End: 2023-12-20

## 2022-11-14 NOTE — PROGRESS NOTES
.  FOLLOW UP  Nov 16, 2022     Kaitlyn Garcia is a 46 year old woman who presents with history of atypical ductal hyperplasia.      HPI:     History of 2 right breast needle biopsies that were benign. In 2012 she had a left breast intraductal papilloma surgical excised. In 2016 she had 2 left needle biopsies, one of which demonstrated atypical ductal hyperplasia. The biopsied area of ADH was surgical excised. In 2017 she had an MRI guided needle biopsy that demonstrated an intraductal papilloma.      Family history of 2 paternal aunts who had breast cancer in their 60's and a paternal cousin with breast cancer in her 40's. Kaitlyn was seen by a genetic counselor in 2015, no genetic testing was done.      She is doing high risk screening with breast MRI and mammogram. She completed 5 years of Tamoxifen. She had a breast MRI 5/9/17 revealed an area of enhancement biopsied to be an intraductal papilloma without atypia. She was seen for surgical consultation by Dr. Montenegro and monitoring with imaging was recommended.     She had a pelvic ultrasound 6/4/18, 6/12/19, and 11/4/20 that were stable.      She denies any breast concerns including mass, skin change, nipple inversion or nipple discharge.      BREAST-SPECIFIC HISTORY:     Previous breast imaging: Yes   - 5/21/12 b/l Dmammo and right u/s for right breast lump: calcifications in the upper outer quadrant of left breast spanning 5 cm. Right u/s negative. BI-RADS 4 suspicious for malignancy   - 5/23/12 left stereotactic biopsy: intraductal papilloma   - 7/30/12 left wire-placement   - 2/19/13 MRI: large segmental enhancement of left lateral breast, BI-RADS 0 incomplete   Left u/s: BI-RADS 3 probably benign  - 3/29/13 MRI: BI-RADS 2 benign findings  - 8/5/13 b/l Dmammo for fullness: BI-RADS 2 benign   - 2/5/14 MRI: cysts in left breast, BI-RADS 2 benign   - 5/9/14 Dmammo b/l: negative. Left u/s: cystic appearing structures, BI-RADS 2 benign  - 5/20/14 MRI: study not  completed due to nausea, BI-RADS 0 incomplete  - 5/28/14 MRI: BI-RADS 2 benign  - 10/28/14 b/l Dmammo: BI-RADS 2 benign   - 5/7/15 MRI BI-RADS 2 benign   - 11/10/15 Smammo: bilateral calcs: BI-RADS 2 benign  - 1/25/16 left u/s for pain: multiple tiny cyst  - 5/17/16 MRI: BI-RADS 2 benign   - 12/6/16  Smammo: lateral left breast developing macrocalcifications BI-RADS 0 incomplete   - 12/7/16 Dmammo left: left 2:00 calcifications BI-RADS 4 suspicious  - 12/12/16: stereotactic biopsy: focus of ADH    - 2/1/17 wire placement   - 5/9/17 MRI: 2 adjacent mass like areas of enhancement mid central left breast BI-RADS 0 incomplete   - 5/12/17 left u/s: scattered small cysts BI-RADS 4 suspicious   - 5/18/17 MR biopsy: intraductal papilloma   - 4/30/18 MRI BI-RADS 2  - 6/6/18 left Dmammo and ultrasound for pain BI-RADS 2  - 11/28/18 Smammo BI-RADS 2  - 5/7/19 breast MRI BI-RADS 2  - 8/21/19 Dmammo and right breast ultrasound: BI-RADS 2  - 11/12/19 Smammo BI-RADS 1  - 5/20/20 breast MRI BI-ARDS 2  - 11/4/20 b/l Dmammo and left breast ultrasound for lump: BI-RADS 1  - 5/5/21 Breast MRI BI-RADS 1  - 11/10/21 Smammo BI-RADS 2  - 5/11/22 breast MRI BI-RADS 2     Prior breast biopsies:Yes   - 2 right breast biopsies   - 5/23/12 left needle biopsy: intraductal papilloma with sclerosing features   - 7/30/12 left excisional biopsy with Dr. Lynch: focal columnar cell change with atypia, intraductal papilloma and surrounding intraductal papillomatosis, sclerosing adenosis, fibrocystic changes  - 12/12/16 left needle biopsy x2: flat epithelial atypia with focus of ADH, duct ectasia and focal sclerosing adenosis, microcalcifications.   FEA and columnar cell change with atypia  - 2/1/17 left scar excision and excisional biopsy with Dr. Montenegro: FEA, columnar cell change/hyperplasia, intraductal papillomas, usual ductal hyperplasia, calcifications  - 5/18/17 left MRI needle biopsy: intraductal papilloma, mild columnar cell hyperplasia, mild duct  ectasia, focal sclerosing adenosis      Prior history of breast cancer: No  Prior radiation history: No   Self breast exams: No  Breast density: heterogeneously dense     GYN HISTORY:  . Age at 1st pregnancy: 17. Breastfeeding history: Yes.   Age at menarche: [age]  Menopausal: premenopausal  Menopausal hormone replacement therapy: No     RISK ASSESSMENT: < 3% 5 year risk and > 20% lifetime risk   Heather: 2.2% 5 year risk   SEBASTIAN: 39.6% lifetime risk   Abiodun: 11.6% lifetime risk      FAMILY HISTORY:  Breast ca: Yes    - paternal aunt 60 's   - paternal aunt 60's  - paternal cousin 40 (aunt)  Ovarian ca: No   - ? Paternal cousin with ovarian cancer   Pancreatic ca: No   Prostate: yes  - maternal uncle 69   Gastric ca: Yes   - paternal grandfather, passed   Melanoma: No  Colon ca: No  Other cancer: Yes   - maternal grandmother skin cancer 90's  - paternal grandfather brain 60's   Latter day ethnicity: No  Other genetic, testing, syndromes, or clotting disorders: No   - She saw a genetic counselor 2015 and no testing was recommended     PAST MEDICAL HISTORY  Past Medical History:   Diagnosis Date     Anemia      Atypical ductal hyperplasia of breast      Choroidal nevus of right eye      Concussion 10/2015     Depressive disorder 2006    Treated with celexa for two years     Family history of breast cancer 2/3/2015     Family history of breast cancer 2/3/2015     Family history of breast cancer 2/3/2015     Family history of breast cancer 2/3/2015     Gastroesophageal reflux disease      Glaucoma suspect of both eyes      Papilloma of breast 11    Left, See Dr. Lynch at      PONV (postoperative nausea and vomiting)      S/P endometrial ablation 10/10/13    Menorrhagia     Shingles     x2 in the past     WBC decreased 2/3/2015     WBC decreased 2/3/2015     WBC decreased 2/3/2015     PAST SURGICAL HISTORY   Past Surgical History:   Procedure Laterality Date     ABDOMEN SURGERY      rectoplasty     BIOPSY BREAST  NEEDLE LOCALIZATION  7/30/2012    Procedure: BIOPSY BREAST NEEDLE LOCALIZATION;  left breast excisional Biopsy with wire localization @0830;  Surgeon: Robert Lynch MD;  Location: [facility]     BREAST SURGERY       COSMETIC SURGERY      Rectalplasty for episiotomy repair     COSMETIC SURGERY      Rectalplasty for episiotomy repair     DILATION AND CURETTAGE, HYSTEROSCOPY, ABLATE ENDOMETRIUM NOVASURE, COMBINED  10/10/2013    Procedure: COMBINED DILATION AND CURETTAGE, HYSTEROSCOPY, ABLATE ENDOMETRIUM NOVASURE;  Endometrial ablation with Novasure;  Surgeon: Indu Birmingham DO;  Location: [facility] OR     HC TOOTH EXTRACTION W/FORCEP       LUMPECTOMY BREAST Left 2/1/2017    Procedure: LUMPECTOMY BREAST;  Surgeon: Lori Montenegro MD;  [facility]     SOFT TISSUE SURGERY      lumpectomy x 2 right breast     SOFT TISSUE SURGERY      episiotomy fixed     MEDICATIONS  Current Outpatient Medications   Medication Sig Dispense Refill     albuterol (PROAIR HFA/PROVENTIL HFA/VENTOLIN HFA) 108 (90 Base) MCG/ACT inhaler INHALE 2 PUFFS INTO THE LUNGS EVERY 6 HOURS 8.5 g 0     LORazepam (ATIVAN) 1 MG tablet Take 1 tablet (1 mg) by mouth twice a day as needed.       MELATONIN PO Take by mouth nightly as needed        metroNIDAZOLE (METROGEL) 0.75 % external gel Apply topically 2 times daily 45 g 1     Naproxen Sodium (ALEVE PO) Take 440 mg by mouth as needed        rOPINIRole (REQUIP) 0.5 MG tablet Take 1-2 tablets by mouth at bed time. 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 Her  has lymphoma.     ROS:  Change in vision  No  Headaches: no  Respiratory: No shortness of breath, dyspnea on exertion, cough, or hemoptysis   Cardiovascular: negative   Gastrointestinal: negative Abdominal pain: no  Breast: negative  Musculoskeletal: negative Joint pain No Back pain: no  Psychiatric: negative  Hematologic/Lymphatic/Immunologic: negative  Endocrine: negative    EXAM  There were no vitals taken for this visit.   PHYSICAL EXAM  Respiratory: breathing non labored.   Breasts: Examination was done in both the upright and supine positions.  Breasts are symmetrical . No dominant fixed or suspicious masses noted. No skin or nipple changes. No nipple discharge. Left breast scar well healed.   No clavicular, cervical, or axillary lymphadenopathy.     INVESTIGATIONS:    11/16/22 screening mammogram: per Radiology no concerning findings, final report pending.     ASSESSMENT/PLAN:    Kaitlyn Garcia is a 46 year old woman with history of left breast atypical ductal hyperplasia. She meets NCCN guidelines for high risk screening and risk reduction. She completed 5 years of low dose Tamoxifen.       1) Atypical ductal hyperplasia and family history of breast cancer.   We also reviewed that atypical ductal hyperplasia increases her baseline risk for breast cancer. She meets guidelines for high risk screening with a lifetime risk of breast cancer >20%. Discussed she meets guidelines for high risk screening with yearly mammogram alternating with Breast MRI every six months. Clinical encounter every 6-12 months including family history, risk assessment, and clinical breast exam.   - Breast MRI with clinic visit due 5/2023  - Screening mammogram with clinic visit due 11/17/23     2) Lifestyle Modifications were provided. - Maintain your best healthy weight. Higher body fat and adult weight gain is associated with increased risk for breast cancer. This increase in risk has been attributed to increase in circulating endogenous estrogen levels from fat tissue.   -  Alcohol can raise estrogen. Alcohol consumption, even at moderate levels (1-2 drinks per day), increases breast cancer risk and are best avoided. If you choose to drink alcohol limit alcohol consumption to less than 1 drink per day. (1 ounce of liquor, 6 ounces of wine, or 8 ounces of beer).  - Be active daily and void being sedentary.     Merced Wiggins PA-C    20 minutes spent on the date of the encounter doing chart review, review of test results, interpretation of tests, patient visit and documentation.

## 2022-11-14 NOTE — PROGRESS NOTES
"  Assessment & Plan     Perioral dermatitis  Mild- try topicals first. Did notice oral doxy was quite effective but has mild amt of disease at present and would prefer topical   - metroNIDAZOLE (METROGEL) 0.75 % external gel; Apply topically 2 times daily    Visit for screening mammogram  - MA SCREENING DIGITAL BILAT - Future  (s+30); Future             See Patient Instructions    No follow-ups on file.    Marcelo Shah MD  [facility] Sahni is a 47 year old, presenting for the following health issues:  Derm Problem      History of Present Illness       Reason for visit:  Rash on face- off & on since March, wear masks regularly  Symptom onset:  More than a month  Symptoms include:  Near mouth ams xhin  Symptom intensity:  Mild  Symptom progression:  Staying the same  Had these symptoms before:  No    She eats 2-3 servings of fruits and vegetables daily.She consumes 0 sweetened beverage(s) daily.She exercises with enough effort to increase her heart rate 9 or less minutes per day.  She exercises with enough effort to increase her heart rate 3 or less days per week.   She is taking medications regularly.           Review of Systems         Objective    /66   Pulse 76   Temp 97.9  F (36.6  C) (Temporal)   Ht 1.689 m (5' 6.5\")   Wt 70.9 kg (156 lb 3.2 oz)   SpO2 98%   BMI 24.83 kg/m    Body mass index is 24.83 kg/m .  Physical Exam   GENERAL: healthy, alert and no distress  SKIN: scattered perioral papules                     "

## 2022-11-16 ENCOUNTER — ANCILLARY PROCEDURE (OUTPATIENT)
Dept: MAMMOGRAPHY | Facility: CLINIC | Age: 47
End: 2022-11-16
Payer: COMMERCIAL

## 2022-11-16 ENCOUNTER — OFFICE VISIT (OUTPATIENT)
Dept: SURGERY | Facility: CLINIC | Age: 47
End: 2022-11-16
Attending: PHYSICIAN ASSISTANT
Payer: COMMERCIAL

## 2022-11-16 VITALS
DIASTOLIC BLOOD PRESSURE: 82 MMHG | TEMPERATURE: 98.3 F | RESPIRATION RATE: 16 BRPM | HEART RATE: 74 BPM | OXYGEN SATURATION: 97 % | SYSTOLIC BLOOD PRESSURE: 113 MMHG | WEIGHT: 153.9 LBS | BODY MASS INDEX: 24.47 KG/M2

## 2022-11-16 DIAGNOSIS — Z80.3 FAMILY HISTORY OF BREAST CANCER: ICD-10-CM

## 2022-11-16 DIAGNOSIS — Z91.89 AT HIGH RISK FOR BREAST CANCER: Primary | ICD-10-CM

## 2022-11-16 DIAGNOSIS — N60.99 ATYPICAL HYPERPLASIA OF BREAST: ICD-10-CM

## 2022-11-16 DIAGNOSIS — Z12.31 VISIT FOR SCREENING MAMMOGRAM: ICD-10-CM

## 2022-11-16 PROCEDURE — 77063 BREAST TOMOSYNTHESIS BI: CPT | Performed by: STUDENT IN AN ORGANIZED HEALTH CARE EDUCATION/TRAINING PROGRAM

## 2022-11-16 PROCEDURE — 99213 OFFICE O/P EST LOW 20 MIN: CPT | Performed by: PHYSICIAN ASSISTANT

## 2022-11-16 PROCEDURE — G0463 HOSPITAL OUTPT CLINIC VISIT: HCPCS

## 2022-11-16 PROCEDURE — 77067 SCR MAMMO BI INCL CAD: CPT | Performed by: STUDENT IN AN ORGANIZED HEALTH CARE EDUCATION/TRAINING PROGRAM

## 2022-11-16 ASSESSMENT — PAIN SCALES - GENERAL: PAINLEVEL: NO PAIN (0)

## 2022-11-16 NOTE — LETTER
11/16/2022         RE: Kaitlyn Garcia  [address]  [facility] [facility] [address]        Dear Colleague,    Thank you for referring your patient, Kaitlyn Garcia, to the [facility]. Please see a copy of my visit note below.    .  FOLLOW UP  Nov 16, 2022     Kaitlyn Garcia is a 46 year old woman who presents with history of atypical ductal hyperplasia.      HPI:     History of 2 right breast needle biopsies that were benign. In 2012 she had a left breast intraductal papilloma surgical excised. In 2016 she had 2 left needle biopsies, one of which demonstrated atypical ductal hyperplasia. The biopsied area of ADH was surgical excised. In 2017 she had an MRI guided needle biopsy that demonstrated an intraductal papilloma.      Family history of 2 paternal aunts who had breast cancer in their 60's and a paternal cousin with breast cancer in her 40's. Kaitlyn was seen by a genetic counselor in 2015, no genetic testing was done.      She is doing high risk screening with breast MRI and mammogram. She completed 5 years of Tamoxifen. She had a breast MRI 5/9/17 revealed an area of enhancement biopsied to be an intraductal papilloma without atypia. She was seen for surgical consultation by Dr. Montenegro and monitoring with imaging was recommended.     She had a pelvic ultrasound 6/4/18, 6/12/19, and 11/4/20 that were stable.      She denies any breast concerns including mass, skin change, nipple inversion or nipple discharge.      BREAST-SPECIFIC HISTORY:     Previous breast imaging: Yes   - 5/21/12 b/l Dmammo and right u/s for right breast lump: calcifications in the upper outer quadrant of left breast spanning 5 cm. Right u/s negative. BI-RADS 4 suspicious for malignancy   - 5/23/12 left stereotactic biopsy: intraductal papilloma   - 7/30/12 left wire-placement   - 2/19/13 MRI: large segmental enhancement of left lateral breast, BI-RADS 0 incomplete   Left u/s: BI-RADS 3 probably benign  - 3/29/13 MRI:  BI-RADS 2 benign findings  - 8/5/13 b/l Dmammo for fullness: BI-RADS 2 benign   - 2/5/14 MRI: cysts in left breast, BI-RADS 2 benign   - 5/9/14 Dmammo b/l: negative. Left u/s: cystic appearing structures, BI-RADS 2 benign  - 5/20/14 MRI: study not completed due to nausea, BI-RADS 0 incomplete  - 5/28/14 MRI: BI-RADS 2 benign  - 10/28/14 b/l Dmammo: BI-RADS 2 benign   - 5/7/15 MRI BI-RADS 2 benign   - 11/10/15 Smammo: bilateral calcs: BI-RADS 2 benign  - 1/25/16 left u/s for pain: multiple tiny cyst  - 5/17/16 MRI: BI-RADS 2 benign   - 12/6/16  Smammo: lateral left breast developing macrocalcifications BI-RADS 0 incomplete   - 12/7/16 Dmammo left: left 2:00 calcifications BI-RADS 4 suspicious  - 12/12/16: stereotactic biopsy: focus of ADH    - 2/1/17 wire placement   - 5/9/17 MRI: 2 adjacent mass like areas of enhancement mid central left breast BI-RADS 0 incomplete   - 5/12/17 left u/s: scattered small cysts BI-RADS 4 suspicious   - 5/18/17 MR biopsy: intraductal papilloma   - 4/30/18 MRI BI-RADS 2  - 6/6/18 left Dmammo and ultrasound for pain BI-RADS 2  - 11/28/18 Smammo BI-RADS 2  - 5/7/19 breast MRI BI-RADS 2  - 8/21/19 Dmammo and right breast ultrasound: BI-RADS 2  - 11/12/19 Smammo BI-RADS 1  - 5/20/20 breast MRI BI-ARDS 2  - 11/4/20 b/l Dmammo and left breast ultrasound for lump: BI-RADS 1  - 5/5/21 Breast MRI BI-RADS 1  - 11/10/21 Smammo BI-RADS 2  - 5/11/22 breast MRI BI-RADS 2     Prior breast biopsies:Yes   - 2 right breast biopsies   - 5/23/12 left needle biopsy: intraductal papilloma with sclerosing features   - 7/30/12 left excisional biopsy with Dr. Lynch: focal columnar cell change with atypia, intraductal papilloma and surrounding intraductal papillomatosis, sclerosing adenosis, fibrocystic changes  - 12/12/16 left needle biopsy x2: flat epithelial atypia with focus of ADH, duct ectasia and focal sclerosing adenosis, microcalcifications.   FEA and columnar cell change with atypia  - 2/1/17 left scar  excision and excisional biopsy with Dr. Montenegro: FEA, columnar cell change/hyperplasia, intraductal papillomas, usual ductal hyperplasia, calcifications  - 17 left MRI needle biopsy: intraductal papilloma, mild columnar cell hyperplasia, mild duct ectasia, focal sclerosing adenosis      Prior history of breast cancer: No  Prior radiation history: No   Self breast exams: No  Breast density: heterogeneously dense     GYN HISTORY:  . Age at 1st pregnancy: 17. Breastfeeding history: Yes.   Age at menarche: 13-15  Menopausal: premenopausal  Menopausal hormone replacement therapy: No     RISK ASSESSMENT: < 3% 5 year risk and > 20% lifetime risk   Heather: 2.2% 5 year risk   SEBASTIAN: 39.6% lifetime risk   Abiodun: 11.6% lifetime risk      FAMILY HISTORY:  Breast ca: Yes    - paternal aunt 60 's   - paternal aunt 60's  - paternal cousin 40 (aunt)  Ovarian ca: No   - ? Paternal cousin with ovarian cancer   Pancreatic ca: No   Prostate: yes  - maternal uncle 69   Gastric ca: Yes   - paternal grandfather, passed   Melanoma: No  Colon ca: No  Other cancer: Yes   - maternal grandmother skin cancer 90's  - paternal grandfather brain 60's   [place] ethnicity: No  Other genetic, testing, syndromes, or clotting disorders: No   - She saw a genetic counselor 2015 and no testing was recommended     PAST MEDICAL HISTORY  Past Medical History:   Diagnosis Date     Anemia      Atypical ductal hyperplasia of breast      Choroidal nevus of right eye      Concussion 10/2015     Depressive disorder 2006    Treated with celexa for two years     Family history of breast cancer 2/3/2015     Family history of breast cancer 2/3/2015     Family history of breast cancer 2/3/2015     Family history of breast cancer 2/3/2015     Gastroesophageal reflux disease      Glaucoma suspect of both eyes      Papilloma of breast 11    Left, See Dr. Lynch at      PONV (postoperative nausea and vomiting)      S/P endometrial ablation 10/10/13    Menorrhagia      Shingles     x2 in the past     WBC decreased 2/3/2015     WBC decreased 2/3/2015     WBC decreased 2/3/2015     PAST SURGICAL HISTORY   Past Surgical History:   Procedure Laterality Date     ABDOMEN SURGERY      rectoplasty     BIOPSY BREAST NEEDLE LOCALIZATION  7/30/2012    Procedure: BIOPSY BREAST NEEDLE LOCALIZATION;  left breast excisional Biopsy with wire localization @0830;  Surgeon: Robert Lynch MD;  Location: [facility] OR     BREAST SURGERY       COSMETIC SURGERY      Rectalplasty for episiotomy repair     COSMETIC SURGERY      Rectalplasty for episiotomy repair     DILATION AND CURETTAGE, HYSTEROSCOPY, ABLATE ENDOMETRIUM NOVASURE, COMBINED  10/10/2013    Procedure: COMBINED DILATION AND CURETTAGE, HYSTEROSCOPY, ABLATE ENDOMETRIUM NOVASURE;  Endometrial ablation with Novasure;  Surgeon: Indu Birmingham DO;  Location: [facility] OR     HC TOOTH EXTRACTION W/FORCEP       LUMPECTOMY BREAST Left 2/1/2017    Procedure: LUMPECTOMY BREAST;  Surgeon: Lori Montenegro MD;  [facility]     SOFT TISSUE SURGERY      lumpectomy x 2 right breast     SOFT TISSUE SURGERY      episiotomy fixed     MEDICATIONS  Current Outpatient Medications   Medication Sig Dispense Refill     albuterol (PROAIR HFA/PROVENTIL HFA/VENTOLIN HFA) 108 (90 Base) MCG/ACT inhaler INHALE 2 PUFFS INTO THE LUNGS EVERY 6 HOURS 8.5 g 0     LORazepam (ATIVAN) 1 MG tablet Take 1 tablet (1 mg) by mouth twice a day as needed.       MELATONIN PO Take by mouth nightly as needed        metroNIDAZOLE (METROGEL) 0.75 % external gel Apply topically 2 times daily 45 g 1     Naproxen Sodium (ALEVE PO) Take 440 mg by mouth as needed        rOPINIRole (REQUIP) 0.5 MG tablet Take 1-2 tablets by mouth at bed time. 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 Her  has lymphoma.     ROS:  Change in vision No  Headaches: no  Respiratory: No shortness of breath, dyspnea on exertion, cough, or hemoptysis   Cardiovascular: negative   Gastrointestinal: negative Abdominal pain: no  Breast: negative  Musculoskeletal: negative Joint pain No Back pain: no  Psychiatric: negative  Hematologic/Lymphatic/Immunologic: negative  Endocrine: negative    EXAM  There were no vitals taken for this visit.   PHYSICAL EXAM  Respiratory: breathing non labored.   Breasts: Examination was done in both the upright and supine positions.  Breasts are symmetrical . No dominant fixed or suspicious masses noted. No skin or nipple changes. No nipple discharge. Left breast scar well healed.   No clavicular, cervical, or axillary lymphadenopathy.     INVESTIGATIONS:    11/16/22 screening mammogram: per Radiology no concerning findings, final report pending.     ASSESSMENT/PLAN:    Kaitlyn Garcia is a 46 year old woman with history of left breast atypical ductal hyperplasia. She meets NCCN guidelines for high risk screening and risk reduction. She completed 5 years of low dose Tamoxifen.       1) Atypical ductal hyperplasia and family history of breast cancer.   We also reviewed that atypical ductal hyperplasia increases her baseline risk for breast cancer. She meets guidelines for high risk screening with a lifetime risk of breast cancer >20%. Discussed she meets guidelines for high risk screening with yearly mammogram alternating with Breast MRI every six months. Clinical encounter every 6-12 months including family history, risk assessment, and clinical breast exam.   - Breast MRI with clinic visit due 5/2023  - Screening mammogram with clinic visit due 11/17/23     2) Lifestyle Modifications were provided.  - Maintain your best healthy weight. Higher body fat and adult weight gain is associated with increased risk for breast cancer. This increase in risk has been attributed to increase in circulating endogenous estrogen levels from fat tissue.   - Alcohol can raise estrogen. Alcohol consumption, even at moderate levels (1-2 drinks per day), increases breast cancer risk and are best avoided. If you choose to drink alcohol limit alcohol consumption to less than 1 drink per day. (1 ounce of liquor, 6 ounces of wine, or 8 ounces of beer).  - Be active daily and void being sedentary.     Merced Wiggins PA-C    20 minutes spent on the date of the encounter doing chart review, review of test results, interpretation of tests, patient visit and documentation.

## 2022-11-16 NOTE — NURSING NOTE
"Oncology Rooming Note    November 16, 2022 9:53 AM   Kaitlyn Garcia is a 47 year old female who presents for:    Chief Complaint   Patient presents with     Oncology Clinic Visit     Breast cancer      Initial Vitals: /82 (BP Location: Left arm, Patient Position: Sitting, Cuff Size: Adult Regular)   Pulse 74   Temp 98.3  F (36.8  C) (Oral)   Resp 16   Wt 69.8 kg (153 lb 14.4 oz)   SpO2 97%   BMI 24.47 kg/m   Estimated body mass index is 24.47 kg/m  as calculated from the following:    Height as of 11/14/22: 1.689 m (5' 6.5\").    Weight as of this encounter: 69.8 kg (153 lb 14.4 oz). Body surface area is 1.81 meters squared.  No Pain (0) Comment: Data Unavailable   No LMP recorded. Patient has had an ablation.  Allergies reviewed: Yes  Medications reviewed: Yes    Medications: Medication refills not needed today.  Pharmacy name entered into [facility]:    [facility] [place], [place] - [address], [id number]  [facility] [place], [place] - [address]  [id number] - [place], [place] - [address], [place] AT [facility]    Clinical concerns: None stated.        Catalina Dudley LPN November 16, 2022 9:53 AM              "

## 2022-11-16 NOTE — PATIENT INSTRUCTIONS
Kaitlyn Garcia is a 46 year old woman with history of left breast atypical ductal hyperplasia. She meets NCCN guidelines for high risk screening and risk reduction. She completed 5 years of low dose Tamoxifen.       1) Atypical ductal hyperplasia and family history of breast cancer.   We also reviewed that atypical ductal hyperplasia increases her baseline risk for breast cancer. She meets guidelines for high risk screening with a lifetime risk of breast cancer >20%. Discussed she meets guidelines for high risk screening with yearly mammogram alternating with Breast MRI every six months. Clinical encounter every 6-12 months including family history, risk assessment, and clinical breast exam.   - Breast MRI with clinic visit due 5/2023  - Screening mammogram with clinic visit due 11/17/23     2) Lifestyle Modifications were provided. - Maintain your best healthy weight. Higher body fat and adult weight gain is associated with increased risk for breast cancer. This increase in risk has been attributed to increase in circulating endogenous estrogen levels from fat tissue.   - Alcohol can raise estrogen. Alcohol consumption, even at moderate levels (1-2 drinks per day), increases breast cancer risk and are best avoided. If you choose to drink alcohol limit alcohol consumption to less than 1 drink per day. (1 ounce of liquor, 6 ounces of wine, or 8 ounces of beer).  - Be active daily and void being sedentary.

## 2022-11-21 DIAGNOSIS — G47.61 PLMD (PERIODIC LIMB MOVEMENT DISORDER): ICD-10-CM

## 2022-11-21 RX ORDER — ROPINIROLE 0.5 MG/1
TABLET, FILM COATED ORAL
Qty: 60 TABLET | Refills: 1 | Status: SHIPPED | OUTPATIENT
Start: 2022-11-21 | End: 2023-01-13

## 2023-01-03 DIAGNOSIS — D31.31 CHOROIDAL NEVUS OF RIGHT EYE: Primary | ICD-10-CM

## 2023-01-10 ENCOUNTER — OFFICE VISIT (OUTPATIENT)
Dept: OPHTHALMOLOGY | Facility: CLINIC | Age: 48
End: 2023-01-10
Attending: OPHTHALMOLOGY
Payer: COMMERCIAL

## 2023-01-10 DIAGNOSIS — D31.31 CHOROIDAL NEVUS OF RIGHT EYE: ICD-10-CM

## 2023-01-10 PROCEDURE — 99213 OFFICE O/P EST LOW 20 MIN: CPT | Performed by: OPHTHALMOLOGY

## 2023-01-10 PROCEDURE — 92134 CPTRZ OPH DX IMG PST SGM RTA: CPT | Performed by: OPHTHALMOLOGY

## 2023-01-10 PROCEDURE — G0463 HOSPITAL OUTPT CLINIC VISIT: HCPCS | Mod: 25

## 2023-01-10 PROCEDURE — 92250 FUNDUS PHOTOGRAPHY W/I&R: CPT | Performed by: OPHTHALMOLOGY

## 2023-01-10 PROCEDURE — 99207 FUNDUS PHOTOS OD (RIGHT EYE): CPT | Mod: 26 | Performed by: OPHTHALMOLOGY

## 2023-01-10 ASSESSMENT — CONF VISUAL FIELD
OD_SUPERIOR_NASAL_RESTRICTION: 0
OS_INFERIOR_TEMPORAL_RESTRICTION: 0
OD_SUPERIOR_TEMPORAL_RESTRICTION: 0
OS_NORMAL: 1
OS_SUPERIOR_TEMPORAL_RESTRICTION: 0
OD_INFERIOR_NASAL_RESTRICTION: 0
OD_INFERIOR_TEMPORAL_RESTRICTION: 0
OS_INFERIOR_NASAL_RESTRICTION: 0
OS_SUPERIOR_NASAL_RESTRICTION: 0
METHOD: COUNTING FINGERS
OD_NORMAL: 1

## 2023-01-10 ASSESSMENT — VISUAL ACUITY
OD_CC: 20/20
OS_CC: 20/20
CORRECTION_TYPE: GLASSES
METHOD: SNELLEN - LINEAR

## 2023-01-10 ASSESSMENT — CUP TO DISC RATIO
OD_RATIO: 0.6
OS_RATIO: 0.6

## 2023-01-10 ASSESSMENT — SLIT LAMP EXAM - LIDS
COMMENTS: NORMAL
COMMENTS: NORMAL

## 2023-01-10 ASSESSMENT — REFRACTION_WEARINGRX
OD_CYLINDER: SPHERE
OS_ADD: +2.00
OS_SPHERE: -0.50
OS_AXIS: 080
OD_ADD: +2.00
OD_SPHERE: -0.25
OS_CYLINDER: +0.50

## 2023-01-10 ASSESSMENT — TONOMETRY
IOP_METHOD: ICARE
OS_IOP_MMHG: 18
OD_IOP_MMHG: 17

## 2023-01-10 ASSESSMENT — EXTERNAL EXAM - LEFT EYE: OS_EXAM: NORMAL

## 2023-01-10 ASSESSMENT — EXTERNAL EXAM - RIGHT EYE: OD_EXAM: NORMAL
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
## 2023-01-10 NOTE — NURSING NOTE
Chief Complaints and History of Present Illnesses   Patient presents with     Follow Up     Chief Complaint(s) and History of Present Illness(es)     Follow Up            Associated symptoms: Negative for dryness, flashes and floaters    Treatments tried: artificial tears    Pain scale: 0/10          Comments    Annual follow up for Choroidal nevus of right eye.    The patient states she has no eye changes  She reports good vision.  January Armijo, COA, COA 7:10 AM 01/10/2023

## 2023-01-12 DIAGNOSIS — G47.61 PLMD (PERIODIC LIMB MOVEMENT DISORDER): ICD-10-CM

## 2023-01-13 RX ORDER — ROPINIROLE 0.5 MG/1
TABLET, FILM COATED ORAL
Qty: 60 TABLET | Refills: 1 | Status: SHIPPED | OUTPATIENT
Start: 2023-01-13 | End: 2023-02-17

## 2023-01-14 ENCOUNTER — HEALTH MAINTENANCE LETTER (OUTPATIENT)
Age: 48
End: 2023-01-14

## 2023-02-17 ENCOUNTER — TELEPHONE (OUTPATIENT)
Dept: FAMILY MEDICINE | Facility: CLINIC | Age: 48
End: 2023-02-17

## 2023-02-17 ENCOUNTER — OFFICE VISIT (OUTPATIENT)
Dept: FAMILY MEDICINE | Facility: CLINIC | Age: 48
End: 2023-02-17
Payer: COMMERCIAL

## 2023-02-17 VITALS
OXYGEN SATURATION: 97 % | WEIGHT: 151 LBS | DIASTOLIC BLOOD PRESSURE: 82 MMHG | RESPIRATION RATE: 20 BRPM | BODY MASS INDEX: 23.7 KG/M2 | HEART RATE: 86 BPM | SYSTOLIC BLOOD PRESSURE: 115 MMHG | TEMPERATURE: 98 F | HEIGHT: 67 IN

## 2023-02-17 DIAGNOSIS — R51.9 CHRONIC NONINTRACTABLE HEADACHE, UNSPECIFIED HEADACHE TYPE: ICD-10-CM

## 2023-02-17 DIAGNOSIS — Z00.00 ROUTINE GENERAL MEDICAL EXAMINATION AT A HEALTH CARE FACILITY: Primary | ICD-10-CM

## 2023-02-17 DIAGNOSIS — E55.9 VITAMIN D INSUFFICIENCY: ICD-10-CM

## 2023-02-17 DIAGNOSIS — Z13.6 CARDIOVASCULAR SCREENING; LDL GOAL LESS THAN 160: ICD-10-CM

## 2023-02-17 DIAGNOSIS — Z12.4 CERVICAL CANCER SCREENING: ICD-10-CM

## 2023-02-17 DIAGNOSIS — J02.9 PHARYNGITIS, UNSPECIFIED ETIOLOGY: ICD-10-CM

## 2023-02-17 DIAGNOSIS — G47.61 PLMD (PERIODIC LIMB MOVEMENT DISORDER): ICD-10-CM

## 2023-02-17 DIAGNOSIS — Z13.0 SCREENING FOR DISORDER OF BLOOD AND BLOOD-FORMING ORGANS: ICD-10-CM

## 2023-02-17 DIAGNOSIS — Z13.29 SCREENING FOR THYROID DISORDER: ICD-10-CM

## 2023-02-17 DIAGNOSIS — G89.29 CHRONIC NONINTRACTABLE HEADACHE, UNSPECIFIED HEADACHE TYPE: ICD-10-CM

## 2023-02-17 DIAGNOSIS — Z13.1 SCREENING FOR DIABETES MELLITUS (DM): ICD-10-CM

## 2023-02-17 DIAGNOSIS — Z12.11 SCREEN FOR COLON CANCER: ICD-10-CM

## 2023-02-17 LAB
ALBUMIN SERPL-MCNC: 3.7 G/DL (ref 3.4–5)
ALP SERPL-CCNC: 58 U/L (ref 40–150)
ALT SERPL W P-5'-P-CCNC: 46 U/L (ref 0–50)
ANION GAP SERPL CALCULATED.3IONS-SCNC: 7 MMOL/L (ref 3–14)
AST SERPL W P-5'-P-CCNC: 21 U/L (ref 0–45)
BILIRUB SERPL-MCNC: 0.4 MG/DL (ref 0.2–1.3)
BUN SERPL-MCNC: 14 MG/DL (ref 7–30)
CALCIUM SERPL-MCNC: 8.6 MG/DL (ref 8.5–10.1)
CHLORIDE BLD-SCNC: 104 MMOL/L (ref 94–109)
CHOLEST SERPL-MCNC: 204 MG/DL
CO2 SERPL-SCNC: 26 MMOL/L (ref 20–32)
CREAT SERPL-MCNC: 0.88 MG/DL (ref 0.52–1.04)
ERYTHROCYTE [DISTWIDTH] IN BLOOD BY AUTOMATED COUNT: 11.4 % (ref 10–15)
FASTING STATUS PATIENT QL REPORTED: NO
FERRITIN SERPL-MCNC: 69 NG/ML (ref 8–252)
GFR SERPL CREATININE-BSD FRML MDRD: 81 ML/MIN/1.73M2
GLUCOSE BLD-MCNC: 86 MG/DL (ref 70–99)
HCT VFR BLD AUTO: 37.9 % (ref 35–47)
HDLC SERPL-MCNC: 81 MG/DL
HGB BLD-MCNC: 12.9 G/DL (ref 11.7–15.7)
LDLC SERPL CALC-MCNC: 109 MG/DL
MCH RBC QN AUTO: 31.6 PG (ref 26.5–33)
MCHC RBC AUTO-ENTMCNC: 34 G/DL (ref 31.5–36.5)
MCV RBC AUTO: 93 FL (ref 78–100)
NONHDLC SERPL-MCNC: 123 MG/DL
PLATELET # BLD AUTO: 223 10E3/UL (ref 150–450)
POTASSIUM BLD-SCNC: 3.8 MMOL/L (ref 3.4–5.3)
PROT SERPL-MCNC: 6.9 G/DL (ref 6.8–8.8)
RBC # BLD AUTO: 4.08 10E6/UL (ref 3.8–5.2)
SODIUM SERPL-SCNC: 137 MMOL/L (ref 133–144)
TRIGL SERPL-MCNC: 71 MG/DL
TSH SERPL DL<=0.005 MIU/L-ACNC: 0.85 MU/L (ref 0.4–4)
WBC # BLD AUTO: 5 10E3/UL (ref 4–11)

## 2023-02-17 PROCEDURE — 87624 HPV HI-RISK TYP POOLED RSLT: CPT | Performed by: NURSE PRACTITIONER

## 2023-02-17 PROCEDURE — 80061 LIPID PANEL: CPT | Performed by: NURSE PRACTITIONER

## 2023-02-17 PROCEDURE — G0145 SCR C/V CYTO,THINLAYER,RESCR: HCPCS | Performed by: NURSE PRACTITIONER

## 2023-02-17 PROCEDURE — U0005 INFEC AGEN DETEC AMPLI PROBE: HCPCS | Performed by: NURSE PRACTITIONER

## 2023-02-17 PROCEDURE — 82728 ASSAY OF FERRITIN: CPT | Performed by: NURSE PRACTITIONER

## 2023-02-17 PROCEDURE — 84443 ASSAY THYROID STIM HORMONE: CPT | Performed by: NURSE PRACTITIONER

## 2023-02-17 PROCEDURE — 36415 COLL VENOUS BLD VENIPUNCTURE: CPT | Performed by: NURSE PRACTITIONER

## 2023-02-17 PROCEDURE — 85027 COMPLETE CBC AUTOMATED: CPT | Performed by: NURSE PRACTITIONER

## 2023-02-17 PROCEDURE — 99396 PREV VISIT EST AGE 40-64: CPT | Performed by: NURSE PRACTITIONER

## 2023-02-17 PROCEDURE — 99213 OFFICE O/P EST LOW 20 MIN: CPT | Mod: 25 | Performed by: NURSE PRACTITIONER

## 2023-02-17 PROCEDURE — U0003 INFECTIOUS AGENT DETECTION BY NUCLEIC ACID (DNA OR RNA); SEVERE ACUTE RESPIRATORY SYNDROME CORONAVIRUS 2 (SARS-COV-2) (CORONAVIRUS DISEASE [COVID-19]), AMPLIFIED PROBE TECHNIQUE, MAKING USE OF HIGH THROUGHPUT TECHNOLOGIES AS DESCRIBED BY CMS-2020-01-R: HCPCS | Performed by: NURSE PRACTITIONER

## 2023-02-17 PROCEDURE — 80053 COMPREHEN METABOLIC PANEL: CPT | Performed by: NURSE PRACTITIONER

## 2023-02-17 PROCEDURE — 82306 VITAMIN D 25 HYDROXY: CPT | Performed by: NURSE PRACTITIONER

## 2023-02-17 RX ORDER — ROPINIROLE 0.5 MG/1
1.5 TABLET, FILM COATED ORAL AT BEDTIME
Qty: 90 TABLET | Refills: 1 | Status: SHIPPED | OUTPATIENT
Start: 2023-02-17 | End: 2023-05-01

## 2023-02-17 RX ORDER — ROPINIROLE 0.5 MG/1
1.5 TABLET, FILM COATED ORAL AT BEDTIME
Qty: 90 TABLET | Refills: 1 | Status: SHIPPED | OUTPATIENT
Start: 2023-02-17 | End: 2023-02-17

## 2023-02-17 ASSESSMENT — ENCOUNTER SYMPTOMS
CHILLS: 0
FEVER: 0
ABDOMINAL PAIN: 0
EYE PAIN: 0
ARTHRALGIAS: 0
DIARRHEA: 0
NERVOUS/ANXIOUS: 0
HEMATURIA: 0
HEARTBURN: 0
HEMATOCHEZIA: 0
MYALGIAS: 0
WEAKNESS: 0
DIZZINESS: 0
DYSURIA: 0
NAUSEA: 0
CONSTIPATION: 0
JOINT SWELLING: 0
HEADACHES: 1
SORE THROAT: 1
COUGH: 0
PALPITATIONS: 0
FREQUENCY: 0
SHORTNESS OF BREATH: 0
PARESTHESIAS: 0

## 2023-02-17 ASSESSMENT — PAIN SCALES - GENERAL: PAINLEVEL: MODERATE PAIN (4)

## 2023-02-17 NOTE — TELEPHONE ENCOUNTER
Hello! We received rx for Ropinirole 0.5mg tablets with 2 sets of directions. Please clarify which direction set you would like.     Thanks,   Jessica Cespedes, PharmD, [facility]  Pharmacy Specialist  [facility]  [phone]

## 2023-02-17 NOTE — PATIENT INSTRUCTIONS
PLAN:   1.   Symptomatic therapy suggested: will increase Requp to 1.5 mg at bedtime.    2.  Orders Placed This Encounter   Medications    rOPINIRole (REQUIP) 0.5 MG tablet     Sig: Take 3 tablets (1.5 mg) by mouth At Bedtime Take 1-2 tablets by mouth at bed time.     Dispense:  90 tablet     Refill:  1     Orders Placed This Encounter   Procedures    REVIEW OF HEALTH MAINTENANCE PROTOCOL ORDERS    CT Head w/o Contrast    Lipid panel reflex to direct LDL Fasting    CBC with platelets    Comprehensive metabolic panel    TSH with free T4 reflex    Symptomatic COVID-19 Virus (Coronavirus) by PCR Nose    Ferritin       3. Patient needs to follow up in if no improvement,or sooner if worsening of symptoms or other symptoms develop.  FURTHER TESTING:       - CT head   I will place order. Please call [phone] to schedule.  Work on weight loss  Regular exercise  Will follow up and/or notify patient of  results via My Chart to determine further need for followup  Follow up office visit in one year for annual health maintenance exam, sooner PRN.    Preventive Health Recommendations  Female Ages 40 to 49    Yearly exam:   See your health care provider every year in order to  Review health changes.   Discuss preventive care.    Review your medicines if your doctor prescribed any.    Get a Pap test every three years (unless you have an abnormal result and your provider advises testing more often).    If you get Pap tests with HPV test, you only need to test every 5 years, unless you have an abnormal result. You do not need a Pap test if your uterus was removed (hysterectomy) and you have not had cancer.    You should be tested each year for STDs (sexually transmitted diseases), if you're at risk.   Ask your doctor if you should have a mammogram.    Have a colonoscopy (test for colon cancer) if someone in your family has had colon cancer or polyps before age 50.     Have a cholesterol test every 5 years.     Have a diabetes  test (fasting glucose) after age 45. If you are at risk for diabetes, you should have this test every 3 years.    Shots: Get a flu shot each year. Get a tetanus shot every 10 years.     Nutrition:   Eat at least 5 servings of fruits and vegetables each day.  Eat whole-grain bread, whole-wheat pasta and brown rice instead of white grains and rice.  Get adequate Calcium and Vitamin D.      Lifestyle  Exercise at least 150 minutes a week (an average of 30 minutes a day, 5 days a week). This will help you control your weight and prevent disease.  Limit alcohol to one drink per day.  No smoking.   Wear sunscreen to prevent skin cancer.  See your dentist every six months for an exam and cleaning.

## 2023-02-17 NOTE — PROGRESS NOTES
SUBJECTIVE:   CC: Kaitlyn is an 47 year old who presents for preventive health visit.     Patient has been advised of split billing requirements and indicates understanding: Yes  Healthy Habits:     Getting at least 3 servings of Calcium per day:  Yes    Bi-annual eye exam:  Yes    Dental care twice a year:  Yes    Sleep apnea or symptoms of sleep apnea:  Daytime drowsiness and Excessive snoring    Diet:  Regular (no restrictions)    Frequency of exercise:  4-5 days/week    Duration of exercise:  30-45 minutes    Taking medications regularly:  Yes    PHQ-2 Total Score: 0    Additional concerns today:  Yes      Today's PHQ-2 Score:   PHQ-2 ( 1999 Pfizer) 2/17/2023   Q1: Little interest or pleasure in doing things 0   Q2: Feeling down, depressed or hopeless 0   PHQ-2 Score 0   PHQ-2 Total Score (12-17 Years)- Positive if 3 or more points; Administer PHQ-A if positive -   Q1: Little interest or pleasure in doing things Not at all   Q2: Feeling down, depressed or hopeless Not at all   PHQ-2 Score 0       Social History     Tobacco Use     Smoking status: Never     Passive exposure: Never     Smokeless tobacco: Never   Substance Use Topics     Alcohol use: Yes     Alcohol/week: 0.0 standard drinks     Comment: Less than once a month     If you drink alcohol do you typically have >3 drinks per day or >7 drinks per week? No    Alcohol Use 2/17/2023   Prescreen: >3 drinks/day or >7 drinks/week? No   Prescreen: >3 drinks/day or >7 drinks/week? -       Reviewed orders with patient.  Reviewed health maintenance and updated orders accordingly - Yes  Lab work is in process  Labs reviewed in EPIC  BP Readings from Last 3 Encounters:   02/17/23 115/82   11/16/22 113/82   11/14/22 112/66    Wt Readings from Last 3 Encounters:   02/17/23 68.5 kg (151 lb)   11/16/22 69.8 kg (153 lb 14.4 oz)   11/14/22 70.9 kg (156 lb 3.2 oz)                  Patient Active Problem List   Diagnosis     CARDIOVASCULAR SCREENING; LDL GOAL LESS THAN  160     Iron deficiency anemia     Mammographic microcalcification found on diagnostic imaging of breast     Intraductal papilloma of breast     Atypical hyperplasia of breast     Acne     Bloating symptom     Abdominal pain     Abnormal biliary HIDA scan     Family history of breast cancer     Neutropenia (H)     Choroidal nevus of right eye     Situational mixed anxiety and depressive disorder     Glaucoma suspect, bilateral     Post-concussion syndrome     Muscle spasms of head or neck     Allergic rhinitis     WBC decreased     Past Surgical History:   Procedure Laterality Date     ABDOMEN SURGERY      rectoplasty     BIOPSY BREAST NEEDLE LOCALIZATION  7/30/2012    Procedure: BIOPSY BREAST NEEDLE LOCALIZATION;  left breast excisional Biopsy with wire localization @0830;  Surgeon: Robert Lynch MD;  Location: [facility] OR     BREAST SURGERY       COSMETIC SURGERY      Rectalplasty for episiotomy repair     COSMETIC SURGERY      Rectalplasty for episiotomy repair     DILATION AND CURETTAGE, HYSTEROSCOPY, ABLATE ENDOMETRIUM NOVASURE, COMBINED  10/10/2013    Procedure: COMBINED DILATION AND CURETTAGE, HYSTEROSCOPY, ABLATE ENDOMETRIUM NOVASURE;  Endometrial ablation with Novasure;  Surgeon: Indu Birmingham DO;  Location: [facility] OR     HC TOOTH EXTRACTION W/FORCEP       LUMPECTOMY BREAST Left 2/1/2017    Procedure: LUMPECTOMY BREAST;  Surgeon: Lori Montenegro MD;  Location: [facility]     SOFT TISSUE SURGERY      lumpectomy x 2 right breast     SOFT TISSUE SURGERY      episiotomy fixed       Social History     Tobacco Use     Smoking status: Never     Passive exposure: Never     Smokeless tobacco: Never   Substance Use Topics     Alcohol use: Yes     Alcohol/week: 0.0 standard drinks     Comment: Less than once a month     Family History   Problem Relation Age of Onset     Hypertension Father      Lipids Father      Hyperlipidemia Father      Depression/Anxiety Father      Cerebrovascular Disease Father          TIA     Cancer Maternal Grandmother      Glaucoma Maternal Grandmother      Macular Degeneration Maternal Grandmother      Osteoporosis Maternal Grandmother      Anesthesia Reaction Paternal Grandmother      Diabetes Paternal Grandmother      Cancer Paternal Grandfather      Depression/Anxiety Mother      Depression/Anxiety Daughter      Depression Daughter      Depression Son      Cataracts Maternal Grandfather      Breast Cancer Paternal Aunt 60        x 2 aunts     Breast Cancer Other 40        Paternal cousin     Breast Cancer Other 64        Several aunts on fathers side/Several aunts on fathers side/Several aunts on fathers side/Several aunts on fathers side     Prostate Cancer Other 64        Uncle on mothers side     Asthma No family hx of      C.A.D. No family hx of      Cancer - colorectal No family hx of      Connective Tissue Disorder No family hx of      Thyroid Disease No family hx of      Coronary Artery Disease No family hx of      Ovarian Cancer No family hx of      Thyroid Disease No family hx of      Chemical Addiction No family hx of      Known Genetic Syndrome No family hx of          Current Outpatient Medications   Medication Sig Dispense Refill     albuterol (PROAIR HFA/PROVENTIL HFA/VENTOLIN HFA) 108 (90 Base) MCG/ACT inhaler INHALE 2 PUFFS INTO THE LUNGS EVERY 6 HOURS 8.5 g 0     LORazepam (ATIVAN) 1 MG tablet Take 1 tablet (1 mg) by mouth twice a day as needed.       MELATONIN PO Take by mouth nightly as needed        metroNIDAZOLE (METROGEL) 0.75 % external gel Apply topically 2 times daily 45 g 1     Naproxen Sodium (ALEVE PO) Take 440 mg by mouth as needed        valACYclovir (VALTREX) 1000 mg tablet TAKE TWO TABLETS BY MOUTH TWICE A DAY 4 tablet 3     venlafaxine (EFFEXOR XR) 37.5 MG 24 hr capsule TAKE ONE CAPSULE BY MOUTH ONCE DAILY 90 capsule 1     rOPINIRole (REQUIP) 0.5 MG tablet Take 3 tablets (1.5 mg) by mouth At Bedtime 90 tablet 1     Allergies   Allergen Reactions      No Known Drug Allergies        Breast Cancer Screening:    FHS-7:   Breast CA Risk Assessment (FHS-7) 11/10/2021 11/16/2022   Did any of your first-degree relatives have breast or ovarian cancer? No No   Did any of your relatives have bilateral breast cancer? No No   Did any man in your family have breast cancer? No No   Did any woman in your family have breast and ovarian cancer? No No   Did any woman in your family have breast cancer before age 50 y? No No   Do you have 2 or more relatives with breast and/or ovarian cancer? Yes Yes   Do you have 2 or more relatives with breast and/or bowel cancer? Yes Yes       Mammogram Screening: Recommended annual mammography  Pertinent mammograms are reviewed under the imaging tab.    History of abnormal Pap smear: NO - age 30-65 PAP every 5 years with negative HPV co-testing recommended  PAP / HPV Latest Ref Rng & Units 2/17/2023 6/4/2018 2/3/2015   PAP   Negative for Intraepithelial Lesion or Malignancy (NILM) - -   PAP (Historical) - - NIL NIL   HPV16 NEG:Negative - Negative -   HPV18 NEG:Negative - Negative -   HRHPV NEG:Negative - Negative -     Reviewed and updated as needed this visit by clinical staff   Tobacco  Allergies  Meds  Problems             Reviewed and updated as needed this visit by Provider      Problems            Past Medical History:   Diagnosis Date     Anemia      Atypical ductal hyperplasia of breast      Choroidal nevus of right eye      Concussion 10/2015     Depressive disorder 2006    Treated with celexa for two years     Family history of breast cancer 2/3/2015     Family history of breast cancer 2/3/2015     Family history of breast cancer 2/3/2015     Family history of breast cancer 2/3/2015     Gastroesophageal reflux disease      Glaucoma suspect of both eyes      Papilloma of breast 5/23/12    Left, See Dr. Lynch at      PONV (postoperative nausea and vomiting)      S/P endometrial ablation 10/10/13    Menorrhagia     Shingles     x2  in the past     WBC decreased 2/3/2015     WBC decreased 2/3/2015     WBC decreased 2/3/2015      Past Surgical History:   Procedure Laterality Date     ABDOMEN SURGERY      rectoplasty     BIOPSY BREAST NEEDLE LOCALIZATION  7/30/2012    Procedure: BIOPSY BREAST NEEDLE LOCALIZATION;  left breast excisional Biopsy with wire localization @0830;  Surgeon: Robert Lynch MD;  Location: [facility] OR     BREAST SURGERY       COSMETIC SURGERY      Rectalplasty for episiotomy repair     COSMETIC SURGERY      Rectalplasty for episiotomy repair     DILATION AND CURETTAGE, HYSTEROSCOPY, ABLATE ENDOMETRIUM NOVASURE, COMBINED  10/10/2013    Procedure: COMBINED DILATION AND CURETTAGE, HYSTEROSCOPY, ABLATE ENDOMETRIUM NOVASURE;  Endometrial ablation with Novasure;  Surgeon: Indu Birmingham DO;  Location: [facility] OR     HC TOOTH EXTRACTION W/FORCEP       LUMPECTOMY BREAST Left 2/1/2017    Procedure: LUMPECTOMY BREAST;  Surgeon: Lori Montenegro MD;  Location: [facility]     SOFT TISSUE SURGERY      lumpectomy x 2 right breast     SOFT TISSUE SURGERY      episiotomy fixed       Review of Systems   Constitutional: Negative for chills and fever.   HENT: Positive for sore throat. Negative for congestion, ear pain and hearing loss.         Says she snores terribly according to her friends  Had sleep study last year   Was given a trial of ropidilol and at first thought was helping but not so much now       Also sore throat since Wednesday   Took a Covid swab on Thursday    Eyes: Negative for pain and visual disturbance.   Respiratory: Negative for cough and shortness of breath.    Cardiovascular: Negative for chest pain, palpitations and peripheral edema.   Gastrointestinal: Negative for abdominal pain, constipation, diarrhea, heartburn, hematochezia and nausea.   Genitourinary: Negative for dysuria, frequency, genital sores, hematuria, pelvic pain, urgency and vaginal bleeding.   Musculoskeletal: Negative for arthralgias,  "joint swelling and myalgias.   Skin: Negative for rash.   Neurological: Positive for headaches. Negative for dizziness, weakness and paresthesias.        Having headaches intermittently about 3 a month  This is new for her   No visual disturbances   Has left work twice in the last 6 months   Will have tightness in her neck and shoulders      Psychiatric/Behavioral: Negative for mood changes. The patient is not nervous/anxious.         OBJECTIVE:   /82   Pulse 86   Temp 98  F (36.7  C) (Oral)   Resp 20   Ht 1.689 m (5' 6.5\")   Wt 68.5 kg (151 lb)   SpO2 97%   BMI 24.01 kg/m    Physical Exam  GENERAL: healthy, alert and no distress  EYES: Eyes grossly normal to inspection and conjunctivae and sclerae normal  HENT: ear canals and TM's normal, nose and mouth without ulcers or lesions  NECK: no adenopathy, no asymmetry, masses, or scars and thyroid normal to palpation  RESP: lungs clear to auscultation - no rales, rhonchi or wheezes  BREAST: normal without masses, tenderness or nipple discharge and no palpable axillary masses or adenopathy  CV: regular rates and rhythm, no murmur, click or rub, peripheral pulses strong and no peripheral edema  ABDOMEN: soft, nontender, no hepatosplenomegaly, no masses and bowel sounds normal   (female): normal female external genitalia, normal urethral meatus, vaginal mucosa pink, moist, well rugated, and normal cervix/adnexa/uterus without masses or discharge  MS: no gross musculoskeletal defects noted, no edema  SKIN: no suspicious lesions or rashes  NEURO: Normal strength and tone, mentation intact and speech normal  Normal strength and tone, sensory exam grossly normal, mentation intact, speech normal and normal strength throughout  PSYCH: mentation appears normal, affect normal/bright  LYMPH: no cervical, supraclavicular, axillary, or inguinal adenopathy    Diagnostic Test Results:  Labs reviewed in Epic  Results for orders placed or performed in visit on 02/17/23 "   Lipid panel reflex to direct LDL Fasting     Status: Abnormal   Result Value Ref Range    Cholesterol 204 (H) <200 mg/dL    Triglycerides 71 <150 mg/dL    Direct Measure HDL 81 >=50 mg/dL    LDL Cholesterol Calculated 109 (H) <=100 mg/dL    Non HDL Cholesterol 123 <130 mg/dL    Patient Fasting > 8hrs? No     Narrative    Cholesterol  Desirable:  <200 mg/dL    Triglycerides  Normal:  Less than 150 mg/dL  Borderline High:  150-199 mg/dL  High:  200-499 mg/dL  Very High:  Greater than or equal to 500 mg/dL    Direct Measure HDL  Female:  Greater than or equal to 50 mg/dL   Male:  Greater than or equal to 40 mg/dL    LDL Cholesterol  Desirable:  <100mg/dL  Above Desirable:  100-129 mg/dL   Borderline High:  130-159 mg/dL   High:  160-189 mg/dL   Very High:  >= 190 mg/dL    Non HDL Cholesterol  Desirable:  130 mg/dL  Above Desirable:  130-159 mg/dL  Borderline High:  160-189 mg/dL  High:  190-219 mg/dL  Very High:  Greater than or equal to 220 mg/dL   CBC with platelets     Status: Normal   Result Value Ref Range    WBC Count 5.0 4.0 - 11.0 10e3/uL    RBC Count 4.08 3.80 - 5.20 10e6/uL    Hemoglobin 12.9 11.7 - 15.7 g/dL    Hematocrit 37.9 35.0 - 47.0 %    MCV 93 78 - 100 fL    MCH 31.6 26.5 - 33.0 pg    MCHC 34.0 31.5 - 36.5 g/dL    RDW 11.4 10.0 - 15.0 %    Platelet Count 223 150 - 450 10e3/uL   Comprehensive metabolic panel     Status: Normal   Result Value Ref Range    Sodium 137 133 - 144 mmol/L    Potassium 3.8 3.4 - 5.3 mmol/L    Chloride 104 94 - 109 mmol/L    Carbon Dioxide (CO2) 26 20 - 32 mmol/L    Anion Gap 7 3 - 14 mmol/L    Urea Nitrogen 14 7 - 30 mg/dL    Creatinine 0.88 0.52 - 1.04 mg/dL    Calcium 8.6 8.5 - 10.1 mg/dL    Glucose 86 70 - 99 mg/dL    Alkaline Phosphatase 58 40 - 150 U/L    AST 21 0 - 45 U/L    ALT 46 0 - 50 U/L    Protein Total 6.9 6.8 - 8.8 g/dL    Albumin 3.7 3.4 - 5.0 g/dL    Bilirubin Total 0.4 0.2 - 1.3 mg/dL    GFR Estimate 81 >60 mL/min/1.73m2   TSH with free T4 reflex     Status:  Normal   Result Value Ref Range    TSH 0.85 0.40 - 4.00 mU/L   Symptomatic COVID-19 Virus (Coronavirus) by PCR Nose     Status: Normal    Specimen: Nose; Swab   Result Value Ref Range    SARS CoV2 PCR Negative Negative    Narrative    Testing was performed using the Aptima SARS-CoV-2 Assay on the  [employer] System. Additional information about this  Emergency Use Authorization (EUA) assay can be found via the Lab  Guide. This test should be ordered for the detection of SARS-CoV-2 in  individuals who meet SARS-CoV-2 clinical and/or epidemiological  criteria. Test performance is unknown in asymptomatic patients. This  test is for in vitro diagnostic use under the FDA EUA for  laboratories certified under CLIA to perform high complexity testing.  This test has not been FDA cleared or approved. A negative result  does not rule out the presence of PCR inhibitors in the specimen or  target RNA in concentration below the limit of detection for the  assay. The possibility of a false negative should be considered if  the patient's recent exposure or clinical presentation suggests  COVID-19. This test was validated by the [facility]. This laboratory is certified under  the Clinical Laboratory Improvement Amendments of 1988 (CLIA-88) as  qualified to perform high complexity laboratory testing.   Ferritin     Status: Normal   Result Value Ref Range    Ferritin 69 8 - 252 ng/mL   Vitamin D Deficiency     Status: Normal   Result Value Ref Range    Vitamin D, Total (25-Hydroxy) 39 20 - 75 ug/L    Narrative    Season, race, dietary intake, and treatment affect the concentration of 25-hydroxy-Vitamin D. Values may decrease during winter months and increase during summer months. Values 20-29 ug/L may indicate Vitamin D insufficiency and values <20 ug/L may indicate Vitamin D deficiency.    Vitamin D determination is routinely performed by an immunoassay specific for 25  hydroxyvitamin D3.  If an individual is on vitamin D2(ergocalciferol) supplementation, please specify 25 OH vitamin D2 and D3 level determination by LCMSMS test VITD23.     Pap screen with HPV - recommended age 30 - 65 years     Status: None   Result Value Ref Range    Interpretation        Negative for Intraepithelial Lesion or Malignancy (NILM)    Comment         Papanicolaou Test Limitations:  Cervical cytology is a screening test with limited sensitivity, and regular screening is critical for cancer prevention.  Pap tests are primarily effective for the diagnosis/prevention of squamous cell carcinoma, not adenocarcinoma or other cancers.        Specimen Adequacy       Satisfactory for evaluation, endocervical/transformation zone component absent    Clinical Information       none      Reflex Testing Yes regardless of result     Previous Abnormal?       No      Performing Labs       The technical component of this testing was completed at [facility]         ASSESSMENT/PLAN:   Kaitlyn was seen today for physical.    Diagnoses and all orders for this visit:    Routine general medical examination at a health care facility  -     REVIEW OF HEALTH MAINTENANCE PROTOCOL ORDERS    CARDIOVASCULAR SCREENING; LDL GOAL LESS THAN 160  -   -     Lipid panel reflex to direct LDL Fasting    Screen for colon cancer  Up to date    Screening for disorder of blood and blood-forming organs  -     CBC with platelets    Screening for thyroid disorder  -     TSH with free T4 reflex    Screening for diabetes mellitus (DM)  -     Comprehensive metabolic panel    Cervical cancer screening  -     Pap screen with HPV - recommended age 30 - 65 years  -     HPV Hold (Lab Only)  -     HPV High Risk Types DNA Cervical    PLMD (periodic limb movement disorder)  -     Will increase  rOPINIRole (REQUIP) 0.5 MG tablet; Take 3 tablets (1.5 mg) by mouth At Bedtime Take 1-2 tablets by mouth  at bed time.  -     Ferritin; Future  -     Ferritin  FOLLOW UP WITH SPECIALIST :sleep specialist     Pharyngitis, unspecified etiology  -     Symptomatic COVID-19 Virus (Coronavirus) by PCR Nose    Chronic nonintractable headache, unspecified headache type  -     CT Head w/o Contrast; Future  Will follow up and/or notify patient of  results via My Chart to determine further need for followup  Suspect could be muscle tension from her neck     Vitamin D insufficiency  -     Vitamin D Deficiency; Future  -     Vitamin D Deficiency    PLAN:    Patient needs to follow up in if no improvement,or sooner if worsening of symptoms or other symptoms develop.  FURTHER TESTING:       - CT head   I will place order. Please call [phone] to schedule.  Work on weight loss  Regular exercise  Will follow up and/or notify patient of  results via My Chart to determine further need for followup  Follow up office visit in one year for annual health maintenance exam, sooner PRN.    Patient has been advised of split billing requirements and indicates understanding: Yes      COUNSELING:  Reviewed preventive health counseling, as reflected in patient instructions  Special attention given to:        Regular exercise       Healthy diet/nutrition       Vision screening       Osteoporosis prevention/bone health       Colorectal Cancer Screening       Consider Hep C screening for all patients one time for ages 18-79 years       The 10-year ASCVD risk score (Jnaes CERVANTES, et al., 2019) is: 0.5%    Values used to calculate the score:      Age: 47 years      Sex: Female      Is Non- : No      Diabetic: No      Tobacco smoker: No      Systolic Blood Pressure: 115 mmHg      Is BP treated: No      HDL Cholesterol: 81 mg/dL      Total Cholesterol: 204 mg/dL       Advance Care Planning        She reports that she has never smoked. She has never been exposed to tobacco smoke. She has never used smokeless tobacco.          Ani  SINDHU Oh CNP  [facility]

## 2023-02-18 LAB
DEPRECATED CALCIDIOL+CALCIFEROL SERPL-MC: 39 UG/L (ref 20–75)
SARS-COV-2 RNA RESP QL NAA+PROBE: NEGATIVE

## 2023-02-19 NOTE — RESULT ENCOUNTER NOTE
David Garcia,    Attached are your test results.  -Ferritin (iron) level is normal.  -Cholesterol levels (LDL,HDL, Triglycerides) are normal.  ADVISE: rechecking in 1 year.   Covid swab is negative   -Liver and gallbladder tests are normal (ALT,AST, Alk phos, bilirubin), kidney function is normal (Cr, GFR), sodium is normal, potassium is normal, calcium is normal, glucose is normal.  -TSH (thyroid stimulating hormone) level is normal which indicates normal thyroid function.  -Vitamin D level is normal and getting 1000 IU daily in your diet or supplements is recommended.      Please contact us if you have any questions.    Ani Slater, CNP

## 2023-02-21 ENCOUNTER — ANCILLARY PROCEDURE (OUTPATIENT)
Dept: CT IMAGING | Facility: CLINIC | Age: 48
End: 2023-02-21
Attending: NURSE PRACTITIONER
Payer: COMMERCIAL

## 2023-02-21 DIAGNOSIS — R51.9 CHRONIC NONINTRACTABLE HEADACHE, UNSPECIFIED HEADACHE TYPE: ICD-10-CM

## 2023-02-21 DIAGNOSIS — G89.29 CHRONIC NONINTRACTABLE HEADACHE, UNSPECIFIED HEADACHE TYPE: ICD-10-CM

## 2023-02-21 PROCEDURE — 70450 CT HEAD/BRAIN W/O DYE: CPT | Performed by: RADIOLOGY

## 2023-02-21 NOTE — RESULT ENCOUNTER NOTE
David Garcia,    Attached are your test results.  CT of head is normal which is reassuring    Please contact us if you have any questions.    Ani Slater, CNP

## 2023-02-22 LAB
BKR LAB AP GYN ADEQUACY: NORMAL
BKR LAB AP GYN INTERPRETATION: NORMAL
BKR LAB AP HPV REFLEX: NORMAL
BKR LAB AP PREVIOUS ABNORMAL: NORMAL
PATH REPORT.COMMENTS IMP SPEC: NORMAL
PATH REPORT.COMMENTS IMP SPEC: NORMAL
PATH REPORT.RELEVANT HX SPEC: NORMAL

## 2023-02-24 LAB
HUMAN PAPILLOMA VIRUS 16 DNA: NEGATIVE
HUMAN PAPILLOMA VIRUS 18 DNA: NEGATIVE
HUMAN PAPILLOMA VIRUS FINAL DIAGNOSIS: NORMAL
HUMAN PAPILLOMA VIRUS OTHER HR: NEGATIVE

## 2023-03-06 ENCOUNTER — OFFICE VISIT (OUTPATIENT)
Dept: OPHTHALMOLOGY | Facility: CLINIC | Age: 48
End: 2023-03-06
Attending: OPTOMETRIST
Payer: COMMERCIAL

## 2023-03-06 DIAGNOSIS — H40.003 GLAUCOMA SUSPECT OF BOTH EYES: Primary | ICD-10-CM

## 2023-03-06 DIAGNOSIS — H52.4 PRESBYOPIA: ICD-10-CM

## 2023-03-06 DIAGNOSIS — H52.13 MYOPIA OF BOTH EYES: ICD-10-CM

## 2023-03-06 DIAGNOSIS — D31.31 CHOROIDAL NEVUS OF RIGHT EYE: ICD-10-CM

## 2023-03-06 PROCEDURE — 92083 EXTENDED VISUAL FIELD XM: CPT | Performed by: OPHTHALMOLOGY

## 2023-03-06 PROCEDURE — 92133 CPTRZD OPH DX IMG PST SGM ON: CPT | Performed by: OPHTHALMOLOGY

## 2023-03-06 PROCEDURE — 99213 OFFICE O/P EST LOW 20 MIN: CPT | Performed by: OPHTHALMOLOGY

## 2023-03-06 ASSESSMENT — TONOMETRY
IOP_METHOD: TONOPEN
OD_IOP_MMHG: 14
OS_IOP_MMHG: 18

## 2023-03-06 ASSESSMENT — GONIOSCOPY: METHOD: SUSSMAN, FOUR MIRROR

## 2023-03-06 ASSESSMENT — PACHYMETRY
OD_CT(UM): 501
OS_CT(UM): 500

## 2023-03-06 ASSESSMENT — CONF VISUAL FIELD
OD_INFERIOR_NASAL_RESTRICTION: 0
OD_SUPERIOR_NASAL_RESTRICTION: 0
OD_INFERIOR_TEMPORAL_RESTRICTION: 0
OD_SUPERIOR_TEMPORAL_RESTRICTION: 0

## 2023-03-06 ASSESSMENT — EXTERNAL EXAM - LEFT EYE: OS_EXAM: NORMAL

## 2023-03-06 ASSESSMENT — VISUAL ACUITY
METHOD: SNELLEN - LINEAR
OS_CC: 20/20
CORRECTION_TYPE: GLASSES
OD_CC: 20/20

## 2023-03-06 ASSESSMENT — SLIT LAMP EXAM - LIDS
COMMENTS: NORMAL
COMMENTS: NORMAL

## 2023-03-06 ASSESSMENT — EXTERNAL EXAM - RIGHT EYE: OD_EXAM: NORMAL
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
## 2023-03-06 NOTE — NURSING NOTE
Chief Complaints and History of Present Illnesses   Patient presents with     Glaucoma       Chief Complaint(s) and History of Present Illness(es)     Glaucoma           Comments    Patient here for glaucoma suspect evaluation. Had full exam with Dr. Nicholson in July 2022. Sees Dr. Perez for right eye choroidal nevus (last visit 1/10/23). No vision concerns. No drop use currently, previously did Ats but not lately.                      Karen Devine, COT

## 2023-03-24 ENCOUNTER — OFFICE VISIT (OUTPATIENT)
Dept: DERMATOLOGY | Facility: CLINIC | Age: 48
End: 2023-03-24
Payer: COMMERCIAL

## 2023-03-24 DIAGNOSIS — L82.1 SEBORRHEIC KERATOSES: ICD-10-CM

## 2023-03-24 DIAGNOSIS — D18.01 CHERRY ANGIOMA: ICD-10-CM

## 2023-03-24 DIAGNOSIS — Z12.83 ENCOUNTER FOR SCREENING FOR MALIGNANT NEOPLASM OF SKIN: ICD-10-CM

## 2023-03-24 DIAGNOSIS — L81.4 LENTIGINES: ICD-10-CM

## 2023-03-24 DIAGNOSIS — D22.9 MULTIPLE BENIGN NEVI: ICD-10-CM

## 2023-03-24 DIAGNOSIS — L21.9 DERMATITIS, SEBORRHEIC: Primary | ICD-10-CM

## 2023-03-24 PROCEDURE — 99204 OFFICE O/P NEW MOD 45 MIN: CPT | Performed by: NURSE PRACTITIONER

## 2023-03-24 NOTE — LETTER
3/24/2023         RE: Kaitlyn Garcia  [address]  [facility] [address]        Dear Colleague,    Thank you for referring your patient, Kaitlyn Garcia, to the [facility]. Please see a copy of my visit note below.    [facility] Dermatology Note  Encounter Date: Mar 24, 2023  Office Visit     Reviewed patients past medical history and pertinent chart review prior to patients visit today.     Dermatology Problem List:  Recurrent rash on face, likely seb derm. ketoconazole 2% cream 3/24/2023     ____________________________________________    Assessment & Plan:     # Seborrheic dermatitis, face. Discussed that this is a recurring rash for most people and will need some maintenance even after rash has resolved. Given prescription for ketoconazole 2% cream to use daily while rash is present, and ongoing 1-3 times weekly when rash is well controlled. Also advised okay to use hydrocortisone 1% cream 1-2 times daily when rash is itchy. Stop use when rash is not symptomatic and use for flares only. Councled about use and side effects of thinning of the skin, striae, erythema, and worsening of rash.        # Benign skin findings including: seborrheic keratoses, cherry angioma, lentigines and benign nevi.   - No further intervention required. Patient to report changes.   - Patient reassured of the benign nature of these lesions.    #Signs and Symptoms of non-melanoma skin cancer and ABCDEs of melanoma reviewed with patient. Patient encouraged to perform monthly self skin exams and educated on how to perform them. UV precautions reviewed with patient. Patient was asked about new or changing moles/lesions on body.     #Reviewed Sunscreen: Apply 20 minutes prior to going outdoors and reapply every two hours, when wet or sweating. We recommend using an SPF 30 or higher, and to use one that is water resistant.       Follow-up:  1-2 years for follow up full body skin exam, prn for  new or changing lesions or new concerns    Beulah Loera, APRN CNP on 3/24/2023 at 4:14 PM    ____________________________________________    CC: Skin Check    HPI:  Ms. Kaitlyn Garcia is a(n) 47 year old female who presents today as a new patient for a full body skin cancer screening. Patient has concerns today about a recurrent red scaly rash around her nose.  This has been going on for several months.  She has been prescribed metronidazole gel which seems to help a little bit but it does keep recurring.  She also has itching inside the right ear.  She has never been told that she has dandruff..     Patient is otherwise feeling well, without additional skin concerns.     Physical Exam:  Vitals: There were no vitals taken for this visit.  SKIN: Total skin excluding the genitalia areas was performed. The exam included the head/face, neck, both arms, chest, back, abdomen, both legs, digits, mons pubis, buttock and nails.   -Diffuse very fine mild scaling of the scalp  -Very faint erythema and very mild scaling of the glabella, eyebrows, and nasal creases  -several 1-2mm red dome shaped symmetric papules scattered on the trunk  -multiple tan/brown flat round macules and raised papules scattered throughout trunk, extremities and head. No worrisome features for malignancy noted on examination.  -scattered tan, homogenous macules scattered on sun exposed areas of trunk, extremities and face.   -scattered waxy, stuck on tan/brown papules and patches on the trunk     - No other lesions of concern on areas examined.     Medications:  Current Outpatient Medications   Medication     metroNIDAZOLE (METROGEL) 0.75 % external gel     albuterol (PROAIR HFA/PROVENTIL HFA/VENTOLIN HFA) 108 (90 Base) MCG/ACT inhaler     LORazepam (ATIVAN) 1 MG tablet     MELATONIN PO     Naproxen Sodium (ALEVE PO)     rOPINIRole (REQUIP) 0.5 MG tablet     valACYclovir (VALTREX) 1000 mg tablet     venlafaxine (EFFEXOR XR) 37.5 MG 24 hr capsule      No current facility-administered medications for this visit.      Past Medical History:   Patient Active Problem List   Diagnosis     CARDIOVASCULAR SCREENING; LDL GOAL LESS THAN 160     Iron deficiency anemia     Mammographic microcalcification found on diagnostic imaging of breast     Intraductal papilloma of breast     Atypical hyperplasia of breast     Acne     Bloating symptom     Abdominal pain     Abnormal biliary HIDA scan     Family history of breast cancer     Neutropenia (H)     Choroidal nevus of right eye     Situational mixed anxiety and depressive disorder     Glaucoma suspect, bilateral     Post-concussion syndrome     Muscle spasms of head or neck     Allergic rhinitis     WBC decreased     Past Medical History:   Diagnosis Date     Anemia      Atypical ductal hyperplasia of breast      Choroidal nevus of right eye      Concussion 10/2015     Depressive disorder 2006    Treated with celexa for two years     Family history of breast cancer 2/3/2015     Family history of breast cancer 2/3/2015     Family history of breast cancer 2/3/2015     Family history of breast cancer 2/3/2015     Gastroesophageal reflux disease      Glaucoma suspect of both eyes      Papilloma of breast 5/23/12    Left, See Dr. Lynch at      PONV (postoperative nausea and vomiting)      S/P endometrial ablation 10/10/13    Menorrhagia     Shingles     x2 in the past     WBC decreased 2/3/2015     WBC decreased 2/3/2015     WBC decreased 2/3/2015       CC Referred Self, MD  No address on file on close of this encounter.      Again, thank you for allowing me to participate in the care of your patient.        Sincerely,        SINDHU Zamarripa CNP

## 2023-03-24 NOTE — PROGRESS NOTES
[facility] Dermatology Note  Encounter Date: Mar 24, 2023  Office Visit     Reviewed patients past medical history and pertinent chart review prior to patients visit today.     Dermatology Problem List:  Recurrent rash on face, likely seb derm. ketoconazole 2% cream 3/24/2023     ____________________________________________    Assessment & Plan:     # Seborrheic dermatitis, face. Discussed that this is a recurring rash for most people and will need some maintenance even after rash has resolved. Given prescription for ketoconazole 2% cream to use daily while rash is present, and ongoing 1-3 times weekly when rash is well controlled. Also advised okay to use hydrocortisone 1% cream 1-2 times daily when rash is itchy. Stop use when rash is not symptomatic and use for flares only. Councled about use and side effects of thinning of the skin, striae, erythema, and worsening of rash.        # Benign skin findings including: seborrheic keratoses, cherry angioma, lentigines and benign nevi.   - No further intervention required. Patient to report changes.   - Patient reassured of the benign nature of these lesions.    #Signs and Symptoms of non-melanoma skin cancer and ABCDEs of melanoma reviewed with patient. Patient encouraged to perform monthly self skin exams and educated on how to perform them. UV precautions reviewed with patient. Patient was asked about new or changing moles/lesions on body.     #Reviewed Sunscreen: Apply 20 minutes prior to going outdoors and reapply every two hours, when wet or sweating. We recommend using an SPF 30 or higher, and to use one that is water resistant.       Follow-up:  1-2 years for follow up full body skin exam, prn for new or changing lesions or new concerns    Beulah Loera, SINDHU CNP on 3/24/2023 at 4:14 PM    ____________________________________________    CC: Skin Check    HPI:  Ms. Kaitlyn Garcia is a(n) 47 year old female who presents today as a new  patient for a full body skin cancer screening. Patient has concerns today about a recurrent red scaly rash around her nose.  This has been going on for several months.  She has been prescribed metronidazole gel which seems to help a little bit but it does keep recurring.  She also has itching inside the right ear.  She has never been told that she has dandruff..     Patient is otherwise feeling well, without additional skin concerns.     Physical Exam:  Vitals: There were no vitals taken for this visit.  SKIN: Total skin excluding the genitalia areas was performed. The exam included the head/face, neck, both arms, chest, back, abdomen, both legs, digits, mons pubis, buttock and nails.   -Diffuse very fine mild scaling of the scalp  -Very faint erythema and very mild scaling of the glabella, eyebrows, and nasal creases  -several 1-2mm red dome shaped symmetric papules scattered on the trunk  -multiple tan/brown flat round macules and raised papules scattered throughout trunk, extremities and head. No worrisome features for malignancy noted on examination.  -scattered tan, homogenous macules scattered on sun exposed areas of trunk, extremities and face.   -scattered waxy, stuck on tan/brown papules and patches on the trunk     - No other lesions of concern on areas examined.     Medications:  Current Outpatient Medications   Medication     metroNIDAZOLE (METROGEL) 0.75 % external gel     albuterol (PROAIR HFA/PROVENTIL HFA/VENTOLIN HFA) 108 (90 Base) MCG/ACT inhaler     LORazepam (ATIVAN) 1 MG tablet     MELATONIN PO     Naproxen Sodium (ALEVE PO)     rOPINIRole (REQUIP) 0.5 MG tablet     valACYclovir (VALTREX) 1000 mg tablet     venlafaxine (EFFEXOR XR) 37.5 MG 24 hr capsule     No current facility-administered medications for this visit.      Past Medical History:   Patient Active Problem List   Diagnosis     CARDIOVASCULAR SCREENING; LDL GOAL LESS THAN 160     Iron deficiency anemia     Mammographic  microcalcification found on diagnostic imaging of breast     Intraductal papilloma of breast     Atypical hyperplasia of breast     Acne     Bloating symptom     Abdominal pain     Abnormal biliary HIDA scan     Family history of breast cancer     Neutropenia (H)     Choroidal nevus of right eye     Situational mixed anxiety and depressive disorder     Glaucoma suspect, bilateral     Post-concussion syndrome     Muscle spasms of head or neck     Allergic rhinitis     WBC decreased     Past Medical History:   Diagnosis Date     Anemia      Atypical ductal hyperplasia of breast      Choroidal nevus of right eye      Concussion 10/2015     Depressive disorder 2006    Treated with celexa for two years     Family history of breast cancer 2/3/2015     Family history of breast cancer 2/3/2015     Family history of breast cancer 2/3/2015     Family history of breast cancer 2/3/2015     Gastroesophageal reflux disease      Glaucoma suspect of both eyes      Papilloma of breast 5/23/12    Left, See Dr. Lynch at      PONV (postoperative nausea and vomiting)      S/P endometrial ablation 10/10/13    Menorrhagia     Shingles     x2 in the past     WBC decreased 2/3/2015     WBC decreased 2/3/2015     WBC decreased 2/3/2015       CC Referred Self, MD  No address on file on close of this encounter.

## 2023-05-15 NOTE — PROGRESS NOTES
.  FOLLOW UP  May 17, 2023     Kaitlyn Garcia is a 47 year old woman who presents with history of atypical ductal hyperplasia.      HPI:     History of 2 right breast biopsies that were benign. In 2012 she had a left breast intraductal papilloma surgical excised. In 2016 she had 2 left needle biopsies, one of which demonstrated atypical ductal hyperplasia. The biopsied area of ADH was surgical excised. In 2017 she had an MRI guided needle left breast biopsy that demonstrated an intraductal papilloma.      Family history of 2 paternal aunts who had breast cancer in their 60's and a paternal cousin with breast cancer in her 40's. Kaitlyn was seen by a genetic counselor in 2015, no genetic testing was done.      She is doing high risk screening with breast MRI and mammogram. She completed 5 years of Tamoxifen. She had a breast MRI 5/9/17 revealed an area of enhancement biopsied to be an intraductal papilloma without atypia. She was seen for surgical consultation by Dr. Montenegro and monitoring with imaging was recommended.     She had a pelvic ultrasound 6/4/18, 6/12/19, and 11/4/20 that were stable.      She denies any breast concerns including mass, skin change, nipple inversion or nipple discharge. She does report bilateral breast fullness.      BREAST-SPECIFIC HISTORY:     Previous breast imaging: Yes   - 5/21/12 b/l Dmammo and right u/s for right breast lump: calcifications in the upper outer quadrant of left breast spanning 5 cm. Right u/s negative. BI-RADS 4 suspicious for malignancy   - 5/23/12 left stereotactic biopsy: intraductal papilloma   - 7/30/12 left wire-placement   - 2/19/13 MRI: large segmental enhancement of left lateral breast, BI-RADS 0 incomplete   Left u/s: BI-RADS 3 probably benign  - 3/29/13 MRI: BI-RADS 2 benign findings  - 8/5/13 b/l Dmammo for fullness: BI-RADS 2 benign   - 2/5/14 MRI: cysts in left breast, BI-RADS 2 benign   - 5/9/14 Dmammo b/l: negative. Left u/s: cystic appearing structures,  BI-RADS 2 benign  - 5/20/14 MRI: study not completed due to nausea, BI-RADS 0 incomplete  - 5/28/14 MRI: BI-RADS 2 benign  - 10/28/14 b/l Dmammo: BI-RADS 2 benign   - 5/7/15 MRI BI-RADS 2 benign   - 11/10/15 Smammo: bilateral calcs: BI-RADS 2 benign  - 1/25/16 left u/s for pain: multiple tiny cyst  - 5/17/16 MRI: BI-RADS 2 benign   - 12/6/16  Smammo: lateral left breast developing macrocalcifications BI-RADS 0 incomplete   - 12/7/16 Dmammo left: left 2:00 calcifications BI-RADS 4 suspicious  - 12/12/16: stereotactic biopsy: focus of ADH    - 2/1/17 wire placement   - 5/9/17 MRI: 2 adjacent mass like areas of enhancement mid central left breast BI-RADS 0 incomplete   - 5/12/17 left u/s: scattered small cysts BI-RADS 4 suspicious   - 5/18/17 MR biopsy: intraductal papilloma   - 4/30/18 MRI BI-RADS 2  - 6/6/18 left Dmammo and ultrasound for pain BI-RADS 2  - 11/28/18 Smammo BI-RADS 2  - 5/7/19 breast MRI BI-RADS 2  - 8/21/19 Dmammo and right breast ultrasound: BI-RADS 2  - 11/12/19 Smammo BI-RADS 1  - 5/20/20 breast MRI BI-ARDS 2  - 11/4/20 b/l Dmammo and left breast ultrasound for lump: BI-RADS 1  - 5/5/21 Breast MRI BI-RADS 1  - 11/10/21 Smammo BI-RADS 2  - 5/11/22 breast MRI BI-RADS 2  - 11/16/22 Smammo BI-RADS 2     Prior breast biopsies:Yes   - 2 right breast biopsies   - 5/23/12 left needle biopsy: intraductal papilloma with sclerosing features   - 7/30/12 left excisional biopsy with Dr. Lynch: focal columnar cell change with atypia, intraductal papilloma and surrounding intraductal papillomatosis, sclerosing adenosis, fibrocystic changes  - 12/12/16 left needle biopsy x2: flat epithelial atypia with focus of ADH, duct ectasia and focal sclerosing adenosis, microcalcifications.   FEA and columnar cell change with atypia  - 2/1/17 left scar excision and excisional biopsy with Dr. Montenegro: FEA, columnar cell change/hyperplasia, intraductal papillomas, usual ductal hyperplasia, calcifications  - 5/18/17 left MRI needle  biopsy: intraductal papilloma, mild columnar cell hyperplasia, mild duct ectasia, focal sclerosing adenosis      Prior history of breast cancer: No  Prior radiation history: No   Self breast exams: No  Breast density: heterogeneously dense     GYN HISTORY:  . Age at 1st pregnancy: 17. Breastfeeding history: Yes.   Age at menarche: 14-14  Menopausal: premenopausal, uterine ablation   Menopausal hormone replacement therapy: No     RISK ASSESSMENT: < 3% 5 year risk and > 20% lifetime risk   Heather: 2.2% 5 year risk   SEBASTIAN: 43.6% lifetime risk      FAMILY HISTORY:  Breast ca: Yes    - paternal aunt 60 's   - paternal aunt 60's  - paternal cousin 40 (aunt)  Ovarian ca: No   - ? Paternal cousin with ovarian cancer   Pancreatic ca: No   Prostate: yes  - maternal uncle 69   Gastric ca: Yes   - paternal grandfather, passed   Melanoma: No  Colon ca: No  Other cancer: Yes   - maternal grandmother skin cancer 90's  - paternal grandfather brain 60's   Religious ethnicity: No  Other genetic, testing, syndromes, or clotting disorders: No   - She saw a genetic counselor 2015 and no testing was recommended     PAST MEDICAL HISTORY  Past Medical History:   Diagnosis Date     Anemia      Atypical ductal hyperplasia of breast      Choroidal nevus of right eye      Concussion 10/2015     Depressive disorder 2006    Treated with celexa for two years     Family history of breast cancer 2/3/2015     Family history of breast cancer 2/3/2015     Family history of breast cancer 2/3/2015     Family history of breast cancer 2/3/2015     Gastroesophageal reflux disease      Glaucoma suspect of both eyes      Papilloma of breast 11    Left, See Dr. Lynch at      PONV (postoperative nausea and vomiting)      S/P endometrial ablation 10/10/13    Menorrhagia     Shingles     x2 in the past     WBC decreased 2/3/2015     WBC decreased 2/3/2015     WBC decreased 2/3/2015     PAST SURGICAL HISTORY   Past Surgical History:   Procedure Laterality  Date     ABDOMEN SURGERY      rectoplasty     BIOPSY BREAST NEEDLE LOCALIZATION  7/30/2012    Procedure: BIOPSY BREAST NEEDLE LOCALIZATION;  left breast excisional Biopsy with wire localization @0830;  Surgeon: Robert Lynch MD;  Location: [facility] OR     BREAST SURGERY       COSMETIC SURGERY      Rectalplasty for episiotomy repair     COSMETIC SURGERY      Rectalplasty for episiotomy repair     DILATION AND CURETTAGE, HYSTEROSCOPY, ABLATE ENDOMETRIUM NOVASURE, COMBINED  10/10/2013    Procedure: COMBINED DILATION AND CURETTAGE, HYSTEROSCOPY, ABLATE ENDOMETRIUM NOVASURE;  Endometrial ablation with Novasure;  Surgeon: Indu Birmingham DO;  Location: [facility] OR     HC TOOTH EXTRACTION W/FORCEP       LUMPECTOMY BREAST Left 2/1/2017    Procedure: LUMPECTOMY BREAST;  Surgeon: Lori Montenegro MD;  Location: [facility]     SOFT TISSUE SURGERY      lumpectomy x 2 right breast     SOFT TISSUE SURGERY      episiotomy fixed     MEDICATIONS  Current Outpatient Medications   Medication Sig Dispense Refill     albuterol (PROAIR HFA/PROVENTIL HFA/VENTOLIN HFA) 108 (90 Base) MCG/ACT inhaler INHALE 2 PUFFS INTO THE LUNGS EVERY 6 HOURS 8.5 g 0     LORazepam (ATIVAN) 1 MG tablet Take 1 tablet (1 mg) by mouth twice a day as needed.       MELATONIN PO Take by mouth nightly as needed        metroNIDAZOLE (METROGEL) 0.75 % external gel Apply topically 2 times daily 45 g 1     Naproxen Sodium (ALEVE PO) Take 440 mg by mouth as needed        rOPINIRole (REQUIP) 0.5 MG tablet TAKE 3 TABLETS (1.5 MG) BY MOUTH AT BEDTIME 90 tablet 1     valACYclovir (VALTREX) 1000 mg tablet TAKE TWO TABLETS BY MOUTH TWICE A DAY 4 tablet 3     venlafaxine (EFFEXOR XR) 37.5 MG 24 hr capsule TAKE ONE CAPSULE BY MOUTH ONCE DAILY 90 capsule 1     ALLERGIES  Allergies   Allergen Reactions     No Known Drug Allergy       SOCIAL HISTORY:  Smokes: No  EtOH: Yes, less than 1 per month  Exercise: walking   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 Her  has lymphoma.     ROS:  Change in vision No  Headaches: no  Respiratory: No shortness of breath, dyspnea on exertion, cough, or hemoptysis   Cardiovascular: negative   Gastrointestinal: negative Abdominal pain: no  Breast: negative  Musculoskeletal: negative Joint pain No Back pain: no  Psychiatric: negative  Hematologic/Lymphatic/Immunologic: negative  Endocrine: negative    EXAM  /80   Pulse 76   Temp 98  F (36.7  C) (Oral)   Wt 68.6 kg (151 lb 4.8 oz)   SpO2 100%   BMI 24.05 kg/m     PHYSICAL EXAM  Respiratory: breathing non labored.   Breasts: Examination was done in both the upright and supine positions.  Breasts are symmetrical . No dominant fixed or suspicious masses noted. No skin or nipple changes. No nipple discharge.   Left lateral and periareolar scar well healed. Right lateral scar well healed. Breast bilaterally equally more firm superiorly and centrally  No clavicular, cervical, or axillary lymphadenopathy.     INVESTIGATIONS:    5/17/23 breast MRI: results pending     ASSESSMENT/PLAN:    Kaitlyn Garcia is a 47 year old woman with history of left breast atypical ductal hyperplasia. She meets NCCN guidelines for high risk screening and risk reduction. She completed 5 years of low dose Tamoxifen.       1) Atypical ductal hyperplasia and family history of breast cancer.   We also reviewed that atypical ductal hyperplasia increases her baseline risk for breast cancer. She meets guidelines for high risk screening with a lifetime risk of breast cancer >20%. Discussed she meets guidelines for high risk screening with yearly mammogram alternating with Breast MRI every six months. Clinical encounter every 6-12 months including family history, risk assessment, and clinical breast exam.   - Screening mammogram with clinic visit due 11/17/23  - Breast MRI with clinic visit due 5/2024     2) Lifestyle Modifications were provided. - Maintain your best healthy weight. Higher body fat  and adult weight gain is associated with increased risk for breast cancer. This increase in risk has been attributed to increase in circulating endogenous estrogen levels from fat tissue.   - Alcohol can raise estrogen. Alcohol consumption, even at moderate levels (1-2 drinks per day), increases breast cancer risk and are best avoided. If you choose to drink alcohol limit alcohol consumption to less than 1 drink per day. (1 ounce of liquor, 6 ounces of wine, or 8 ounces of beer).  - Be active daily and void being sedentary.     Merced Wiggins PA-C    20 minutes spent on the date of the encounter doing chart review, review of test results, interpretation of tests, patient visit and documentation.

## 2023-05-17 ENCOUNTER — PATIENT OUTREACH (OUTPATIENT)
Dept: ONCOLOGY | Facility: CLINIC | Age: 48
End: 2023-05-17

## 2023-05-17 ENCOUNTER — OFFICE VISIT (OUTPATIENT)
Dept: SURGERY | Facility: CLINIC | Age: 48
End: 2023-05-17
Attending: PHYSICIAN ASSISTANT
Payer: COMMERCIAL

## 2023-05-17 ENCOUNTER — ANCILLARY PROCEDURE (OUTPATIENT)
Dept: MRI IMAGING | Facility: CLINIC | Age: 48
End: 2023-05-17
Attending: PHYSICIAN ASSISTANT
Payer: COMMERCIAL

## 2023-05-17 VITALS
HEART RATE: 76 BPM | TEMPERATURE: 98 F | SYSTOLIC BLOOD PRESSURE: 137 MMHG | WEIGHT: 151.3 LBS | BODY MASS INDEX: 24.05 KG/M2 | OXYGEN SATURATION: 100 % | DIASTOLIC BLOOD PRESSURE: 80 MMHG

## 2023-05-17 DIAGNOSIS — Z91.89 AT HIGH RISK FOR BREAST CANCER: Primary | ICD-10-CM

## 2023-05-17 DIAGNOSIS — N60.99 ATYPICAL HYPERPLASIA OF BREAST: ICD-10-CM

## 2023-05-17 DIAGNOSIS — Z91.89 AT HIGH RISK FOR BREAST CANCER: ICD-10-CM

## 2023-05-17 DIAGNOSIS — Z80.3 FAMILY HISTORY OF BREAST CANCER: ICD-10-CM

## 2023-05-17 PROCEDURE — 99213 OFFICE O/P EST LOW 20 MIN: CPT | Performed by: PHYSICIAN ASSISTANT

## 2023-05-17 PROCEDURE — G0463 HOSPITAL OUTPT CLINIC VISIT: HCPCS | Performed by: PHYSICIAN ASSISTANT

## 2023-05-17 PROCEDURE — 77049 MRI BREAST C-+ W/CAD BI: CPT | Performed by: RADIOLOGY

## 2023-05-17 PROCEDURE — A9585 GADOBUTROL INJECTION: HCPCS | Performed by: RADIOLOGY

## 2023-05-17 RX ORDER — GADOBUTROL 604.72 MG/ML
7.5 INJECTION INTRAVENOUS ONCE
Status: COMPLETED | OUTPATIENT
Start: 2023-05-17 | End: 2023-05-17

## 2023-05-17 RX ADMIN — GADOBUTROL 7 ML: 604.72 INJECTION INTRAVENOUS at 11:37

## 2023-05-17 ASSESSMENT — PAIN SCALES - GENERAL: PAINLEVEL: NO PAIN (0)

## 2023-05-17 NOTE — PATIENT INSTRUCTIONS
Kaitlyn Garcia is a 47 year old woman with history of left breast atypical ductal hyperplasia. She meets NCCN guidelines for high risk screening and risk reduction. She completed 5 years of low dose Tamoxifen.       1) Atypical ductal hyperplasia and family history of breast cancer.   We also reviewed that atypical ductal hyperplasia increases her baseline risk for breast cancer. She meets guidelines for high risk screening with a lifetime risk of breast cancer >20%. Discussed she meets guidelines for high risk screening with yearly mammogram alternating with Breast MRI every six months. Clinical encounter every 6-12 months including family history, risk assessment, and clinical breast exam.   - Screening mammogram with clinic visit due 11/17/23  - Breast MRI with clinic visit due 5/2024     2) Lifestyle Modifications were provided. - Maintain your best healthy weight. Higher body fat and adult weight gain is associated with increased risk for breast cancer. This increase in risk has been attributed to increase in circulating endogenous estrogen levels from fat tissue.   - Alcohol can raise estrogen. Alcohol consumption, even at moderate levels (1-2 drinks per day), increases breast cancer risk and are best avoided. If you choose to drink alcohol limit alcohol consumption to less than 1 drink per day. (1 ounce of liquor, 6 ounces of wine, or 8 ounces of beer).  - Be active daily and void being sedentary.

## 2023-05-17 NOTE — PROGRESS NOTES
Writer received Genetics - Cancer Risk referral, referred for:     family history of breast and ovarian cancer     Reviewed for appropriate plan, and sent to New Patient Scheduling for completion.

## 2023-05-17 NOTE — LETTER
5/17/2023         RE: Kaitlyn Garcia  [address]  [facility] [address]        Dear Colleague,    Thank you for referring your patient, Kaitlyn Garcia, to the [facility]. Please see a copy of my visit note below.    .  FOLLOW UP  May 17, 2023     Kaitlyn Garcia is a 47 year old woman who presents with history of atypical ductal hyperplasia.      HPI:     History of 2 right breast biopsies that were benign. In 2012 she had a left breast intraductal papilloma surgical excised. In 2016 she had 2 left needle biopsies, one of which demonstrated atypical ductal hyperplasia. The biopsied area of ADH was surgical excised. In 2017 she had an MRI guided needle left breast biopsy that demonstrated an intraductal papilloma.      Family history of 2 paternal aunts who had breast cancer in their 60's and a paternal cousin with breast cancer in her 40's. Kaitlyn was seen by a genetic counselor in 2015, no genetic testing was done.      She is doing high risk screening with breast MRI and mammogram. She completed 5 years of Tamoxifen. She had a breast MRI 5/9/17 revealed an area of enhancement biopsied to be an intraductal papilloma without atypia. She was seen for surgical consultation by Dr. Montenegro and monitoring with imaging was recommended.     She had a pelvic ultrasound 6/4/18, 6/12/19, and 11/4/20 that were stable.      She denies any breast concerns including mass, skin change, nipple inversion or nipple discharge. She does report bilateral breast fullness.      BREAST-SPECIFIC HISTORY:     Previous breast imaging: Yes   - 5/21/12 b/l Dmammo and right u/s for right breast lump: calcifications in the upper outer quadrant of left breast spanning 5 cm. Right u/s negative. BI-RADS 4 suspicious for malignancy   - 5/23/12 left stereotactic biopsy: intraductal papilloma   - 7/30/12 left wire-placement   - 2/19/13 MRI: large segmental enhancement of left lateral breast, BI-RADS 0 incomplete   Left  u/s: BI-RADS 3 probably benign  - 3/29/13 MRI: BI-RADS 2 benign findings  - 8/5/13 b/l Dmammo for fullness: BI-RADS 2 benign   - 2/5/14 MRI: cysts in left breast, BI-RADS 2 benign   - 5/9/14 Dmammo b/l: negative. Left u/s: cystic appearing structures, BI-RADS 2 benign  - 5/20/14 MRI: study not completed due to nausea, BI-RADS 0 incomplete  - 5/28/14 MRI: BI-RADS 2 benign  - 10/28/14 b/l Dmammo: BI-RADS 2 benign   - 5/7/15 MRI BI-RADS 2 benign   - 11/10/15 Smammo: bilateral calcs: BI-RADS 2 benign  - 1/25/16 left u/s for pain: multiple tiny cyst  - 5/17/16 MRI: BI-RADS 2 benign   - 12/6/16  Smammo: lateral left breast developing macrocalcifications BI-RADS 0 incomplete   - 12/7/16 Dmammo left: left 2:00 calcifications BI-RADS 4 suspicious  - 12/12/16: stereotactic biopsy: focus of ADH    - 2/1/17 wire placement   - 5/9/17 MRI: 2 adjacent mass like areas of enhancement mid central left breast BI-RADS 0 incomplete   - 5/12/17 left u/s: scattered small cysts BI-RADS 4 suspicious   - 5/18/17 MR biopsy: intraductal papilloma   - 4/30/18 MRI BI-RADS 2  - 6/6/18 left Dmammo and ultrasound for pain BI-RADS 2  - 11/28/18 Smammo BI-RADS 2  - 5/7/19 breast MRI BI-RADS 2  - 8/21/19 Dmammo and right breast ultrasound: BI-RADS 2  - 11/12/19 Smammo BI-RADS 1  - 5/20/20 breast MRI BI-ARDS 2  - 11/4/20 b/l Dmammo and left breast ultrasound for lump: BI-RADS 1  - 5/5/21 Breast MRI BI-RADS 1  - 11/10/21 Smammo BI-RADS 2  - 5/11/22 breast MRI BI-RADS 2  - 11/16/22 Smammo BI-RADS 2     Prior breast biopsies:Yes   - 2 right breast biopsies   - 5/23/12 left needle biopsy: intraductal papilloma with sclerosing features   - 7/30/12 left excisional biopsy with Dr. Lynch: focal columnar cell change with atypia, intraductal papilloma and surrounding intraductal papillomatosis, sclerosing adenosis, fibrocystic changes  - 12/12/16 left needle biopsy x2: flat epithelial atypia with focus of ADH, duct ectasia and focal sclerosing adenosis,  microcalcifications.   FEA and columnar cell change with atypia  - 17 left scar excision and excisional biopsy with Dr. Montenegro: FEA, columnar cell change/hyperplasia, intraductal papillomas, usual ductal hyperplasia, calcifications  - 17 left MRI needle biopsy: intraductal papilloma, mild columnar cell hyperplasia, mild duct ectasia, focal sclerosing adenosis      Prior history of breast cancer: No  Prior radiation history: No   Self breast exams: No  Breast density: heterogeneously dense     GYN HISTORY:  . Age at 1st pregnancy: 17. Breastfeeding history: Yes.   Age at menarche: 14-14  Menopausal: premenopausal, uterine ablation   Menopausal hormone replacement therapy: No     RISK ASSESSMENT: < 3% 5 year risk and > 20% lifetime risk   Heather: 2.2% 5 year risk   SEBASTIAN: 43.6% lifetime risk      FAMILY HISTORY:  Breast ca: Yes    - paternal aunt 60 's   - paternal aunt 60's  - paternal cousin 40 (aunt)  Ovarian ca: No   - ? Paternal cousin with ovarian cancer   Pancreatic ca: No   Prostate: yes  - maternal uncle 69   Gastric ca: Yes   - paternal grandfather, passed   Melanoma: No  Colon ca: No  Other cancer: Yes   - maternal grandmother skin cancer 90's  - paternal grandfather brain 60's   Alevism ethnicity: No  Other genetic, testing, syndromes, or clotting disorders: No   - She saw a genetic counselor 2015 and no testing was recommended     PAST MEDICAL HISTORY  Past Medical History:   Diagnosis Date    Anemia     Atypical ductal hyperplasia of breast     Choroidal nevus of right eye     Concussion 10/2015    Depressive disorder 2006    Treated with celexa for two years    Family history of breast cancer 2/3/2015    Family history of breast cancer 2/3/2015    Family history of breast cancer 2/3/2015    Family history of breast cancer 2/3/2015    Gastroesophageal reflux disease     Glaucoma suspect of both eyes     Papilloma of breast 11    Left, See Dr. Lynch at     PONV (postoperative nausea and  vomiting)     S/P endometrial ablation 10/10/13    Menorrhagia    Shingles     x2 in the past    WBC decreased 2/3/2015    WBC decreased 2/3/2015    WBC decreased 2/3/2015     PAST SURGICAL HISTORY   Past Surgical History:   Procedure Laterality Date    ABDOMEN SURGERY      rectoplasty    BIOPSY BREAST NEEDLE LOCALIZATION  7/30/2012    Procedure: BIOPSY BREAST NEEDLE LOCALIZATION;  left breast excisional Biopsy with wire localization @0830;  Surgeon: Robert Lynch MD;  Location: [facility] OR    BREAST SURGERY      COSMETIC SURGERY      Rectalplasty for episiotomy repair    COSMETIC SURGERY      Rectalplasty for episiotomy repair    DILATION AND CURETTAGE, HYSTEROSCOPY, ABLATE ENDOMETRIUM NOVASURE, COMBINED  10/10/2013    Procedure: COMBINED DILATION AND CURETTAGE, HYSTEROSCOPY, ABLATE ENDOMETRIUM NOVASURE;  Endometrial ablation with Novasure;  Surgeon: Indu Birmingham DO;  Location: [facility] OR    HC TOOTH EXTRACTION W/FORCEP      LUMPECTOMY BREAST Left 2/1/2017    Procedure: LUMPECTOMY BREAST;  Surgeon: Lori Montenegro MD;  Location: [facility]    SOFT TISSUE SURGERY      lumpectomy x 2 right breast    SOFT TISSUE SURGERY      episiotomy fixed     MEDICATIONS  Current Outpatient Medications   Medication Sig Dispense Refill    albuterol (PROAIR HFA/PROVENTIL HFA/VENTOLIN HFA) 108 (90 Base) MCG/ACT inhaler INHALE 2 PUFFS INTO THE LUNGS EVERY 6 HOURS 8.5 g 0    LORazepam (ATIVAN) 1 MG tablet Take 1 tablet (1 mg) by mouth twice a day as needed.      MELATONIN PO Take by mouth nightly as needed       metroNIDAZOLE (METROGEL) 0.75 % external gel Apply topically 2 times daily 45 g 1    Naproxen Sodium (ALEVE PO) Take 440 mg by mouth as needed       rOPINIRole (REQUIP) 0.5 MG tablet TAKE 3 TABLETS (1.5 MG) BY MOUTH AT BEDTIME 90 tablet 1    valACYclovir (VALTREX) 1000 mg tablet TAKE TWO TABLETS BY MOUTH TWICE A DAY 4 tablet 3    venlafaxine (EFFEXOR XR) 37.5 MG 24 hr capsule TAKE ONE CAPSULE BY MOUTH ONCE DAILY  90 capsule 1     ALLERGIES  Allergies   Allergen Reactions    No Known Drug Allergy       SOCIAL HISTORY:  Smokes: No  EtOH: Yes, less than 1 per month  Exercise: walking   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 Her  has lymphoma.     ROS:  Change in vision No  Headaches: no  Respiratory: No shortness of breath, dyspnea on exertion, cough, or hemoptysis   Cardiovascular: negative   Gastrointestinal: negative Abdominal pain: no  Breast: negative  Musculoskeletal: negative Joint pain No Back pain: no  Psychiatric: negative  Hematologic/Lymphatic/Immunologic: negative  Endocrine: negative    EXAM  /80   Pulse 76   Temp 98  F (36.7  C) (Oral)   Wt 68.6 kg (151 lb 4.8 oz)   SpO2 100%   BMI 24.05 kg/m     PHYSICAL EXAM  Respiratory: breathing non labored.   Breasts: Examination was done in both the upright and supine positions.  Breasts are symmetrical . No dominant fixed or suspicious masses noted. No skin or nipple changes. No nipple discharge.   Left lateral and periareolar scar well healed. Right lateral scar well healed. Breast bilaterally equally more firm superiorly and centrally  No clavicular, cervical, or axillary lymphadenopathy.     INVESTIGATIONS:    5/17/23 breast MRI: results pending     ASSESSMENT/PLAN:    Kaitlyn Garcia is a 47 year old woman with history of left breast atypical ductal hyperplasia. She meets NCCN guidelines for high risk screening and risk reduction. She completed 5 years of low dose Tamoxifen.       1) Atypical ductal hyperplasia and family history of breast cancer.   We also reviewed that atypical ductal hyperplasia increases her baseline risk for breast cancer. She meets guidelines for high risk screening with a lifetime risk of breast cancer >20%. Discussed she meets guidelines for high risk screening with yearly mammogram alternating with Breast MRI every six months. Clinical encounter every 6-12 months including family history, risk assessment, and  clinical breast exam.   - Screening mammogram with clinic visit due 11/17/23  - Breast MRI with clinic visit due 5/2024     2) Lifestyle Modifications were provided. - Maintain your best healthy weight. Higher body fat and adult weight gain is associated with increased risk for breast cancer. This increase in risk has been attributed to increase in circulating endogenous estrogen levels from fat tissue.   - Alcohol can raise estrogen. Alcohol consumption, even at moderate levels (1-2 drinks per day), increases breast cancer risk and are best avoided. If you choose to drink alcohol limit alcohol consumption to less than 1 drink per day. (1 ounce of liquor, 6 ounces of wine, or 8 ounces of beer).  - Be active daily and void being sedentary.     Merced Wiggins PA-C    20 minutes spent on the date of the encounter doing chart review, review of test results, interpretation of tests, patient visit and documentation.

## 2023-05-17 NOTE — DISCHARGE INSTRUCTIONS
MRI Contrast Discharge Instructions    The IV contrast you received today will pass out of your body in your  urine. This will happen in the next 24 hours. You will not feel this process.  Your urine will not change color.    Drink at least 4 extra glasses of water or juice today (unless your doctor  has restricted your fluids). This reduces the stress on your kidneys.  You may take your regular medicines.    If you are on dialysis: It is best to have dialysis today.    If you have a reaction: Most reactions happen right away. If you have  any new symptoms after leaving the hospital (such as hives or swelling),  call your hospital at the correct number below. Or call your family doctor.  If you have breathing distress or wheezing, call 911.    Special instructions: ***    I have read and understand the above information.    Signature:______________________________________ Date:___________    Staff:__________________________________________ Date:___________     Time:__________    [facility] Departments:    ___[place]: [phone]  ___[facility]: [phone]  ___[place]: [id number] ___[facility]: [phone]  ___[facility]: [phone]  ___[facility]: [phone]  ___[phone]  ___[facility]: [phone]  ___[phone]

## 2023-06-20 ENCOUNTER — OFFICE VISIT (OUTPATIENT)
Dept: URGENT CARE | Facility: URGENT CARE | Age: 48
End: 2023-06-20
Payer: COMMERCIAL

## 2023-06-20 VITALS
HEART RATE: 78 BPM | TEMPERATURE: 97.4 F | BODY MASS INDEX: 23.31 KG/M2 | WEIGHT: 146.6 LBS | OXYGEN SATURATION: 97 % | DIASTOLIC BLOOD PRESSURE: 78 MMHG | RESPIRATION RATE: 16 BRPM | SYSTOLIC BLOOD PRESSURE: 117 MMHG

## 2023-06-20 DIAGNOSIS — G43.009 MIGRAINE WITHOUT AURA AND WITHOUT STATUS MIGRAINOSUS, NOT INTRACTABLE: Primary | ICD-10-CM

## 2023-06-20 DIAGNOSIS — J01.90 ACUTE SINUSITIS WITH SYMPTOMS > 10 DAYS: ICD-10-CM

## 2023-06-20 DIAGNOSIS — J20.9 ACUTE BRONCHITIS WITH SYMPTOMS > 10 DAYS: ICD-10-CM

## 2023-06-20 PROCEDURE — 99214 OFFICE O/P EST MOD 30 MIN: CPT | Performed by: PHYSICIAN ASSISTANT

## 2023-06-20 RX ORDER — SUMATRIPTAN 50 MG/1
50 TABLET, FILM COATED ORAL
Qty: 30 TABLET | Refills: 0 | Status: SHIPPED | OUTPATIENT
Start: 2023-06-20 | End: 2023-07-13

## 2023-06-20 RX ORDER — DOXYCYCLINE 100 MG/1
100 CAPSULE ORAL 2 TIMES DAILY WITH MEALS
Qty: 20 CAPSULE | Refills: 0 | Status: SHIPPED | OUTPATIENT
Start: 2023-06-20 | End: 2023-06-30

## 2023-06-20 RX ORDER — BENZONATATE 200 MG/1
200 CAPSULE ORAL 3 TIMES DAILY PRN
Qty: 30 CAPSULE | Refills: 0 | Status: SHIPPED | OUTPATIENT
Start: 2023-06-20 | End: 2023-06-30

## 2023-06-20 ASSESSMENT — ENCOUNTER SYMPTOMS
CARDIOVASCULAR NEGATIVE: 1
PARESTHESIAS: 0
CONSTITUTIONAL NEGATIVE: 1
WHEEZING: 0
GASTROINTESTINAL NEGATIVE: 1
FACIAL ASYMMETRY: 0
FATIGUE: 0
NUMBNESS: 0
SHORTNESS OF BREATH: 0
SINUS PRESSURE: 1
SPEECH DIFFICULTY: 0
FEVER: 0
COUGH: 1
LIGHT-HEADEDNESS: 1
SORE THROAT: 0
RHINORRHEA: 1
PALPITATIONS: 0
HEADACHES: 1
CHILLS: 0
SINUS PAIN: 1
TREMORS: 0
SEIZURES: 0

## 2023-06-20 ASSESSMENT — PAIN SCALES - GENERAL: PAINLEVEL: SEVERE PAIN (6)

## 2023-06-20 NOTE — PROGRESS NOTES
"Willis Sahni is a 47 year old, presenting for the following health issues:  Migraine (Migraine beginning Saturday night. Patient does not have any prescription migraine medication. Patient has been alternating Tylenol and naproxen. Patient reports nausea with migraine.) and Sinus Problem (Patient reports \"sinus cold\" beginning three weeks ago - has now spread to chest. )  HPI   Headache  Onset/Duration: 3months on and off.  Had normal head CT few months ago.  Description  Location: R side, frontal  Character: throbbing pain  Frequency:  daily  Duration:  hours  Wake with headaches: no  Able to do daily activities when headache present: YES  Intensity:  moderate  Progression of Symptoms: same  Accompanying signs and symptoms:  Stiff neck: no  Neck or upper back pain: no  Sinus or URI symptoms: Yes, for 2weeks now, productive cough but no shortness of breath or wheezing  Fever: no  Nausea or vomiting: Yes  Dizziness: Yes  Numbness/tingling: no  Weakness: no  Visual changes: Yes  History  Head trauma: no  Family history of migraines: no  Daily pain medication use: no  Previous tests for headaches: no  Neurologist evaluation: no  Precipitating or Alleviating factors (light/sound/sleep/caffeine): lights, sounds, caffeine  Therapies tried and outcome: tylenol, aleve, allergy med, dayquil, nyquil             Outcome - minimal relief    Patient Active Problem List   Diagnosis     CARDIOVASCULAR SCREENING; LDL GOAL LESS THAN 160     Iron deficiency anemia     Mammographic microcalcification found on diagnostic imaging of breast     Intraductal papilloma of breast     Atypical hyperplasia of breast     Acne     Bloating symptom     Abdominal pain     Abnormal biliary HIDA scan     Family history of breast cancer     Neutropenia (H)     Choroidal nevus of right eye     Situational mixed anxiety and depressive disorder     Glaucoma suspect, bilateral     Post-concussion syndrome     Muscle spasms of head or neck     " Allergic rhinitis     WBC decreased     Current Outpatient Medications   Medication     albuterol (PROAIR HFA/PROVENTIL HFA/VENTOLIN HFA) 108 (90 Base) MCG/ACT inhaler     LORazepam (ATIVAN) 1 MG tablet     MELATONIN PO     Naproxen Sodium (ALEVE PO)     rOPINIRole (REQUIP) 0.5 MG tablet     valACYclovir (VALTREX) 1000 mg tablet     venlafaxine (EFFEXOR XR) 37.5 MG 24 hr capsule     metroNIDAZOLE (METROGEL) 0.75 % external gel     No current facility-administered medications for this visit.        Allergies   Allergen Reactions     No Known Drug Allergy      Review of Systems   Constitutional: Negative.  Negative for chills, fatigue and fever.   HENT: Positive for congestion, rhinorrhea, sinus pressure and sinus pain. Negative for ear discharge, ear pain, hearing loss and sore throat.    Respiratory: Positive for cough. Negative for shortness of breath and wheezing.    Cardiovascular: Negative.  Negative for chest pain, palpitations and peripheral edema.   Gastrointestinal: Negative.    Allergic/Immunologic: Positive for environmental allergies.   Neurological: Positive for light-headedness and headaches. Negative for tremors, seizures, syncope, facial asymmetry, speech difficulty, numbness and paresthesias.   All other systems reviewed and are negative.           Objective    /78 (BP Location: Left arm, Patient Position: Sitting, Cuff Size: Adult Regular)   Pulse 78   Temp 97.4  F (36.3  C) (Tympanic)   Resp 16   Wt 66.5 kg (146 lb 9.6 oz)   SpO2 97%   BMI 23.31 kg/m    Body mass index is 23.31 kg/m .  Physical Exam  Vitals and nursing note reviewed.   Constitutional:       General: She is not in acute distress.     Appearance: Normal appearance. She is well-developed and normal weight. She is not ill-appearing.   HENT:      Head: Normocephalic and atraumatic.      Comments: TMs are intact without any erythema or bulging bilaterally.  Airway is patent.     Nose: Congestion and rhinorrhea present.  Rhinorrhea is purulent.      Right Turbinates: Swollen.      Left Turbinates: Swollen.      Right Sinus: Maxillary sinus tenderness and frontal sinus tenderness present.      Left Sinus: No maxillary sinus tenderness or frontal sinus tenderness.      Mouth/Throat:      Lips: Pink.      Mouth: Mucous membranes are moist.      Pharynx: Oropharynx is clear. Uvula midline. No pharyngeal swelling, oropharyngeal exudate or posterior oropharyngeal erythema.      Tonsils: No tonsillar exudate.   Eyes:      General: No scleral icterus.     Extraocular Movements: Extraocular movements intact.      Conjunctiva/sclera: Conjunctivae normal.      Pupils: Pupils are equal, round, and reactive to light.   Neck:      Thyroid: No thyromegaly.   Cardiovascular:      Rate and Rhythm: Normal rate and regular rhythm.      Pulses: Normal pulses.      Heart sounds: Normal heart sounds, S1 normal and S2 normal. No murmur heard.     No friction rub. No gallop.   Pulmonary:      Effort: Pulmonary effort is normal. No accessory muscle usage, respiratory distress or retractions.      Breath sounds: Normal breath sounds and air entry. No stridor. No decreased breath sounds, wheezing, rhonchi or rales.   Musculoskeletal:      Cervical back: Normal range of motion and neck supple.   Lymphadenopathy:      Cervical: No cervical adenopathy.   Skin:     General: Skin is warm and dry.      Nails: There is no clubbing.   Neurological:      General: No focal deficit present.      Mental Status: She is alert and oriented to person, place, and time.      GCS: GCS eye subscore is 4. GCS verbal subscore is 5. GCS motor subscore is 6.      Cranial Nerves: Cranial nerves 2-12 are intact.      Sensory: Sensation is intact.      Motor: Motor function is intact.      Coordination: Coordination is intact.      Gait: Gait is intact.      Deep Tendon Reflexes: Reflexes are normal and symmetric.   Psychiatric:         Mood and Affect: Mood normal.         Behavior:  Behavior normal.         Thought Content: Thought content normal.         Judgment: Judgment normal.          Assessment/Plan:  Migraine without aura and without status migrainosus, not intractable:  No focal neurologic deficits on exam.  Recent head CT was negative. Will give trial of imitrex as directed. Avoid triggers.  Keep HA diary.  Avoid caffeine, get regular sleep, adequate nutrition, stress reduction.  RTC if symptoms persist or to the ER if she develops worsening HAs, visual changes, one sided weakness or slurred speech.    -     SUMAtriptan (IMITREX) 50 MG tablet; Take 1 tablet (50 mg) by mouth at onset of headache for migraine May repeat in 2 hours. Max 4 tablets/24 hours.    Acute sinusitis with symptoms > 10 days:  Will treat with [id number] for sinusitis and take with food/probiotics to minimize GI upset.  Recommend tylenol/ibuprofen prn pain/fever and zyrtec/pseudofed for sinus congestion.   Rest, fluids, chicken soup.  Recheck in clinic if symptoms worsen or if symptoms do not improve.    -     doxycycline monohydrate (MONODOX) 100 MG capsule; Take 1 capsule (100 mg) by mouth 2 times daily (with meals) for 10 days Increases risk of heartburn and also sun sensitivity or sun burn. Contraindicated in pregnancy.    Acute bronchitis with symptoms > 10 days:  Will treat with doxy above and tessalon perles as needed for symptoms.  -     doxycycline monohydrate (MONODOX) 100 MG capsule; Take 1 capsule (100 mg) by mouth 2 times daily (with meals) for 10 days Increases risk of heartburn and also sun sensitivity or sun burn. Contraindicated in pregnancy.  -     benzonatate (TESSALON) 200 MG capsule; Take 1 capsule (200 mg) by mouth 3 times daily as needed for cough        Gissel Xavier Nina PA-C

## 2023-06-21 ENCOUNTER — MYC MEDICAL ADVICE (OUTPATIENT)
Dept: FAMILY MEDICINE | Facility: CLINIC | Age: 48
End: 2023-06-21
Payer: COMMERCIAL

## 2023-06-21 DIAGNOSIS — G43.009 MIGRAINE WITHOUT AURA AND WITHOUT STATUS MIGRAINOSUS, NOT INTRACTABLE: ICD-10-CM

## 2023-06-22 ENCOUNTER — OFFICE VISIT (OUTPATIENT)
Dept: URGENT CARE | Facility: URGENT CARE | Age: 48
End: 2023-06-22
Payer: COMMERCIAL

## 2023-06-22 VITALS
OXYGEN SATURATION: 98 % | DIASTOLIC BLOOD PRESSURE: 80 MMHG | TEMPERATURE: 97.7 F | HEART RATE: 73 BPM | SYSTOLIC BLOOD PRESSURE: 118 MMHG
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
## 2023-06-22 PROCEDURE — 99213 OFFICE O/P EST LOW 20 MIN: CPT | Performed by: PHYSICIAN ASSISTANT

## 2023-06-22 RX ORDER — TRIAMCINOLONE ACETONIDE 1 MG/G
CREAM TOPICAL 2 TIMES DAILY
Qty: 15 G | Refills: 0 | Status: SHIPPED | OUTPATIENT
Start: 2023-06-22

## 2023-06-22 ASSESSMENT — ENCOUNTER SYMPTOMS: ROS SKIN COMMENTS: BUG BITE

## 2023-06-22 NOTE — PROGRESS NOTES
SUBJECTIVE:   Kaitlyn Garcia is a 47 year old female presenting with a chief complaint of   Chief Complaint   Patient presents with     Insect Bite     Left side of neck noticed since 3 days ago, redness, severe itching, doesn't recall seeing any tick, was spending time outside 6 days ago        She is an established patient of [facility].   Patient noted a bug bite to right side neck 3 days ago.  States at first enlarged.  Itchy.  No tick noted.  No treatment.  Currently on doxycycline for sinusitis.          Review of Systems   Skin:        Bug bite     All other systems reviewed and are negative.      Past Medical History:   Diagnosis Date     Anemia      Atypical ductal hyperplasia of breast      Choroidal nevus of right eye      Concussion 10/2015     Depressive disorder 2006    Treated with celexa for two years     Family history of breast cancer 2/3/2015     Family history of breast cancer 2/3/2015     Family history of breast cancer 2/3/2015     Family history of breast cancer 2/3/2015     Gastroesophageal reflux disease      Glaucoma suspect of both eyes      Papilloma of breast 5/23/12    Left, See Dr. Lynch at      PONV (postoperative nausea and vomiting)      S/P endometrial ablation 10/10/13    Menorrhagia     Shingles     x2 in the past     WBC decreased 2/3/2015     WBC decreased 2/3/2015     WBC decreased 2/3/2015     Family History   Problem Relation Age of Onset     Hypertension Father      Lipids Father      Hyperlipidemia Father      Depression/Anxiety Father      Cerebrovascular Disease Father         TIA     Cancer Maternal Grandmother      Glaucoma Maternal Grandmother      Macular Degeneration Maternal Grandmother      Osteoporosis Maternal Grandmother      Anesthesia Reaction Paternal Grandmother      Diabetes Paternal Grandmother      Cancer Paternal Grandfather      Depression/Anxiety Mother      Depression/Anxiety Daughter      Depression Daughter      Depression Son      Cataracts  Maternal Grandfather      Breast Cancer Paternal Aunt 60        x 2 aunts     Breast Cancer Other 40        Paternal cousin     Breast Cancer Other 64        Several aunts on fathers side/Several aunts on fathers side/Several aunts on fathers side/Several aunts on fathers side     Prostate Cancer Other 64        Uncle on mothers side     Asthma No family hx of      C.A.D. No family hx of      Cancer - colorectal No family hx of      Connective Tissue Disorder No family hx of      Thyroid Disease No family hx of      Coronary Artery Disease No family hx of      Ovarian Cancer No family hx of      Thyroid Disease No family hx of      Chemical Addiction No family hx of      Known Genetic Syndrome No family hx of      Current Outpatient Medications   Medication Sig Dispense Refill     doxycycline monohydrate (MONODOX) 100 MG capsule Take 1 capsule (100 mg) by mouth 2 times daily (with meals) for 10 days Increases risk of heartburn and also sun sensitivity or sun burn. Contraindicated in pregnancy. 20 capsule 0     rOPINIRole (REQUIP) 0.5 MG tablet TAKE 3 TABLETS (1.5 MG) BY MOUTH AT BEDTIME 90 tablet 1     triamcinolone (KENALOG) 0.1 % external cream Apply topically 2 times daily 15 g 0     venlafaxine (EFFEXOR XR) 37.5 MG 24 hr capsule TAKE ONE CAPSULE BY MOUTH ONCE DAILY 90 capsule 1     albuterol (PROAIR HFA/PROVENTIL HFA/VENTOLIN HFA) 108 (90 Base) MCG/ACT inhaler INHALE 2 PUFFS INTO THE LUNGS EVERY 6 HOURS 8.5 g 0     benzonatate (TESSALON) 200 MG capsule Take 1 capsule (200 mg) by mouth 3 times daily as needed for cough 30 capsule 0     LORazepam (ATIVAN) 1 MG tablet Take 1 tablet (1 mg) by mouth twice a day as needed.       MELATONIN PO Take by mouth nightly as needed        metroNIDAZOLE (METROGEL) 0.75 % external gel Apply topically 2 times daily 45 g 1     Naproxen Sodium (ALEVE PO) Take 440 mg by mouth as needed        SUMAtriptan (IMITREX) 50 MG tablet Take 1 tablet (50 mg) by mouth at onset of headache for  migraine May repeat in 2 hours. Max 4 tablets/24 hours. 30 tablet 0     valACYclovir (VALTREX) 1000 mg tablet TAKE TWO TABLETS BY MOUTH TWICE A DAY 4 tablet 3     Social History     Tobacco Use     Smoking status: Never     Passive exposure: Never     Smokeless tobacco: Never   Substance Use Topics     Alcohol use: Yes     Alcohol/week: 0.0 standard drinks of alcohol     Comment: Less than once a month       OBJECTIVE  /80 (BP Location: Right arm, Patient Position: Sitting, Cuff Size: Adult Regular)   Pulse 73   Temp 97.7  F (36.5  C) (Tympanic)   SpO2 98%     Physical Exam  Vitals reviewed.   Constitutional:       Appearance: Normal appearance. She is normal weight.   Eyes:      Extraocular Movements: Extraocular movements intact.      Conjunctiva/sclera: Conjunctivae normal.   Cardiovascular:      Rate and Rhythm: Normal rate.   Skin:     Comments: Right side base of neck with 5 cm erythematous and edematous lesion.     Neurological:      Mental Status: She is alert.         Labs:  No results found for this or any previous visit (from the past 24 hour(s)).    X-Ray was not done.    ASSESSMENT:      ICD-10-CM    1. Bug bite, initial encounter  W57.XXXA triamcinolone (KENALOG) 0.1 % external cream           Medical Decision Making:    Differential Diagnosis:  Insect Bite: Spider bite and Mosquito bite    Serious Comorbid Conditions:  Adult:  reviewed    PLAN:    Rx for triamcinolone.  Patient education.  Don't itch.  Ice prn.      Followup:    If not improving or if condition worsens, follow up with your Primary Care Provider, If not improving or if conditions worsens over the next 12-24 hours, go to the Emergency Department    There are no Patient Instructions on file for this visit.

## 2023-07-02 DIAGNOSIS — G47.61 PLMD (PERIODIC LIMB MOVEMENT DISORDER): ICD-10-CM

## 2023-07-03 RX ORDER — ROPINIROLE 0.5 MG/1
TABLET, FILM COATED ORAL
Qty: 90 TABLET | Refills: 1 | Status: SHIPPED | OUTPATIENT
Start: 2023-07-03 | End: 2023-09-01

## 2023-07-13 DIAGNOSIS — G43.009 MIGRAINE WITHOUT AURA AND WITHOUT STATUS MIGRAINOSUS, NOT INTRACTABLE: ICD-10-CM

## 2023-07-13 RX ORDER — SUMATRIPTAN 50 MG/1
50 TABLET, FILM COATED ORAL
Qty: 30 TABLET | Refills: 3 | Status: SHIPPED | OUTPATIENT
Start: 2023-07-13 | End: 2024-02-26

## 2023-07-13 RX ORDER — SUMATRIPTAN 50 MG/1
50 TABLET, FILM COATED ORAL
Qty: 30 TABLET | Refills: 0 | OUTPATIENT
Start: 2023-07-13

## 2023-07-26 DIAGNOSIS — F41.1 GAD (GENERALIZED ANXIETY DISORDER): ICD-10-CM

## 2023-07-26 DIAGNOSIS — F41.0 PANIC ATTACK: ICD-10-CM

## 2023-07-26 RX ORDER — VENLAFAXINE HYDROCHLORIDE 37.5 MG/1
CAPSULE, EXTENDED RELEASE ORAL
Qty: 90 CAPSULE | Refills: 0 | Status: SHIPPED | OUTPATIENT
Start: 2023-07-26 | End: 2023-10-03

## 2023-08-09 ENCOUNTER — OFFICE VISIT (OUTPATIENT)
Dept: URGENT CARE | Facility: URGENT CARE | Age: 48
End: 2023-08-09
Payer: COMMERCIAL

## 2023-08-09 VITALS
HEART RATE: 76 BPM | SYSTOLIC BLOOD PRESSURE: 110 MMHG | TEMPERATURE: 97.3 F | DIASTOLIC BLOOD PRESSURE: 79 MMHG | OXYGEN SATURATION: 98 %

## 2023-08-09 DIAGNOSIS — J01.90 ACUTE SINUSITIS WITH SYMPTOMS > 10 DAYS: Primary | ICD-10-CM

## 2023-08-09 PROCEDURE — 99213 OFFICE O/P EST LOW 20 MIN: CPT

## 2023-08-09 NOTE — PROGRESS NOTES
Assessment & Plan     Acute sinusitis with symptoms > 10 days  - amoxicillin-clavulanate (AUGMENTIN) 875-125 MG tablet  Dispense: 14 tablet; Refill: 0     Patient Instructions   Augmentin twice a day for 7 days   Push fluids  Lots of handwashing.   Ibuprofen as needed for fever or pain  Delsymor dayquil/nyquil for cough as needed     Rest as able.   F/u in the clinic if symptoms persist or worsen.        No follow-ups on file.    CS Urgent Care Provider  [facility] ANDREE Sahni is a 47 year old female who presents to clinic today for the following health issues:  Chief Complaint   Patient presents with    Sinus Problem     Congestion, pressure behind eyes, pain, headache X11-12 days     HPI      URI Adult    Onset of symptoms was 10 day(s) ago.  Course of illness is worsening.    Severity moderate  Current and Associated symptoms: chills, sweats, runny nose, stuffy nose, cough - non-productive, ear pain  sore throat, and facial pain/pressure  Treatment measures tried include Decongestants and OTC Cough med.  Predisposing factors include ill contact: Work.    Review of Systems  Constitutional, HEENT, cardiovascular, pulmonary, GI, , musculoskeletal, neuro, skin, endocrine and psych systems are negative, except as otherwise noted.      Objective    /79   Pulse 76   Temp 97.3  F (36.3  C) (Tympanic)   SpO2 98%   Physical Exam   GENERAL: healthy, alert and no distress  EYES: Eyes grossly normal to inspection, PERRL and conjunctivae and sclerae normal  HENT: normal cephalic/atraumatic, ear canals and TM's normal, nose and mouth without ulcers or lesions, oropharynx clear, oral mucous membranes moist, and sinuses: maxillary, frontal tenderness on bilaterally  NECK: no adenopathy, no asymmetry, masses, or scars and thyroid normal to palpation  RESP: lungs clear to auscultation - no rales, rhonchi or wheezes  CV: regular rate and rhythm, normal S1 S2, no S3 or S4, no murmur,  click or rub, no peripheral edema and peripheral pulses strong  ABDOMEN: soft, nontender, no hepatosplenomegaly, no masses and bowel sounds normal  MS: no gross musculoskeletal defects noted, no edema

## 2023-08-09 NOTE — PATIENT INSTRUCTIONS
Augmentin twice a day for 7 days   Push fluids  Lots of handwashing.   Ibuprofen as needed for fever or pain  Delsymor dayquil/nyquil for cough as needed     Rest as able.   F/u in the clinic if symptoms persist or worsen.

## 2023-09-01 DIAGNOSIS — G47.61 PLMD (PERIODIC LIMB MOVEMENT DISORDER): ICD-10-CM

## 2023-09-01 RX ORDER — ROPINIROLE 0.5 MG/1
TABLET, FILM COATED ORAL
Qty: 90 TABLET | Refills: 1 | Status: SHIPPED | OUTPATIENT
Start: 2023-09-01 | End: 2023-09-05

## 2023-09-02 DIAGNOSIS — G47.61 PLMD (PERIODIC LIMB MOVEMENT DISORDER): ICD-10-CM

## 2023-09-05 RX ORDER — ROPINIROLE 0.5 MG/1
TABLET, FILM COATED ORAL
Qty: 90 TABLET | Refills: 1 | Status: SHIPPED | OUTPATIENT
Start: 2023-09-05 | End: 2023-12-22

## 2023-09-08 ENCOUNTER — VIRTUAL VISIT (OUTPATIENT)
Dept: ONCOLOGY | Facility: CLINIC | Age: 48
End: 2023-09-08
Attending: PHYSICIAN ASSISTANT
Payer: COMMERCIAL

## 2023-09-08 DIAGNOSIS — Z80.3 FAMILY HISTORY OF BREAST CANCER: Primary | ICD-10-CM

## 2023-09-08 DIAGNOSIS — Z80.41 FAMILY HISTORY OF MALIGNANT NEOPLASM OF OVARY: ICD-10-CM

## 2023-09-08 PROCEDURE — 96040 HC GENETIC COUNSELING, EACH 30 MINUTES: CPT | Mod: GT,95 | Performed by: GENETIC COUNSELOR, MS

## 2023-09-08 ASSESSMENT — PAIN SCALES - GENERAL: PAINLEVEL: MILD PAIN (2)

## 2023-09-08 NOTE — PROGRESS NOTES
Virtual Visit Details    Type of service:  Video Visit   Joined the call at 2023, 8:57:23 am.  Left the call at 2023, 9:22:32 am.    Originating Location (pt. Location): Home  Distant Location (provider location):  Off-site  Platform used for Video Visit: Pat    2023    Referring Provider: Merced Wiggins PA-C    Presenting Information:   I met with Kaitlyn Garcia today for genetic counseling at the Cancer Risk Management Program (virtual visit) to discuss her family history of breast cancer.  She is here today to review this history, cancer screening recommendations, and available genetic testing options.    Personal History:  Kaitlyn is a 47 year old female.  In  she had a left breast intraductal papilloma surgical excised. In  she had 2 left needle biopsies, one of which demonstrated atypical ductal hyperplasia. The biopsied area of ADH was surgical excised. In  she had an MRI guided needle left breast biopsy that demonstrated an intraductal papilloma. She is currently followed by Merced Wiggins PA-C, for high risk breast screening with annual breast MRI (2023) and mammogram (2022).  Her most recent colonoscopy was in , and follow up was recommended in 10 years.   She had her first menstrual period at age 14-15, her first child at age 17, and is premenopause.      Kaitlyn met with Alice Walton MS [facility] [place] [facility] [place] in .  No testing was ordered at that time.      Family History: (Please see scanned pedigree from  detailed family history information; family history information was updated today)  Kaitlyn has three children ages 30, 26 and 24.  She also has 11 brothers and sisters, none of whom have had cancer.  Two of Kaitlyn mcintosh paternal aunts have had breast cancer in their 60 s  One of Kaitlyn mcintosh paternal cousins had ovarian cancer in her 30s or 40s.    Kaitlyn s paternal grandfather reportedly had brain cancer that was diagnosed in his 60 s and  of  stomach cancer in his 80's    Kaitlyn s maternal uncle had prostate cancer at 69.  Kaitlyn s maternal grandmother had skin cancer at 95, and passed away at 97.   Kaitlyn s maternal and paternal ancestry is [place].      Discussion:  Kaitlyn has a personal history of atypical ductal hyperplasia and family history of breast cancer.  She is followed with alternating mammogram and breast MRI imaging due to this history.    We reviewed the features of sporadic, familial, and hereditary cancers. Based on her personal and family history, Kaitlyn meets current National Comprehensive Cancer Network (NCCN) criteria for genetic testing of hereditary breast and ovarian cancer (BRCA1 and BRCA2).    We discussed that there are additional genes that could cause increased risk for breast, and other cancers. As many of these genes present with overlapping features in a family and accurate cancer risk cannot always be established based upon the pedigree analysis alone, it would be reasonable for Kaitlyn to consider panel genetic testing to analyze multiple genes at once.  A detailed handout regarding hereditary cancer and the information we discussed can be found in the after visit summary. Topics included: inheritance pattern, cancer risks, cancer screening recommendations, and also risks, benefits and limitations of testing.  We reviewed available genetic testing, including targeted and expanded testing options.  Kaitlyn elected to proceed with BRCA1/2 testing and the [facility] Multi Cancer Panel, and verbal consent was obtained.  Genes selected: AIP, ALK, APC, BIPIN, AXIN2, BAP1, BARD1, BLM, BMPR1A, BRCA1, BRCA2, BRIP1, CASR, CDC73, CDH1, CDK4, CDKN1B, CDKN1C, CDKN2A, CEBPA, CHEK2, CTNNA1, DICER1, DIS3L2, EGFR, EPCAM, FH, FLCN, GATA2, GPC3, GREM1, HOXB13, HRAS, KIT, MAX, MEN1, MET, MITF, MLH1, MSH2, MSH3, MSH6, MUTYH, NBN, NF1, NF2, NTHL1, PALB2, PDGFRA, PHOX2B, PMS2, POLD1, POLE, POT1, ZZERP2F, PTCH1, PTEN, RAD50, RAD51C, RAD51D, RB1,  RECQL4, RET, RUNX1, SDHA, SDHAF2, SDHB, SDHC, SDHD, SMAD4, SMARCA4, SMARCB1, SMARCE1, STK11, SUFU, TERC, TERT, [id number], TP53, TSC1, TSC2, VHL, WRN, WT1  Medical Management: For Kaitlyn, we reviewed that the information from genetic testing may determine:  additional cancer screening for which Kaitlyn may qualify (i.e. more frequent colonoscopies, more frequent dermatologic exams, etc.),  options for risk reducing surgeries Kaitlyn could consider (i.e. bilateral mastectomy, surgery to remove her ovaries and/or uterus, etc.),    and targeted chemotherapies for certain cancers in the future (i.e. immunotherapy for individuals with Macias syndrome, PARP inhibitors, etc.).   These recommendations will be discussed in detail once genetic testing is completed.     Plan:  1) Today Kaitlyn elected to proceed with BRCA1/2 testing and the [facility] Multi Cancer Panel. She plans to have her blood drawn at the [facility] clinic.  2) This information should be available in 4 weeks.  3) Kaitlyn will return to clinic to discuss the results.    Yasmine Phillip MS, CGC  Licensed, Certified Genetic Counselor  [facility]  Phone: [phone]      Genetic Testing Next Steps:    An order for genetic testing will be placed by your genetic counselor, and a blood draw will be coordinated through an [facility] clinic   You will be contacted by [facility] if the estimated out of pocket cost exceeds $100.  This price depends on your insurance benefits (including coverage and remaining deductible).   Once notified, please contact the testing laboratory to confirm your out of pocket cost.  Additional pricing and payment options may be available.  Results from your genetic test will be available in approximately 2-4 weeks, though may take longer depending on insurance.  A follow up appointment will be scheduled with your genetic counselor to discuss these results and medical management options.  Your genetic test results may not appear  in [employer]; please contact your genetic counselor if you would prefer to obtain an electronic copy prior to your appointment.

## 2023-09-08 NOTE — LETTER
9/8/2023         RE: Kaitlyn Garcia  [address]  [place] [address]        Dear Colleague,    Thank you for referring your patient, Kaitlyn Garcia, to the [facility]. Please see a copy of my visit note below.    Virtual Visit Details    Type of service:  Video Visit   Joined the call at 9/8/2023, 8:57:23 am.  Left the call at 9/8/2023, 9:22:32 am.    Originating Location (pt. Location): Home  Distant Location (provider location):  Off-site  Platform used for Video Visit: [employer]    9/8/2023    Referring Provider: Merced Wiggins PA-C    Presenting Information:   I met with Kaitlyn Garcia today for genetic counseling at the Cancer Risk Management Program (virtual visit) to discuss her family history of breast cancer.  She is here today to review this history, cancer screening recommendations, and available genetic testing options.    Personal History:  Kaitlyn is a 47 year old female.  In 2012 she had a left breast intraductal papilloma surgical excised. In 2016 she had 2 left needle biopsies, one of which demonstrated atypical ductal hyperplasia. The biopsied area of ADH was surgical excised. In 2017 she had an MRI guided needle left breast biopsy that demonstrated an intraductal papilloma. She is currently followed by Merced Wiggins PA-C, for high risk breast screening with annual breast MRI (5/17/2023) and mammogram (11/16/2022).  Her most recent colonoscopy was in 2020, and follow up was recommended in 10 years.   She had her first menstrual period at age 14-15, her first child at age 17, and is premenopause.      Kaitlyn [place] Alice Walton [place] [facility] [place].  No testing was ordered at that time.      Family History: (Please see scanned pedigree from 2015 detailed family history information; family history information was updated today)  Kaitlyn has three children ages 30, 26 and 24.  She also has 11 brothers and sisters, none of whom have had  cancer.  Two of Kaitlyn s paternal aunts have had breast cancer in their 60 s  One of Kaitlyn mcintosh paternal cousins had ovarian cancer in her 30s or 40s.    Kaitlyn s paternal grandfather reportedly had brain cancer that was diagnosed in his 60 s and  of stomach cancer in his 80's    Kaitlyn s maternal uncle had prostate cancer at 69.  Kaitlyn s maternal grandmother had skin cancer at 95, and passed away at 97.   Kaitlyn s maternal and paternal ancestry is [place].      Discussion:  Kaitlyn has a personal history of atypical ductal hyperplasia and family history of breast cancer.  She is followed with alternating mammogram and breast MRI imaging due to this history.    We reviewed the features of sporadic, familial, and hereditary cancers. Based on her personal and family history, Kaitlyn meets current National Comprehensive Cancer Network (NCCN) criteria for genetic testing of hereditary breast and ovarian cancer (BRCA1 and BRCA2).    We discussed that there are additional genes that could cause increased risk for breast, and other cancers. As many of these genes present with overlapping features in a family and accurate cancer risk cannot always be established based upon the pedigree analysis alone, it would be reasonable for Kaitlyn to consider panel genetic testing to analyze multiple genes at once.  A detailed handout regarding hereditary cancer and the information we discussed can be found in the after visit summary. Topics included: inheritance pattern, cancer risks, cancer screening recommendations, and also risks, benefits and limitations of testing.  We reviewed available genetic testing, including targeted and expanded testing options.  Kaitlyn elected to proceed with BRCA1/2 testing and the [facility] Multi Cancer Panel, and verbal consent was obtained.  Genes selected: AIP, ALK, APC, BIPIN, AXIN2, BAP1, BARD1, BLM, BMPR1A, BRCA1, BRCA2, BRIP1, CASR, CDC73, CDH1, CDK4, CDKN1B, CDKN1C, CDKN2A, CEBPA, CHEK2, CTNNA1,  DICER1, DIS3L2, EGFR, EPCAM, FH, FLCN, GATA2, GPC3, GREM1, HOXB13, HRAS, KIT, MAX, MEN1, MET, MITF, MLH1, MSH2, MSH3, MSH6, MUTYH, NBN, NF1, NF2, NTHL1, PALB2, PDGFRA, PHOX2B, PMS2, POLD1, POLE, POT1, [id number], PTCH1, PTEN, RAD50, RAD51C, RAD51D, RB1, RECQL4, RET, RUNX1, SDHA, SDHAF2, SDHB, SDHC, SDHD, SMAD4, SMARCA4, SMARCB1, SMARCE1, STK11, SUFU, TERC, TERT, [id number], TP53, TSC1, TSC2, VHL, WRN, WT1  Medical Management: For Kaitlyn, we reviewed that the information from genetic testing may determine:  additional cancer screening for which Kaitlyn may qualify (i.e. more frequent colonoscopies, more frequent dermatologic exams, etc.),  options for risk reducing surgeries Kaitlyn could consider (i.e. bilateral mastectomy, surgery to remove her ovaries and/or uterus, etc.),    and targeted chemotherapies for certain cancers in the future (i.e. immunotherapy for individuals with Macias syndrome, PARP inhibitors, etc.).   These recommendations will be discussed in detail once genetic testing is completed.     Plan:  1) Today Kaitlyn elected to proceed with BRCA1/2 testing and the [facility] Multi Cancer Panel. She plans to have her blood drawn at the [facility].  2) This information should be available in 4 weeks.  3) Kaitlyn will return to clinic to discuss the results.    Yasmine Phillip MS, [facility]  Licensed, Certified Genetic Counselor  [facility]  Phone: [phone]      Genetic Testing Next Steps:    An order for genetic testing will be placed by your genetic counselor, and a blood draw will be coordinated through an [facility] clinic   You will be contacted by [facility] if the estimated out of pocket cost exceeds $100.  This price depends on your insurance benefits (including coverage and remaining deductible).   Once notified, please contact the testing laboratory to confirm your out of pocket cost.  Additional pricing and payment options may be available.  Results from your genetic test will be  available in approximately 2-4 weeks, though may take longer depending on insurance.  A follow up appointment will be scheduled with your genetic counselor to discuss these results and medical management options.  Your genetic test results may not appear in [employer]; please contact your genetic counselor if you would prefer to obtain an electronic copy prior to your appointment.

## 2023-09-08 NOTE — PATIENT INSTRUCTIONS
Assessing Cancer Risk  Cancer is a common diagnosis which impacts many families.  Individuals may develop cancer due to environmental factors (such as exposures and lifestyle), aging, genetic predisposition, or a combination of these factors.  The vast majority of cancer diagnoses are considered sporadic, and not primarily due to an inherited factor. Approximately 5-10% of cancer diagnoses are thought to be caused by inherited risk factors.       Many of the genes we are born with impact our risk of certain diseases, such as cancer.  When one of these genes is not working properly due to a mistake (known as a  mutation  or  variant ), this may lead to an increased risk of developing cancer.      Families impacted by a hereditary cancer syndrome are more likely to have relatives across several generations diagnosed with cancer, earlier ages of diagnoses (prior to age 50), certain rare tumors, or relatives diagnosed with multiple primary cancers.  However, this may not be the case for all families which carry a cancer risk gene, such as those with small family size or limited history information.         Genetic Testing  For some families, genetic testing may help to explain why their cancer developed, provide tailored management options, and clarify the risk of developing cancer in the future.      Genetic testing involves a simple blood or saliva test and will look at the genetic information in select genes for variants associated with cancer risk.  This testing may include analysis of a single gene due to a known variant in the family, multiple genes most associated with the cancers in a family, or an expanded panel of genes related to many types of cancers.    Results  There are several possible genetic test results, including:   Positive--a harmful mutation (also known as a  pathogenic  or  likely pathogenic  variant) was identified in a gene associated with increased cancer risk.  These risks, as well as  medical management options, depend on the specific genetic variant identified.    Negative--no variants were identified in the genes analyzed   Variant of unknown significance--a variant was identified in one or more genes, though it is currently unclear how this impacts cancer risk in the family.  Genetic testing labs are working to collect evidence about these uncertain variants and may provide updates in the future.    Medical Management  If a harmful mutation is found in a cancer risk gene, there may be increased cancer surveillance or preventative surgeries that can be offered. This information will be discussed after genetic testing is completed. If no mutations are found on genetic testing, screening is often recommended based on personal and/or family history of cancer. All cancer risk management options should be discussed in more detail with an individual's medical providers.    Inheritance   Variants in most cancer risk genes are inherited in an autosomal dominant pattern.  This means that if a parent has a variant, each of their children will have a 50% chance of inheriting that same variant.  Therefore, each child would have a 50% chance of being at increased risk for developing cancer.    Some cancer risk genes are inherited in an autosomal recessive pattern.  These risks are present when an individual inherits mutations from both parents in the same gene.         Genetic Information Nondiscrimination Act (HELEN)  The Genetic Information Nondiscrimination Act of 2008 (HELEN) is a federal law that protects individuals from health insurance or employment discrimination based on a genetic test result alone (with some exceptions, including employers with fewer than 15 employees, and ).  However, HELEN's protection does not cover life insurance, long term care, or disability insurances.  The [facility] [employer] [place] is a great resource to learn more.    Questions to Think About  Regarding Genetic Testing  What effect will the test result have on me and my relationship with my family members if I have an inherited gene mutation?  If I don't have a gene mutation?  Should I share my test results, and how will my family react to this news, which may also affect them?  Are my children ready to learn new information that may one day affect their own health?    Please call us if you have any questions or concerns   (Appointments: [phone])    Jakob Pierre, MS [facility]  [phone]  Emily Chavez, MS, [facility] [phone]  Vianca Nicole, MS, [facility]  [phone]  Yasmine Phillip, MS, [facility]  [phone]  Addis Keen, MS, [facility]  [phone]  Krissy Cleaning, MS, [facility]  [phone]  Pilar Green, MS, [facility]  [phone]

## 2023-09-08 NOTE — NURSING NOTE
Is the patient currently in the state of [place]? YES    Visit mode:VIDEO    If the visit is dropped, the patient can be reconnected by: VIDEO VISIT: Text to cell phone:   Telephone Information:   Mobile [phone]       Will anyone else be joining the visit? NO  (If patient encounters technical issues they should call [phone]    How would you like to obtain your AVS? MyChart    Are changes needed to the allergy or medication list? No    Reason for visit: Consult    Adenike MCCURDY

## 2023-09-18 ENCOUNTER — LAB (OUTPATIENT)
Dept: LAB | Facility: CLINIC | Age: 48
End: 2023-09-18
Payer: COMMERCIAL

## 2023-09-18 DIAGNOSIS — Z80.3 FAMILY HISTORY OF BREAST CANCER: ICD-10-CM

## 2023-09-18 DIAGNOSIS — Z80.41 FAMILY HISTORY OF MALIGNANT NEOPLASM OF OVARY: ICD-10-CM

## 2023-09-18 PROCEDURE — 36415 COLL VENOUS BLD VENIPUNCTURE: CPT

## 2023-09-18 PROCEDURE — 99000 SPECIMEN HANDLING OFFICE-LAB: CPT

## 2023-09-20 ENCOUNTER — OFFICE VISIT (OUTPATIENT)
Dept: URGENT CARE | Facility: URGENT CARE | Age: 48
End: 2023-09-20
Payer: COMMERCIAL

## 2023-09-20 VITALS
SYSTOLIC BLOOD PRESSURE: 108 MMHG | DIASTOLIC BLOOD PRESSURE: 74 MMHG | WEIGHT: 153.3 LBS | OXYGEN SATURATION: 96 % | BODY MASS INDEX: 24.37 KG/M2 | TEMPERATURE: 97.4 F | RESPIRATION RATE: 16 BRPM | HEART RATE: 84 BPM

## 2023-09-20 DIAGNOSIS — J22 LOWER RESPIRATORY TRACT INFECTION: Primary | ICD-10-CM

## 2023-09-20 DIAGNOSIS — J20.9 ACUTE BRONCHITIS, UNSPECIFIED ORGANISM: ICD-10-CM

## 2023-09-20 PROCEDURE — 99213 OFFICE O/P EST LOW 20 MIN: CPT | Performed by: NURSE PRACTITIONER

## 2023-09-20 RX ORDER — PREDNISONE 20 MG/1
20 TABLET ORAL DAILY
Qty: 5 TABLET | Refills: 0 | Status: SHIPPED | OUTPATIENT
Start: 2023-09-20 | End: 2023-09-25

## 2023-09-20 RX ORDER — AZITHROMYCIN 250 MG/1
TABLET, FILM COATED ORAL
Qty: 6 TABLET | Refills: 0 | Status: SHIPPED | OUTPATIENT
Start: 2023-09-20 | End: 2023-09-25

## 2023-09-20 ASSESSMENT — PAIN SCALES - GENERAL: PAINLEVEL: NO PAIN (0)

## 2023-09-20 NOTE — PATIENT INSTRUCTIONS
-See handout on bronchitis.  Take steroid for 5 days.  Antibiotic as directed.  Albuterol inhaler 2 puffs every 4 hours as needed for shortness of breath, cough, or wheezing.  Follow up with your primary provider in the next 7-10 days if not improving as expected. Sooner if worse, ER with trouble breathing.

## 2023-09-20 NOTE — PROGRESS NOTES
SUBJECTIVE:  Kaitlyn Garcia is a 47 year old female who presents to the clinic today with a chief complaint of cough  for 11 day(s).  Her cough is described as productive of yellow sputum and cough.    The patient's symptoms are moderate and worsening.  Associated symptoms include none. The patient's symptoms are exacerbated by no particular triggers  Patient has been using decongestants, Robitussin DM, and mucinex  to improve symptoms.    Past Medical History:   Diagnosis Date    Anemia     Atypical ductal hyperplasia of breast     Choroidal nevus of right eye     Concussion 10/2015    Depressive disorder 2006    Treated with celexa for two years    Family history of breast cancer 2/3/2015    Family history of breast cancer 2/3/2015    Family history of breast cancer 2/3/2015    Family history of breast cancer 2/3/2015    Gastroesophageal reflux disease     Glaucoma suspect of both eyes     Papilloma of breast 5/23/12    Left, See Dr. Lynch at     PONV (postoperative nausea and vomiting)     S/P endometrial ablation 10/10/13    Menorrhagia    Shingles     x2 in the past    WBC decreased 2/3/2015    WBC decreased 2/3/2015    WBC decreased 2/3/2015       Current Outpatient Medications   Medication Sig Dispense Refill    albuterol (PROAIR HFA/PROVENTIL HFA/VENTOLIN HFA) 108 (90 Base) MCG/ACT inhaler INHALE 2 PUFFS INTO THE LUNGS EVERY 6 HOURS 8.5 g 0    LORazepam (ATIVAN) 1 MG tablet Take 1 tablet (1 mg) by mouth twice a day as needed.      MELATONIN PO Take by mouth nightly as needed       Naproxen Sodium (ALEVE PO) Take 440 mg by mouth as needed       rOPINIRole (REQUIP) 0.5 MG tablet TAKE THREE TABLETS BY MOUTH AT BEDTIME 90 tablet 1    SUMAtriptan (IMITREX) 50 MG tablet Take 1 tablet (50 mg) by mouth at onset of headache for migraine May repeat in 2 hours. Max 4 tablets/24 hours. 30 tablet 3    valACYclovir (VALTREX) 1000 mg tablet TAKE TWO TABLETS BY MOUTH TWICE A DAY 4 tablet 3    venlafaxine (EFFEXOR XR) 37.5  MG 24 hr capsule TAKE ONE CAPSULE BY MOUTH ONCE DAILY 90 capsule 0    metroNIDAZOLE (METROGEL) 0.75 % external gel Apply topically 2 times daily 45 g 1    triamcinolone (KENALOG) 0.1 % external cream Apply topically 2 times daily 15 g 0       Social History     Tobacco Use    Smoking status: Never     Passive exposure: Never    Smokeless tobacco: Never   Substance Use Topics    Alcohol use: Yes     Alcohol/week: 0.0 standard drinks of alcohol     Comment: Less than once a month       OBJECTIVE:  /74 (BP Location: Left arm, Patient Position: Sitting, Cuff Size: Adult Regular)   Pulse 84   Temp 97.4  F (36.3  C) (Tympanic)   Resp 16   Wt 69.5 kg (153 lb 4.8 oz)   SpO2 96%   BMI 24.37 kg/m    GENERAL APPEARANCE: healthy, alert and no distress  EYES: EOMI,  PERRL, conjunctiva clear  HENT: ear canals and TM's normal.  Nose and mouth without ulcers, erythema or lesions  NECK: supple, nontender, no lymphadenopathy  RESP: lungs clear to auscultation - no rales, rhonchi or wheezes  CV: regular rates and rhythm, normal S1 S2, no murmur noted  ABDOMEN:  soft, nontender, no HSM or masses and bowel sounds normal  NEURO: Normal strength and tone, sensory exam grossly normal,  normal speech and mentation  SKIN: no suspicious lesions or rashes    ASSESSMENT:      1. Lower respiratory tract infection    - azithromycin (ZITHROMAX) 250 MG tablet; Take 2 tablets (500 mg) by mouth daily for 1 day, THEN 1 tablet (250 mg) daily for 4 days.  Dispense: 6 tablet; Refill: 0  - predniSONE (DELTASONE) 20 MG tablet; Take 1 tablet (20 mg) by mouth daily for 5 days  Dispense: 5 tablet; Refill: 0    2. Acute bronchitis, unspecified organism        PLAN:  -See handout on bronchitis.  Take steroid for 5 days.  Antibiotic as directed.  Albuterol inhaler 2 puffs every 4 hours as needed for shortness of breath, cough, or wheezing.  Follow up with your primary provider in the next 7-10 days if not improving as expected. Sooner if worse, ER  with trouble breathing.

## 2023-09-25 LAB — SCANNED LAB RESULT: NORMAL

## 2023-09-28 ENCOUNTER — MYC REFILL (OUTPATIENT)
Dept: FAMILY MEDICINE | Facility: CLINIC | Age: 48
End: 2023-09-28
Payer: COMMERCIAL

## 2023-09-28 DIAGNOSIS — F41.0 PANIC ATTACK: ICD-10-CM

## 2023-09-28 DIAGNOSIS — F41.1 GAD (GENERALIZED ANXIETY DISORDER): ICD-10-CM

## 2023-09-28 RX ORDER — VENLAFAXINE HYDROCHLORIDE 37.5 MG/1
37.5 CAPSULE, EXTENDED RELEASE ORAL DAILY
Qty: 90 CAPSULE | Refills: 0 | OUTPATIENT
Start: 2023-09-28

## 2023-10-03 ENCOUNTER — MYC REFILL (OUTPATIENT)
Dept: FAMILY MEDICINE | Facility: CLINIC | Age: 48
End: 2023-10-03
Payer: COMMERCIAL

## 2023-10-03 DIAGNOSIS — F41.0 PANIC ATTACK: ICD-10-CM

## 2023-10-03 DIAGNOSIS — F41.1 GAD (GENERALIZED ANXIETY DISORDER): ICD-10-CM

## 2023-10-03 RX ORDER — VENLAFAXINE HYDROCHLORIDE 37.5 MG/1
37.5 CAPSULE, EXTENDED RELEASE ORAL DAILY
Qty: 90 CAPSULE | Refills: 0 | Status: SHIPPED | OUTPATIENT
Start: 2023-10-03 | End: 2024-01-17

## 2023-11-18 NOTE — RESULT ENCOUNTER NOTE
David Garcia,    Attached are your test results.  -LDL(bad) cholesterol level is elevated which can increase your heart disease risk.  A diet high in fat and simple carbohydrates, genetics and being overweight can contribute to this. ADVISE: exercising 150 minutes of aerobic exercise per week (30 minutes for 5 days per week or 50 minutes for 3 days per week are options) and eating a low saturated fat/low carbohydrate diet are helpful to improve this. In 12 months, you should recheck your fasting cholesterol panel by scheduling a lab-only appointment.  -Liver and gallbladder tests are normal (ALT,AST, Alk phos, bilirubin), kidney function is normal (Cr, GFR), sodium is normal, potassium is normal, calcium is normal, glucose is normal.  -TSH (thyroid stimulating hormone) level is normal which indicates normal thyroid function.  -Hepatitis C antibody screen test shows no signs of a previous hepatitis C infection.   Please contact us if you have any questions.    Ani Slater, CNP    
4 = No assist / stand by assistance

## 2023-11-20 NOTE — PROGRESS NOTES
.  FOLLOW UP  Nov 22, 2023     Kaitlyn Garcia is a 48 year old woman who presents with history of atypical ductal hyperplasia.      HPI:     History of 2 right breast biopsies that were benign. In 2012 she had a left breast intraductal papilloma surgical excised. In 2016 she had 2 left needle biopsies, one of which demonstrated atypical ductal hyperplasia. The biopsied area of ADH was surgical excised. In 2017 she had an MRI guided needle left breast biopsy that demonstrated an intraductal papilloma.      Family history of 2 paternal aunts who had breast cancer in their 60's and a paternal cousin with breast cancer in her 40's. Kaitlyn was seen by a genetic counselor in 2015, no genetic testing was done.      She is doing high risk screening with breast MRI and mammogram. She completed 5 years of Tamoxifen.      She had a pelvic ultrasound 6/4/18, 6/12/19, and 11/4/20 that were stable.      Today she denies any breast concerns including mass, skin change, nipple inversion or nipple discharge. She does report bilateral breast fullness.      BREAST-SPECIFIC HISTORY:     Previous breast imaging: Yes   - 5/21/12 b/l Dmammo and right u/s for right breast lump: calcifications in the upper outer quadrant of left breast spanning 5 cm. Right u/s negative. BI-RADS 4 suspicious for malignancy   - 5/23/12 left stereotactic biopsy: intraductal papilloma   - 7/30/12 left wire-placement   - 2/19/13 MRI: large segmental enhancement of left lateral breast, BI-RADS 0 incomplete   Left u/s: BI-RADS 3 probably benign  - 3/29/13 MRI: BI-RADS 2 benign findings  - 8/5/13 b/l Dmammo for fullness: BI-RADS 2 benign   - 2/5/14 MRI: cysts in left breast, BI-RADS 2 benign   - 5/9/14 Dmammo b/l: negative. Left u/s: cystic appearing structures, BI-RADS 2 benign  - 5/20/14 MRI: study not completed due to nausea, BI-RADS 0 incomplete  - 5/28/14 MRI: BI-RADS 2 benign  - 10/28/14 b/l Dmammo: BI-RADS 2 benign   - 5/7/15 MRI BI-RADS 2 benign   -  11/10/15 Smammo: bilateral calcs: BI-RADS 2 benign  - 1/25/16 left u/s for pain: multiple tiny cyst  - 5/17/16 MRI: BI-RADS 2 benign   - 12/6/16  Smammo: lateral left breast developing macrocalcifications BI-RADS 0 incomplete   - 12/7/16 Dmammo left: left 2:00 calcifications BI-RADS 4 suspicious  - 12/12/16: stereotactic biopsy: focus of ADH    - 2/1/17 wire placement   - 5/9/17 MRI: 2 adjacent mass like areas of enhancement mid central left breast BI-RADS 0 incomplete   - 5/12/17 left u/s: scattered small cysts BI-RADS 4 suspicious   - 5/18/17 MR biopsy: intraductal papilloma   - 4/30/18 MRI BI-RADS 2  - 6/6/18 left Dmammo and ultrasound for pain BI-RADS 2  - 11/28/18 Smammo BI-RADS 2  - 5/7/19 breast MRI BI-RADS 2  - 8/21/19 Dmammo and right breast ultrasound: BI-RADS 2  - 11/12/19 Smammo BI-RADS 1  - 5/20/20 breast MRI BI-ARDS 2  - 11/4/20 b/l Dmammo and left breast ultrasound for lump: BI-RADS 1  - 5/5/21 Breast MRI BI-RADS 1  - 11/10/21 Smammo BI-RADS 2  - 5/11/22 breast MRI BI-RADS 2  - 11/16/22 Smammo BI-RADS 2  - 5/17/23 breast MRI BI-RADS 2     Prior breast biopsies:Yes   - 2 right breast biopsies   - 5/23/12 left needle biopsy: intraductal papilloma with sclerosing features   - 7/30/12 left excisional biopsy with Dr. Lynch: focal columnar cell change with atypia, intraductal papilloma and surrounding intraductal papillomatosis, sclerosing adenosis, fibrocystic changes  - 12/12/16 left needle biopsy x2: flat epithelial atypia with focus of ADH, duct ectasia and focal sclerosing adenosis, microcalcifications.   FEA and columnar cell change with atypia  - 2/1/17 left scar excision and excisional biopsy with Dr. Moni: FEA, columnar cell change/hyperplasia, intraductal papillomas, usual ductal hyperplasia, calcifications  - 5/18/17 left MRI needle biopsy: intraductal papilloma, mild columnar cell hyperplasia, mild duct ectasia, focal sclerosing adenosis      Prior history of breast cancer: No  Prior radiation  history: No   Self breast exams: No  Breast density: heterogeneously dense     GYN HISTORY:  . Age at 1st pregnancy: 17. Breastfeeding history: Yes.   Age at menarche: [age]  Menopausal: premenopausal, uterine ablation   Menopausal hormone replacement therapy: No     RISK ASSESSMENT: < 3% 5 year risk and > 20% lifetime risk   Heather: 2.0% 5 year risk   SEBASTIAN: 43.5% lifetime risk      FAMILY HISTORY:  Breast ca: Yes    - paternal aunt 60 's (5 paternal aunts)  - paternal aunt 60's  - paternal cousin 40 (aunt)  Ovarian ca: No   - ? Paternal cousin with ovarian cancer   Pancreatic ca: No   Prostate: yes  - maternal uncle 69   Gastric ca: Yes   - paternal grandfather, passed   Melanoma: No  Colon ca: No  Other cancer: Yes   - maternal grandmother skin cancer 90's  - paternal grandfather brain 60's   Restorationist ethnicity: No  Other genetic, testing, syndromes, or clotting disorders: No   - She saw a genetic counselor 2015 and no testing was recommended     PAST MEDICAL HISTORY  Past Medical History:   Diagnosis Date    Anemia     Atypical ductal hyperplasia of breast     Choroidal nevus of right eye     Concussion 10/2015    Depressive disorder 2006    Treated with celexa for two years    Family history of breast cancer 2/3/2015    Family history of breast cancer 2/3/2015    Family history of breast cancer 2/3/2015    Family history of breast cancer 2/3/2015    Gastroesophageal reflux disease     Glaucoma suspect of both eyes     Papilloma of breast 11    Left, See Dr. Lynch at     PONV (postoperative nausea and vomiting)     S/P endometrial ablation 10/10/13    Menorrhagia    Shingles     x2 in the past    WBC decreased 2/3/2015    WBC decreased 2/3/2015    WBC decreased 2/3/2015     PAST SURGICAL HISTORY   Past Surgical History:   Procedure Laterality Date    ABDOMEN SURGERY      rectoplasty    BIOPSY BREAST NEEDLE LOCALIZATION  2012    Procedure: BIOPSY BREAST NEEDLE LOCALIZATION;  left breast excisional  Biopsy with wire localization @0830;  Surgeon: Robert yLnch MD;  Location: [facility] OR    BREAST SURGERY      COSMETIC SURGERY      Rectalplasty for episiotomy repair    COSMETIC SURGERY      Rectalplasty for episiotomy repair    DILATION AND CURETTAGE, HYSTEROSCOPY, ABLATE ENDOMETRIUM NOVASURE, COMBINED  10/10/2013    Procedure: COMBINED DILATION AND CURETTAGE, HYSTEROSCOPY, ABLATE ENDOMETRIUM NOVASURE;  Endometrial ablation with Novasure;  Surgeon: Indu Birmingham DO;  Location: [facility] OR    HC TOOTH EXTRACTION W/FORCEP      LUMPECTOMY BREAST Left 2/1/2017    Procedure: LUMPECTOMY BREAST;  Surgeon: Lori Montenegro MD;  Location: [facility]    SOFT TISSUE SURGERY      lumpectomy x 2 right breast    SOFT TISSUE SURGERY      episiotomy fixed     MEDICATIONS  Current Outpatient Medications   Medication Sig Dispense Refill    albuterol (PROAIR HFA/PROVENTIL HFA/VENTOLIN HFA) 108 (90 Base) MCG/ACT inhaler INHALE 2 PUFFS INTO THE LUNGS EVERY 6 HOURS 8.5 g 0    LORazepam (ATIVAN) 1 MG tablet Take 1 tablet (1 mg) by mouth twice a day as needed.      MELATONIN PO Take by mouth nightly as needed       Naproxen Sodium (ALEVE PO) Take 440 mg by mouth as needed       rOPINIRole (REQUIP) 0.5 MG tablet TAKE THREE TABLETS BY MOUTH AT BEDTIME 90 tablet 1    SUMAtriptan (IMITREX) 50 MG tablet Take 1 tablet (50 mg) by mouth at onset of headache for migraine May repeat in 2 hours. Max 4 tablets/24 hours. 30 tablet 3    triamcinolone (KENALOG) 0.1 % external cream Apply topically 2 times daily 15 g 0    venlafaxine (EFFEXOR XR) 37.5 MG 24 hr capsule Take 1 capsule (37.5 mg) by mouth daily 90 capsule 0    metroNIDAZOLE (METROGEL) 0.75 % external gel Apply topically 2 times daily (Patient not taking: Reported on 11/22/2023) 45 g 1    valACYclovir (VALTREX) 1000 mg tablet TAKE TWO TABLETS BY MOUTH TWICE A DAY (Patient not taking: Reported on 11/22/2023) 4 tablet 3     ALLERGIES  Allergies   Allergen Reactions    No Known  Drug Allergy       SOCIAL HISTORY:  Smokes: No  EtOH: Yes, less than 1 per month  Exercise: walking   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 Her  has lymphoma.     ROS:  Change in vision No  Headaches: no  Respiratory: No shortness of breath, dyspnea on exertion, cough, or hemoptysis   Cardiovascular: negative   Gastrointestinal: negative Abdominal pain: no  Breast: negative  Musculoskeletal: negative Joint pain No Back pain: no  Psychiatric: negative  Hematologic/Lymphatic/Immunologic: negative  Endocrine: negative    EXAM  /85   Pulse 67   Temp 97.9  F (36.6  C) (Oral)   Resp 16   Wt 70.8 kg (156 lb)   SpO2 99%   BMI 24.80 kg/m     PHYSICAL EXAM  Respiratory: breathing non labored.   Breasts: Examination was done in both the upright and supine positions.  Breasts are symmetrical . No dominant fixed or suspicious masses noted. No skin or nipple changes. No nipple discharge.   Left lateral and periareolar scar well healed. Right lateral scar well healed. Breast bilaterally equally more firm superiorly and centrally  No clavicular, cervical, or axillary lymphadenopathy.     INVESTIGATIONS:    11/22/23 screening mammogram per Radiology no concerning findings, final report pending.     ASSESSMENT/PLAN:    Kaitlyn Garcia is a 48 year old woman with history of left breast atypical ductal hyperplasia. She meets NCCN guidelines for high risk screening and risk reduction. She completed 5 years of low dose Tamoxifen.       1) Atypical ductal hyperplasia and family history of breast cancer.   We also reviewed that atypical ductal hyperplasia increases her baseline risk for breast cancer. She meets guidelines for high risk screening with a lifetime risk of breast cancer >20%. Discussed she meets guidelines for high risk screening with yearly mammogram alternating with Breast MRI every six months. Clinical encounter every 6-12 months including family history, risk assessment, and clinical  breast exam.   - Breast MRI with clinic visit due 5/18/2024  - Screening mammogram with clinic visit due 11/23/24     2) Lifestyle Modifications were provided. - Maintain your best healthy weight. Higher body fat and adult weight gain is associated with increased risk for breast cancer. This increase in risk has been attributed to increase in circulating endogenous estrogen levels from fat tissue.   - Alcohol can raise estrogen. Alcohol consumption, even at moderate levels (1-2 drinks per day), increases breast cancer risk and are best avoided. If you choose to drink alcohol limit alcohol consumption to less than 1 drink per day. (1 ounce of liquor, 6 ounces of wine, or 8 ounces of beer).  - Be active daily and void being sedentary.     Merced Wiggins PA-C    20 minutes spent on the date of the encounter doing chart review, review of test results, interpretation of tests, patient visit and documentation.

## 2023-11-22 ENCOUNTER — ONCOLOGY VISIT (OUTPATIENT)
Dept: SURGERY | Facility: CLINIC | Age: 48
End: 2023-11-22
Attending: PHYSICIAN ASSISTANT
Payer: COMMERCIAL

## 2023-11-22 ENCOUNTER — ANCILLARY PROCEDURE (OUTPATIENT)
Dept: MAMMOGRAPHY | Facility: CLINIC | Age: 48
End: 2023-11-22
Payer: COMMERCIAL

## 2023-11-22 VITALS
TEMPERATURE: 97.9 F | DIASTOLIC BLOOD PRESSURE: 85 MMHG | RESPIRATION RATE: 16 BRPM | SYSTOLIC BLOOD PRESSURE: 122 MMHG | OXYGEN SATURATION: 99 % | BODY MASS INDEX: 24.8 KG/M2 | HEART RATE: 67 BPM | WEIGHT: 156 LBS

## 2023-11-22 DIAGNOSIS — Z91.89 AT HIGH RISK FOR BREAST CANCER: Primary | ICD-10-CM

## 2023-11-22 DIAGNOSIS — Z12.31 VISIT FOR SCREENING MAMMOGRAM: ICD-10-CM

## 2023-11-22 PROCEDURE — 77067 SCR MAMMO BI INCL CAD: CPT | Performed by: RADIOLOGY

## 2023-11-22 PROCEDURE — 99213 OFFICE O/P EST LOW 20 MIN: CPT | Performed by: PHYSICIAN ASSISTANT

## 2023-11-22 PROCEDURE — 77063 BREAST TOMOSYNTHESIS BI: CPT | Performed by: RADIOLOGY

## 2023-11-22 ASSESSMENT — PAIN SCALES - GENERAL: PAINLEVEL: MODERATE PAIN (4)

## 2023-11-22 NOTE — PATIENT INSTRUCTIONS
Kaitlyn Garcia is a 48 year old woman with history of left breast atypical ductal hyperplasia. She meets NCCN guidelines for high risk screening and risk reduction. She completed 5 years of low dose Tamoxifen.       1) Atypical ductal hyperplasia and family history of breast cancer.   We also reviewed that atypical ductal hyperplasia increases her baseline risk for breast cancer. She meets guidelines for high risk screening with a lifetime risk of breast cancer >20%. Discussed she meets guidelines for high risk screening with yearly mammogram alternating with Breast MRI every six months. Clinical encounter every 6-12 months including family history, risk assessment, and clinical breast exam.   - Breast MRI with clinic visit due 5/18/2024  - Screening mammogram with clinic visit due 11/23/24     2) Lifestyle Modifications were provided. - Maintain your best healthy weight. Higher body fat and adult weight gain is associated with increased risk for breast cancer. This increase in risk has been attributed to increase in circulating endogenous estrogen levels from fat tissue.   - Alcohol can raise estrogen. Alcohol consumption, even at moderate levels (1-2 drinks per day), increases breast cancer risk and are best avoided. If you choose to drink alcohol limit alcohol consumption to less than 1 drink per day. (1 ounce of liquor, 6 ounces of wine, or 8 ounces of beer).  - Be active daily and void being sedentary.

## 2023-11-22 NOTE — LETTER
11/22/2023         RE: Kaitlyn Garcia  [address]  [facility] [address]        Dear Colleague,    Thank you for referring your patient, Kaitlyn Garcia, to the [facility]. Please see a copy of my visit note below.    .  FOLLOW UP  Nov 22, 2023     Kaitlyn Garcia is a 48 year old woman who presents with history of atypical ductal hyperplasia.      HPI:     History of 2 right breast biopsies that were benign. In 2012 she had a left breast intraductal papilloma surgical excised. In 2016 she had 2 left needle biopsies, one of which demonstrated atypical ductal hyperplasia. The biopsied area of ADH was surgical excised. In 2017 she had an MRI guided needle left breast biopsy that demonstrated an intraductal papilloma.      Family history of 2 paternal aunts who had breast cancer in their 60's and a paternal cousin with breast cancer in her 40's. Kaitlyn was seen by a genetic counselor in 2015, no genetic testing was done.      She is doing high risk screening with breast MRI and mammogram. She completed 5 years of Tamoxifen.      She had a pelvic ultrasound 6/4/18, 6/12/19, and 11/4/20 that were stable.      Today she denies any breast concerns including mass, skin change, nipple inversion or nipple discharge. She does report bilateral breast fullness.      BREAST-SPECIFIC HISTORY:     Previous breast imaging: Yes   - 5/21/12 b/l Dmammo and right u/s for right breast lump: calcifications in the upper outer quadrant of left breast spanning 5 cm. Right u/s negative. BI-RADS 4 suspicious for malignancy   - 5/23/12 left stereotactic biopsy: intraductal papilloma   - 7/30/12 left wire-placement   - 2/19/13 MRI: large segmental enhancement of left lateral breast, BI-RADS 0 incomplete   Left u/s: BI-RADS 3 probably benign  - 3/29/13 MRI: BI-RADS 2 benign findings  - 8/5/13 b/l Dmammo for fullness: BI-RADS 2 benign   - 2/5/14 MRI: cysts in left breast, BI-RADS 2 benign   - 5/9/14 Dmammo b/l:  negative. Left u/s: cystic appearing structures, BI-RADS 2 benign  - 5/20/14 MRI: study not completed due to nausea, BI-RADS 0 incomplete  - 5/28/14 MRI: BI-RADS 2 benign  - 10/28/14 b/l Dmammo: BI-RADS 2 benign   - 5/7/15 MRI BI-RADS 2 benign   - 11/10/15 Smammo: bilateral calcs: BI-RADS 2 benign  - 1/25/16 left u/s for pain: multiple tiny cyst  - 5/17/16 MRI: BI-RADS 2 benign   - 12/6/16  Smammo: lateral left breast developing macrocalcifications BI-RADS 0 incomplete   - 12/7/16 Dmammo left: left 2:00 calcifications BI-RADS 4 suspicious  - 12/12/16: stereotactic biopsy: focus of ADH    - 2/1/17 wire placement   - 5/9/17 MRI: 2 adjacent mass like areas of enhancement mid central left breast BI-RADS 0 incomplete   - 5/12/17 left u/s: scattered small cysts BI-RADS 4 suspicious   - 5/18/17 MR biopsy: intraductal papilloma   - 4/30/18 MRI BI-RADS 2  - 6/6/18 left Dmammo and ultrasound for pain BI-RADS 2  - 11/28/18 Smammo BI-RADS 2  - 5/7/19 breast MRI BI-RADS 2  - 8/21/19 Dmammo and right breast ultrasound: BI-RADS 2  - 11/12/19 Smammo BI-RADS 1  - 5/20/20 breast MRI BI-ARDS 2  - 11/4/20 b/l Dmammo and left breast ultrasound for lump: BI-RADS 1  - 5/5/21 Breast MRI BI-RADS 1  - 11/10/21 Smammo BI-RADS 2  - 5/11/22 breast MRI BI-RADS 2  - 11/16/22 Smammo BI-RADS 2  - 5/17/23 breast MRI BI-RADS 2     Prior breast biopsies:Yes   - 2 right breast biopsies   - 5/23/12 left needle biopsy: intraductal papilloma with sclerosing features   - 7/30/12 left excisional biopsy with Dr. Lynch: focal columnar cell change with atypia, intraductal papilloma and surrounding intraductal papillomatosis, sclerosing adenosis, fibrocystic changes  - 12/12/16 left needle biopsy x2: flat epithelial atypia with focus of ADH, duct ectasia and focal sclerosing adenosis, microcalcifications.   FEA and columnar cell change with atypia  - 2/1/17 left scar excision and excisional biopsy with Dr. Montenegro: FEA, columnar cell change/hyperplasia, intraductal  papillomas, usual ductal hyperplasia, calcifications  - 17 left MRI needle biopsy: intraductal papilloma, mild columnar cell hyperplasia, mild duct ectasia, focal sclerosing adenosis      Prior history of breast cancer: No  Prior radiation history: No   Self breast exams: No  Breast density: heterogeneously dense     GYN HISTORY:  . Age at 1st pregnancy: 17. Breastfeeding history: Yes.   Age at menarche: [age]  Menopausal: premenopausal, uterine ablation   Menopausal hormone replacement therapy: No     RISK ASSESSMENT: < 3% 5 year risk and > 20% lifetime risk   Heather: 2.0% 5 year risk   SEBASTIAN: 43.5% lifetime risk      FAMILY HISTORY:  Breast ca: Yes    - paternal aunt 60 's (5 paternal aunts)  - paternal aunt 60's  - paternal cousin 40 (aunt)  Ovarian ca: No   - ? Paternal cousin with ovarian cancer   Pancreatic ca: No   Prostate: yes  - maternal uncle 69   Gastric ca: Yes   - paternal grandfather, passed   Melanoma: No  Colon ca: No  Other cancer: Yes   - maternal grandmother skin cancer 90's  - paternal grandfather brain 60's   Taoist ethnicity: No  Other genetic, testing, syndromes, or clotting disorders: No   - She saw a genetic counselor 2015 and no testing was recommended     PAST MEDICAL HISTORY  Past Medical History:   Diagnosis Date    Anemia     Atypical ductal hyperplasia of breast     Choroidal nevus of right eye     Concussion 10/2015    Depressive disorder 2006    Treated with celexa for two years    Family history of breast cancer 2/3/2015    Family history of breast cancer 2/3/2015    Family history of breast cancer 2/3/2015    Family history of breast cancer 2/3/2015    Gastroesophageal reflux disease     Glaucoma suspect of both eyes     Papilloma of breast 11    Left, See Dr. Lynch at     PONV (postoperative nausea and vomiting)     S/P endometrial ablation 10/10/13    Menorrhagia    Shingles     x2 in the past    WBC decreased 2/3/2015    WBC decreased 2/3/2015    WBC decreased  2/3/2015     PAST SURGICAL HISTORY   Past Surgical History:   Procedure Laterality Date    ABDOMEN SURGERY      rectoplasty    BIOPSY BREAST NEEDLE LOCALIZATION  7/30/2012    Procedure: BIOPSY BREAST NEEDLE LOCALIZATION;  left breast excisional Biopsy with wire localization @0830;  Surgeon: Robert Lynch MD;  Location: [facility] OR    BREAST SURGERY      COSMETIC SURGERY      Rectalplasty for episiotomy repair    COSMETIC SURGERY      Rectalplasty for episiotomy repair    DILATION AND CURETTAGE, HYSTEROSCOPY, ABLATE ENDOMETRIUM NOVASURE, COMBINED  10/10/2013    Procedure: COMBINED DILATION AND CURETTAGE, HYSTEROSCOPY, ABLATE ENDOMETRIUM NOVASURE;  Endometrial ablation with Novasure;  Surgeon: Indu Birmingham DO;  Location: [facility] OR    HC TOOTH EXTRACTION W/FORCEP      LUMPECTOMY BREAST Left 2/1/2017    Procedure: LUMPECTOMY BREAST;  Surgeon: Lori Montenegro MD;  Location: [facility]    SOFT TISSUE SURGERY      lumpectomy x 2 right breast    SOFT TISSUE SURGERY      episiotomy fixed     MEDICATIONS  Current Outpatient Medications   Medication Sig Dispense Refill    albuterol (PROAIR HFA/PROVENTIL HFA/VENTOLIN HFA) 108 (90 Base) MCG/ACT inhaler INHALE 2 PUFFS INTO THE LUNGS EVERY 6 HOURS 8.5 g 0    LORazepam (ATIVAN) 1 MG tablet Take 1 tablet (1 mg) by mouth twice a day as needed.      MELATONIN PO Take by mouth nightly as needed       Naproxen Sodium (ALEVE PO) Take 440 mg by mouth as needed       rOPINIRole (REQUIP) 0.5 MG tablet TAKE THREE TABLETS BY MOUTH AT BEDTIME 90 tablet 1    SUMAtriptan (IMITREX) 50 MG tablet Take 1 tablet (50 mg) by mouth at onset of headache for migraine May repeat in 2 hours. Max 4 tablets/24 hours. 30 tablet 3    triamcinolone (KENALOG) 0.1 % external cream Apply topically 2 times daily 15 g 0    venlafaxine (EFFEXOR XR) 37.5 MG 24 hr capsule Take 1 capsule (37.5 mg) by mouth daily 90 capsule 0    metroNIDAZOLE (METROGEL) 0.75 % external gel Apply topically 2 times daily  (Patient not taking: Reported on 11/22/2023) 45 g 1    valACYclovir (VALTREX) 1000 mg tablet TAKE TWO TABLETS BY MOUTH TWICE A DAY (Patient not taking: Reported on 11/22/2023) 4 tablet 3     ALLERGIES  Allergies   Allergen Reactions    No Known Drug Allergy       SOCIAL HISTORY:  Smokes: No  EtOH: Yes, less than 1 per month  Exercise: walking   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 Her  has lymphoma.     ROS:  Change in vision No  Headaches: no  Respiratory: No shortness of breath, dyspnea on exertion, cough, or hemoptysis   Cardiovascular: negative   Gastrointestinal: negative Abdominal pain: no  Breast: negative  Musculoskeletal: negative Joint pain No Back pain: no  Psychiatric: negative  Hematologic/Lymphatic/Immunologic: negative  Endocrine: negative    EXAM  /85   Pulse 67   Temp 97.9  F (36.6  C) (Oral)   Resp 16   Wt 70.8 kg (156 lb)   SpO2 99%   BMI 24.80 kg/m     PHYSICAL EXAM  Respiratory: breathing non labored.   Breasts: Examination was done in both the upright and supine positions.  Breasts are symmetrical . No dominant fixed or suspicious masses noted. No skin or nipple changes. No nipple discharge.   Left lateral and periareolar scar well healed. Right lateral scar well healed. Breast bilaterally equally more firm superiorly and centrally  No clavicular, cervical, or axillary lymphadenopathy.     INVESTIGATIONS:    11/22/23 screening mammogram per Radiology no concerning findings, final report pending.     ASSESSMENT/PLAN:    Kaitlyn Garcia is a 48 year old woman with history of left breast atypical ductal hyperplasia. She meets NCCN guidelines for high risk screening and risk reduction. She completed 5 years of low dose Tamoxifen.       1) Atypical ductal hyperplasia and family history of breast cancer.   We also reviewed that atypical ductal hyperplasia increases her baseline risk for breast cancer. She meets guidelines for high risk screening with a lifetime  risk of breast cancer >20%. Discussed she meets guidelines for high risk screening with yearly mammogram alternating with Breast MRI every six months. Clinical encounter every 6-12 months including family history, risk assessment, and clinical breast exam.   - Breast MRI with clinic visit due 5/18/2024  - Screening mammogram with clinic visit due 11/23/24     2) Lifestyle Modifications were provided. - Maintain your best healthy weight. Higher body fat and adult weight gain is associated with increased risk for breast cancer. This increase in risk has been attributed to increase in circulating endogenous estrogen levels from fat tissue.   - Alcohol can raise estrogen. Alcohol consumption, even at moderate levels (1-2 drinks per day), increases breast cancer risk and are best avoided. If you choose to drink alcohol limit alcohol consumption to less than 1 drink per day. (1 ounce of liquor, 6 ounces of wine, or 8 ounces of beer).  - Be active daily and void being sedentary.     Merced Wiggins PA-C    20 minutes spent on the date of the encounter doing chart review, review of test results, interpretation of tests, patient visit and documentation.

## 2023-11-22 NOTE — NURSING NOTE
"Oncology Rooming Note    November 22, 2023 11:05 AM   Kaitlyn Garcia is a 48 year old female who presents for:    Chief Complaint   Patient presents with    Breast Cancer     Initial Vitals: /85   Pulse 67   Temp 97.9  F (36.6  C) (Oral)   Resp 16   Wt 70.8 kg (156 lb)   SpO2 99%   BMI 24.80 kg/m   Estimated body mass index is 24.8 kg/m  as calculated from the following:    Height as of 2/17/23: 1.689 m (5' 6.5\").    Weight as of this encounter: 70.8 kg (156 lb). Body surface area is 1.82 meters squared.  Moderate Pain (4) Comment: Data Unavailable   No LMP recorded. Patient has had an ablation.  Allergies reviewed: Yes  Medications reviewed: Yes    Medications: Medication refills not needed today.  Pharmacy name entered into [facility]:    [facility] [place], [place] - [address] , [id number]  [facility] [place], [place] - [address]  [id number] - [place], [place] - [address] ([place], [place])    Clinical concerns:  Patient states there are no new concerns to discuss with provider.  Leanne was not notified.        Janki Dale CMA              "

## 2023-12-15 ENCOUNTER — ANCILLARY PROCEDURE (OUTPATIENT)
Dept: GENERAL RADIOLOGY | Facility: CLINIC | Age: 48
End: 2023-12-15
Payer: COMMERCIAL

## 2023-12-15 ENCOUNTER — OFFICE VISIT (OUTPATIENT)
Dept: URGENT CARE | Facility: URGENT CARE | Age: 48
End: 2023-12-15
Payer: COMMERCIAL

## 2023-12-15 ENCOUNTER — E-VISIT (OUTPATIENT)
Dept: URGENT CARE | Facility: CLINIC | Age: 48
End: 2023-12-15
Payer: COMMERCIAL

## 2023-12-15 VITALS
TEMPERATURE: 98 F | HEART RATE: 102 BPM | WEIGHT: 158 LBS | DIASTOLIC BLOOD PRESSURE: 80 MMHG | SYSTOLIC BLOOD PRESSURE: 110 MMHG | BODY MASS INDEX: 25.12 KG/M2 | OXYGEN SATURATION: 97 %

## 2023-12-15 DIAGNOSIS — R06.02 SHORTNESS OF BREATH: ICD-10-CM

## 2023-12-15 DIAGNOSIS — R05.1 ACUTE COUGH: Primary | ICD-10-CM

## 2023-12-15 PROCEDURE — 71046 X-RAY EXAM CHEST 2 VIEWS: CPT | Mod: TC | Performed by: RADIOLOGY

## 2023-12-15 PROCEDURE — 99213 OFFICE O/P EST LOW 20 MIN: CPT

## 2023-12-15 PROCEDURE — 99207 PR NON-BILLABLE SERV PER CHARTING: CPT | Performed by: NURSE PRACTITIONER

## 2023-12-15 RX ORDER — BENZONATATE 200 MG/1
200 CAPSULE ORAL 3 TIMES DAILY PRN
Qty: 21 CAPSULE | Refills: 0 | Status: SHIPPED | OUTPATIENT
Start: 2023-12-15 | End: 2024-02-19

## 2023-12-15 RX ORDER — ALBUTEROL SULFATE 90 UG/1
2 AEROSOL, METERED RESPIRATORY (INHALATION) EVERY 6 HOURS
Qty: 8.5 G | Refills: 0 | Status: CANCELLED | OUTPATIENT
Start: 2023-12-15

## 2023-12-15 RX ORDER — ALBUTEROL SULFATE 90 UG/1
2 AEROSOL, METERED RESPIRATORY (INHALATION) EVERY 6 HOURS PRN
Qty: 8.5 G | Refills: 0 | Status: SHIPPED | OUTPATIENT
Start: 2023-12-15

## 2023-12-15 NOTE — PATIENT INSTRUCTIONS
Dear Kaitlyn Garcia,    We are sorry you are not feeling well. Based on the responses you provided, it is recommended that you be seen in-person in urgent care so we can better evaluate your symptoms. Please click here to find the nearest urgent care location to you.   You will not be charged for this Visit. Thank you for trusting us with your care.    Sean Childs NP

## 2023-12-15 NOTE — PATIENT INSTRUCTIONS
Chest x-ray does not show any pneumonia per the radiologist report.  Use the albuterol inhaler as prescribed for the cough.  Take benzonatate as prescribed for the cough.  Follow-up with your primary care provider should symptoms persist.

## 2023-12-15 NOTE — PROGRESS NOTES
ASSESSMENT:  (R05.1) Acute cough  (primary encounter diagnosis)  Plan: XR Chest 2 Views, albuterol (PROAIR         HFA/PROVENTIL HFA/VENTOLIN HFA) 108 (90 Base)         MCG/ACT inhaler, benzonatate (TESSALON) 200 MG         capsule    PLAN:  Informed the patient that the chest x-ray does not show any pneumonia per the radiologist report.  We discussed using the albuterol inhaler as prescribed for the cough.  We also discussed taking benzonatate as prescribed for the cough.  Informed her to follow-up with her primary care provider should symptoms persist.  Patient acknowledged her understanding of the above plan.    The use of [employer] services may have been used to construct the content in this note; any grammatical or spelling errors are non-intentional. Please contact the author of this note directly if you are in need of any clarification.      Bam Rhodes, SINDHU CNP      SUBJECTIVE:  Kaitlyn Garcia is a 48 year old female who presents to the clinic today with a chief complaint of cough  for 2 weeks.  Her cough is described as productive clear.    The patient's symptoms are moderate to severe and worsening.  Associated symptoms include stuffy nose and chest tightness. The patient's symptoms are exacerbated by activity, laughing and wearing a mask.  Patient has been using OTC cough suppressants  to improve symptoms.    ROS  Negative except noted above.     OBJECTIVE:  /80   Pulse 102   Temp 98  F (36.7  C) (Tympanic)   Wt 71.7 kg (158 lb)   SpO2 97%   BMI 25.12 kg/m    GENERAL APPEARANCE: healthy, alert and no distress  EYES: EOMI,  PERRL, conjunctiva clear  HENT: ear canals and TM's normal.  Nose and mouth without ulcers, erythema or lesions  NECK: supple, nontender, no lymphadenopathy  RESP: lungs clear to auscultation - no rales, rhonchi or wheezes  CV: regular rates and rhythm, normal S1 S2, no murmur noted  SKIN: no suspicious lesions or rashes    X-RAY: Chest x-ray does  not show any pneumonia per the radiologist report.

## 2023-12-20 ENCOUNTER — OFFICE VISIT (OUTPATIENT)
Dept: URGENT CARE | Facility: URGENT CARE | Age: 48
End: 2023-12-20
Payer: COMMERCIAL

## 2023-12-20 VITALS
BODY MASS INDEX: 24.82 KG/M2 | WEIGHT: 156.1 LBS | RESPIRATION RATE: 16 BRPM | HEART RATE: 86 BPM | DIASTOLIC BLOOD PRESSURE: 74 MMHG | OXYGEN SATURATION: 98 % | TEMPERATURE: 97.3 F | SYSTOLIC BLOOD PRESSURE: 111 MMHG

## 2023-12-20 DIAGNOSIS — J40 BRONCHITIS: Primary | ICD-10-CM

## 2023-12-20 PROCEDURE — 99214 OFFICE O/P EST MOD 30 MIN: CPT | Performed by: NURSE PRACTITIONER

## 2023-12-20 RX ORDER — AZITHROMYCIN 250 MG/1
TABLET, FILM COATED ORAL
Qty: 6 TABLET | Refills: 0 | Status: SHIPPED | OUTPATIENT
Start: 2023-12-20 | End: 2023-12-25

## 2023-12-20 RX ORDER — PREDNISONE 20 MG/1
20 TABLET ORAL DAILY
Qty: 5 TABLET | Refills: 0 | Status: SHIPPED | OUTPATIENT
Start: 2023-12-20 | End: 2023-12-25

## 2023-12-20 ASSESSMENT — PAIN SCALES - GENERAL: PAINLEVEL: MODERATE PAIN (5)

## 2023-12-20 NOTE — PROGRESS NOTES
SUBJECTIVE:  Kaitlyn Garcia is a 48 year old female who presents to the clinic today with a chief complaint of cough  for 3 week(s).  Her cough is described as persistent cough.    The patient's symptoms are moderate and not changing over the course of time.  Associated symptoms include congestion and feeling tight. The patient's symptoms are exacerbated by no particular triggers  Patient has been using albuterol nebs and tessalon pearls to improve symptoms.    Past Medical History:   Diagnosis Date    Anemia     Atypical ductal hyperplasia of breast     Choroidal nevus of right eye     Concussion 10/2015    Depressive disorder 2006    Treated with celexa for two years    Family history of breast cancer 2/3/2015    Family history of breast cancer 2/3/2015    Family history of breast cancer 2/3/2015    Family history of breast cancer 2/3/2015    Gastroesophageal reflux disease     Glaucoma suspect of both eyes     Papilloma of breast 5/23/12    Left, See Dr. Lynch at     PONV (postoperative nausea and vomiting)     S/P endometrial ablation 10/10/13    Menorrhagia    Shingles     x2 in the past    WBC decreased 2/3/2015    WBC decreased 2/3/2015    WBC decreased 2/3/2015       Current Outpatient Medications   Medication Sig Dispense Refill    albuterol (PROAIR HFA/PROVENTIL HFA/VENTOLIN HFA) 108 (90 Base) MCG/ACT inhaler Inhale 2 puffs into the lungs every 6 hours as needed for cough 8.5 g 0    albuterol (PROAIR HFA/PROVENTIL HFA/VENTOLIN HFA) 108 (90 Base) MCG/ACT inhaler INHALE 2 PUFFS INTO THE LUNGS EVERY 6 HOURS 8.5 g 0    benzonatate (TESSALON) 200 MG capsule Take 1 capsule (200 mg) by mouth 3 times daily as needed for cough 21 capsule 0    LORazepam (ATIVAN) 1 MG tablet Take 1 tablet (1 mg) by mouth twice a day as needed.      MELATONIN PO Take by mouth nightly as needed       Naproxen Sodium (ALEVE PO) Take 440 mg by mouth as needed       rOPINIRole (REQUIP) 0.5 MG tablet TAKE THREE TABLETS BY MOUTH AT BEDTIME  90 tablet 1    SUMAtriptan (IMITREX) 50 MG tablet Take 1 tablet (50 mg) by mouth at onset of headache for migraine May repeat in 2 hours. Max 4 tablets/24 hours. 30 tablet 3    triamcinolone (KENALOG) 0.1 % external cream Apply topically 2 times daily 15 g 0    venlafaxine (EFFEXOR XR) 37.5 MG 24 hr capsule Take 1 capsule (37.5 mg) by mouth daily 90 capsule 0    metroNIDAZOLE (METROGEL) 0.75 % external gel Apply topically 2 times daily (Patient not taking: Reported on 11/22/2023) 45 g 1    valACYclovir (VALTREX) 1000 mg tablet TAKE TWO TABLETS BY MOUTH TWICE A DAY (Patient not taking: Reported on 11/22/2023) 4 tablet 3       Social History     Tobacco Use    Smoking status: Never     Passive exposure: Never    Smokeless tobacco: Never   Substance Use Topics    Alcohol use: Yes     Alcohol/week: 0.0 standard drinks of alcohol     Comment: Less than once a month       OBJECTIVE:  /74 (BP Location: Left arm, Patient Position: Sitting, Cuff Size: Adult Regular)   Pulse 86   Temp 97.3  F (36.3  C) (Tympanic)   Resp 16   Wt 70.8 kg (156 lb 1.6 oz)   SpO2 98%   BMI 24.82 kg/m    GENERAL APPEARANCE: healthy, alert and no distress  EYES: EOMI, conjunctiva clear  HENT: ear canals and TM's normal.  Nose and mouth without ulcers, erythema or lesions  NECK: supple, nontender, no lymphadenopathy  RESP: lungs clear to auscultation - no rales, rhonchi or wheezes  CV: regular rates and rhythm, normal S1 S2, no murmur noted  ABDOMEN:  soft, nontender, no HSM or masses and bowel sounds normal  NEURO: Normal strength and tone, sensory exam grossly normal,  normal speech and mentation  SKIN: no suspicious lesions or rashes    Review of Xray from 12/15/23; no acute pulmonary process, lungs clear.    ASSESSMENT:    1. Bronchitis    - predniSONE (DELTASONE) 20 MG tablet; Take 1 tablet (20 mg) by mouth daily for 5 days  Dispense: 5 tablet; Refill: 0  - azithromycin (ZITHROMAX) 250 MG tablet; Take 2 tablets (500 mg) by mouth  daily for 1 day, THEN 1 tablet (250 mg) daily for 4 days.  Dispense: 6 tablet; Refill: 0    PLAN:  -See handout on bronchitis.  Take steroid for 5 days.  Antibiotic as directed.  Albuterol inhaler 2 puffs every 4 hours as needed for shortness of breath, cough, or wheezing.  Follow up with your primary provider in the next 7-10 days if not improving as expected. Sooner if worse, ER with trouble breathing.

## 2023-12-22 DIAGNOSIS — G47.61 PLMD (PERIODIC LIMB MOVEMENT DISORDER): ICD-10-CM

## 2023-12-22 RX ORDER — ROPINIROLE 0.5 MG/1
TABLET, FILM COATED ORAL
Qty: 90 TABLET | Refills: 1 | Status: SHIPPED | OUTPATIENT
Start: 2023-12-22 | End: 2024-02-19

## 2024-01-03 DIAGNOSIS — D31.31 CHOROIDAL NEVUS OF RIGHT EYE: Primary | ICD-10-CM

## 2024-01-12 ENCOUNTER — OFFICE VISIT (OUTPATIENT)
Dept: OPHTHALMOLOGY | Facility: CLINIC | Age: 49
End: 2024-01-12
Attending: OPHTHALMOLOGY
Payer: COMMERCIAL

## 2024-01-12 DIAGNOSIS — D31.31 CHOROIDAL NEVUS OF RIGHT EYE: ICD-10-CM

## 2024-01-12 PROCEDURE — 99207 FUNDUS PHOTOS OU (BOTH EYES): CPT | Mod: 26 | Performed by: OPHTHALMOLOGY

## 2024-01-12 PROCEDURE — 99214 OFFICE O/P EST MOD 30 MIN: CPT | Mod: GC | Performed by: OPHTHALMOLOGY

## 2024-01-12 PROCEDURE — 92134 CPTRZ OPH DX IMG PST SGM RTA: CPT | Performed by: OPHTHALMOLOGY

## 2024-01-12 PROCEDURE — 99213 OFFICE O/P EST LOW 20 MIN: CPT | Performed by: OPHTHALMOLOGY

## 2024-01-12 PROCEDURE — 92250 FUNDUS PHOTOGRAPHY W/I&R: CPT | Performed by: OPHTHALMOLOGY

## 2024-01-12 ASSESSMENT — VISUAL ACUITY
OD_CC: 20/20
METHOD: SNELLEN - LINEAR
OS_CC: 20/20
OD_CC+: -1
CORRECTION_TYPE: GLASSES

## 2024-01-12 ASSESSMENT — CONF VISUAL FIELD
OS_NORMAL: 1
OS_INFERIOR_TEMPORAL_RESTRICTION: 0
OD_INFERIOR_TEMPORAL_RESTRICTION: 0
OS_SUPERIOR_NASAL_RESTRICTION: 0
OD_SUPERIOR_TEMPORAL_RESTRICTION: 0
OD_INFERIOR_NASAL_RESTRICTION: 0
OS_INFERIOR_NASAL_RESTRICTION: 0
OS_SUPERIOR_TEMPORAL_RESTRICTION: 0
OD_NORMAL: 1
OD_SUPERIOR_NASAL_RESTRICTION: 0
METHOD: COUNTING FINGERS

## 2024-01-12 ASSESSMENT — CUP TO DISC RATIO
OS_RATIO: 0.6
OD_RATIO: 0.6

## 2024-01-12 ASSESSMENT — TONOMETRY
OS_IOP_MMHG: 13
OD_IOP_MMHG: 13
IOP_METHOD: TONOPEN

## 2024-01-12 ASSESSMENT — EXTERNAL EXAM - RIGHT EYE: OD_EXAM: NORMAL

## 2024-01-12 ASSESSMENT — REFRACTION_WEARINGRX
OD_ADD: +2.00
OS_CYLINDER: +0.50
OD_SPHERE: -0.25
OS_AXIS: 080
OS_SPHERE: -0.50
OS_ADD: +2.00
OD_CYLINDER: SPHERE

## 2024-01-12 ASSESSMENT — EXTERNAL EXAM - LEFT EYE: OS_EXAM: NORMAL

## 2024-01-12 ASSESSMENT — SLIT LAMP EXAM - LIDS
COMMENTS: NORMAL
COMMENTS: NORMAL

## 2024-01-12 NOTE — NURSING NOTE
Chief Complaints and History of Present Illnesses   Patient presents with    Follow Up     Choroidal nevus of right eye     Chief Complaint(s) and History of Present Illness(es)       Follow Up              Comments: Choroidal nevus of right eye              Comments    Pt states no change in VA since last visit  States no flashes, floaters eye pain or redness    Bernarda Welch COT 10:02 AM January 12, 2024
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
## 2024-01-17 DIAGNOSIS — F41.0 PANIC ATTACK: ICD-10-CM

## 2024-01-17 DIAGNOSIS — F41.1 GAD (GENERALIZED ANXIETY DISORDER): ICD-10-CM

## 2024-01-17 RX ORDER — VENLAFAXINE HYDROCHLORIDE 37.5 MG/1
37.5 CAPSULE, EXTENDED RELEASE ORAL DAILY
Qty: 90 CAPSULE | Refills: 0 | Status: SHIPPED | OUTPATIENT
Start: 2024-01-17 | End: 2024-02-19

## 2024-01-18 NOTE — TELEPHONE ENCOUNTER
Appointments in Next Year      Feb 19, 2024  9:00 AM  (Arrive by 8:40 AM)  Adult Preventative Visit with SINDHU Antonio CNP  [facility] ([facility] ) [phone]     Mar 13, 2024 10:15 AM  (Arrive by 10:00 AM)  RETURN GLAUCOMA with Cullen Nelson MD  [facility] ([facility] ) [phone]     May 22, 2024 12:15 PM  (Arrive by 11:45 AM)  MR BREAST BILATERAL W/O & W CONTRAST with [id number]  [facility] [place] ([facility] ) [phone]     May 22, 2024  1:00 PM  (Arrive by 12:45 PM)  Return Patient with Merced Wiggins PA-C  [facility] ([facility] ) [phone]

## 2024-02-17 SDOH — HEALTH STABILITY: PHYSICAL HEALTH: ON AVERAGE, HOW MANY MINUTES DO YOU ENGAGE IN EXERCISE AT THIS LEVEL?: 60 MIN

## 2024-02-17 SDOH — HEALTH STABILITY: PHYSICAL HEALTH: ON AVERAGE, HOW MANY DAYS PER WEEK DO YOU ENGAGE IN MODERATE TO STRENUOUS EXERCISE (LIKE A BRISK WALK)?: 5 DAYS

## 2024-02-17 ASSESSMENT — SOCIAL DETERMINANTS OF HEALTH (SDOH): HOW OFTEN DO YOU GET TOGETHER WITH FRIENDS OR RELATIVES?: ONCE A WEEK

## 2024-02-19 ENCOUNTER — OFFICE VISIT (OUTPATIENT)
Dept: FAMILY MEDICINE | Facility: CLINIC | Age: 49
End: 2024-02-19
Payer: COMMERCIAL

## 2024-02-19 VITALS
DIASTOLIC BLOOD PRESSURE: 81 MMHG | BODY MASS INDEX: 24.81 KG/M2 | OXYGEN SATURATION: 98 % | HEART RATE: 82 BPM | TEMPERATURE: 98.6 F | WEIGHT: 158.1 LBS | SYSTOLIC BLOOD PRESSURE: 118 MMHG | HEIGHT: 67 IN | RESPIRATION RATE: 18 BRPM

## 2024-02-19 DIAGNOSIS — Z00.00 ROUTINE GENERAL MEDICAL EXAMINATION AT A HEALTH CARE FACILITY: Primary | ICD-10-CM

## 2024-02-19 DIAGNOSIS — F41.1 GAD (GENERALIZED ANXIETY DISORDER): ICD-10-CM

## 2024-02-19 DIAGNOSIS — Z13.0 SCREENING FOR DISORDER OF BLOOD AND BLOOD-FORMING ORGANS: ICD-10-CM

## 2024-02-19 DIAGNOSIS — Z13.6 CARDIOVASCULAR SCREENING; LDL GOAL LESS THAN 160: ICD-10-CM

## 2024-02-19 DIAGNOSIS — F41.0 PANIC ATTACK: ICD-10-CM

## 2024-02-19 DIAGNOSIS — D72.819 LEUKOPENIA, UNSPECIFIED TYPE: ICD-10-CM

## 2024-02-19 DIAGNOSIS — Z13.29 SCREENING FOR THYROID DISORDER: ICD-10-CM

## 2024-02-19 DIAGNOSIS — Z13.1 SCREENING FOR DIABETES MELLITUS (DM): ICD-10-CM

## 2024-02-19 DIAGNOSIS — G47.61 PLMD (PERIODIC LIMB MOVEMENT DISORDER): ICD-10-CM

## 2024-02-19 LAB
ALBUMIN SERPL BCG-MCNC: 4.4 G/DL (ref 3.5–5.2)
ALP SERPL-CCNC: 58 U/L (ref 40–150)
ALT SERPL W P-5'-P-CCNC: 16 U/L (ref 0–50)
ANION GAP SERPL CALCULATED.3IONS-SCNC: 8 MMOL/L (ref 7–15)
AST SERPL W P-5'-P-CCNC: 21 U/L (ref 0–45)
BILIRUB SERPL-MCNC: 0.3 MG/DL
BUN SERPL-MCNC: 14.2 MG/DL (ref 6–20)
CALCIUM SERPL-MCNC: 8.9 MG/DL (ref 8.6–10)
CHLORIDE SERPL-SCNC: 105 MMOL/L (ref 98–107)
CHOLEST SERPL-MCNC: 210 MG/DL
CREAT SERPL-MCNC: 0.79 MG/DL (ref 0.51–0.95)
DEPRECATED HCO3 PLAS-SCNC: 26 MMOL/L (ref 22–29)
EGFRCR SERPLBLD CKD-EPI 2021: >90 ML/MIN/1.73M2
ERYTHROCYTE [DISTWIDTH] IN BLOOD BY AUTOMATED COUNT: 11.4 % (ref 10–15)
FASTING STATUS PATIENT QL REPORTED: YES
GLUCOSE SERPL-MCNC: 95 MG/DL (ref 70–99)
HCT VFR BLD AUTO: 38.3 % (ref 35–47)
HDLC SERPL-MCNC: 68 MG/DL
HGB BLD-MCNC: 12.9 G/DL (ref 11.7–15.7)
LDLC SERPL CALC-MCNC: 127 MG/DL
MCH RBC QN AUTO: 31.2 PG (ref 26.5–33)
MCHC RBC AUTO-ENTMCNC: 33.7 G/DL (ref 31.5–36.5)
MCV RBC AUTO: 93 FL (ref 78–100)
NONHDLC SERPL-MCNC: 142 MG/DL
PLATELET # BLD AUTO: 228 10E3/UL (ref 150–450)
POTASSIUM SERPL-SCNC: 4.5 MMOL/L (ref 3.4–5.3)
PROT SERPL-MCNC: 6.8 G/DL (ref 6.4–8.3)
RBC # BLD AUTO: 4.14 10E6/UL (ref 3.8–5.2)
SODIUM SERPL-SCNC: 139 MMOL/L (ref 135–145)
TRIGL SERPL-MCNC: 77 MG/DL
TSH SERPL DL<=0.005 MIU/L-ACNC: 1.25 UIU/ML (ref 0.3–4.2)
WBC # BLD AUTO: 3.7 10E3/UL (ref 4–11)

## 2024-02-19 PROCEDURE — 99396 PREV VISIT EST AGE 40-64: CPT | Performed by: NURSE PRACTITIONER

## 2024-02-19 PROCEDURE — 80053 COMPREHEN METABOLIC PANEL: CPT | Performed by: NURSE PRACTITIONER

## 2024-02-19 PROCEDURE — 80061 LIPID PANEL: CPT | Performed by: NURSE PRACTITIONER

## 2024-02-19 PROCEDURE — 36415 COLL VENOUS BLD VENIPUNCTURE: CPT | Performed by: NURSE PRACTITIONER

## 2024-02-19 PROCEDURE — 84443 ASSAY THYROID STIM HORMONE: CPT | Performed by: NURSE PRACTITIONER

## 2024-02-19 PROCEDURE — 85027 COMPLETE CBC AUTOMATED: CPT | Performed by: NURSE PRACTITIONER

## 2024-02-19 RX ORDER — VENLAFAXINE HYDROCHLORIDE 37.5 MG/1
37.5 CAPSULE, EXTENDED RELEASE ORAL DAILY
Qty: 90 CAPSULE | Refills: 3 | Status: SHIPPED | OUTPATIENT
Start: 2024-02-19

## 2024-02-19 RX ORDER — ROPINIROLE 0.5 MG/1
1.5 TABLET, FILM COATED ORAL AT BEDTIME
Qty: 270 TABLET | Refills: 3 | Status: SHIPPED | OUTPATIENT
Start: 2024-02-19

## 2024-02-19 ASSESSMENT — PAIN SCALES - GENERAL: PAINLEVEL: NO PAIN (0)

## 2024-02-19 NOTE — PROGRESS NOTES
Preventive Care Visit  [facility]  Ani SINDHU Oh CNP, Internal Medicine - Pediatrics  Feb 19, 2024    Assessment & Plan     Routine general medical examination at a health care facility  - REVIEW OF HEALTH MAINTENANCE PROTOCOL ORDERS    CARDIOVASCULAR SCREENING; LDL GOAL LESS THAN 160  - Lipid panel reflex to direct LDL Fasting    Screening for disorder of blood and blood-forming organs  - CBC with platelets    Screening for thyroid disorder  - TSH with free T4 reflex    Screening for diabetes mellitus (DM)  - Comprehensive metabolic panel    PLMD (periodic limb movement disorder)  - rOPINIRole (REQUIP) 0.5 MG tablet  Dispense: 270 tablet; Refill: 3  The current medical regimen is effective.  Continue current medication regimen unchanged.    Panic attack  - venlafaxine (EFFEXOR XR) 37.5 MG 24 hr capsule  Dispense: 90 capsule; Refill: 3  The current medical regimen is effective.  Continue current medication regimen unchanged.    AUDELIA (generalized anxiety disorder)  - venlafaxine (EFFEXOR XR) 37.5 MG 24 hr capsule  Dispense: 90 capsule; Refill: 3  The current medical regimen is effective.  Continue current medication regimen unchanged.    Leukopenia, unspecified type  - CBC with platelets      Review of external notes as documented elsewhere in note  Ordering of each unique test  Prescription drug management   Time spent by me doing chart review, history and exam, documentation and further activities per the note      Counseling  Appropriate preventive services were discussed with this patient, including applicable screening as appropriate for fall prevention, nutrition, physical activity, Tobacco-use cessation, weight loss and cognition.  Checklist reviewing preventive services available has been given to the patient.  Reviewed patient's diet, addressing concerns and/or questions.   She is at risk for psychosocial distress and has been provided with information to reduce risk.      Patient has been advised of split billing requirements and indicates understanding: Yes    FUTURE APPOINTMENTS:       - Follow-up for annual visit or as needed  See Patient Instructions    Willis Sahni is a 48 year old, presenting for the following:  Physical (Physical)        2/19/2024     8:44 AM   Additional Questions   Roomed by Renetta PALACIO   Accompanied by Self         2/19/2024     8:44 AM   Patient Reported Additional Medications   Patient reports taking the following new medications None        Health Care Directive  Patient does not have a Health Care Directive or Living Will: Discussed advance care planning with patient; however, patient declined at this time.    HPI        2/17/2024   General Health   How would you rate your overall physical health? Excellent   Feel stress (tense, anxious, or unable to sleep) Only a little   (!) STRESS CONCERN      2/17/2024   Nutrition   Three or more servings of calcium each day? Yes   Diet: Regular (no restrictions)   How many servings of fruit and vegetables per day? 4 or more   How many sweetened beverages each day? 0-1         2/17/2024   Exercise   Days per week of moderate/strenous exercise 5 days   Average minutes spent exercising at this level 60 min         2/17/2024   Social Factors   Frequency of gathering with friends or relatives Once a week   Worry food won't last until get money to buy more No   Food not last or not have enough money for food? No   Do you have housing?  Yes   Are you worried about losing your housing? No   Lack of transportation? No   Unable to get utilities (heat,electricity)? No         2/17/2024   Dental   Dentist two times every year? Yes         2/17/2024   TB Screening   Were you born outside of [place]?  No         Today's PHQ-2 Score:       2/19/2024     8:37 AM   PHQ-2 ( 1999 [employer])   Q1: Little interest or pleasure in doing things 0   Q2: Feeling down, depressed or hopeless 0   PHQ-2 Score 0   Q1: Little interest or  pleasure in doing things Not at all   Q2: Feeling down, depressed or hopeless Not at all   PHQ-2 Score 0           2/17/2024   Substance Use   Alcohol more than 3/day or more than 7/wk No   Do you use any other substances recreationally? No     Social History     Tobacco Use    Smoking status: Never     Passive exposure: Never    Smokeless tobacco: Never   Vaping Use    Vaping Use: Never used   Substance Use Topics    Alcohol use: Yes     Comment: Once a month    Drug use: No        Mammogram Screening - Mammography discussed, not appropriate due to MRI scheduled this year .  Surgical history and/or SOGI form updated.        2/17/2024   STI Screening   New sexual partner(s) since last STI/HIV test? No     History of abnormal Pap smear: NO - age 30-65 PAP every 5 years with negative HPV co-testing recommended        Latest Ref Rng & Units 2/17/2023    11:45 AM 6/4/2018    11:35 AM 6/4/2018    11:28 AM   PAP / HPV   PAP  Negative for Intraepithelial Lesion or Malignancy (NILM)      PAP (Historical)    NIL    HPV 16 DNA Negative Negative  Negative     HPV 18 DNA Negative Negative  Negative     Other HR HPV Negative Negative  Negative       The 10-year ASCVD risk score (Janes CERVANTES, et al., 2019) is: 0.6%    Values used to calculate the score:      Age: 48 years      Sex: Female      Is Non- : No      Diabetic: No      Tobacco smoker: No      Systolic Blood Pressure: 118 mmHg      Is BP treated: No      HDL Cholesterol: 81 mg/dL      Total Cholesterol: 204 mg/dL        2/17/2024   Contraception/Family Planning   Questions about contraception or family planning No        Reviewed and updated as needed this visit by Provider                    Past Medical History:   Diagnosis Date    Anemia     Atypical ductal hyperplasia of breast     Choroidal nevus of right eye     Concussion 10/2015    Depressive disorder 2006    Treated with celexa for two years    Family history of breast cancer 02/03/2015     Family history of breast cancer 02/03/2015    Family history of breast cancer 02/03/2015    Family history of breast cancer 02/03/2015    Gastroesophageal reflux disease     Glaucoma suspect of both eyes     Papilloma of breast 05/23/2012    Left, See Dr. Lynch at     PONV (postoperative nausea and vomiting)     S/P endometrial ablation 10/10/2013    Menorrhagia    Shingles     x2 in the past    WBC decreased 02/03/2015    WBC decreased 02/03/2015    WBC decreased 02/03/2015     Past Surgical History:   Procedure Laterality Date    ABDOMEN SURGERY      rectoplasty    BIOPSY BREAST NEEDLE LOCALIZATION  7/30/2012    Procedure: BIOPSY BREAST NEEDLE LOCALIZATION;  left breast excisional Biopsy with wire localization @0830;  Surgeon: Robert Lynch MD;  Location: [facility] OR    BREAST SURGERY      COSMETIC SURGERY      Rectalplasty for episiotomy repair    COSMETIC SURGERY      Rectalplasty for episiotomy repair    DILATION AND CURETTAGE, HYSTEROSCOPY, ABLATE ENDOMETRIUM NOVASURE, COMBINED  10/10/2013    Procedure: COMBINED DILATION AND CURETTAGE, HYSTEROSCOPY, ABLATE ENDOMETRIUM NOVASURE;  Endometrial ablation with Novasure;  Surgeon: Indu Birmingham DO;  Location: [facility] OR    HC TOOTH EXTRACTION W/FORCEP      LUMPECTOMY BREAST Left 2/1/2017    Procedure: LUMPECTOMY BREAST;  Surgeon: Lori Montenegro MD;  Location: [facility]    SOFT TISSUE SURGERY      lumpectomy x 2 right breast    SOFT TISSUE SURGERY      episiotomy fixed     Lab work is in process  Labs reviewed in EPIC  BP Readings from Last 3 Encounters:   02/19/24 118/81   12/20/23 111/74   12/15/23 110/80    Wt Readings from Last 3 Encounters:   02/19/24 71.7 kg (158 lb 1.6 oz)   12/20/23 70.8 kg (156 lb 1.6 oz)   12/15/23 71.7 kg (158 lb)                  Patient Active Problem List   Diagnosis    CARDIOVASCULAR SCREENING; LDL GOAL LESS THAN 160    Iron deficiency anemia    Mammographic microcalcification found on diagnostic imaging of breast     Intraductal papilloma of breast    Atypical hyperplasia of breast    Acne    Bloating symptom    Abdominal pain    Abnormal biliary HIDA scan    Family history of breast cancer    Neutropenia (H24)    Choroidal nevus of right eye    Situational mixed anxiety and depressive disorder    Glaucoma suspect, bilateral    Post-concussion syndrome    Muscle spasms of head or neck    Allergic rhinitis    WBC decreased     Past Surgical History:   Procedure Laterality Date    ABDOMEN SURGERY      rectoplasty    BIOPSY BREAST NEEDLE LOCALIZATION  7/30/2012    Procedure: BIOPSY BREAST NEEDLE LOCALIZATION;  left breast excisional Biopsy with wire localization @0830;  Surgeon: Robert Lynch MD;  Location: [facility] OR    BREAST SURGERY      COSMETIC SURGERY      Rectalplasty for episiotomy repair    COSMETIC SURGERY      Rectalplasty for episiotomy repair    DILATION AND CURETTAGE, HYSTEROSCOPY, ABLATE ENDOMETRIUM NOVASURE, COMBINED  10/10/2013    Procedure: COMBINED DILATION AND CURETTAGE, HYSTEROSCOPY, ABLATE ENDOMETRIUM NOVASURE;  Endometrial ablation with Novasure;  Surgeon: Indu Birmingham DO;  Location: [facility] OR    HC TOOTH EXTRACTION W/FORCEP      LUMPECTOMY BREAST Left 2/1/2017    Procedure: LUMPECTOMY BREAST;  Surgeon: Lori Montenegro MD;  Location: [facility]    SOFT TISSUE SURGERY      lumpectomy x 2 right breast    SOFT TISSUE SURGERY      episiotomy fixed       Social History     Tobacco Use    Smoking status: Never     Passive exposure: Never    Smokeless tobacco: Never   Substance Use Topics    Alcohol use: Yes     Comment: Once a month     Family History   Problem Relation Age of Onset    Hypertension Father     Lipids Father     Hyperlipidemia Father     Depression/Anxiety Father     Cerebrovascular Disease Father         TIA    Cancer Maternal Grandmother     Glaucoma Maternal Grandmother     Macular Degeneration Maternal Grandmother     Osteoporosis Maternal Grandmother     Anesthesia Reaction  Paternal Grandmother     Diabetes Paternal Grandmother     Cancer Paternal Grandfather     Depression/Anxiety Mother     Depression/Anxiety Daughter     Depression Daughter     Depression Son     Cataracts Maternal Grandfather     Breast Cancer Paternal Aunt 60        x 2 aunts    Breast Cancer Other 40        Several aunts on fathers side    Breast Cancer Other 64        Several aunts on fathers side/Several aunts on fathers side/Several aunts on fathers side/Several aunts on fathers side    Prostate Cancer Other 64        Uncle on mothers side    Asthma No family hx of     C.A.D. No family hx of     Cancer - colorectal No family hx of     Connective Tissue Disorder No family hx of     Thyroid Disease No family hx of     Coronary Artery Disease No family hx of     Ovarian Cancer No family hx of     Thyroid Disease No family hx of     Chemical Addiction No family hx of     Known Genetic Syndrome No family hx of          Current Outpatient Medications   Medication Sig Dispense Refill    albuterol (PROAIR HFA/PROVENTIL HFA/VENTOLIN HFA) 108 (90 Base) MCG/ACT inhaler Inhale 2 puffs into the lungs every 6 hours as needed for cough 8.5 g 0    albuterol (PROAIR HFA/PROVENTIL HFA/VENTOLIN HFA) 108 (90 Base) MCG/ACT inhaler INHALE 2 PUFFS INTO THE LUNGS EVERY 6 HOURS 8.5 g 0    benzonatate (TESSALON) 200 MG capsule Take 1 capsule (200 mg) by mouth 3 times daily as needed for cough 21 capsule 0    LORazepam (ATIVAN) 1 MG tablet Take 1 tablet (1 mg) by mouth twice a day as needed.      Naproxen Sodium (ALEVE PO) Take 440 mg by mouth as needed       rOPINIRole (REQUIP) 0.5 MG tablet TAKE THREE TABLETS BY MOUTH AT BEDTIME. 90 tablet 1    SUMAtriptan (IMITREX) 50 MG tablet Take 1 tablet (50 mg) by mouth at onset of headache for migraine May repeat in 2 hours. Max 4 tablets/24 hours. 30 tablet 3    triamcinolone (KENALOG) 0.1 % external cream Apply topically 2 times daily 15 g 0    venlafaxine (EFFEXOR XR) 37.5 MG 24 hr capsule  "TAKE ONE CAPSULE BY MOUTH ONCE DAILY 90 capsule 0     Allergies   Allergen Reactions    No Known Drug Allergy        CONSTITUTIONAL:NEGATIVE for fever, chills, change in weight  INTEGUMENTARY/SKIN: NEGATIVE for worrisome rashes, moles or lesions  EYES: NEGATIVE for vision changes or irritation  ENT: NEGATIVE for ear, mouth and throat problems  RESP:NEGATIVE for significant cough or SOB  BREAST: NEGATIVE for masses, tenderness or discharge  CV: NEGATIVE for chest pain, palpitations or peripheral edema  GI: NEGATIVE for nausea, abdominal pain, heartburn, or change in bowel habits   menopausal female: amenorrhea, no unusual urinary symptoms, no unusual vaginal symptoms, and had an ablation about 10 years   MUSCULOSKELETAL:NEGATIVE for significant arthralgias or myalgia  NEURO: NEGATIVE for weakness, dizziness or paresthesias  ENDOCRINE: NEGATIVE for temperature intolerance, skin/hair changes  HEME/ALLERGY/IMMUNE: NEGATIVE for bleeding problems  PSYCHIATRIC: NEGATIVE for changes in mood or affect          Objective    Exam  /81 (BP Location: Right arm, Patient Position: Sitting, Cuff Size: Adult Regular)   Pulse 82   Temp 98.6  F (37  C) (Temporal)   Resp 18   Ht 1.699 m (5' 6.89\")   Wt 71.7 kg (158 lb 1.6 oz)   SpO2 98%   BMI 24.84 kg/m     Estimated body mass index is 24.84 kg/m  as calculated from the following:    Height as of this encounter: 1.699 m (5' 6.89\").    Weight as of this encounter: 71.7 kg (158 lb 1.6 oz).    Physical Exam  GENERAL: alert and no distress  EYES: Eyes grossly normal to inspection and conjunctivae and sclerae normal  HENT: ear canals and TM's normal, nose and mouth without ulcers or lesions  NECK: no adenopathy, no asymmetry, masses, or scars  RESP: lungs clear to auscultation - no rales, rhonchi or wheezes  BREAST: normal without masses, tenderness or nipple discharge and no palpable axillary masses or adenopathy  CV: regular rates and rhythm, no murmur, click or rub, " peripheral pulses strong, and no peripheral edema  ABDOMEN: soft, nontender, no hepatosplenomegaly, no masses and bowel sounds normal   (female): normal female external genitalia, normal urethral meatus , normal vaginal mucosa, and normal cervix, adnexae, and uterus without masses.  MS: no gross musculoskeletal defects noted, no edema  SKIN: no suspicious lesions or rashes  NEURO: Normal strength and tone, mentation intact and speech normal  PSYCH: mentation appears normal, affect normal/bright  LYMPH: no cervical, supraclavicular, axillary, or inguinal adenopathy      Signed Electronically by: SINDHU Antonio CNP

## 2024-02-19 NOTE — PATIENT INSTRUCTIONS
PLAN:   1.   Symptomatic therapy suggested: Increase calcium to 1000mg and 1000 international unit(s) Vit D .    2.  Orders Placed This Encounter   Medications    rOPINIRole (REQUIP) 0.5 MG tablet     Sig: Take 3 tablets (1.5 mg) by mouth at bedtime     Dispense:  270 tablet     Refill:  3    venlafaxine (EFFEXOR XR) 37.5 MG 24 hr capsule     Sig: Take 1 capsule (37.5 mg) by mouth daily     Dispense:  90 capsule     Refill:  3     Orders Placed This Encounter   Procedures    REVIEW OF HEALTH MAINTENANCE PROTOCOL ORDERS    Lipid panel reflex to direct LDL Fasting    CBC with platelets    Comprehensive metabolic panel    TSH with free T4 reflex       3.  Will follow up and/or notify patient of  results via My Chart to determine further need for followup  Follow up office visit in one year for annual health maintenance exam, sooner PRN.   Patient needs to follow up in if no improvement,or sooner if worsening of symptoms or other symptoms develop.    Preventive Care Advice   This is general advice given by our system to help you stay healthy. However, your care team may have specific advice just for you. Please talk to your care team about your preventive care needs.  Nutrition  Eat 5 or more servings of fruits and vegetables each day.  Try wheat bread, brown rice and whole grain pasta (instead of white bread, rice, and pasta).  Get enough calcium and vitamin D. Check the label on foods and aim for 100% of the RDA (recommended daily allowance).  Lifestyle  Exercise at least 150 minutes each week  (30 minutes a day, 5 days a week).  Do muscle strengthening activities 2 days a week. These help control your weight and prevent disease.  No smoking.  Wear sunscreen to prevent skin cancer.  Have a dental exam and cleaning every 6 months.  Yearly exams  See your health care team every year to talk about:  Any changes in your health.  Any medicines your care team has prescribed.  Preventive care, family planning, and ways to  prevent chronic diseases.  Shots (vaccines)   HPV shots (up to age 26), if you've never had them before.  Hepatitis B shots (up to age 59), if you've never had them before.  COVID-19 shot: Get this shot when it's due.  Flu shot: Get a flu shot every year.  Tetanus shot: Get a tetanus shot every 10 years.  Pneumococcal, hepatitis A, and RSV shots: Ask your care team if you need these based on your risk.  Shingles shot (for age 50 and up)  General health tests  Diabetes screening:  Starting at age 35, Get screened for diabetes at least every 3 years.  If you are younger than age 35, ask your care team if you should be screened for diabetes.  Cholesterol test: At age 39, start having a cholesterol test every 5 years, or more often if advised.  Bone density scan (DEXA): At age 50, ask your care team if you should have this scan for osteoporosis (brittle bones).  Hepatitis C: Get tested at least once in your life.  STIs (sexually transmitted infections)  Before age 24: Ask your care team if you should be screened for STIs.  After age 24: Get screened for STIs if you're at risk. You are at risk for STIs (including HIV) if:  You are sexually active with more than one person.  You don't use condoms every time.  You or a partner was diagnosed with a sexually transmitted infection.  If you are at risk for HIV, ask about PrEP medicine to prevent HIV.  Get tested for HIV at least once in your life, whether you are at risk for HIV or not.  Cancer screening tests  Cervical cancer screening: If you have a cervix, begin getting regular cervical cancer screening tests starting at age 21.  Breast cancer scan (mammogram): If you've ever had breasts, begin having regular mammograms starting at age 40. This is a scan to check for breast cancer.  Colon cancer screening: It is important to start screening for colon cancer at age 45.  Have a colonoscopy test every 10 years (or more often if you're at risk) Or, ask your provider about stool  tests like a FIT test every year or Cologuard test every 3 years.  To learn more about your testing options, visit:   https://www.[employer]/637643.pdf.  For help making a decision, visit:   https://bit.ly/zq09278.  Prostate cancer screening test: If you have a prostate, ask your care team if a prostate cancer screening test (PSA) at age 55 is right for you.  Lung cancer screening: If you are a current or former smoker ages 50 to 80, ask your care team if ongoing lung cancer screenings are right for you.  For informational purposes only. Not to replace the advice of your health care provider. Copyright   2023 [employer]. All rights reserved. Clinically reviewed by the [facility] Transitions Program. [employer] [id number] - REV 01/24.    Learning About Stress  What is stress?     Stress is your body's response to a hard situation. Your body can have a physical, emotional, or mental response. Stress is a fact of life for most people, and it affects everyone differently. What causes stress for you may not be stressful for someone else.  A lot of things can cause stress. You may feel stress when you go on a job interview, take a test, or run a race. This kind of short-term stress is normal and even useful. It can help you if you need to work hard or react quickly. For example, stress can help you finish an important job on time.  Long-term stress is caused by ongoing stressful situations or events. Examples of long-term stress include long-term health problems, ongoing problems at work, or conflicts in your family. Long-term stress can harm your health.  How does stress affect your health?  When you are stressed, your body responds as though you are in danger. It makes hormones that speed up your heart, make you breathe faster, and give you a burst of energy. This is called the fight-or-flight stress response. If the stress is over quickly, your body goes back to normal and no harm is done.  But if  stress happens too often or lasts too long, it can have bad effects. Long-term stress can make you more likely to get sick, and it can make symptoms of some diseases worse. If you tense up when you are stressed, you may develop neck, shoulder, or low back pain. Stress is linked to high blood pressure and heart disease.  Stress also harms your emotional health. It can make you blackmon, tense, or depressed. Your relationships may suffer, and you may not do well at work or school.  What can you do to manage stress?  You can try these things to help manage stress:   Do something active. Exercise or activity can help reduce stress. Walking is a great way to get started. Even everyday activities such as housecleaning or yard work can help.  Try yoga or andrea chi. These techniques combine exercise and meditation. You may need some training at first to learn them.  Do something you enjoy. For example, listen to music or go to a movie. Practice your hobby or do volunteer work.  Meditate. This can help you relax, because you are not worrying about what happened before or what may happen in the future.  Do guided imagery. Imagine yourself in any setting that helps you feel calm. You can use online videos, books, or a teacher to guide you.  Do breathing exercises. For example:  From a standing position, bend forward from the waist with your knees slightly bent. Let your arms dangle close to the floor.  Breathe in slowly and deeply as you return to a standing position. Roll up slowly and lift your head last.  Hold your breath for just a few seconds in the standing position.  Breathe out slowly and bend forward from the waist.  Let your feelings out. Talk, laugh, cry, and express anger when you need to. Talking with supportive friends or family, a counselor, or a jerome leader about your feelings is a healthy way to relieve stress. Avoid discussing your feelings with people who make you feel worse.  Write. It may help to write about  "things that are bothering you. This helps you find out how much stress you feel and what is causing it. When you know this, you can find better ways to cope.  What can you do to prevent stress?  You might try some of these things to help prevent stress:  Manage your time. This helps you find time to do the things you want and need to do.  Get enough sleep. Your body recovers from the stresses of the day while you are sleeping.  Get support. Your family, friends, and community can make a difference in how you experience stress.  Limit your news feed. Avoid or limit time on social media or news that may make you feel stressed.  Do something active. Exercise or activity can help reduce stress. Walking is a great way to get started.  Where can you learn more?  Go to https://www.[employer].net/patiented  Enter N032 in the search box to learn more about \"Learning About Stress.\"  Current as of: February 26, 2023               Content Version: 13.8    4385-7727 [employer].   Care instructions adapted under license by your healthcare professional. If you have questions about a medical condition or this instruction, always ask your healthcare professional. [employer] disclaims any warranty or liability for your use of this information.      "

## 2024-02-22 NOTE — RESULT ENCOUNTER NOTE
David Garcia,    Attached are your test results.  -Normal red blood cell (hgb) levels, decreased white blood cell count and normal platelet levels. ADVISE: recheck in a month  -LDL(bad) cholesterol level is elevated which can increase your heart disease risk.  A diet high in fat and simple carbohydrates, genetics and being overweight can contribute to this. ADVISE: exercising 150 minutes of aerobic exercise per week (30 minutes for 5 days per week or 50 minutes for 3 days per week are options) and eating a low saturated fat/low carbohydrate diet are helpful to improve this. In 12 months, you should recheck your fasting cholesterol panel by scheduling a lab-only appointment.  -Liver and gallbladder tests are normal (ALT,AST, Alk phos, bilirubin), kidney function is normal (Cr, GFR), sodium is normal, potassium is normal, calcium is normal, glucose is normal.  -TSH (thyroid stimulating hormone) level is normal which indicates normal thyroid function.   Please contact us if you have any questions.    Ani Slater, CNP

## 2024-02-25 DIAGNOSIS — G43.009 MIGRAINE WITHOUT AURA AND WITHOUT STATUS MIGRAINOSUS, NOT INTRACTABLE: ICD-10-CM

## 2024-02-26 RX ORDER — SUMATRIPTAN 50 MG/1
50 TABLET, FILM COATED ORAL
Qty: 30 TABLET | Refills: 2 | Status: SHIPPED | OUTPATIENT
Start: 2024-02-26

## 2024-03-12 DIAGNOSIS — H40.003 GLAUCOMA SUSPECT OF BOTH EYES: Primary | ICD-10-CM

## 2024-03-13 ENCOUNTER — OFFICE VISIT (OUTPATIENT)
Dept: OPHTHALMOLOGY | Facility: CLINIC | Age: 49
End: 2024-03-13
Attending: OPHTHALMOLOGY
Payer: COMMERCIAL

## 2024-03-13 DIAGNOSIS — H04.129 DRY EYE: Primary | ICD-10-CM

## 2024-03-13 DIAGNOSIS — H40.003 GLAUCOMA SUSPECT OF BOTH EYES: ICD-10-CM

## 2024-03-13 PROCEDURE — 99213 OFFICE O/P EST LOW 20 MIN: CPT | Performed by: OPHTHALMOLOGY

## 2024-03-13 PROCEDURE — 92083 EXTENDED VISUAL FIELD XM: CPT | Performed by: OPHTHALMOLOGY

## 2024-03-13 PROCEDURE — 92133 CPTRZD OPH DX IMG PST SGM ON: CPT | Performed by: OPHTHALMOLOGY

## 2024-03-13 ASSESSMENT — SLIT LAMP EXAM - LIDS
COMMENTS: NORMAL
COMMENTS: NORMAL

## 2024-03-13 ASSESSMENT — VISUAL ACUITY
CORRECTION_TYPE: GLASSES
OS_CC: 20/20
METHOD: SNELLEN - LINEAR
OD_CC: 20/20
OD_CC+: -1

## 2024-03-13 ASSESSMENT — REFRACTION_WEARINGRX
OD_ADD: +2.00
OS_CYLINDER: +0.50
OS_AXIS: 080
OD_CYLINDER: SPHERE
OS_ADD: +2.00
OS_SPHERE: -0.50
OD_SPHERE: -0.25

## 2024-03-13 ASSESSMENT — CONF VISUAL FIELD
OS_INFERIOR_NASAL_RESTRICTION: 0
OD_INFERIOR_NASAL_RESTRICTION: 0
OD_INFERIOR_TEMPORAL_RESTRICTION: 0
OS_SUPERIOR_NASAL_RESTRICTION: 0
OD_NORMAL: 1
OD_SUPERIOR_TEMPORAL_RESTRICTION: 0
OS_NORMAL: 1
OS_INFERIOR_TEMPORAL_RESTRICTION: 0
METHOD: COUNTING FINGERS
OS_SUPERIOR_TEMPORAL_RESTRICTION: 0
OD_SUPERIOR_NASAL_RESTRICTION: 0

## 2024-03-13 ASSESSMENT — CUP TO DISC RATIO
OS_RATIO: 0.6
OD_RATIO: 0.6

## 2024-03-13 ASSESSMENT — TONOMETRY
OD_IOP_MMHG: 15
IOP_METHOD: TONOPEN
OS_IOP_MMHG: 16

## 2024-03-13 ASSESSMENT — EXTERNAL EXAM - LEFT EYE: OS_EXAM: NORMAL

## 2024-03-13 ASSESSMENT — EXTERNAL EXAM - RIGHT EYE: OD_EXAM: NORMAL
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
## 2024-03-13 NOTE — NURSING NOTE
"Chief Complaints and History of Present Illnesses   Patient presents with    Glaucoma Suspect Follow Up     1 year follow up for Glaucoma suspect each eye.     Patient states vision is stable each eye since she saw Dr. Perez on 1/12/24. \"I used to be able to read things closer, and now I have to move them further away.\" No eye drops used.     Dilia Marshall, COT 10:45 AM 03/13/2024       Chief Complaint(s) and History of Present Illness(es)       Glaucoma Suspect Follow Up              Laterality: both eyes    Associated symptoms: Negative for dryness, eye pain, flashes and floaters    Pain scale: 0/10    Comments: 1 year follow up for Glaucoma suspect each eye.     Patient states vision is stable each eye since she saw Dr. Perez on 1/12/24. \"I used to be able to read things closer, and now I have to move them further away.\" No eye drops used.     Dilia Marshall, COT 10:45 AM 03/13/2024                      "

## 2024-03-25 ENCOUNTER — OFFICE VISIT (OUTPATIENT)
Dept: OPHTHALMOLOGY | Facility: CLINIC | Age: 49
End: 2024-03-25
Payer: COMMERCIAL

## 2024-03-25 ENCOUNTER — TELEPHONE (OUTPATIENT)
Dept: OPHTHALMOLOGY | Facility: CLINIC | Age: 49
End: 2024-03-25

## 2024-03-25 DIAGNOSIS — H52.13 MYOPIA OF BOTH EYES WITH ASTIGMATISM AND PRESBYOPIA: ICD-10-CM

## 2024-03-25 DIAGNOSIS — H04.129 DRY EYE: Primary | ICD-10-CM

## 2024-03-25 DIAGNOSIS — H52.4 MYOPIA OF BOTH EYES WITH ASTIGMATISM AND PRESBYOPIA: ICD-10-CM

## 2024-03-25 DIAGNOSIS — H52.203 MYOPIA OF BOTH EYES WITH ASTIGMATISM AND PRESBYOPIA: ICD-10-CM

## 2024-03-25 PROCEDURE — 92015 DETERMINE REFRACTIVE STATE: CPT | Performed by: OPHTHALMOLOGY

## 2024-03-25 PROCEDURE — 99214 OFFICE O/P EST MOD 30 MIN: CPT | Performed by: OPHTHALMOLOGY

## 2024-03-25 ASSESSMENT — REFRACTION_MANIFEST
OS_AXIS: 080
OS_CYLINDER: +0.50
OS_SPHERE: -0.25
OD_ADD: +2.00
OD_CYLINDER: SPHERE
OS_ADD: +2.00
OD_SPHERE: PLANO

## 2024-03-25 ASSESSMENT — REFRACTION_WEARINGRX
OS_CYLINDER: +0.50
OS_SPHERE: -0.50
OD_CYLINDER: SPHERE
OS_AXIS: 080
OS_ADD: +1.50
OD_ADD: +1.50
OD_SPHERE: -0.25

## 2024-03-25 ASSESSMENT — VISUAL ACUITY
METHOD: SNELLEN - LINEAR
CORRECTION_TYPE: GLASSES
OD_CC: 20/15
OS_CC: 20/15

## 2024-03-25 ASSESSMENT — TONOMETRY
OD_IOP_MMHG: 12
IOP_METHOD: ICARE
OS_IOP_MMHG: 14

## 2024-03-25 ASSESSMENT — EXTERNAL EXAM - RIGHT EYE: OD_EXAM: NORMAL

## 2024-03-25 ASSESSMENT — SLIT LAMP EXAM - LIDS
COMMENTS: NORMAL
COMMENTS: NORMAL

## 2024-03-25 ASSESSMENT — EXTERNAL EXAM - LEFT EYE: OS_EXAM: NORMAL

## 2024-03-25 NOTE — TELEPHONE ENCOUNTER
Left Voicemail (1st Attempt) and Sent Cnektt (1st Attempt) for the patient to call back and schedule the following:    Appointment type: Return Adult Eye  Provider: Dr. Nelson  Return date: 3/24/2025  Specialty phone number: [phone]  Additional appointment(s) needed:   Additonal Notes:     Attempted to schedule a one year follow up with Dr. Nelson:    Return for 24-2 VF, Annual Visit-v/t/d/MRx, OCT RNFL     Also sent a Playdek message.    Karin MARIE/Complex Procedure    [facility]   Neurology, NeuroSurgery, NeuroPsychology, Pain Management and Cardiology Specialties  Medical/Surgical Adult Specialties

## 2024-03-25 NOTE — NURSING NOTE
Chief Complaints and History of Present Illnesses   Patient presents with    Follow Up     Chief Complaint(s) and History of Present Illness(es)       Follow Up              Laterality: both eyes    Course: stable    Associated symptoms: Negative for eye pain, flashes and floaters    Treatments tried: artificial tears              Comments    Here for follow up. [facility]. Uses [facility] as needed.    Eloy BECKER 8:01 AM March 25, 2024
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
## 2024-04-29 ENCOUNTER — LAB REQUISITION (OUTPATIENT)
Dept: LAB | Facility: CLINIC | Age: 49
End: 2024-04-29

## 2024-04-29 ENCOUNTER — MYC MEDICAL ADVICE (OUTPATIENT)
Dept: FAMILY MEDICINE | Facility: CLINIC | Age: 49
End: 2024-04-29
Payer: COMMERCIAL

## 2024-04-29 LAB — SARS-COV-2 RNA RESP QL NAA+PROBE: NEGATIVE

## 2024-04-29 PROCEDURE — 87635 SARS-COV-2 COVID-19 AMP PRB: CPT | Performed by: INTERNAL MEDICINE

## 2024-05-14 NOTE — PROGRESS NOTES
.  FOLLOW UP  May 22, 2024     Kaitlyn Garcia is a 48 year old woman who presents with history of atypical ductal hyperplasia.      HPI:     History of 2 right breast biopsies that were benign. In 2012 she had a left breast intraductal papilloma surgical excised. In 2016 she had 2 left needle biopsies, one of which demonstrated atypical ductal hyperplasia. The biopsied area of ADH was surgical excised. In 2017 she had an MRI guided needle left breast biopsy that demonstrated an intraductal papilloma.      Family history of 2 paternal aunts who had breast cancer in their 60's and a paternal cousin with breast cancer in her 40's. Kaitlyn was seen by a genetic counselor in 2015, no genetic testing was done.      She is doing high risk screening with breast MRI and mammogram. She completed 5 years of Tamoxifen for risk reduction.      She had a pelvic ultrasound 6/4/18, 6/12/19, and 11/4/20 that were stable.      Today she denies any breast concerns including mass, skin change, nipple inversion or nipple discharge. She feels like she is starting to go through menopause with night sweats. She is due for a breast MRI.     BREAST-SPECIFIC HISTORY:     Previous breast imaging: Yes   - 5/21/12 b/l Dmammo and right u/s for right breast lump: calcifications in the upper outer quadrant of left breast spanning 5 cm. Right u/s negative. BI-RADS 4 suspicious for malignancy   - 5/23/12 left stereotactic biopsy: intraductal papilloma   - 7/30/12 left wire-placement   - 2/19/13 MRI: large segmental enhancement of left lateral breast, BI-RADS 0 incomplete   Left u/s: BI-RADS 3 probably benign  - 3/29/13 MRI: BI-RADS 2 benign findings  - 8/5/13 b/l Dmammo for fullness: BI-RADS 2 benign   - 2/5/14 MRI: cysts in left breast, BI-RADS 2 benign   - 5/9/14 Dmammo b/l: negative. Left u/s: cystic appearing structures, BI-RADS 2 benign  - 5/20/14 MRI: study not completed due to nausea, BI-RADS 0 incomplete  - 5/28/14 MRI: BI-RADS 2 benign  -  10/28/14 b/l Dmammo: BI-RADS 2 benign   - 5/7/15 MRI BI-RADS 2 benign   - 11/10/15 Smammo: bilateral calcs: BI-RADS 2 benign  - 1/25/16 left u/s for pain: multiple tiny cyst  - 5/17/16 MRI: BI-RADS 2 benign   - 12/6/16  Smammo: lateral left breast developing macrocalcifications BI-RADS 0 incomplete   - 12/7/16 Dmammo left: left 2:00 calcifications BI-RADS 4 suspicious  - 12/12/16: stereotactic biopsy: focus of ADH    - 2/1/17 wire placement   - 5/9/17 MRI: 2 adjacent mass like areas of enhancement mid central left breast BI-RADS 0 incomplete   - 5/12/17 left u/s: scattered small cysts BI-RADS 4 suspicious   - 5/18/17 MR biopsy: intraductal papilloma   - 4/30/18 MRI BI-RADS 2  - 6/6/18 left Dmammo and ultrasound for pain BI-RADS 2  - 11/28/18 Smammo BI-RADS 2  - 5/7/19 breast MRI BI-RADS 2  - 8/21/19 Dmammo and right breast ultrasound: BI-RADS 2  - 11/12/19 Smammo BI-RADS 1  - 5/20/20 breast MRI BI-ARDS 2  - 11/4/20 b/l Dmammo and left breast ultrasound for lump: BI-RADS 1  - 5/5/21 Breast MRI BI-RADS 1  - 11/10/21 Smammo BI-RADS 2  - 5/11/22 breast MRI BI-RADS 2  - 11/16/22 Smammo BI-RADS 2  - 5/17/23 breast MRI BI-RADS 2  - 11/22/23 Smammo BI-RADS 2  - 5/22/24 breast MRI     Prior breast biopsies:Yes   - 2 right breast biopsies   - 5/23/12 left needle biopsy: intraductal papilloma with sclerosing features   - 7/30/12 left excisional biopsy with Dr. Lynch: focal columnar cell change with atypia, intraductal papilloma and surrounding intraductal papillomatosis, sclerosing adenosis, fibrocystic changes  - 12/12/16 left needle biopsy x2: flat epithelial atypia with focus of ADH, duct ectasia and focal sclerosing adenosis, microcalcifications.   FEA and columnar cell change with atypia  - 2/1/17 left scar excision and excisional biopsy with Dr. Montenegro: FEA, columnar cell change/hyperplasia, intraductal papillomas, usual ductal hyperplasia, calcifications  - 5/18/17 left MRI needle biopsy: intraductal papilloma, mild  columnar cell hyperplasia, mild duct ectasia, focal sclerosing adenosis      Prior history of breast cancer: No  Prior radiation history: No   Self breast exams: No  Breast density: heterogeneously dense     GYN HISTORY:  . Age at 1st pregnancy: 17. Breastfeeding history: Yes.   Age at menarche: [age]  Menopausal: premenopausal, uterine ablation   Menopausal hormone replacement therapy: No     RISK ASSESSMENT: < 3% 5 year risk and > 20% lifetime risk   Heather: 2.0% 5 year risk   SEBASTIAN: 43.5% lifetime risk      FAMILY HISTORY:  Breast ca: Yes    - paternal aunt 60 's (5 paternal aunts)  - paternal aunt 60's  - paternal cousin 40 (aunt, 1 sisters)  Ovarian ca: No   - ? Paternal cousin with ovarian cancer   Pancreatic ca: No   Prostate: yes  - maternal uncle 69   Gastric ca: Yes   - paternal grandfather, passed   Melanoma: No  Colon ca: No  Other cancer: Yes   - maternal grandmother skin cancer 90's  - paternal grandfather brain 60's   Hinduism ethnicity: No  Other genetic, testing, syndromes, or clotting disorders: No   - She saw a genetic counselor 2015 and no testing was recommended     PAST MEDICAL HISTORY  Past Medical History:   Diagnosis Date    Anemia     Atypical ductal hyperplasia of breast     Choroidal nevus of right eye     Concussion 10/2015    Depressive disorder 2006    Treated with celexa for two years    Family history of breast cancer 2015    Family history of breast cancer 2015    Family history of breast cancer 2015    Family history of breast cancer 2015    Gastroesophageal reflux disease     Glaucoma suspect of both eyes     Papilloma of breast 2012    Left, See Dr. Lynch at     PONV (postoperative nausea and vomiting)     S/P endometrial ablation 10/10/2013    Menorrhagia    Shingles     x2 in the past    WBC decreased 2015    WBC decreased 2015    WBC decreased 2015     PAST SURGICAL HISTORY   Past Surgical History:   Procedure Laterality Date     ABDOMEN SURGERY      rectoplasty    BIOPSY BREAST NEEDLE LOCALIZATION  7/30/2012    Procedure: BIOPSY BREAST NEEDLE LOCALIZATION;  left breast excisional Biopsy with wire localization @0830;  Surgeon: Robert Lynch MD;  Location: [facility] OR    BREAST SURGERY      COSMETIC SURGERY      Rectalplasty for episiotomy repair    COSMETIC SURGERY      Rectalplasty for episiotomy repair    DILATION AND CURETTAGE, HYSTEROSCOPY, ABLATE ENDOMETRIUM NOVASURE, COMBINED  10/10/2013    Procedure: COMBINED DILATION AND CURETTAGE, HYSTEROSCOPY, ABLATE ENDOMETRIUM NOVASURE;  Endometrial ablation with Novasure;  Surgeon: Indu Birmingham DO;  Location: [facility] OR    HC TOOTH EXTRACTION W/FORCEP      LUMPECTOMY BREAST Left 2/1/2017    Procedure: LUMPECTOMY BREAST;  Surgeon: Lori Montenegro MD;  Location: [facility]    SOFT TISSUE SURGERY      lumpectomy x 2 right breast    SOFT TISSUE SURGERY      episiotomy fixed     MEDICATIONS  Current Outpatient Medications   Medication Sig Dispense Refill    albuterol (PROAIR HFA/PROVENTIL HFA/VENTOLIN HFA) 108 (90 Base) MCG/ACT inhaler Inhale 2 puffs into the lungs every 6 hours as needed for cough 8.5 g 0    LORazepam (ATIVAN) 1 MG tablet Take 1 tablet (1 mg) by mouth twice a day as needed.      Naproxen Sodium (ALEVE PO) Take 440 mg by mouth as needed       rOPINIRole (REQUIP) 0.5 MG tablet Take 3 tablets (1.5 mg) by mouth at bedtime 270 tablet 3    SUMAtriptan (IMITREX) 50 MG tablet TAKE 1 TABLET (50 MG) BY MOUTH AT ONSET OF HEADACHE FOR MIGRAINE, MAY REPEAT IN 2 HOURS. MAX 4 TABLETS/24 HOURS. 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 Her  has lymphoma and has had several knee surgeries. She has a daughter who lives in [place] and a 27 year old son. They have [employer] season tickets.      ROS:  Change in vision No  Headaches: no  Respiratory: No shortness of breath, dyspnea on exertion, cough, or hemoptysis   Cardiovascular: negative   Gastrointestinal: negative Abdominal pain: no  Breast: negative  Musculoskeletal: negative Joint pain No Back pain: no  Psychiatric: negative  Hematologic/Lymphatic/Immunologic: negative  Endocrine: negative    EXAM  /83 (BP Location: Right arm, Patient Position: Chair, Cuff Size: Adult Large)   Pulse 64   Temp 98.1  F (36.7  C)   Resp 16   Ht 1.699 m (5' 6.89\")   Wt 71.8 kg (158 lb 4.8 oz)   SpO2 98%   BMI 24.87 kg/m     PHYSICAL EXAM  Respiratory: breathing non labored.   Breasts: Examination was done in both the upright and supine positions.  Breasts are symmetrical . No dominant fixed or suspicious masses noted. No skin or nipple changes. No nipple discharge.   Left lateral and periareolar scar well healed. Right lateral scar well healed. Breast bilaterally equally more firm superiorly and centrally  No clavicular, cervical, or axillary lymphadenopathy.     INVESTIGATIONS:    5/22/24 breast MRI: per Radiology no concerning findings, final report pending.     ASSESSMENT/PLAN:    Kaitlyn Garcia is a 48 year old woman with history of left breast atypical ductal hyperplasia. She meets NCCN guidelines for high risk screening and risk reduction. She completed 5 years of low dose Tamoxifen for risk reduction.       1) Atypical ductal hyperplasia and family history of breast cancer.   We also reviewed that atypical ductal hyperplasia increases her baseline risk for breast cancer. She meets guidelines for high risk screening with a lifetime risk of breast cancer >20%. Discussed she meets guidelines for high risk screening with yearly mammogram alternating with Breast MRI " every six months. Clinical encounter every 6-12 months including family history, risk assessment, and clinical breast exam.   - Screening mammogram with clinic visit due 11/23/24  - Breast MRI with clinic visit due 5/23/2025     2) Lifestyle Modifications were provided. - Maintain your best healthy weight. Higher body fat and adult weight gain is associated with increased risk for breast cancer. This increase in risk has been attributed to increase in circulating endogenous estrogen levels from fat tissue.   - Alcohol can raise estrogen. Alcohol consumption, even at moderate levels (1-2 drinks per day), increases breast cancer risk and are best avoided. If you choose to drink alcohol limit alcohol consumption to less than 1 drink per day. (1 ounce of liquor, 6 ounces of wine, or 8 ounces of beer).  - Be active daily and void being sedentary.     Merced Wiggins PA-C    20 minutes spent on the date of the encounter doing chart review, review of test results, interpretation of tests, patient visit and documentation.

## 2024-05-22 ENCOUNTER — LAB (OUTPATIENT)
Dept: LAB | Facility: CLINIC | Age: 49
End: 2024-05-22
Payer: COMMERCIAL

## 2024-05-22 ENCOUNTER — ONCOLOGY VISIT (OUTPATIENT)
Dept: SURGERY | Facility: CLINIC | Age: 49
End: 2024-05-22
Attending: PHYSICIAN ASSISTANT
Payer: COMMERCIAL

## 2024-05-22 ENCOUNTER — ANCILLARY PROCEDURE (OUTPATIENT)
Dept: MRI IMAGING | Facility: CLINIC | Age: 49
End: 2024-05-22
Attending: PHYSICIAN ASSISTANT
Payer: COMMERCIAL

## 2024-05-22 VITALS
TEMPERATURE: 98.1 F | SYSTOLIC BLOOD PRESSURE: 121 MMHG | HEIGHT: 67 IN | WEIGHT: 158.3 LBS | BODY MASS INDEX: 24.84 KG/M2 | HEART RATE: 64 BPM | OXYGEN SATURATION: 98 % | RESPIRATION RATE: 16 BRPM | DIASTOLIC BLOOD PRESSURE: 83 MMHG

## 2024-05-22 DIAGNOSIS — D72.819 LEUKOPENIA, UNSPECIFIED TYPE: ICD-10-CM

## 2024-05-22 DIAGNOSIS — Z91.89 AT HIGH RISK FOR BREAST CANCER: Primary | ICD-10-CM

## 2024-05-22 DIAGNOSIS — Z91.89 AT HIGH RISK FOR BREAST CANCER: ICD-10-CM

## 2024-05-22 LAB
ERYTHROCYTE [DISTWIDTH] IN BLOOD BY AUTOMATED COUNT: 11.4 % (ref 10–15)
HCT VFR BLD AUTO: 36.7 % (ref 35–47)
HGB BLD-MCNC: 12.8 G/DL (ref 11.7–15.7)
MCH RBC QN AUTO: 31.7 PG (ref 26.5–33)
MCHC RBC AUTO-ENTMCNC: 34.9 G/DL (ref 31.5–36.5)
MCV RBC AUTO: 91 FL (ref 78–100)
PLATELET # BLD AUTO: 211 10E3/UL (ref 150–450)
RBC # BLD AUTO: 4.04 10E6/UL (ref 3.8–5.2)
WBC # BLD AUTO: 4 10E3/UL (ref 4–11)

## 2024-05-22 PROCEDURE — A9585 GADOBUTROL INJECTION: HCPCS | Performed by: RADIOLOGY

## 2024-05-22 PROCEDURE — 99213 OFFICE O/P EST LOW 20 MIN: CPT | Performed by: PHYSICIAN ASSISTANT

## 2024-05-22 PROCEDURE — 85027 COMPLETE CBC AUTOMATED: CPT | Performed by: PATHOLOGY

## 2024-05-22 PROCEDURE — 77049 MRI BREAST C-+ W/CAD BI: CPT | Performed by: RADIOLOGY

## 2024-05-22 PROCEDURE — 36415 COLL VENOUS BLD VENIPUNCTURE: CPT | Performed by: PATHOLOGY

## 2024-05-22 RX ORDER — GADOBUTROL 604.72 MG/ML
7.5 INJECTION INTRAVENOUS ONCE
Status: COMPLETED | OUTPATIENT
Start: 2024-05-22 | End: 2024-05-22

## 2024-05-22 RX ADMIN — GADOBUTROL 7.5 ML: 604.72 INJECTION INTRAVENOUS at 13:00

## 2024-05-22 ASSESSMENT — PAIN SCALES - GENERAL: PAINLEVEL: NO PAIN (0)

## 2024-05-22 NOTE — DISCHARGE INSTRUCTIONS
MRI Contrast Discharge Instructions    The IV contrast you received today will pass out of your body in your  urine. This will happen in the next 24 hours. You will not feel this process.  Your urine will not change color.    Drink at least 4 extra glasses of water or juice today (unless your doctor  has restricted your fluids). This reduces the stress on your kidneys.  You may take your regular medicines.    If you are on dialysis: It is best to have dialysis today.    If you have a reaction: Most reactions happen right away. If you have  any new symptoms after leaving the hospital (such as hives or swelling),  call your hospital at the correct number below. Or call your family doctor.  If you have breathing distress or wheezing, call 911.    Special instructions: ***    I have read and understand the above information.    Signature:______________________________________ Date:___________    Staff:__________________________________________ Date:___________     Time:__________    [facility] Departments:    ___[place]: [phone]  ___[facility]: [phone]  ___[place]: [phone] ___[facility]: [phone]  ___[facility]: [phone]  ___[facility]: [phone]  ___[phone]  ___[facility]: [phone]  ___[phone]

## 2024-05-22 NOTE — NURSING NOTE
"Oncology Rooming Note    May 22, 2024 12:51 PM   Kaitlyn Garcia is a 48 year old female who presents for:    Chief Complaint   Patient presents with    Oncology Clinic Visit     P RETURN - BREAST CANCER     Initial Vitals: /83 (BP Location: Right arm, Patient Position: Chair, Cuff Size: Adult Large)   Pulse 64   Temp 98.1  F (36.7  C)   Resp 16   Ht 1.699 m (5' 6.89\")   Wt 71.8 kg (158 lb 4.8 oz)   SpO2 98%   BMI 24.87 kg/m   Estimated body mass index is 24.87 kg/m  as calculated from the following:    Height as of this encounter: 1.699 m (5' 6.89\").    Weight as of this encounter: 71.8 kg (158 lb 4.8 oz). Body surface area is 1.84 meters squared.  No Pain (0) Comment: Data Unavailable   No LMP recorded. Patient has had an ablation.  Allergies reviewed: Yes  Medications reviewed: Yes    Medications: Medication refills not needed today.  Pharmacy name entered into [facility]:    [facility], [place] - [address], [address]  [facility] [place], [place] - [address]  [id number] [place], [place] - [address] [facility]    Frailty Screening:   Is the patient here for a new oncology consult visit in cancer care? 2. No    Héctor Blanco LPN              "

## 2024-05-22 NOTE — PATIENT INSTRUCTIONS
Kaitlyn Garcia is a 48 year old woman with history of left breast atypical ductal hyperplasia. She meets NCCN guidelines for high risk screening and risk reduction. She completed 5 years of low dose Tamoxifen for risk reduction.       1) Atypical ductal hyperplasia and family history of breast cancer.   We also reviewed that atypical ductal hyperplasia increases her baseline risk for breast cancer. She meets guidelines for high risk screening with a lifetime risk of breast cancer >20%. Discussed she meets guidelines for high risk screening with yearly mammogram alternating with Breast MRI every six months. Clinical encounter every 6-12 months including family history, risk assessment, and clinical breast exam.   - Screening mammogram with clinic visit due 11/23/24  - Breast MRI with clinic visit due 5/23/2025     2) Lifestyle Modifications were provided. - Maintain your best healthy weight. Higher body fat and adult weight gain is associated with increased risk for breast cancer. This increase in risk has been attributed to increase in circulating endogenous estrogen levels from fat tissue.   - Alcohol can raise estrogen. Alcohol consumption, even at moderate levels (1-2 drinks per day), increases breast cancer risk and are best avoided. If you choose to drink alcohol limit alcohol consumption to less than 1 drink per day. (1 ounce of liquor, 6 ounces of wine, or 8 ounces of beer).  - Be active daily and void being sedentary.

## 2024-05-22 NOTE — LETTER
5/22/2024         RE: Kaitlyn Garcia  [address]  [facility] [address]        Dear Colleague,    Thank you for referring your patient, Kaitlyn Garcia, to the [facility]. Please see a copy of my visit note below.    .  FOLLOW UP  May 22, 2024     Kaitlyn Garcia is a 48 year old woman who presents with history of atypical ductal hyperplasia.      HPI:     History of 2 right breast biopsies that were benign. In 2012 she had a left breast intraductal papilloma surgical excised. In 2016 she had 2 left needle biopsies, one of which demonstrated atypical ductal hyperplasia. The biopsied area of ADH was surgical excised. In 2017 she had an MRI guided needle left breast biopsy that demonstrated an intraductal papilloma.      Family history of 2 paternal aunts who had breast cancer in their 60's and a paternal cousin with breast cancer in her 40's. Kaitlyn was seen by a genetic counselor in 2015, no genetic testing was done.      She is doing high risk screening with breast MRI and mammogram. She completed 5 years of Tamoxifen for risk reduction.      She had a pelvic ultrasound 6/4/18, 6/12/19, and 11/4/20 that were stable.      Today she denies any breast concerns including mass, skin change, nipple inversion or nipple discharge. She feels like she is starting to go through menopause with night sweats. She is due for a breast MRI.     BREAST-SPECIFIC HISTORY:     Previous breast imaging: Yes   - 5/21/12 b/l Dmammo and right u/s for right breast lump: calcifications in the upper outer quadrant of left breast spanning 5 cm. Right u/s negative. BI-RADS 4 suspicious for malignancy   - 5/23/12 left stereotactic biopsy: intraductal papilloma   - 7/30/12 left wire-placement   - 2/19/13 MRI: large segmental enhancement of left lateral breast, BI-RADS 0 incomplete   Left u/s: BI-RADS 3 probably benign  - 3/29/13 MRI: BI-RADS 2 benign findings  - 8/5/13 b/l Dmammo for fullness: BI-RADS 2 benign    - 2/5/14 MRI: cysts in left breast, BI-RADS 2 benign   - 5/9/14 Dmammo b/l: negative. Left u/s: cystic appearing structures, BI-RADS 2 benign  - 5/20/14 MRI: study not completed due to nausea, BI-RADS 0 incomplete  - 5/28/14 MRI: BI-RADS 2 benign  - 10/28/14 b/l Dmammo: BI-RADS 2 benign   - 5/7/15 MRI BI-RADS 2 benign   - 11/10/15 Smammo: bilateral calcs: BI-RADS 2 benign  - 1/25/16 left u/s for pain: multiple tiny cyst  - 5/17/16 MRI: BI-RADS 2 benign   - 12/6/16  Smammo: lateral left breast developing macrocalcifications BI-RADS 0 incomplete   - 12/7/16 Dmammo left: left 2:00 calcifications BI-RADS 4 suspicious  - 12/12/16: stereotactic biopsy: focus of ADH    - 2/1/17 wire placement   - 5/9/17 MRI: 2 adjacent mass like areas of enhancement mid central left breast BI-RADS 0 incomplete   - 5/12/17 left u/s: scattered small cysts BI-RADS 4 suspicious   - 5/18/17 MR biopsy: intraductal papilloma   - 4/30/18 MRI BI-RADS 2  - 6/6/18 left Dmammo and ultrasound for pain BI-RADS 2  - 11/28/18 Smammo BI-RADS 2  - 5/7/19 breast MRI BI-RADS 2  - 8/21/19 Dmammo and right breast ultrasound: BI-RADS 2  - 11/12/19 Smammo BI-RADS 1  - 5/20/20 breast MRI BI-ARDS 2  - 11/4/20 b/l Dmammo and left breast ultrasound for lump: BI-RADS 1  - 5/5/21 Breast MRI BI-RADS 1  - 11/10/21 Smammo BI-RADS 2  - 5/11/22 breast MRI BI-RADS 2  - 11/16/22 Smammo BI-RADS 2  - 5/17/23 breast MRI BI-RADS 2  - 11/22/23 Smammo BI-RADS 2  - 5/22/24 breast MRI     Prior breast biopsies:Yes   - 2 right breast biopsies   - 5/23/12 left needle biopsy: intraductal papilloma with sclerosing features   - 7/30/12 left excisional biopsy with Dr. Lynch: focal columnar cell change with atypia, intraductal papilloma and surrounding intraductal papillomatosis, sclerosing adenosis, fibrocystic changes  - 12/12/16 left needle biopsy x2: flat epithelial atypia with focus of ADH, duct ectasia and focal sclerosing adenosis, microcalcifications.   FEA and columnar cell change  with atypia  - 17 left scar excision and excisional biopsy with Dr. Montenegro: FEA, columnar cell change/hyperplasia, intraductal papillomas, usual ductal hyperplasia, calcifications  - 17 left MRI needle biopsy: intraductal papilloma, mild columnar cell hyperplasia, mild duct ectasia, focal sclerosing adenosis      Prior history of breast cancer: No  Prior radiation history: No   Self breast exams: No  Breast density: heterogeneously dense     GYN HISTORY:  . Age at 1st pregnancy: 17. Breastfeeding history: Yes.   Age at menarche: 14-14  Menopausal: premenopausal, uterine ablation   Menopausal hormone replacement therapy: No     RISK ASSESSMENT: < 3% 5 year risk and > 20% lifetime risk   Heather: 2.0% 5 year risk   SEBASTIAN: 43.5% lifetime risk      FAMILY HISTORY:  Breast ca: Yes    - paternal aunt 60 's (5 paternal aunts)  - paternal aunt 60's  - paternal cousin 40 (aunt, 1 sisters)  Ovarian ca: No   - ? Paternal cousin with ovarian cancer   Pancreatic ca: No   Prostate: yes  - maternal uncle 69   Gastric ca: Yes   - paternal grandfather, passed   Melanoma: No  Colon ca: No  Other cancer: Yes   - maternal grandmother skin cancer 90's  - paternal grandfather brain 60's   Sikh ethnicity: No  Other genetic, testing, syndromes, or clotting disorders: No   - She saw a genetic counselor 2015 and no testing was recommended     PAST MEDICAL HISTORY  Past Medical History:   Diagnosis Date    Anemia     Atypical ductal hyperplasia of breast     Choroidal nevus of right eye     Concussion 10/2015    Depressive disorder 2006    Treated with celexa for two years    Family history of breast cancer 2015    Family history of breast cancer 2015    Family history of breast cancer 2015    Family history of breast cancer 2015    Gastroesophageal reflux disease     Glaucoma suspect of both eyes     Papilloma of breast 2012    Left, See Dr. Lynch at     PONV (postoperative nausea and vomiting)      S/P endometrial ablation 10/10/2013    Menorrhagia    Shingles     x2 in the past    WBC decreased 02/03/2015    WBC decreased 02/03/2015    WBC decreased 02/03/2015     PAST SURGICAL HISTORY   Past Surgical History:   Procedure Laterality Date    ABDOMEN SURGERY      rectoplasty    BIOPSY BREAST NEEDLE LOCALIZATION  7/30/2012    Procedure: BIOPSY BREAST NEEDLE LOCALIZATION;  left breast excisional Biopsy with wire localization @0830;  Surgeon: Robert Lynch MD;  Location: [facility] OR    BREAST SURGERY      COSMETIC SURGERY      Rectalplasty for episiotomy repair    COSMETIC SURGERY      Rectalplasty for episiotomy repair    DILATION AND CURETTAGE, HYSTEROSCOPY, ABLATE ENDOMETRIUM NOVASURE, COMBINED  10/10/2013    Procedure: COMBINED DILATION AND CURETTAGE, HYSTEROSCOPY, ABLATE ENDOMETRIUM NOVASURE;  Endometrial ablation with Novasure;  Surgeon: Indu Birmingham DO;  Location: [facility] OR    HC TOOTH EXTRACTION W/FORCEP      LUMPECTOMY BREAST Left 2/1/2017    Procedure: LUMPECTOMY BREAST;  Surgeon: Lori Montenegro MD;  Location: [facility]    SOFT TISSUE SURGERY      lumpectomy x 2 right breast    SOFT TISSUE SURGERY      episiotomy fixed     MEDICATIONS  Current Outpatient Medications   Medication Sig Dispense Refill    albuterol (PROAIR HFA/PROVENTIL HFA/VENTOLIN HFA) 108 (90 Base) MCG/ACT inhaler Inhale 2 puffs into the lungs every 6 hours as needed for cough 8.5 g 0    LORazepam (ATIVAN) 1 MG tablet Take 1 tablet (1 mg) by mouth twice a day as needed.      Naproxen Sodium (ALEVE PO) Take 440 mg by mouth as needed       rOPINIRole (REQUIP) 0.5 MG tablet Take 3 tablets (1.5 mg) by mouth at bedtime 270 tablet 3    SUMAtriptan (IMITREX) 50 MG tablet TAKE 1 TABLET (50 MG) BY MOUTH AT ONSET OF HEADACHE FOR MIGRAINE, MAY REPEAT IN 2 HOURS. MAX 4 TABLETS/24 HOURS. 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 Her  has lymphoma.     ROS:  Change in vision No  Headaches: no  Respiratory: No shortness of breath, dyspnea on exertion, cough, or hemoptysis   Cardiovascular: negative   Gastrointestinal: negative Abdominal pain: no  Breast: negative  Musculoskeletal: negative Joint pain No Back pain: no  Psychiatric: negative  Hematologic/Lymphatic/Immunologic: negative  Endocrine: negative    EXAM  /83 (BP Location: Right arm, Patient Position: Chair, Cuff Size: Adult Large)   Pulse 64   Temp 98.1  F (36.7  C)   Resp 16   Ht 1.699 m (5' 6.89\")   Wt 71.8 kg (158 lb 4.8 oz)   SpO2 98%   BMI 24.87 kg/m     PHYSICAL EXAM  Respiratory: breathing non labored.   Breasts: Examination was done in both the upright and supine positions.  Breasts are symmetrical . No dominant fixed or suspicious masses noted. No skin or nipple changes. No nipple discharge.   Left lateral and periareolar scar well healed. Right lateral scar well healed. Breast bilaterally equally more firm superiorly and centrally  No clavicular, cervical, or axillary lymphadenopathy.     INVESTIGATIONS:    5/22/24 breast MRI: per Radiology no concerning findings, final report pending.     ASSESSMENT/PLAN:    Kaitlyn Garcia is a 48 year old woman with history of left breast atypical ductal hyperplasia. She meets NCCN guidelines for high risk screening and risk reduction. She completed 5 years of low dose Tamoxifen for risk reduction.       1) Atypical ductal hyperplasia and family history of breast cancer.   We also reviewed that atypical ductal hyperplasia increases her baseline risk for breast cancer. She meets guidelines for high risk screening with a lifetime risk of breast cancer >20%. " Discussed she meets guidelines for high risk screening with yearly mammogram alternating with Breast MRI every six months. Clinical encounter every 6-12 months including family history, risk assessment, and clinical breast exam.   - Screening mammogram with clinic visit due 11/23/24  - Breast MRI with clinic visit due 5/23/2025     2) Lifestyle Modifications were provided. - Maintain your best healthy weight. Higher body fat and adult weight gain is associated with increased risk for breast cancer. This increase in risk has been attributed to increase in circulating endogenous estrogen levels from fat tissue.   - Alcohol can raise estrogen. Alcohol consumption, even at moderate levels (1-2 drinks per day), increases breast cancer risk and are best avoided. If you choose to drink alcohol limit alcohol consumption to less than 1 drink per day. (1 ounce of liquor, 6 ounces of wine, or 8 ounces of beer).  - Be active daily and void being sedentary.     Merced Wiggins PA-C    20 minutes spent on the date of the encounter doing chart review, review of test results, interpretation of tests, patient visit and documentation.

## 2024-05-23 ENCOUNTER — MYC MEDICAL ADVICE (OUTPATIENT)
Dept: FAMILY MEDICINE | Facility: CLINIC | Age: 49
End: 2024-05-23
Payer: COMMERCIAL

## 2024-05-23 NOTE — TELEPHONE ENCOUNTER
Looks like MRI was ordered by patient's oncologist, Merced Wiggins PA-C. Routing to provider to see if patient needs to follow up on hepatic cyst.     Marlen Vallejo, MONICAN, RN   [facility]

## 2024-07-06 ENCOUNTER — TRANSFERRED RECORDS (OUTPATIENT)
Dept: HEALTH INFORMATION MANAGEMENT | Facility: CLINIC | Age: 49
End: 2024-07-06
Payer: COMMERCIAL

## 2024-09-16 ENCOUNTER — TELEPHONE (OUTPATIENT)
Dept: OPHTHALMOLOGY | Facility: CLINIC | Age: 49
End: 2024-09-16
Payer: COMMERCIAL

## 2024-09-16 NOTE — TELEPHONE ENCOUNTER
Left Voicemail (2nd Attempt) for the patient to call back and schedule the following:    Appointment type: return  Provider: dr. mansfield  Return date: 3/25/2025  Specialty phone number: [phone]   Additonal Notes: Return for 24-2 VF, Annual Visit-v/t/d/MRx, OCT RNFL.     [facility]   Orthopedics, Podiatry, Sports Medicine, Ent ,Eye , Audiology, Adult Endocrine & Diabetes, Nutrition & Medication Therapy Management Specialties   [facility]

## 2024-11-25 NOTE — PROGRESS NOTES
.FOLLOW UP  Nov 27, 2024     Kaitlyn Garcia is a 49 year old woman who presents with history of atypical ductal hyperplasia.      HPI:     History of right breast biopsies x2 that were benign.     In 2012 she had a left breast intraductal papilloma surgical excised.     In 2016 she had left needle biopsies x2, one of which demonstrated atypical ductal hyperplasia. The biopsied area of ADH was surgical excised.     In 2017 she had an MRI guided needle left breast biopsy that demonstrated an intraductal papilloma.      Family history of 2 paternal aunts who had breast cancer in their 60's and a paternal cousin with breast cancer in her 40's. Kaitlyn had negative genetic testing in 2023.      She is doing high risk screening with breast MRI and mammogram. Her most recent mammogram was 11/22/23. Her most recent breast MRI was 5/22/4. She completed 5 years of Tamoxifen for risk reduction.      Today she denies any breast concerns including mass, skin change, nipple inversion or nipple discharge. She is due for a screening mammogram.      BREAST-SPECIFIC HISTORY:     Previous breast imaging: Yes   - 5/21/12 b/l Dmammo and right u/s for right breast lump: calcifications in the upper outer quadrant of left breast spanning 5 cm. Right u/s negative. BI-RADS 4 suspicious for malignancy   - 5/23/12 left stereotactic biopsy: intraductal papilloma   - 7/30/12 left wire-placement   - 2/19/13 MRI: large segmental enhancement of left lateral breast, BI-RADS 0 incomplete   Left u/s: BI-RADS 3 probably benign  - 3/29/13 MRI: BI-RADS 2 benign findings  - 8/5/13 b/l Dmammo for fullness: BI-RADS 2 benign   - 2/5/14 MRI: cysts in left breast, BI-RADS 2 benign   - 5/9/14 Dmammo b/l: negative. Left u/s: cystic appearing structures, BI-RADS 2 benign  - 5/20/14 MRI: study not completed due to nausea, BI-RADS 0 incomplete  - 5/28/14 MRI: BI-RADS 2 benign  - 10/28/14 b/l Dmammo: BI-RADS 2 benign   - 5/7/15 MRI BI-RADS 2 benign   - 11/10/15  Smammo: bilateral calcs: BI-RADS 2 benign  - 1/25/16 left u/s for pain: multiple tiny cyst  - 5/17/16 MRI: BI-RADS 2 benign   - 12/6/16  Smammo: lateral left breast developing macrocalcifications BI-RADS 0 incomplete   - 12/7/16 Dmammo left: left 2:00 calcifications BI-RADS 4 suspicious  - 12/12/16: stereotactic biopsy: focus of ADH    - 2/1/17 wire placement   - 5/9/17 MRI: 2 adjacent mass like areas of enhancement mid central left breast BI-RADS 0 incomplete   - 5/12/17 left u/s: scattered small cysts BI-RADS 4 suspicious   - 5/18/17 MR biopsy: intraductal papilloma   - 4/30/18 MRI BI-RADS 2  - 6/6/18 left Dmammo and ultrasound for pain BI-RADS 2  - 11/28/18 Smammo BI-RADS 2  - 5/7/19 breast MRI BI-RADS 2  - 8/21/19 Dmammo and right breast ultrasound: BI-RADS 2  - 11/12/19 Smammo BI-RADS 1  - 5/20/20 breast MRI BI-ARDS 2  - 11/4/20 b/l Dmammo and left breast ultrasound for lump: BI-RADS 1  - 5/5/21 Breast MRI BI-RADS 1  - 11/10/21 Smammo BI-RADS 2  - 5/11/22 breast MRI BI-RADS 2  - 11/16/22 Smammo BI-RADS 2  - 5/17/23 breast MRI BI-RADS 2  - 11/22/23 Smammo BI-RADS 2  - 5/22/24 breast MRI BI-RADS 2  - 11/27/24 Smammo      Prior breast biopsies:Yes   - 2 right breast biopsies   - 5/23/12 left needle biopsy: intraductal papilloma with sclerosing features   - 7/30/12 left excisional biopsy with Dr. Lynch: focal columnar cell change with atypia, intraductal papilloma and surrounding intraductal papillomatosis, sclerosing adenosis, fibrocystic changes  - 12/12/16 left needle biopsy x2: flat epithelial atypia with focus of ADH, duct ectasia and focal sclerosing adenosis, microcalcifications.   FEA and columnar cell change with atypia  - 2/1/17 left scar excision and excisional biopsy with Dr. Montenegro: FEA, columnar cell change/hyperplasia, intraductal papillomas, usual ductal hyperplasia, calcifications  - 5/18/17 left MRI needle biopsy: intraductal papilloma, mild columnar cell hyperplasia, mild duct ectasia, focal  sclerosing adenosis      Prior history of breast cancer: No  Prior radiation history: No   Self breast exams: No  Breast density: heterogeneously dense     GYN HISTORY:  . Age at 1st pregnancy: 17. Breastfeeding history: Yes.   Age at menarche: 14-14  Menopausal: premenopausal, uterine ablation   Menopausal hormone replacement therapy: No     RISK ASSESSMENT: < 3% 5 year risk and > 20% lifetime risk   Heather: 1.9% 5 year risk   SEBASTIAN: 40.4% lifetime risk      FAMILY HISTORY:  Breast ca: Yes    - paternal aunt 60 's (5 paternal aunts)  - paternal aunt 60's  - paternal cousin 40 (aunt with cancer, 1 sisters)  Ovarian ca: No   - ? Paternal cousin with ovarian cancer   Pancreatic ca: No   Prostate: yes  - maternal uncle 69   Gastric ca: Yes   - paternal grandfather, passed   Melanoma: No  Colon ca: No  Other cancer: Yes   - maternal grandmother skin cancer 90's  - paternal grandfather brain 60's   Jain ethnicity: No  Other genetic, testing, syndromes, or clotting disorders: No   - She saw a genetic counselor 2015 and no testing was recommended     PAST MEDICAL HISTORY  Past Medical History:   Diagnosis Date    Anemia     Atypical ductal hyperplasia of breast     Choroidal nevus of right eye     Concussion 10/2015    Depressive disorder 2006    Treated with celexa for two years    Family history of breast cancer 2015    Family history of breast cancer 2015    Family history of breast cancer 2015    Family history of breast cancer 2015    Gastroesophageal reflux disease     Glaucoma suspect of both eyes     Papilloma of breast 2012    Left, See Dr. Lynch at     [facility] (postoperative nausea and vomiting)     S/P endometrial ablation 10/10/2013    Menorrhagia    Shingles     x2 in the past    WBC decreased 2015    WBC decreased 2015    WBC decreased 2015     PAST SURGICAL HISTORY   Past Surgical History:   Procedure Laterality Date    ABDOMEN SURGERY      rectoplasty     BIOPSY BREAST NEEDLE LOCALIZATION  7/30/2012    Procedure: BIOPSY BREAST NEEDLE LOCALIZATION;  left breast excisional Biopsy with wire localization @0830;  Surgeon: Robert Lynch MD;  Location: [facility] OR    BREAST SURGERY      COSMETIC SURGERY      Rectalplasty for episiotomy repair    COSMETIC SURGERY      Rectalplasty for episiotomy repair    DILATION AND CURETTAGE, HYSTEROSCOPY, ABLATE ENDOMETRIUM NOVASURE, COMBINED  10/10/2013    Procedure: COMBINED DILATION AND CURETTAGE, HYSTEROSCOPY, ABLATE ENDOMETRIUM NOVASURE;  Endometrial ablation with Novasure;  Surgeon: Indu Birmingham DO;  Location: [facility] OR    HC TOOTH EXTRACTION W/FORCEP      LUMPECTOMY BREAST Left 2/1/2017    Procedure: LUMPECTOMY BREAST;  Surgeon: Lori Montenegro MD;  Location: [facility]    SOFT TISSUE SURGERY      lumpectomy x 2 right breast    SOFT TISSUE SURGERY      episiotomy fixed   She had a pelvic ultrasound 6/4/18, 6/12/19, and 11/4/20 that were stable.     MEDICATIONS  Current Outpatient Medications   Medication Sig Dispense Refill    albuterol (PROAIR HFA/PROVENTIL HFA/VENTOLIN HFA) 108 (90 Base) MCG/ACT inhaler Inhale 2 puffs into the lungs every 6 hours as needed for cough (Patient not taking: Reported on 5/22/2024) 8.5 g 0    LORazepam (ATIVAN) 1 MG tablet Take 1 tablet (1 mg) by mouth twice a day as needed. (Patient not taking: Reported on 5/22/2024)      Naproxen Sodium (ALEVE PO) Take 440 mg by mouth as needed       rOPINIRole (REQUIP) 0.5 MG tablet Take 3 tablets (1.5 mg) by mouth at bedtime 270 tablet 3    SUMAtriptan (IMITREX) 50 MG tablet TAKE 1 TABLET (50 MG) BY MOUTH AT ONSET OF HEADACHE FOR MIGRAINE, MAY REPEAT IN 2 HOURS. MAX 4 TABLETS/24 HOURS. 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 Her  has lymphoma and has had several knee surgeries. She has a daughter who lives in [place] and a 27 year old son. They have [employer] season tickets.      ROS:  Change in vision No  Headaches: no  Respiratory: No shortness of breath, dyspnea on exertion, cough, or hemoptysis   Cardiovascular: negative   Gastrointestinal: negative Abdominal pain: no  Breast: negative  Musculoskeletal: negative Joint pain No Back pain: no  Psychiatric: negative  Hematologic/Lymphatic/Immunologic: negative  Endocrine: negative    EXAM  /81 (BP Location: Right arm, Patient Position: Sitting, Cuff Size: Adult Regular)   Pulse 89   Temp 98  F (36.7  C) (Oral)   Resp 14   Wt 71.6 kg (157 lb 12.8 oz)   SpO2 96%   BMI 24.80 kg/m     PHYSICAL EXAM  Respiratory: breathing non labored.   Breasts: Examination was done in both the upright and supine positions.  Breasts are symmetrical . No dominant fixed or suspicious masses noted. No skin or nipple changes. No nipple discharge.   Left lateral and periareolar scar well healed. Right lateral scar well healed. Breast bilaterally equally more firm superiorly and centrally  No clavicular, cervical, or axillary lymphadenopathy.     INVESTIGATIONS:    11/27/24 screening mammogram results pending    ASSESSMENT/PLAN:    Kaitlyn Garcia is a 49 year old woman with history of left breast atypical ductal hyperplasia. She meets NCCN guidelines for high risk screening and risk reduction. She completed 5 years of low dose Tamoxifen for risk reduction.       1) Atypical ductal hyperplasia and family history of breast cancer.   We also reviewed that atypical ductal hyperplasia increases her baseline risk for breast cancer. She meets guidelines for high risk  screening with a lifetime risk of breast cancer >20%. Discussed she meets guidelines for high risk screening with yearly mammogram alternating with Breast MRI every six months. Clinical encounter every 6-12 months including family history, risk assessment, and clinical breast exam.   - Breast MRI with clinic visit due 5/23/2025  - Screening mammogram with clinic visit due 11/28/25     2) Lifestyle Modifications were provided. - Maintain your best healthy weight. Higher body fat and adult weight gain is associated with increased risk for breast cancer. This increase in risk has been attributed to increase in circulating endogenous estrogen levels from fat tissue.   - Alcohol can raise estrogen. Alcohol consumption, even at moderate levels (1-2 drinks per day), increases breast cancer risk and are best avoided. If you choose to drink alcohol limit alcohol consumption to less than 1 drink per day. (1 ounce of liquor, 6 ounces of wine, or 8 ounces of beer).  - Be active daily and void being sedentary.     Merced Wiggins PA-C    20 minutes spent on the date of the encounter doing chart review, review of test results, interpretation of tests, patient visit and documentation.

## 2024-11-27 ENCOUNTER — ONCOLOGY VISIT (OUTPATIENT)
Dept: SURGERY | Facility: CLINIC | Age: 49
End: 2024-11-27
Attending: PHYSICIAN ASSISTANT
Payer: COMMERCIAL

## 2024-11-27 ENCOUNTER — ANCILLARY PROCEDURE (OUTPATIENT)
Dept: MAMMOGRAPHY | Facility: CLINIC | Age: 49
End: 2024-11-27
Attending: PHYSICIAN ASSISTANT
Payer: COMMERCIAL

## 2024-11-27 VITALS
HEART RATE: 89 BPM | RESPIRATION RATE: 14 BRPM | WEIGHT: 157.8 LBS | TEMPERATURE: 98 F | OXYGEN SATURATION: 96 % | SYSTOLIC BLOOD PRESSURE: 114 MMHG | BODY MASS INDEX: 24.8 KG/M2 | DIASTOLIC BLOOD PRESSURE: 81 MMHG

## 2024-11-27 DIAGNOSIS — Z91.89 AT HIGH RISK FOR BREAST CANCER: ICD-10-CM

## 2024-11-27 DIAGNOSIS — Z91.89 AT HIGH RISK FOR BREAST CANCER: Primary | ICD-10-CM

## 2024-11-27 PROCEDURE — 77067 SCR MAMMO BI INCL CAD: CPT | Performed by: STUDENT IN AN ORGANIZED HEALTH CARE EDUCATION/TRAINING PROGRAM

## 2024-11-27 PROCEDURE — 99213 OFFICE O/P EST LOW 20 MIN: CPT | Performed by: PHYSICIAN ASSISTANT

## 2024-11-27 PROCEDURE — 77063 BREAST TOMOSYNTHESIS BI: CPT | Performed by: STUDENT IN AN ORGANIZED HEALTH CARE EDUCATION/TRAINING PROGRAM

## 2024-11-27 RX ORDER — CYCLOBENZAPRINE HCL 5 MG
5 TABLET ORAL PRN
COMMUNITY
Start: 2024-11-26

## 2024-11-27 RX ORDER — METHYLPREDNISOLONE 4 MG/1
TABLET ORAL
COMMUNITY
Start: 2024-11-26

## 2024-11-27 ASSESSMENT — PAIN SCALES - GENERAL: PAINLEVEL_OUTOF10: MILD PAIN (3)

## 2024-11-27 NOTE — NURSING NOTE
"Oncology Rooming Note    November 27, 2024 6:58 AM   Kaitlyn Garcia is a 49 year old female who presents for:    Chief Complaint   Patient presents with    Oncology Clinic Visit     At high risk for breast cancer     Initial Vitals: /81 (BP Location: Right arm, Patient Position: Sitting, Cuff Size: Adult Regular)   Pulse 89   Temp 98  F (36.7  C) (Oral)   Resp 14   Wt 71.6 kg (157 lb 12.8 oz)   SpO2 96%   BMI 24.80 kg/m   Estimated body mass index is 24.8 kg/m  as calculated from the following:    Height as of 5/22/24: 1.699 m (5' 6.89\").    Weight as of this encounter: 71.6 kg (157 lb 12.8 oz). Body surface area is 1.84 meters squared.  Mild Pain (3) Comment: Data Unavailable   No LMP recorded. Patient has had an ablation.  Allergies reviewed: Yes  Medications reviewed: Yes    Medications: Medication refills not needed today.  Pharmacy name entered into [employer]:    [facility] [place], [place] - [address]  [facility] [place], [place] - [address]  [id number] - [place], [place] - [address], [place] AT [facility]    Frailty Screening:   Is the patient here for a new oncology consult visit in cancer care? 2. No      Clinical concerns: Patient states no new concerns to discuss with provider.        Yara Urrutia, EMT            "

## 2024-11-27 NOTE — PATIENT INSTRUCTIONS
Kaitlyn Garcia is a 49 year old woman with history of left breast atypical ductal hyperplasia. She meets NCCN guidelines for high risk screening and risk reduction. She completed 5 years of low dose Tamoxifen for risk reduction.       1) Atypical ductal hyperplasia and family history of breast cancer.   We also reviewed that atypical ductal hyperplasia increases her baseline risk for breast cancer. She meets guidelines for high risk screening with a lifetime risk of breast cancer >20%. Discussed she meets guidelines for high risk screening with yearly mammogram alternating with Breast MRI every six months. Clinical encounter every 6-12 months including family history, risk assessment, and clinical breast exam.   - Breast MRI with clinic visit due 5/23/2025  - Screening mammogram with clinic visit due 11/28/25     2) Lifestyle Modifications were provided. - Maintain your best healthy weight. Higher body fat and adult weight gain is associated with increased risk for breast cancer. This increase in risk has been attributed to increase in circulating endogenous estrogen levels from fat tissue.   - Alcohol can raise estrogen. Alcohol consumption, even at moderate levels (1-2 drinks per day), increases breast cancer risk and are best avoided. If you choose to drink alcohol limit alcohol consumption to less than 1 drink per day. (1 ounce of liquor, 6 ounces of wine, or 8 ounces of beer).  - Be active daily and void being sedentary.

## 2024-11-27 NOTE — LETTER
11/27/2024      Kaitlyn Garcia  [address]  [facility] [address]      Dear Colleague,    Thank you for referring your patient, Kaitlyn Garcia, to the [facility]. Please see a copy of my visit note below.    .FOLLOW UP  Nov 27, 2024     Kaitlyn Garcia is a 49 year old woman who presents with history of atypical ductal hyperplasia.      HPI:     History of right breast biopsies x2 that were benign.     In 2012 she had a left breast intraductal papilloma surgical excised.     In 2016 she had left needle biopsies x2, one of which demonstrated atypical ductal hyperplasia. The biopsied area of ADH was surgical excised.     In 2017 she had an MRI guided needle left breast biopsy that demonstrated an intraductal papilloma.      Family history of 2 paternal aunts who had breast cancer in their 60's and a paternal cousin with breast cancer in her 40's. Kaitlyn had negative genetic testing in 2023.      She is doing high risk screening with breast MRI and mammogram. Her most recent mammogram was 11/22/23. Her most recent breast MRI was 5/22/4. She completed 5 years of Tamoxifen for risk reduction.      Today she denies any breast concerns including mass, skin change, nipple inversion or nipple discharge. She is due for a screening mammogram.      BREAST-SPECIFIC HISTORY:     Previous breast imaging: Yes   - 5/21/12 b/l Dmammo and right u/s for right breast lump: calcifications in the upper outer quadrant of left breast spanning 5 cm. Right u/s negative. BI-RADS 4 suspicious for malignancy   - 5/23/12 left stereotactic biopsy: intraductal papilloma   - 7/30/12 left wire-placement   - 2/19/13 MRI: large segmental enhancement of left lateral breast, BI-RADS 0 incomplete   Left u/s: BI-RADS 3 probably benign  - 3/29/13 MRI: BI-RADS 2 benign findings  - 8/5/13 b/l Dmammo for fullness: BI-RADS 2 benign   - 2/5/14 MRI: cysts in left breast, BI-RADS 2 benign   - 5/9/14 Dmammo b/l: negative. Left u/s:  cystic appearing structures, BI-RADS 2 benign  - 5/20/14 MRI: study not completed due to nausea, BI-RADS 0 incomplete  - 5/28/14 MRI: BI-RADS 2 benign  - 10/28/14 b/l Dmammo: BI-RADS 2 benign   - 5/7/15 MRI BI-RADS 2 benign   - 11/10/15 Smammo: bilateral calcs: BI-RADS 2 benign  - 1/25/16 left u/s for pain: multiple tiny cyst  - 5/17/16 MRI: BI-RADS 2 benign   - 12/6/16  Smammo: lateral left breast developing macrocalcifications BI-RADS 0 incomplete   - 12/7/16 Dmammo left: left 2:00 calcifications BI-RADS 4 suspicious  - 12/12/16: stereotactic biopsy: focus of ADH    - 2/1/17 wire placement   - 5/9/17 MRI: 2 adjacent mass like areas of enhancement mid central left breast BI-RADS 0 incomplete   - 5/12/17 left u/s: scattered small cysts BI-RADS 4 suspicious   - 5/18/17 MR biopsy: intraductal papilloma   - 4/30/18 MRI BI-RADS 2  - 6/6/18 left Dmammo and ultrasound for pain BI-RADS 2  - 11/28/18 Smammo BI-RADS 2  - 5/7/19 breast MRI BI-RADS 2  - 8/21/19 Dmammo and right breast ultrasound: BI-RADS 2  - 11/12/19 Smammo BI-RADS 1  - 5/20/20 breast MRI BI-ARDS 2  - 11/4/20 b/l Dmammo and left breast ultrasound for lump: BI-RADS 1  - 5/5/21 Breast MRI BI-RADS 1  - 11/10/21 Smammo BI-RADS 2  - 5/11/22 breast MRI BI-RADS 2  - 11/16/22 Smammo BI-RADS 2  - 5/17/23 breast MRI BI-RADS 2  - 11/22/23 Smammo BI-RADS 2  - 5/22/24 breast MRI BI-RADS 2  - 11/27/24 Smammo      Prior breast biopsies:Yes   - 2 right breast biopsies   - 5/23/12 left needle biopsy: intraductal papilloma with sclerosing features   - 7/30/12 left excisional biopsy with Dr. Lynch: focal columnar cell change with atypia, intraductal papilloma and surrounding intraductal papillomatosis, sclerosing adenosis, fibrocystic changes  - 12/12/16 left needle biopsy x2: flat epithelial atypia with focus of ADH, duct ectasia and focal sclerosing adenosis, microcalcifications.   FEA and columnar cell change with atypia  - 2/1/17 left scar excision and excisional biopsy with  Dr. Montenegro: CASIMIRO, columnar cell change/hyperplasia, intraductal papillomas, usual ductal hyperplasia, calcifications  - 17 left MRI needle biopsy: intraductal papilloma, mild columnar cell hyperplasia, mild duct ectasia, focal sclerosing adenosis      Prior history of breast cancer: No  Prior radiation history: No   Self breast exams: No  Breast density: heterogeneously dense     GYN HISTORY:  . Age at 1st pregnancy: 17. Breastfeeding history: Yes.   Age at menarche: [age]  Menopausal: premenopausal, uterine ablation   Menopausal hormone replacement therapy: No     RISK ASSESSMENT: < 3% 5 year risk and > 20% lifetime risk   Heather: 1.9% 5 year risk   SEBASTIAN: 40.4% lifetime risk      FAMILY HISTORY:  Breast ca: Yes    - paternal aunt 60 's (5 paternal aunts)  - paternal aunt 60's  - paternal cousin 40 (aunt with cancer, 1 sisters)  Ovarian ca: No   - ? Paternal cousin with ovarian cancer   Pancreatic ca: No   Prostate: yes  - maternal uncle 69   Gastric ca: Yes   - paternal grandfather, passed   Melanoma: No  Colon ca: No  Other cancer: Yes   - maternal grandmother skin cancer 90's  - paternal grandfather brain 60's   Samaritan ethnicity: No  Other genetic, testing, syndromes, or clotting disorders: No   - She saw a genetic counselor 2015 and no testing was recommended     PAST MEDICAL HISTORY  Past Medical History:   Diagnosis Date     Anemia      Atypical ductal hyperplasia of breast      Choroidal nevus of right eye      Concussion 10/2015     Depressive disorder 2006    Treated with celexa for two years     Family history of breast cancer 2015     Family history of breast cancer 2015     Family history of breast cancer 2015     Family history of breast cancer 2015     Gastroesophageal reflux disease      Glaucoma suspect of both eyes      Papilloma of breast 2012    Left, See Dr. Lynch at      PONV (postoperative nausea and vomiting)      S/P endometrial ablation 10/10/2013     Menorrhagia     Shingles     x2 in the past     WBC decreased 02/03/2015     WBC decreased 02/03/2015     WBC decreased 02/03/2015     PAST SURGICAL HISTORY   Past Surgical History:   Procedure Laterality Date     ABDOMEN SURGERY      rectoplasty     BIOPSY BREAST NEEDLE LOCALIZATION  7/30/2012    Procedure: BIOPSY BREAST NEEDLE LOCALIZATION;  left breast excisional Biopsy with wire localization @0830;  Surgeon: Robert Lynch MD;  Location: [facility] OR     BREAST SURGERY       COSMETIC SURGERY      Rectalplasty for episiotomy repair     COSMETIC SURGERY      Rectalplasty for episiotomy repair     DILATION AND CURETTAGE, HYSTEROSCOPY, ABLATE ENDOMETRIUM NOVASURE, COMBINED  10/10/2013    Procedure: COMBINED DILATION AND CURETTAGE, HYSTEROSCOPY, ABLATE ENDOMETRIUM NOVASURE;  Endometrial ablation with Novasure;  Surgeon: Indu Birmingham DO;  Location: [facility] OR     HC TOOTH EXTRACTION W/FORCEP       LUMPECTOMY BREAST Left 2/1/2017    Procedure: LUMPECTOMY BREAST;  Surgeon: Lori Montenegro MD;  Location: [facility]     SOFT TISSUE SURGERY      lumpectomy x 2 right breast     SOFT TISSUE SURGERY      episiotomy fixed   She had a pelvic ultrasound 6/4/18, 6/12/19, and 11/4/20 that were stable.     MEDICATIONS  Current Outpatient Medications   Medication Sig Dispense Refill     albuterol (PROAIR HFA/PROVENTIL HFA/VENTOLIN HFA) 108 (90 Base) MCG/ACT inhaler Inhale 2 puffs into the lungs every 6 hours as needed for cough (Patient not taking: Reported on 5/22/2024) 8.5 g 0     LORazepam (ATIVAN) 1 MG tablet Take 1 tablet (1 mg) by mouth twice a day as needed. (Patient not taking: Reported on 5/22/2024)       Naproxen Sodium (ALEVE PO) Take 440 mg by mouth as needed        rOPINIRole (REQUIP) 0.5 MG tablet Take 3 tablets (1.5 mg) by mouth at bedtime 270 tablet 3     SUMAtriptan (IMITREX) 50 MG tablet TAKE 1 TABLET (50 MG) BY MOUTH AT ONSET OF HEADACHE FOR MIGRAINE, MAY REPEAT IN 2 HOURS. MAX 4 TABLETS/24 HOURS.  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 Her  has lymphoma and has had several knee surgeries. She has a daughter who lives in [place] and a 27 year old son. They have [employer] season tickets.      ROS:  Change in vision No  Headaches: no  Respiratory: No shortness of breath, dyspnea on exertion, cough, or hemoptysis   Cardiovascular: negative   Gastrointestinal: negative Abdominal pain: no  Breast: negative  Musculoskeletal: negative Joint pain No Back pain: no  Psychiatric: negative  Hematologic/Lymphatic/Immunologic: negative  Endocrine: negative    EXAM  /81 (BP Location: Right arm, Patient Position: Sitting, Cuff Size: Adult Regular)   Pulse 89   Temp 98  F (36.7  C) (Oral)   Resp 14   Wt 71.6 kg (157 lb 12.8 oz)   SpO2 96%   BMI 24.80 kg/m     PHYSICAL EXAM  Respiratory: breathing non labored.   Breasts: Examination was done in both the upright and supine positions.  Breasts are symmetrical . No dominant fixed or suspicious masses noted. No skin or nipple changes. No nipple discharge.   Left lateral and periareolar scar well healed. Right lateral scar well healed. Breast bilaterally equally more firm superiorly and centrally  No clavicular, cervical, or axillary lymphadenopathy.     INVESTIGATIONS:    11/27/24 screening mammogram results pending    ASSESSMENT/PLAN:    Kaitlyn Garcia is a 49 year old woman with history of left breast atypical ductal hyperplasia. She meets NCCN guidelines for high risk screening and risk reduction. She  completed 5 years of low dose Tamoxifen for risk reduction.       1) Atypical ductal hyperplasia and family history of breast cancer.   We also reviewed that atypical ductal hyperplasia increases her baseline risk for breast cancer. She meets guidelines for high risk screening with a lifetime risk of breast cancer >20%. Discussed she meets guidelines for high risk screening with yearly mammogram alternating with Breast MRI every six months. Clinical encounter every 6-12 months including family history, risk assessment, and clinical breast exam.   - Breast MRI with clinic visit due 5/23/2025  - Screening mammogram with clinic visit due 11/28/25     2) Lifestyle Modifications were provided. - Maintain your best healthy weight. Higher body fat and adult weight gain is associated with increased risk for breast cancer. This increase in risk has been attributed to increase in circulating endogenous estrogen levels from fat tissue.   - Alcohol can raise estrogen. Alcohol consumption, even at moderate levels (1-2 drinks per day), increases breast cancer risk and are best avoided. If you choose to drink alcohol limit alcohol consumption to less than 1 drink per day. (1 ounce of liquor, 6 ounces of wine, or 8 ounces of beer).  - Be active daily and void being sedentary.     Merced Wiggins PA-C    20 minutes spent on the date of the encounter doing chart review, review of test results, interpretation of tests, patient visit and documentation.       Again, thank you for allowing me to participate in the care of your patient.        Sincerely,        Merced Wiggins PA-C

## 2025-01-07 DIAGNOSIS — D31.31 CHOROIDAL NEVUS OF RIGHT EYE: Primary | ICD-10-CM

## 2025-01-13 ENCOUNTER — OFFICE VISIT (OUTPATIENT)
Dept: URGENT CARE | Facility: URGENT CARE | Age: 50
End: 2025-01-13
Payer: COMMERCIAL

## 2025-01-13 VITALS
OXYGEN SATURATION: 98 % | RESPIRATION RATE: 16 BRPM | BODY MASS INDEX: 25.88 KG/M2 | HEART RATE: 80 BPM | WEIGHT: 164.7 LBS | SYSTOLIC BLOOD PRESSURE: 129 MMHG | TEMPERATURE: 98.3 F | DIASTOLIC BLOOD PRESSURE: 87 MMHG

## 2025-01-13 DIAGNOSIS — J01.00 ACUTE NON-RECURRENT MAXILLARY SINUSITIS: Primary | ICD-10-CM

## 2025-01-13 PROCEDURE — 99213 OFFICE O/P EST LOW 20 MIN: CPT

## 2025-01-13 RX ORDER — FLUTICASONE PROPIONATE 50 MCG
1 SPRAY, SUSPENSION (ML) NASAL DAILY
COMMUNITY

## 2025-01-13 RX ORDER — FLUTICASONE PROPIONATE 50 MCG
2 SPRAY, SUSPENSION (ML) NASAL DAILY
Qty: 16 G | Refills: 0 | Status: SHIPPED | OUTPATIENT
Start: 2025-01-13

## 2025-01-13 RX ORDER — IBUPROFEN 200 MG
200 TABLET ORAL EVERY 4 HOURS PRN
COMMUNITY

## 2025-01-13 RX ORDER — ACETAMINOPHEN 500 MG
500-1000 TABLET ORAL EVERY 6 HOURS PRN
COMMUNITY

## 2025-01-13 ASSESSMENT — PAIN SCALES - GENERAL: PAINLEVEL_OUTOF10: MILD PAIN (3)

## 2025-01-13 NOTE — PATIENT INSTRUCTIONS
Take the antibiotic as prescribed and finish the full course even if symptoms improve.    Try yogurt with active cultures or probiotics such as Culturelle daily to help prevent diarrhea while using antibiotics.  Use nasal saline spray and/or humidifier for your symptoms.  Follow up with your primary care provider should symptoms persist.

## 2025-01-13 NOTE — PROGRESS NOTES
ASSESSMENT:  (J01.00) Acute non-recurrent maxillary sinusitis  (primary encounter diagnosis)  Plan: amoxicillin-clavulanate (AUGMENTIN) 875-125 MG         tablet, fluticasone (FLONASE) 50 MCG/ACT nasal         spray    PLAN:  Acute sinusitis patient instructions discussed and provided.  We discussed the need to take the antibiotic as prescribed and finish the full course even if symptoms improve.  We also discussed trying yogurt with active cultures or probiotic such as Culturelle daily to help find diarrhea while taking the antibiotic.  Informed the patient to use nasal saline spray and/or humidifier/steam for the nasal symptoms.  Discussed the need to follow-up with their primary care provider should symptoms persist.  Patient acknowledged their understanding of the above plan.    The use of Dragon/PowerMic dictation services may have been used to construct the content in this note; any grammatical or spelling errors are non-intentional. Please contact the author of this note directly if you are in need of any clarification.      SUBJECTIVE:   Kaitlyn Garcia is a 49 year old female presenting with a chief complaint of runny nose, stuffy nose, facial pain/pressure, headache, and post-nasal drip.  Onset of symptoms was 5 day(s) ago.  Course of illness is worsening.    Patient denies: cough - non-productive, ear pain, sore throat, vomiting, and diarrhea  Treatment measures tried include nasal saline spray, Tylenol, ibuprofen, naproxen, fluids and humidifier.  Predisposing factors include history of sinus infections.    ROS:  Negative except noted above.    OBJECTIVE:  /87 (BP Location: Left arm, Patient Position: Sitting, Cuff Size: Adult Regular)   Pulse 80   Temp 98.3  F (36.8  C) (Oral)   Resp 16   Wt 74.7 kg (164 lb 11.2 oz)   SpO2 98%   BMI 25.88 kg/m    GENERAL APPEARANCE: healthy, alert and no distress  EYES: EOMI,  PERRL, conjunctiva clear  HENT: TM's normal bilaterally, nasal turbinates  erythematous, swollen, and right sided maxillary sinus tenderness   NECK: supple, nontender, no lymphadenopathy  RESP: lungs clear to auscultation - no rales, rhonchi or wheezes  CV: regular rates and rhythm, normal S1 S2, no murmur noted  SKIN: no suspicious lesions or rashes

## 2025-01-14 ENCOUNTER — OFFICE VISIT (OUTPATIENT)
Dept: OPHTHALMOLOGY | Facility: CLINIC | Age: 50
End: 2025-01-14
Attending: OPHTHALMOLOGY
Payer: COMMERCIAL

## 2025-01-14 DIAGNOSIS — D31.31 CHOROIDAL NEVUS OF RIGHT EYE: ICD-10-CM

## 2025-01-14 PROCEDURE — 92250 FUNDUS PHOTOGRAPHY W/I&R: CPT | Performed by: OPHTHALMOLOGY

## 2025-01-14 PROCEDURE — 92134 CPTRZ OPH DX IMG PST SGM RTA: CPT | Performed by: OPHTHALMOLOGY

## 2025-01-14 PROCEDURE — 99213 OFFICE O/P EST LOW 20 MIN: CPT | Performed by: OPHTHALMOLOGY

## 2025-01-14 ASSESSMENT — REFRACTION_WEARINGRX
OD_CYLINDER: SPHERE
OS_ADD: +1.50
OS_SPHERE: -0.50
OS_AXIS: 080
OD_ADD: +1.50
OS_CYLINDER: +0.50
OD_SPHERE: -0.25

## 2025-01-14 ASSESSMENT — CONF VISUAL FIELD
OS_SUPERIOR_TEMPORAL_RESTRICTION: 0
OD_INFERIOR_NASAL_RESTRICTION: 0
OD_NORMAL: 1
OS_SUPERIOR_NASAL_RESTRICTION: 0
OS_INFERIOR_NASAL_RESTRICTION: 0
OD_SUPERIOR_TEMPORAL_RESTRICTION: 0
OS_NORMAL: 1
OD_SUPERIOR_NASAL_RESTRICTION: 0
OS_INFERIOR_TEMPORAL_RESTRICTION: 0
OD_INFERIOR_TEMPORAL_RESTRICTION: 0

## 2025-01-14 ASSESSMENT — EXTERNAL EXAM - LEFT EYE: OS_EXAM: NORMAL

## 2025-01-14 ASSESSMENT — CUP TO DISC RATIO
OD_RATIO: 0.6
OS_RATIO: 0.6

## 2025-01-14 ASSESSMENT — TONOMETRY
IOP_METHOD: TONOPEN
OD_IOP_MMHG: 12
OS_IOP_MMHG: 14

## 2025-01-14 ASSESSMENT — EXTERNAL EXAM - RIGHT EYE: OD_EXAM: NORMAL

## 2025-01-14 ASSESSMENT — VISUAL ACUITY
OS_CC: 20/20
OS_CC+: -2
OD_CC: 20/20
METHOD: SNELLEN - LINEAR

## 2025-01-14 ASSESSMENT — SLIT LAMP EXAM - LIDS
COMMENTS: NORMAL
COMMENTS: NORMAL
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
## 2025-01-14 NOTE — NURSING NOTE
"Chief Complaints and History of Present Illnesses   Patient presents with    Annual Eye Exam     Chief Complaint(s) and History of Present Illness(es)       Annual Eye Exam              Laterality: both eyes    Course: stable              Comments        Patient returns for an annual visit. Patient reports vision is stable. Denies flashes, floaters, and eye trauma. She is using artifical tears in both eyes as needed for dryness and irritation. Today she is fighting sinus infection and is feeling some pressure around the right eye, and is currently taking amoxicillin. Mita \"Summer\" MADALYN Child January 14, 2025                   "

## 2025-02-23 DIAGNOSIS — G47.61 PLMD (PERIODIC LIMB MOVEMENT DISORDER): ICD-10-CM

## 2025-02-24 RX ORDER — ROPINIROLE 0.5 MG/1
1.5 TABLET, FILM COATED ORAL AT BEDTIME
Qty: 270 TABLET | Refills: 3 | Status: SHIPPED | OUTPATIENT
Start: 2025-02-24

## 2025-02-24 NOTE — TELEPHONE ENCOUNTER
Appointments in Next Year      Feb 28, 2025 11:00 AM  (Arrive by 10:40 AM)  Adult Preventative Visit with SINDHU Antonio CNP  [facility] ([facility] ) [phone]     Apr 02, 2025 10:00 AM  (Arrive by 9:45 AM)  RETURN GENERAL with Cullen Nelson MD  [facility] ([facility] ) [phone]     Jun 04, 2025 8:30 AM  (Arrive by 8:00 AM)  MR BREAST BILATERAL W/O & W CONTRAST with [email or web address]  [facility] ([facility] ) [phone]     Jun 04, 2025 10:00 AM  (Arrive by 9:45 AM)  Return Patient with Merced Wiggins PA-C  [facility] ([facility] ) [phone]

## 2025-02-28 ENCOUNTER — OFFICE VISIT (OUTPATIENT)
Dept: FAMILY MEDICINE | Facility: CLINIC | Age: 50
End: 2025-02-28
Payer: COMMERCIAL

## 2025-02-28 VITALS
OXYGEN SATURATION: 96 % | HEART RATE: 72 BPM | SYSTOLIC BLOOD PRESSURE: 122 MMHG | DIASTOLIC BLOOD PRESSURE: 76 MMHG | WEIGHT: 164.7 LBS | HEIGHT: 67 IN | RESPIRATION RATE: 15 BRPM | BODY MASS INDEX: 25.85 KG/M2 | TEMPERATURE: 97.6 F

## 2025-02-28 DIAGNOSIS — J01.00 ACUTE NON-RECURRENT MAXILLARY SINUSITIS: ICD-10-CM

## 2025-02-28 DIAGNOSIS — Z13.6 CARDIOVASCULAR SCREENING; LDL GOAL LESS THAN 160: ICD-10-CM

## 2025-02-28 DIAGNOSIS — G47.61 PLMD (PERIODIC LIMB MOVEMENT DISORDER): ICD-10-CM

## 2025-02-28 DIAGNOSIS — Z23 NEED FOR DIPHTHERIA-TETANUS-PERTUSSIS (TDAP) VACCINE: ICD-10-CM

## 2025-02-28 DIAGNOSIS — F41.1 GAD (GENERALIZED ANXIETY DISORDER): ICD-10-CM

## 2025-02-28 DIAGNOSIS — R79.89 ELEVATED LFTS: ICD-10-CM

## 2025-02-28 DIAGNOSIS — N95.1 MENOPAUSAL SYNDROME (HOT FLASHES): ICD-10-CM

## 2025-02-28 DIAGNOSIS — Z13.1 SCREENING FOR DIABETES MELLITUS (DM): ICD-10-CM

## 2025-02-28 DIAGNOSIS — D22.9 NUMEROUS SKIN MOLES: ICD-10-CM

## 2025-02-28 DIAGNOSIS — G43.009 MIGRAINE WITHOUT AURA AND WITHOUT STATUS MIGRAINOSUS, NOT INTRACTABLE: ICD-10-CM

## 2025-02-28 DIAGNOSIS — Z13.29 SCREENING FOR THYROID DISORDER: ICD-10-CM

## 2025-02-28 DIAGNOSIS — F41.0 PANIC ATTACK: ICD-10-CM

## 2025-02-28 DIAGNOSIS — Z23 NEED FOR PROPHYLACTIC VACCINATION AND INOCULATION AGAINST INFLUENZA: ICD-10-CM

## 2025-02-28 DIAGNOSIS — Z00.00 ROUTINE GENERAL MEDICAL EXAMINATION AT A HEALTH CARE FACILITY: Primary | ICD-10-CM

## 2025-02-28 DIAGNOSIS — Z13.0 SCREENING FOR DISORDER OF BLOOD AND BLOOD-FORMING ORGANS: ICD-10-CM

## 2025-02-28 LAB
ALBUMIN SERPL BCG-MCNC: 4.4 G/DL (ref 3.5–5.2)
ALP SERPL-CCNC: 62 U/L (ref 40–150)
ALT SERPL W P-5'-P-CCNC: 56 U/L (ref 0–50)
ANION GAP SERPL CALCULATED.3IONS-SCNC: 7 MMOL/L (ref 7–15)
AST SERPL W P-5'-P-CCNC: 33 U/L (ref 0–45)
BILIRUB SERPL-MCNC: 0.3 MG/DL
BUN SERPL-MCNC: 14.2 MG/DL (ref 6–20)
CALCIUM SERPL-MCNC: 8.8 MG/DL (ref 8.8–10.4)
CHLORIDE SERPL-SCNC: 105 MMOL/L (ref 98–107)
CHOLEST SERPL-MCNC: 210 MG/DL
CREAT SERPL-MCNC: 0.76 MG/DL (ref 0.51–0.95)
EGFRCR SERPLBLD CKD-EPI 2021: >90 ML/MIN/1.73M2
ERYTHROCYTE [DISTWIDTH] IN BLOOD BY AUTOMATED COUNT: 11.3 % (ref 10–15)
FASTING STATUS PATIENT QL REPORTED: ABNORMAL
FASTING STATUS PATIENT QL REPORTED: ABNORMAL
FSH SERPL IRP2-ACNC: 2.5 MIU/ML
GLUCOSE SERPL-MCNC: 88 MG/DL (ref 70–99)
HCO3 SERPL-SCNC: 27 MMOL/L (ref 22–29)
HCT VFR BLD AUTO: 37.5 % (ref 35–47)
HDLC SERPL-MCNC: 63 MG/DL
HGB BLD-MCNC: 12.7 G/DL (ref 11.7–15.7)
LDLC SERPL CALC-MCNC: 128 MG/DL
LH SERPL-ACNC: 2.2 MIU/ML
MCH RBC QN AUTO: 31 PG (ref 26.5–33)
MCHC RBC AUTO-ENTMCNC: 33.9 G/DL (ref 31.5–36.5)
MCV RBC AUTO: 92 FL (ref 78–100)
NONHDLC SERPL-MCNC: 147 MG/DL
PLATELET # BLD AUTO: 210 10E3/UL (ref 150–450)
POTASSIUM SERPL-SCNC: 4.1 MMOL/L (ref 3.4–5.3)
PROT SERPL-MCNC: 6.8 G/DL (ref 6.4–8.3)
RBC # BLD AUTO: 4.1 10E6/UL (ref 3.8–5.2)
SODIUM SERPL-SCNC: 139 MMOL/L (ref 135–145)
TRIGL SERPL-MCNC: 94 MG/DL
TSH SERPL DL<=0.005 MIU/L-ACNC: 1.55 UIU/ML (ref 0.3–4.2)
WBC # BLD AUTO: 4.5 10E3/UL (ref 4–11)

## 2025-02-28 PROCEDURE — 80053 COMPREHEN METABOLIC PANEL: CPT | Performed by: NURSE PRACTITIONER

## 2025-02-28 PROCEDURE — 80061 LIPID PANEL: CPT | Performed by: NURSE PRACTITIONER

## 2025-02-28 PROCEDURE — 85027 COMPLETE CBC AUTOMATED: CPT | Performed by: NURSE PRACTITIONER

## 2025-02-28 PROCEDURE — 36415 COLL VENOUS BLD VENIPUNCTURE: CPT | Performed by: NURSE PRACTITIONER

## 2025-02-28 PROCEDURE — 83002 ASSAY OF GONADOTROPIN (LH): CPT | Performed by: NURSE PRACTITIONER

## 2025-02-28 PROCEDURE — 83001 ASSAY OF GONADOTROPIN (FSH): CPT | Performed by: NURSE PRACTITIONER

## 2025-02-28 PROCEDURE — 84443 ASSAY THYROID STIM HORMONE: CPT | Performed by: NURSE PRACTITIONER

## 2025-02-28 RX ORDER — SUMATRIPTAN 50 MG/1
50 TABLET, FILM COATED ORAL
Qty: 30 TABLET | Refills: 2 | Status: SHIPPED | OUTPATIENT
Start: 2025-02-28

## 2025-02-28 RX ORDER — VENLAFAXINE HYDROCHLORIDE 37.5 MG/1
37.5 CAPSULE, EXTENDED RELEASE ORAL DAILY
Qty: 90 CAPSULE | Refills: 3 | Status: SHIPPED | OUTPATIENT
Start: 2025-02-28

## 2025-02-28 RX ORDER — FLUTICASONE PROPIONATE 50 MCG
2 SPRAY, SUSPENSION (ML) NASAL DAILY
Qty: 16 G | Refills: 0 | Status: SHIPPED | OUTPATIENT
Start: 2025-02-28

## 2025-02-28 RX ORDER — ROPINIROLE 0.5 MG/1
1.5 TABLET, FILM COATED ORAL AT BEDTIME
Qty: 270 TABLET | Refills: 3 | Status: SHIPPED | OUTPATIENT
Start: 2025-02-28

## 2025-02-28 SDOH — HEALTH STABILITY: PHYSICAL HEALTH: ON AVERAGE, HOW MANY DAYS PER WEEK DO YOU ENGAGE IN MODERATE TO STRENUOUS EXERCISE (LIKE A BRISK WALK)?: 3 DAYS

## 2025-02-28 ASSESSMENT — PAIN SCALES - GENERAL: PAINLEVEL_OUTOF10: NO PAIN (0)

## 2025-02-28 ASSESSMENT — SOCIAL DETERMINANTS OF HEALTH (SDOH): HOW OFTEN DO YOU GET TOGETHER WITH FRIENDS OR RELATIVES?: ONCE A WEEK

## 2025-02-28 NOTE — PATIENT INSTRUCTIONS
PLAN:   1.   Symptomatic therapy suggested: Increase calcium to 1000mg and 1000 international unit(s) Vit D daily .  Continue current medications as prescribed.   2.  Orders Placed This Encounter   Medications    fluticasone (FLONASE) 50 MCG/ACT nasal spray     Sig: Spray 2 sprays into both nostrils daily.     Dispense:  16 g     Refill:  0    venlafaxine (EFFEXOR XR) 37.5 MG 24 hr capsule     Sig: Take 1 capsule (37.5 mg) by mouth daily.     Dispense:  90 capsule     Refill:  3    SUMAtriptan (IMITREX) 50 MG tablet     Sig: Take 1 tablet (50 mg) by mouth at onset of headache for migraine. May repeat in 2 hours. Max 4 tablets/24 hours.     Dispense:  30 tablet     Refill:  2    rOPINIRole (REQUIP) 0.5 MG tablet     Sig: Take 3 tablets (1.5 mg) by mouth at bedtime.     Dispense:  270 tablet     Refill:  3     Orders Placed This Encounter   Procedures    REVIEW OF HEALTH MAINTENANCE PROTOCOL ORDERS    TDAP 10-64Y (ADACEL,BOOSTRIX)    INFLUENZA VACCINE TRIVALENT(FLUBLOK)    Lipid panel reflex to direct LDL Fasting    CBC with platelets    Comprehensive metabolic panel    TSH with free T4 reflex    Follicle stimulating hormone    Luteinizing Hormone    Adult Dermatology  Referral       3.  Will follow up and/or notify patient of  results via My Chart to determine further need for followup   Follow up office visit in one year for annual health maintenance exam, sooner PRN.   Patient needs to follow up in if no improvement,or sooner if worsening of symptoms or other symptoms develop.          Patient Education   Preventive Care Advice   This is general advice given by our system to help you stay healthy. However, your care team may have specific advice just for you. Please talk to your care team about your preventive care needs.  Nutrition  Eat 5 or more servings of fruits and vegetables each day.  Try wheat bread, brown rice and whole grain pasta (instead of white bread, rice, and pasta).  Get enough calcium and  vitamin D. Check the label on foods and aim for 100% of the RDA (recommended daily allowance).  Lifestyle  Exercise at least 150 minutes each week  (30 minutes a day, 5 days a week).  Do muscle strengthening activities 2 days a week. These help control your weight and prevent disease.  No smoking.  Wear sunscreen to prevent skin cancer.  Have a dental exam and cleaning every 6 months.  Yearly exams  See your health care team every year to talk about:  Any changes in your health.  Any medicines your care team has prescribed.  Preventive care, family planning, and ways to prevent chronic diseases.  Shots (vaccines)   HPV shots (up to age 26), if you've never had them before.  Hepatitis B shots (up to age 59), if you've never had them before.  COVID-19 shot: Get this shot when it's due.  Flu shot: Get a flu shot every year.  Tetanus shot: Get a tetanus shot every 10 years.  Pneumococcal, hepatitis A, and RSV shots: Ask your care team if you need these based on your risk.  Shingles shot (for age 50 and up)  General health tests  Diabetes screening:  Starting at age 35, Get screened for diabetes at least every 3 years.  If you are younger than age 35, ask your care team if you should be screened for diabetes.  Cholesterol test: At age 39, start having a cholesterol test every 5 years, or more often if advised.  Bone density scan (DEXA): At age 50, ask your care team if you should have this scan for osteoporosis (brittle bones).  Hepatitis C: Get tested at least once in your life.  STIs (sexually transmitted infections)  Before age 24: Ask your care team if you should be screened for STIs.  After age 24: Get screened for STIs if you're at risk. You are at risk for STIs (including HIV) if:  You are sexually active with more than one person.  You don't use condoms every time.  You or a partner was diagnosed with a sexually transmitted infection.  If you are at risk for HIV, ask about PrEP medicine to prevent HIV.  Get  tested for HIV at least once in your life, whether you are at risk for HIV or not.  Cancer screening tests  Cervical cancer screening: If you have a cervix, begin getting regular cervical cancer screening tests starting at age 21.  Breast cancer scan (mammogram): If you've ever had breasts, begin having regular mammograms starting at age 40. This is a scan to check for breast cancer.  Colon cancer screening: It is important to start screening for colon cancer at age 45.  Have a colonoscopy test every 10 years (or more often if you're at risk) Or, ask your provider about stool tests like a FIT test every year or Cologuard test every 3 years.  To learn more about your testing options, visit:   .  For help making a decision, visit:   https://bit.ly/eg31827.  Prostate cancer screening test: If you have a prostate, ask your care team if a prostate cancer screening test (PSA) at age 55 is right for you.  Lung cancer screening: If you are a current or former smoker ages 50 to 80, ask your care team if ongoing lung cancer screenings are right for you.  For informational purposes only. Not to replace the advice of your health care provider. Copyright   2023 [facility]. All rights reserved. Clinically reviewed by the [facility] Transitions Program. [facility] labs [id number] - REV 01/24.

## 2025-02-28 NOTE — PROGRESS NOTES
Prior to immunization administration, verified patients identity using patient s name and date of birth. Please see Immunization Activity for additional information.     Screening Questionnaire for Adult Immunization    Are you sick today?   No   Do you have allergies to medications, food, a vaccine component or latex?   No   Have you ever had a serious reaction after receiving a vaccination?   No   Do you have a long-term health problem with heart, lung, kidney, or metabolic disease (e.g., diabetes), asthma, a blood disorder, no spleen, complement component deficiency, a cochlear implant, or a spinal fluid leak?  Are you on long-term aspirin therapy?   No   Do you have cancer, leukemia, HIV/AIDS, or any other immune system problem?   No   Do you have a parent, brother, or sister with an immune system problem?   No   In the past 3 months, have you taken medications that affect  your immune system, such as prednisone, other steroids, or anticancer drugs; drugs for the treatment of rheumatoid arthritis, Crohn s disease, or psoriasis; or have you had radiation treatments?   No   Have you had a seizure, or a brain or other nervous system problem?   No   During the past year, have you received a transfusion of blood or blood    products, or been given immune (gamma) globulin or antiviral drug?   No   For women: Are you pregnant or is there a chance you could become       pregnant during the next month?   No   Have you received any vaccinations in the past 4 weeks?   No     Immunization questionnaire answers were all negative.      Patient instructed to remain in clinic for 15 minutes afterwards, and to report any adverse reactions.     Screening performed by Vernon Moore RN on 2/28/2025 at 11:33 AM.

## 2025-02-28 NOTE — PROGRESS NOTES
"Preventive Care Visit  [facility]  Ani SINDHU Oh CNP, Family Medicine  Feb 28, 2025  {Provider  Link to [facility] :[id number]}    Assessment & Plan     Routine general medical examination at a health care facility  ***  - REVIEW OF HEALTH MAINTENANCE PROTOCOL ORDERS    CARDIOVASCULAR SCREENING; LDL GOAL LESS THAN 160  ***  - Lipid panel reflex to direct LDL Fasting    Screening for disorder of blood and blood-forming organs  ***  - CBC with platelets    Screening for thyroid disorder  ***  - TSH with free T4 reflex    Screening for diabetes mellitus (DM)  ***  - Comprehensive metabolic panel    Need for prophylactic vaccination and inoculation against influenza  ***  - INFLUENZA VACCINE TRIVALENT(FLUBLOK)    Need for diphtheria-tetanus-pertussis (Tdap) vaccine  ***  - TDAP 10-64Y (ADACEL,BOOSTRIX)    Acute non-recurrent maxillary sinusitis  ***  - fluticasone (FLONASE) 50 MCG/ACT nasal spray  Dispense: 16 g; Refill: 0    Panic attack  ***  - venlafaxine (EFFEXOR XR) 37.5 MG 24 hr capsule  Dispense: 90 capsule; Refill: 3    AUDELIA (generalized anxiety disorder)  ***  - venlafaxine (EFFEXOR XR) 37.5 MG 24 hr capsule  Dispense: 90 capsule; Refill: 3    Migraine without aura and without status migrainosus, not intractable  ***  - SUMAtriptan (IMITREX) 50 MG tablet  Dispense: 30 tablet; Refill: 2    PLMD (periodic limb movement disorder)  ***  - rOPINIRole (REQUIP) 0.5 MG tablet  Dispense: 270 tablet; Refill: 3      Patient has been advised of split billing requirements and indicates understanding: Yes        BMI  Estimated body mass index is 26.19 kg/m  as calculated from the following:    Height as of this encounter: 1.689 m (5' 6.5\").    Weight as of this encounter: 74.7 kg (164 lb 11.2 oz).   Weight management plan: Discussed healthy diet and exercise guidelines    Counseling  Appropriate preventive services were addressed with this patient via screening, questionnaire, or discussion as " appropriate for fall prevention, nutrition, physical activity, Tobacco-use cessation, social engagement, weight loss and cognition.  Checklist reviewing preventive services available has been given to the patient.  Reviewed patient's diet, addressing concerns and/or questions.   She is at risk for lack of exercise and has been provided with information to increase physical activity for the benefit of her well-being.   She is at risk for psychosocial distress and has been provided with information to reduce risk.       FUTURE APPOINTMENTS:       - Follow-up for annual visit or as needed  See Patient Instructions    Willis Sahni is a 49 year old, presenting for the following:  Physical        2/28/2025    10:30 AM   Additional Questions   Roomed by Vernon   Accompanied by Self         2/28/2025    10:30 AM   Patient Reported Additional Medications   Patient reports taking the following new medications None          HPI       {SUPERLIST (Optional):318771}  {additonal problems for provider to add (Optional):777312}  Advance Care Planning  Patient does not have a Health Care Directive: Discussed advance care planning with patient; information given to patient to review.      2/28/2025   General Health   How would you rate your overall physical health? Good   Feel stress (tense, anxious, or unable to sleep) To some extent   (!) STRESS CONCERN      2/28/2025   Nutrition   Three or more servings of calcium each day? Yes   Diet: Regular (no restrictions)   How many servings of fruit and vegetables per day? 4 or more   How many sweetened beverages each day? 0-1         2/28/2025   Exercise   Days per week of moderate/strenous exercise 3 days         2/28/2025   Social Factors   Frequency of gathering with friends or relatives Once a week   Worry food won't last until get money to buy more No   Food not last or not have enough money for food? No   Do you have housing? (Housing is defined as stable permanent housing and  does not include staying ouside in a car, in a tent, in an abandoned building, in an overnight shelter, or couch-surfing.) Yes   Are you worried about losing your housing? No   Lack of transportation? No   Unable to get utilities (heat,electricity)? No         2/28/2025   Fall Risk   Fallen 2 or more times in the past year? No   Trouble with walking or balance? No          2/28/2025   Dental   Dentist two times every year? Yes         2/17/2024   TB Screening   Were you born outside of the [place]? No         Today's PHQ-2 Score:       2/28/2025    10:30 AM   PHQ-2 ( 1999 [employer])   Q1: Little interest or pleasure in doing things 0   Q2: Feeling down, depressed or hopeless 0   PHQ-2 Score 0    Q1: Little interest or pleasure in doing things Not at all   Q2: Feeling down, depressed or hopeless Not at all   PHQ-2 Score 0       Patient-reported           2/28/2025   Substance Use   Alcohol more than 3/day or more than 7/wk No   Do you use any other substances recreationally? No     Social History     Tobacco Use    Smoking status: Never     Passive exposure: Never    Smokeless tobacco: Never   Vaping Use    Vaping status: Never Used   Substance Use Topics    Alcohol use: Yes     Comment: Once a month    Drug use: No     {Provider  If there are gaps in the social history shown above, please follow the link to update and then refresh the note Link to Social and Substance History :[id number]}      11/27/2024   LAST FHS-7 RESULTS   1st degree relative breast or ovarian cancer No   Any relative bilateral breast cancer No   Any male have breast cancer No   Any ONE woman have BOTH breast AND ovarian cancer No   Any woman with breast cancer before 50yrs No   2 or more relatives with breast AND/OR ovarian cancer Yes   2 or more relatives with breast AND/OR bowel cancer Yes        Mammogram Screening - Mammogram every 1-2 years updated in Health Maintenance based on mutual decision making        2/28/2025   STI Screening   New sexual  partner(s) since last STI/HIV test? No     History of abnormal Pap smear: No - age 30- 64 PAP with HPV every 5 years recommended        Latest Ref Rng & Units 2/17/2023    11:45 AM 6/4/2018    11:35 AM 6/4/2018    11:28 AM   PAP / HPV   PAP  Negative for Intraepithelial Lesion or Malignancy (NILM)      PAP (Historical)    NIL    HPV 16 DNA Negative Negative  Negative     HPV 18 DNA Negative Negative  Negative     Other HR HPV Negative Negative  Negative       ASCVD Risk   The 10-year ASCVD risk score (Janes CERVANTES, et al., 2019) is: 0.9%    Values used to calculate the score:      Age: 49 years      Sex: Female      Is Non- : No      Diabetic: No      Tobacco smoker: No      Systolic Blood Pressure: 122 mmHg      Is BP treated: No      HDL Cholesterol: 68 mg/dL      Total Cholesterol: 210 mg/dL            2/28/2025   Contraception/Family Planning   Questions about contraception or family planning No        Reviewed and updated as needed this visit by Provider                    Past Medical History:   Diagnosis Date    Anemia     Atypical ductal hyperplasia of breast     Choroidal nevus of right eye     Concussion 10/2015    Depressive disorder 2006    Treated with celexa for two years    Family history of breast cancer 02/03/2015    Family history of breast cancer 02/03/2015    Family history of breast cancer 02/03/2015    Family history of breast cancer 02/03/2015    Gastroesophageal reflux disease     Glaucoma suspect of both eyes     Papilloma of breast 05/23/2012    Left, See Dr. Lynch at     PONV (postoperative nausea and vomiting)     S/P endometrial ablation 10/10/2013    Menorrhagia    Shingles     x2 in the past    WBC decreased 02/03/2015    WBC decreased 02/03/2015    WBC decreased 02/03/2015     Past Surgical History:   Procedure Laterality Date    ABDOMEN SURGERY      rectoplasty    BIOPSY BREAST NEEDLE LOCALIZATION  7/30/2012    Procedure: BIOPSY BREAST NEEDLE  LOCALIZATION;  left breast excisional Biopsy with wire localization @0830;  Surgeon: Robert Lynch MD;  Location: [facility] OR    BREAST SURGERY      COSMETIC SURGERY      Rectalplasty for episiotomy repair    COSMETIC SURGERY      Rectalplasty for episiotomy repair    DILATION AND CURETTAGE, HYSTEROSCOPY, ABLATE ENDOMETRIUM NOVASURE, COMBINED  10/10/2013    Procedure: COMBINED DILATION AND CURETTAGE, HYSTEROSCOPY, ABLATE ENDOMETRIUM NOVASURE;  Endometrial ablation with Novasure;  Surgeon: Indu Birmingham DO;  Location: [facility] OR    HC TOOTH EXTRACTION W/FORCEP      LUMPECTOMY BREAST Left 2/1/2017    Procedure: LUMPECTOMY BREAST;  Surgeon: Lori Montenegro MD;  Location: [facility]    SOFT TISSUE SURGERY      lumpectomy x 2 right breast    SOFT TISSUE SURGERY      episiotomy fixed     Lab work is in process  Labs reviewed in EPIC  BP Readings from Last 3 Encounters:   02/28/25 122/76   01/13/25 129/87   11/27/24 114/81    Wt Readings from Last 3 Encounters:   02/28/25 74.7 kg (164 lb 11.2 oz)   01/13/25 74.7 kg (164 lb 11.2 oz)   11/27/24 71.6 kg (157 lb 12.8 oz)                  Patient Active Problem List   Diagnosis    CARDIOVASCULAR SCREENING; LDL GOAL LESS THAN 160    Iron deficiency anemia    Mammographic microcalcification found on diagnostic imaging of breast    Intraductal papilloma of breast    Atypical hyperplasia of breast    Acne    Bloating symptom    Abdominal pain    Abnormal biliary HIDA scan    Family history of breast cancer    Neutropenia    Choroidal nevus of right eye    Situational mixed anxiety and depressive disorder    Glaucoma suspect, bilateral    Post-concussion syndrome    Muscle spasms of head or neck    Allergic rhinitis    WBC decreased     Past Surgical History:   Procedure Laterality Date    ABDOMEN SURGERY      rectoplasty    BIOPSY BREAST NEEDLE LOCALIZATION  7/30/2012    Procedure: BIOPSY BREAST NEEDLE LOCALIZATION;  left breast excisional Biopsy with wire  localization @0830;  Surgeon: Robert Lynch MD;  Location: [facility] OR    BREAST SURGERY      COSMETIC SURGERY      Rectalplasty for episiotomy repair    COSMETIC SURGERY      Rectalplasty for episiotomy repair    DILATION AND CURETTAGE, HYSTEROSCOPY, ABLATE ENDOMETRIUM NOVASURE, COMBINED  10/10/2013    Procedure: COMBINED DILATION AND CURETTAGE, HYSTEROSCOPY, ABLATE ENDOMETRIUM NOVASURE;  Endometrial ablation with Novasure;  Surgeon: Indu Birmingham DO;  Location: [facility] OR    HC TOOTH EXTRACTION W/FORCEP      LUMPECTOMY BREAST Left 2/1/2017    Procedure: LUMPECTOMY BREAST;  Surgeon: Lori Montenegro MD;  Location: [facility]    SOFT TISSUE SURGERY      lumpectomy x 2 right breast    SOFT TISSUE SURGERY      episiotomy fixed       Social History     Tobacco Use    Smoking status: Never     Passive exposure: Never    Smokeless tobacco: Never   Substance Use Topics    Alcohol use: Yes     Comment: Once a month     Family History   Problem Relation Age of Onset    Hypertension Father     Lipids Father     Hyperlipidemia Father     Depression/Anxiety Father     Cerebrovascular Disease Father         TIA    Cancer Maternal Grandmother     Glaucoma Maternal Grandmother     Macular Degeneration Maternal Grandmother     Osteoporosis Maternal Grandmother     Anesthesia Reaction Paternal Grandmother     Diabetes Paternal Grandmother     Cancer Paternal Grandfather     Depression/Anxiety Mother     Depression/Anxiety Daughter     Depression Daughter     Depression Son     Cataracts Maternal Grandfather     Breast Cancer Paternal Aunt 60        x 2 aunts    Breast Cancer Other 40        Several aunts on fathers side    Breast Cancer Other 64        Several aunts on fathers side/Several aunts on fathers side/Several aunts on fathers side/Several aunts on fathers side    Prostate Cancer Other 64        Uncle on mothers side    Asthma No family hx of     C.A.D. No family hx of     Cancer - colorectal No family hx of      Connective Tissue Disorder No family hx of     Thyroid Disease No family hx of     Coronary Artery Disease No family hx of     Ovarian Cancer No family hx of     Thyroid Disease No family hx of     Chemical Addiction No family hx of     Known Genetic Syndrome No family hx of          Current Outpatient Medications   Medication Sig Dispense Refill    acetaminophen (TYLENOL) 500 MG tablet Take 500-1,000 mg by mouth every 6 hours as needed for mild pain.      albuterol (PROAIR HFA/PROVENTIL HFA/VENTOLIN HFA) 108 (90 Base) MCG/ACT inhaler Inhale 2 puffs into the lungs every 6 hours as needed for cough 8.5 g 0    fluticasone (FLONASE) 50 MCG/ACT nasal spray Spray 2 sprays into both nostrils daily. 16 g 0    fluticasone (FLONASE) 50 MCG/ACT nasal spray Spray 1 spray into both nostrils daily.      ibuprofen (ADVIL/MOTRIN) 200 MG tablet Take 200 mg by mouth every 4 hours as needed for pain.      LORazepam (ATIVAN) 1 MG tablet 2 times daily as needed. Take 1 tablet (1 mg) by mouth twice a day as needed.      Naproxen Sodium (ALEVE PO) Take 440 mg by mouth as needed       rOPINIRole (REQUIP) 0.5 MG tablet Take 3 tablets (1.5 mg) by mouth at bedtime. 270 tablet 3    SUMAtriptan (IMITREX) 50 MG tablet Take 1 tablet (50 mg) by mouth at onset of headache for migraine. May repeat in 2 hours. Max 4 tablets/24 hours. 30 tablet 2    venlafaxine (EFFEXOR XR) 37.5 MG 24 hr capsule Take 1 capsule (37.5 mg) by mouth daily. 90 capsule 3     Allergies   Allergen Reactions    No Known Drug Allergy      Recent Labs   Lab Test 02/19/24  0942 02/17/23  1153 11/30/21  1048 08/14/20  0000 11/14/19  1025 06/04/18  1222 04/27/17  1026   * 109* 109*  --   --   --  109*   HDL 68 81 59  --   --   --  75   TRIG 77 71 125  --   --   --  77   ALT 16 46 32  --  31  --   --    CR 0.79 0.88 0.79   < > 0.81  --  0.83   GFRESTIMATED >90 81 89   < > 88  --  76   GFRESTBLACK  --   --   --   --  >90  --  >90  African American GFR Calc     POTASSIUM  "4.5 3.8 4.0   < > 3.8  --  3.6   TSH 1.25 0.85 1.24   < >  --    < > 0.96    < > = values in this interval not displayed.        CONSTITUTIONAL:POSITIVE  for weight gain and NEGATIVE  for {:748947}  INTEGUMENTARY/SKIN: NEGATIVE for worrisome rashes, moles or lesions. Has numerous moles   EYES: NEGATIVE for vision changes or irritation  ENT: POSITIVE for inner ear itching  and NEGATIVE for {:646968}  RESP:NEGATIVE for significant cough or SOB  BREAST: NEGATIVE for masses, tenderness or discharge  CV: NEGATIVE for chest pain, palpitations or peripheral edema  GI: NEGATIVE for nausea, abdominal pain, heartburn, or change in bowel habits   menopausal female: amenorrhea, no unusual urinary symptoms, and no unusual vaginal symptoms  MUSCULOSKELETAL:NEGATIVE for significant arthralgias or myalgia  NEURO: NEGATIVE for weakness, dizziness or paresthesias and POSITIVE for HX headaches-migraine  ENDOCRINE: NEGATIVE for temperature intolerance, skin/hair changes  HEME/ALLERGY/IMMUNE: NEGATIVE for bleeding problems  PSYCHIATRIC: POSITIVE forHx anxiety and doing well on Effexor  and NEGATIVE for{:795361}            Objective    Exam  /76 (BP Location: Right arm, Patient Position: Sitting, Cuff Size: Adult Regular)   Pulse 72   Temp 97.6  F (36.4  C) (Tympanic)   Resp 15   Ht 1.689 m (5' 6.5\")   Wt 74.7 kg (164 lb 11.2 oz)   SpO2 96%   BMI 26.19 kg/m     Estimated body mass index is 26.19 kg/m  as calculated from the following:    Height as of this encounter: 1.689 m (5' 6.5\").    Weight as of this encounter: 74.7 kg (164 lb 11.2 oz).  Wt Readings from Last 4 Encounters:   02/28/25 74.7 kg (164 lb 11.2 oz)   01/13/25 74.7 kg (164 lb 11.2 oz)   11/27/24 71.6 kg (157 lb 12.8 oz)   05/22/24 71.8 kg (158 lb 4.8 oz)      Physical Exam  {FEMALE - complete :051186}        Signed Electronically by: SINDHU Antonio CNP    "

## 2025-03-02 NOTE — RESULT ENCOUNTER NOTE
David Garcia,    Attached are your test results.  -LDL(bad) cholesterol level is elevated which can increase your heart disease risk.  A diet high in fat and simple carbohydrates, genetics and being overweight can contribute to this. ADVISE: exercising 150 minutes of aerobic exercise per week (30 minutes for 5 days per week or 50 minutes for 3 days per week are options) and eating a low saturated fat/low carbohydrate diet are helpful to improve this. In 12 months, you should recheck your fasting cholesterol panel by scheduling a lab-only appointment.  -Liver and gallbladder tests (ALT,AST, Alk phos,bilirubin) are elevated. ADVISE: lets recheck not fasting within a month   I will place the lab order for you  -Kidney function (GFR) is normal.  -Sodium is normal.  -Potassium is normal.  -Calcium is normal.  -Glucose is normal.  -TSH (thyroid stimulating hormone) level is normal which indicates normal thyroid function.  Labs are not in the menopausal range    Please contact us if you have any questions.    Ani Slater, CNP

## 2025-03-13 ENCOUNTER — ANCILLARY PROCEDURE (OUTPATIENT)
Dept: CT IMAGING | Facility: CLINIC | Age: 50
End: 2025-03-13
Attending: NURSE PRACTITIONER
Payer: COMMERCIAL

## 2025-03-13 DIAGNOSIS — R09.81 CONGESTION OF NASAL SINUS: ICD-10-CM

## 2025-03-13 PROCEDURE — 70486 CT MAXILLOFACIAL W/O DYE: CPT | Performed by: RADIOLOGY

## 2025-03-14 NOTE — RESULT ENCOUNTER NOTE
David Garcia,    Attached are your test results.  You CT does not show sinusitis but does show mucosal thickening and areas in right sphenoid that appears obscured   Lets refer you to ENT  Please be aware that coverage of these services is subject to the terms and limitations of your health insurance plan.  Call member services at your health plan with any benefit or coverage questions.  [facility] will call you to coordinate your care as prescribed by the provider. If you don't hear from a representative within 2 business days, please call [phone].       Please contact us if you have any questions.    Ani Slater, CNP

## 2025-03-24 ENCOUNTER — OFFICE VISIT (OUTPATIENT)
Dept: DERMATOLOGY | Facility: CLINIC | Age: 50
End: 2025-03-24
Attending: NURSE PRACTITIONER
Payer: COMMERCIAL

## 2025-03-24 DIAGNOSIS — D22.9 MULTIPLE BENIGN NEVI: ICD-10-CM

## 2025-03-24 DIAGNOSIS — D18.01 CHERRY ANGIOMA: ICD-10-CM

## 2025-03-24 DIAGNOSIS — L82.1 SEBORRHEIC KERATOSES: ICD-10-CM

## 2025-03-24 DIAGNOSIS — D22.9 NUMEROUS SKIN MOLES: ICD-10-CM

## 2025-03-24 DIAGNOSIS — Z12.83 SKIN CANCER SCREENING: Primary | ICD-10-CM

## 2025-03-24 DIAGNOSIS — L81.4 LENTIGINES: ICD-10-CM

## 2025-03-24 DIAGNOSIS — L21.9 DERMATITIS, SEBORRHEIC: ICD-10-CM

## 2025-03-24 PROCEDURE — 99214 OFFICE O/P EST MOD 30 MIN: CPT | Performed by: PHYSICIAN ASSISTANT

## 2025-03-24 NOTE — PATIENT INSTRUCTIONS
Proper skin care from [facility]:    -Eliminate harsh soaps as they strip the natural oils from the skin, often resulting in dry itchy skin ( i.e. Dial, Zest, [place] Islander Spring)  -Use mild soaps such as Cetaphil or Dove Sensitive Skin in the shower. You do not need to use soap on arms, legs, and trunk every time you shower unless visibly soiled.   -Avoid hot or cold showers.  -After showering, lightly dry off and apply moisturizing within 2-3 minutes. This will help trap moisture in the skin.   -Aggressive use of a moisturizer at least 1-2 times a day to the entire body (including -Vanicream, Cetaphil, Aquaphor or Cerave) and moisturize hands after every washing.  -We recommend using moisturizers that come in a tub that needs to be scooped out, not a pump. This has more of an oil base. It will hold moisture in your skin much better than a water base moisturizer. The above recommended are non-pore clogging.      Wear a sunscreen with at least SPF 30 on your face, ears, neck and V of the chest daily. Wear sunscreen on other areas of the body if those areas are exposed to the sun throughout the day. Sunscreens can contain physical and/or chemical blockers. Physical blockers are less likely to clog pores, these include zinc oxide and titanium dioxide. Reapply every two hour and after swimming.     Sunscreen examples: https://www.ewg.org/sunscreen/    UV radiation  UVA radiation remains constant throughout the day and throughout the year. It is a longer wavelength than UVB and therefore penetrates deeper into the skin leading to immediate and delayed tanning, photoaging, and skin cancer. 70-80% of UVA and UVB radiation occurs between the hours of 10am-2pm.  UVB radiation  UVB radiation causes the most harmful effects and is more significant during the summer months. However, snow and ice can reflect UVB radiation leading to skin damage during the winter months as well. UVB radiation is responsible for tanning,  burning, inflammation, delayed erythema (pinkness), pigmentation (brown spots), and skin cancer.     I recommend self monthly full body exams and yearly full body exams with a dermatology provider. If you develop a new or changing lesion please follow up for examination. Most skin cancers are pink and scaly or pink and pearly. However, we do see blue/brown/black skin cancers.  Consider the ABCDEs of melanoma when giving yourself your monthly full body exam ( don't forget the groin, buttocks, feet, toes, etc). A-asymmetry, B-borders, C-color, D-diameter, E-elevation or evolving. If you see any of these changes please follow up in clinic. If you cannot see your back I recommend purchasing a hand held mirror to use with a larger wall mirror.       Checking for Skin Cancer  You can find cancer early by checking your skin each month. There are 3 kinds of skin cancer. They are melanoma, basal cell carcinoma, and squamous cell carcinoma. Doing monthly skin checks is the best way to find new marks or skin changes. Follow the instructions below for checking your skin.   The ABCDEs of checking moles for melanoma   Check your moles or growths for signs of melanoma using ABCDE:   Asymmetry: the sides of the mole or growth don t match  Border: the edges are ragged, notched, or blurred  Color: the color within the mole or growth varies  Diameter: the mole or growth is larger than 6 mm (size of a pencil eraser)  Evolving: the size, shape, or color of the mole or growth is changing (evolving is not shown in the images below)    Checking for other types of skin cancer  Basal cell carcinoma or squamous cell carcinoma have symptoms such as:     A spot or mole that looks different from all other marks on your skin  Changes in how an area feels, such as itching, tenderness, or pain  Changes in the skin's surface, such as oozing, bleeding, or scaliness  A sore that does not heal  New swelling or redness beyond the border of a  mole    Who s at risk?  Anyone can get skin cancer. But you are at greater risk if you have:   Fair skin, light-colored hair, or light-colored eyes  Many moles or abnormal moles on your skin  A history of sunburns from sunlight or tanning beds  A family history of skin cancer  A history of exposure to radiation or chemicals  A weakened immune system  If you have had skin cancer in the past, you are at risk for recurring skin cancer.   How to check your skin  Do your monthly skin checkups in front of a full-length mirror. Check all parts of your body, including your:   Head (ears, face, neck, and scalp)  Torso (front, back, and sides)  Arms (tops, undersides, upper, and lower armpits)  Hands (palms, backs, and fingers, including under the nails)  Buttocks and genitals  Legs (front, back, and sides)  Feet (tops, soles, toes, including under the nails, and between toes)  If you have a lot of moles, take digital photos of them each month. Make sure to take photos both up close and from a distance. These can help you see if any moles change over time.   Most skin changes are not cancer. But if you see any changes in your skin, call your doctor right away. Only he or she can diagnose a problem. If you have skin cancer, seeing your doctor can be the first step toward getting the treatment that could save your life.   [employer] last reviewed this educational content on 4/1/2019 2000-2020 [employer]. [address] [place] [address], [place] [address]. All rights reserved. This information is not intended as a substitute for professional medical care. Always follow your healthcare professional's instructions.       When should I call my doctor?  If you are worsening or not improving, please, contact us or seek urgent care as noted below.     Who should I call with questions (adults)?    [facility] [phone]  For urgent needs outside of business hours call the [facility] at  [phone] and ask for the dermatology resident on call to be paged  If this is a medical emergency and you are unable to reach an ER, Call 911      If you need a prescription refill, please contact your pharmacy. Refills are approved or denied by our Physicians during normal business hours, Monday through Friday.  Per office policy, refills will not be granted if you have not been seen within the past year (or sooner depending on the condition).

## 2025-03-24 NOTE — LETTER
3/24/2025      Kaitlyn Garcia  [address]  [facility] [address]      Dear Colleague,    Thank you for referring your patient, Kaitlyn Garcia, to the [facility]. Please see a copy of my visit note below.    [facility] Dermatology Note  Encounter Date: Mar 24, 2025  Office Visit       SENSITIVE TO EPINEPHRINE.    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 Previously had seb derm of face which cleared with keto cream.   - Recommended using her ketoconazole cream + hydrocortisone cream BID for 10-14 days on cotton swab - can consider adding fluocinolone oil as next option if not helpful, but patient wanted to try this first prior to seeing ENT    # Benign findings: multiple benign nevi, lentigines, cherry angiomas, SKs  - edu on benign etiology  - Signs and Symptoms of non-melanoma skin cancer and ABCDEs of melanoma reviewed with patient. Patient encouraged to perform monthly self skin exams and educated on how to perform them. UV precautions reviewed with patient. Patient was asked about new or changing moles/lesions on body.   - Sunscreen: Apply 20 minutes prior to going outdoors and reapply every two hours, when wet or sweating. We recommend using an SPF 30 or higher, and to use one that is water resistant.     - RTC for changes     Procedures Performed:   None    Follow-up: 1 year(s) in-person, or earlier for new or changing lesions    Staff and scribe:     Scribe Disclosure:   I, ELBA SIMPSON, am serving as a scribe; to document services personally performed by Elizabeth Renee PA-C -based on data collection and the provider's statements to me.     Provider Disclosure:  I agree with above History, Review of Systems,  Physical exam and Plan.  I have reviewed the content of the documentation and have edited it as needed. I have personally performed the services documented here and the documentation accurately represents those services and the decisions I have made.      Electronically signed by:    All risks, benefits and alternatives were discussed with patient.  Patient is in agreement and understands the assessment and plan.  All questions were answered.    Elizabeth Renee PA-C, MPAS  [facility]: Phone: [phone], Fax: [phone]  [facility]: Phone: [phone],  Fax: [phone]  [facility]: Phone: [phone], Fax: [phone]  ____________________________________________    CC: Skin Check (FBSC/Concerns: Skin tags on neck. SENSITIVE TO EPINEPHRINE.)      Reviewed patients past medical history and pertinent chart review prior to patient's visit today.     HPI:  Ms. Kaitlyn Garcia is a 49 year old female who presents today as a return patient for FBSE. Last seen by Beulah Loera. Today patient reported that she has a few possible skin tags on her neck that get caught on clothing and jewelry.     She also reported she has been experiencing intense itching and sometimes scaling in her ears. Previously had seb derm which cleared on her face with ketoconazole 2% cream.     No personal  hx of skin cancer.     Patient is otherwise feeling well, without additional concerns.    Labs:  N/A    Physical Exam:  Vitals: There were no vitals taken for this visit.  SKIN: Total skin excluding the undergarment areas was performed. The exam included the head/face, neck, both arms, chest, back, abdomen, both legs, digits and/or nails.    - - Santoyo's skin type II, has <100 nevi  - There are dome shaped bright red papules on the trunk.   - Multiple regular brown pigmented macules and papules are identified on the trunk  and extremities.   - Scattered brown macules on sun exposed areas.  - There are waxy stuck on tan to brown papules on the trunk.     EARS: external ear canals appear clear, no erythema, no sakina scale  - No other lesions of concern on areas examined.     Medications:  Current Outpatient Medications   Medication Sig Dispense Refill     acetaminophen (TYLENOL) 500 MG tablet Take 500-1,000 mg by mouth every 6 hours as needed for mild pain.       albuterol (PROAIR HFA/PROVENTIL HFA/VENTOLIN HFA) 108 (90 Base) MCG/ACT inhaler Inhale 2 puffs into the lungs every 6 hours as needed for cough 8.5 g 0     fluticasone (FLONASE) 50 MCG/ACT nasal spray Spray 1 spray into both nostrils daily.       ibuprofen (ADVIL/MOTRIN) 200 MG tablet Take 200 mg by mouth every 4 hours as needed for pain.       Naproxen Sodium (ALEVE PO) Take 440 mg by mouth as needed        rOPINIRole (REQUIP) 0.5 MG tablet Take 3 tablets (1.5 mg) by mouth at bedtime. 270 tablet 3     SUMAtriptan (IMITREX) 50 MG tablet Take 1 tablet (50 mg) by mouth at onset of headache for migraine. May repeat in 2 hours. Max 4 tablets/24 hours. 30 tablet 2     venlafaxine (EFFEXOR XR) 37.5 MG 24 hr capsule Take 1 capsule (37.5 mg) by mouth daily. 90 capsule 3     fluticasone (FLONASE) 50 MCG/ACT nasal spray Spray 2 sprays into both nostrils daily. 16 g 0     LORazepam (ATIVAN) 1 MG tablet 2 times daily as needed. Take 1 tablet (1 mg) by mouth twice a day as needed. (Patient not taking: Reported on 3/24/2025)       No current facility-administered medications for this visit.      Past Medical/Surgical History:   Patient Active Problem List   Diagnosis     CARDIOVASCULAR SCREENING; LDL GOAL LESS THAN 160     Iron deficiency anemia     Mammographic microcalcification found on diagnostic imaging of breast     Intraductal papilloma of breast     Atypical hyperplasia of breast     Acne     Bloating symptom     Abdominal pain     Abnormal biliary HIDA scan     Family history of  breast cancer     Neutropenia     Choroidal nevus of right eye     Situational mixed anxiety and depressive disorder     Glaucoma suspect, bilateral     Post-concussion syndrome     Muscle spasms of head or neck     Allergic rhinitis     WBC decreased     Past Medical History:   Diagnosis Date     Anemia      Atypical ductal hyperplasia of breast      Choroidal nevus of right eye      Concussion 10/2015     Depressive disorder 2006    Treated with celexa for two years     Family history of breast cancer 02/03/2015     Family history of breast cancer 02/03/2015     Family history of breast cancer 02/03/2015     Family history of breast cancer 02/03/2015     Gastroesophageal reflux disease      Glaucoma suspect of both eyes      Papilloma of breast 05/23/2012    Left, See Dr. Lynch at      PONV (postoperative nausea and vomiting)      S/P endometrial ablation 10/10/2013    Menorrhagia     Shingles     x2 in the past     WBC decreased 02/03/2015     WBC decreased 02/03/2015     WBC decreased 02/03/2015                        Again, thank you for allowing me to participate in the care of your patient.        Sincerely,        Elizabeth Renee PA-C    Electronically signed

## 2025-03-24 NOTE — PROGRESS NOTES
[facility] Dermatology Note  Encounter Date: Mar 24, 2025  Office Visit       SENSITIVE TO EPINEPHRINE.    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 Previously had seb derm of face which cleared with keto cream.   - Recommended using her ketoconazole cream + hydrocortisone cream BID for 10-14 days on cotton swab - can consider adding fluocinolone oil as next option if not helpful, but patient wanted to try this first prior to seeing ENT    # Benign findings: multiple benign nevi, lentigines, cherry angiomas, SKs  - edu on benign etiology  - Signs and Symptoms of non-melanoma skin cancer and ABCDEs of melanoma reviewed with patient. Patient encouraged to perform monthly self skin exams and educated on how to perform them. UV precautions reviewed with patient. Patient was asked about new or changing moles/lesions on body.   - Sunscreen: Apply 20 minutes prior to going outdoors and reapply every two hours, when wet or sweating. We recommend using an SPF 30 or higher, and to use one that is water resistant.     - RTC for changes     Procedures Performed:   None    Follow-up: 1 year(s) in-person, or earlier for new or changing lesions    Staff and scribe:     Scribe Disclosure:   I, ELBA SIMPSON, am serving as a scribe; to document services personally performed by Elizabeth Renee PA-C -based on data collection and the provider's statements to me.     Provider Disclosure:  I agree with above History, Review of Systems, Physical exam and Plan.  I have reviewed the content of the documentation and have edited it as needed. I have personally performed the services documented here and the documentation accurately represents those services and the decisions  I have made.      Electronically signed by:    All risks, benefits and alternatives were discussed with patient.  Patient is in agreement and understands the assessment and plan.  All questions were answered.    Elizabeth Renee PA-C, MPAS  [facility]: Phone: [phone], Fax: [phone]  [facility]: Phone: [phone],  Fax: [phone]  [facility]: Phone: [phone], Fax: [phone]  ____________________________________________    CC: Skin Check (FBSC/Concerns: Skin tags on neck. SENSITIVE TO EPINEPHRINE.)      Reviewed patients past medical history and pertinent chart review prior to patient's visit today.     HPI:  Ms. Kaitlyn Garcia is a 49 year old female who presents today as a return patient for FBSE. Last seen by Beulah Loera. Today patient reported that she has a few possible skin tags on her neck that get caught on clothing and jewelry.     She also reported she has been experiencing intense itching and sometimes scaling in her ears. Previously had seb derm which cleared on her face with ketoconazole 2% cream.     No personal  hx of skin cancer.     Patient is otherwise feeling well, without additional concerns.    Labs:  N/A    Physical Exam:  Vitals: There were no vitals taken for this visit.  SKIN: Total skin excluding the undergarment areas was performed. The exam included the head/face, neck, both arms, chest, back, abdomen, both legs, digits and/or nails.    - - Santoyo's skin type II, has <100 nevi  - There are dome shaped bright red papules on the trunk.   - Multiple regular brown pigmented macules and papules are identified on the trunk and extremities.   - Scattered brown macules on sun exposed areas.  - There are waxy stuck on tan to brown papules on the trunk.     EARS: external ear canals appear clear, no erythema, no sakina scale  - No other lesions of concern on  areas examined.     Medications:  Current Outpatient Medications   Medication Sig Dispense Refill    acetaminophen (TYLENOL) 500 MG tablet Take 500-1,000 mg by mouth every 6 hours as needed for mild pain.      albuterol (PROAIR HFA/PROVENTIL HFA/VENTOLIN HFA) 108 (90 Base) MCG/ACT inhaler Inhale 2 puffs into the lungs every 6 hours as needed for cough 8.5 g 0    fluticasone (FLONASE) 50 MCG/ACT nasal spray Spray 1 spray into both nostrils daily.      ibuprofen (ADVIL/MOTRIN) 200 MG tablet Take 200 mg by mouth every 4 hours as needed for pain.      Naproxen Sodium (ALEVE PO) Take 440 mg by mouth as needed       rOPINIRole (REQUIP) 0.5 MG tablet Take 3 tablets (1.5 mg) by mouth at bedtime. 270 tablet 3    SUMAtriptan (IMITREX) 50 MG tablet Take 1 tablet (50 mg) by mouth at onset of headache for migraine. May repeat in 2 hours. Max 4 tablets/24 hours. 30 tablet 2    venlafaxine (EFFEXOR XR) 37.5 MG 24 hr capsule Take 1 capsule (37.5 mg) by mouth daily. 90 capsule 3    fluticasone (FLONASE) 50 MCG/ACT nasal spray Spray 2 sprays into both nostrils daily. 16 g 0    LORazepam (ATIVAN) 1 MG tablet 2 times daily as needed. Take 1 tablet (1 mg) by mouth twice a day as needed. (Patient not taking: Reported on 3/24/2025)       No current facility-administered medications for this visit.      Past Medical/Surgical History:   Patient Active Problem List   Diagnosis    CARDIOVASCULAR SCREENING; LDL GOAL LESS THAN 160    Iron deficiency anemia    Mammographic microcalcification found on diagnostic imaging of breast    Intraductal papilloma of breast    Atypical hyperplasia of breast    Acne    Bloating symptom    Abdominal pain    Abnormal biliary HIDA scan    Family history of breast cancer    Neutropenia    Choroidal nevus of right eye    Situational mixed anxiety and depressive disorder    Glaucoma suspect, bilateral    Post-concussion syndrome    Muscle spasms of head or neck    Allergic rhinitis    WBC decreased     Past  Medical History:   Diagnosis Date    Anemia     Atypical ductal hyperplasia of breast     Choroidal nevus of right eye     Concussion 10/2015    Depressive disorder 2006    Treated with celexa for two years    Family history of breast cancer 02/03/2015    Family history of breast cancer 02/03/2015    Family history of breast cancer 02/03/2015    Family history of breast cancer 02/03/2015    Gastroesophageal reflux disease     Glaucoma suspect of both eyes     Papilloma of breast 05/23/2012    Left, See Dr. Lynch at     PONV (postoperative nausea and vomiting)     S/P endometrial ablation 10/10/2013    Menorrhagia    Shingles     x2 in the past    WBC decreased 02/03/2015    WBC decreased 02/03/2015    WBC decreased 02/03/2015

## 2025-03-30 DIAGNOSIS — J01.00 ACUTE NON-RECURRENT MAXILLARY SINUSITIS: ICD-10-CM

## 2025-03-31 RX ORDER — FLUTICASONE PROPIONATE 50 MCG
2 SPRAY, SUSPENSION (ML) NASAL DAILY
Qty: 48 G | Refills: 2 | Status: SHIPPED | OUTPATIENT
Start: 2025-03-31

## 2025-04-01 DIAGNOSIS — H40.003 GLAUCOMA SUSPECT OF BOTH EYES: Primary | ICD-10-CM

## 2025-04-02 ENCOUNTER — OFFICE VISIT (OUTPATIENT)
Dept: OPHTHALMOLOGY | Facility: CLINIC | Age: 50
End: 2025-04-02
Attending: OPHTHALMOLOGY
Payer: COMMERCIAL

## 2025-04-02 DIAGNOSIS — H52.203 MYOPIA OF BOTH EYES WITH ASTIGMATISM AND PRESBYOPIA: ICD-10-CM

## 2025-04-02 DIAGNOSIS — H52.13 MYOPIA OF BOTH EYES WITH ASTIGMATISM AND PRESBYOPIA: ICD-10-CM

## 2025-04-02 DIAGNOSIS — H04.129 DRY EYE: Primary | ICD-10-CM

## 2025-04-02 DIAGNOSIS — D31.31 CHOROIDAL NEVUS OF RIGHT EYE: ICD-10-CM

## 2025-04-02 DIAGNOSIS — H52.4 MYOPIA OF BOTH EYES WITH ASTIGMATISM AND PRESBYOPIA: ICD-10-CM

## 2025-04-02 DIAGNOSIS — H40.003 GLAUCOMA SUSPECT OF BOTH EYES: ICD-10-CM

## 2025-04-02 PROCEDURE — 92133 CPTRZD OPH DX IMG PST SGM ON: CPT | Performed by: OPHTHALMOLOGY

## 2025-04-02 PROCEDURE — 92015 DETERMINE REFRACTIVE STATE: CPT

## 2025-04-02 PROCEDURE — 92083 EXTENDED VISUAL FIELD XM: CPT | Performed by: OPHTHALMOLOGY

## 2025-04-02 PROCEDURE — 92014 COMPRE OPH EXAM EST PT 1/>: CPT | Performed by: OPHTHALMOLOGY

## 2025-04-02 PROCEDURE — 99213 OFFICE O/P EST LOW 20 MIN: CPT | Performed by: OPHTHALMOLOGY

## 2025-04-02 ASSESSMENT — CONF VISUAL FIELD
OS_NORMAL: 1
OS_SUPERIOR_TEMPORAL_RESTRICTION: 0
OD_NORMAL: 1
OD_SUPERIOR_TEMPORAL_RESTRICTION: 0
OS_SUPERIOR_NASAL_RESTRICTION: 0
OS_INFERIOR_NASAL_RESTRICTION: 0
OD_SUPERIOR_NASAL_RESTRICTION: 0
OD_INFERIOR_NASAL_RESTRICTION: 0
OD_INFERIOR_TEMPORAL_RESTRICTION: 0
METHOD: COUNTING FINGERS
OS_INFERIOR_TEMPORAL_RESTRICTION: 0

## 2025-04-02 ASSESSMENT — REFRACTION_WEARINGRX
OS_ADD: +2.00
OD_SPHERE: PLANO
OS_CYLINDER: +0.50
OD_CYLINDER: SPHERE
OD_ADD: +2.00
OS_AXIS: 080
OS_SPHERE: -0.25

## 2025-04-02 ASSESSMENT — REFRACTION_MANIFEST
OS_AXIS: 080
OD_ADD: +2.00
OS_CYLINDER: +0.50
OD_SPHERE: PLANO
OD_CYLINDER: SPHERE
OS_ADD: +2.00
OS_SPHERE: -0.25

## 2025-04-02 ASSESSMENT — EXTERNAL EXAM - RIGHT EYE: OD_EXAM: NORMAL

## 2025-04-02 ASSESSMENT — VISUAL ACUITY
CORRECTION_TYPE: GLASSES
METHOD: SNELLEN - LINEAR
OD_CC: 20/20
OS_CC: 20/20

## 2025-04-02 ASSESSMENT — EXTERNAL EXAM - LEFT EYE: OS_EXAM: NORMAL

## 2025-04-02 ASSESSMENT — SLIT LAMP EXAM - LIDS
COMMENTS: NORMAL
COMMENTS: NORMAL

## 2025-04-02 ASSESSMENT — CUP TO DISC RATIO
OS_RATIO: 0.6
OD_RATIO: 0.6

## 2025-04-02 ASSESSMENT — TONOMETRY
OD_IOP_MMHG: 14
IOP_METHOD: TONOPEN
OS_IOP_MMHG: 14
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
## 2025-04-16 ENCOUNTER — OFFICE VISIT (OUTPATIENT)
Dept: OTOLARYNGOLOGY | Facility: CLINIC | Age: 50
End: 2025-04-16
Attending: NURSE PRACTITIONER
Payer: COMMERCIAL

## 2025-04-16 VITALS
TEMPERATURE: 97.6 F | HEIGHT: 67 IN | SYSTOLIC BLOOD PRESSURE: 123 MMHG | OXYGEN SATURATION: 97 % | HEART RATE: 71 BPM | DIASTOLIC BLOOD PRESSURE: 84 MMHG | WEIGHT: 164 LBS | BODY MASS INDEX: 25.74 KG/M2

## 2025-04-16 DIAGNOSIS — J34.3 HYPERTROPHY, NASAL, TURBINATE: ICD-10-CM

## 2025-04-16 DIAGNOSIS — K21.9 LPRD (LARYNGOPHARYNGEAL REFLUX DISEASE): Primary | ICD-10-CM

## 2025-04-16 DIAGNOSIS — L29.9 EAR ITCHING: ICD-10-CM

## 2025-04-16 DIAGNOSIS — R05.1 ACUTE COUGH: ICD-10-CM

## 2025-04-16 DIAGNOSIS — J30.0 VASOMOTOR RHINITIS: ICD-10-CM

## 2025-04-16 RX ORDER — FLUOCINOLONE ACETONIDE 0.11 MG/ML
5 OIL AURICULAR (OTIC) 2 TIMES DAILY
Qty: 20 ML | Refills: 0 | Status: SHIPPED | OUTPATIENT
Start: 2025-04-16

## 2025-04-16 RX ORDER — CETIRIZINE HYDROCHLORIDE 10 MG/1
10 TABLET ORAL DAILY
COMMUNITY

## 2025-04-16 RX ORDER — OMEPRAZOLE 40 MG/1
40 CAPSULE, DELAYED RELEASE ORAL DAILY
Qty: 90 CAPSULE | Refills: 0 | Status: SHIPPED | OUTPATIENT
Start: 2025-04-16

## 2025-04-16 RX ORDER — IPRATROPIUM BROMIDE 42 UG/1
2 SPRAY, METERED NASAL 4 TIMES DAILY
Qty: 15 ML | Refills: 2 | Status: SHIPPED | OUTPATIENT
Start: 2025-04-16

## 2025-04-16 RX ORDER — FAMOTIDINE 40 MG/1
40 TABLET, FILM COATED ORAL AT BEDTIME
Qty: 60 TABLET | Refills: 0 | Status: SHIPPED | OUTPATIENT
Start: 2025-04-16 | End: 2025-06-15

## 2025-04-16 ASSESSMENT — PAIN SCALES - GENERAL: PAINLEVEL_OUTOF10: MILD PAIN (3)
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
## 2025-04-16 NOTE — PATIENT INSTRUCTIONS
You were seen by Ebony Serrato CNP.  If you have questions or concerns regarding your appointment today, you can reach out to our call center at [phone].  The following has been recommended at your appointment today:    Turbinates are found in both nostrils and help humidify/dehumidify the air we breath in and out. They can become enlarged from allergens. Therefore, the following can help with allergy symptoms.         NASAL REGIMEN:   Start using the saline nasal spray rinse bottle 1 per day. (Use in the shower)  Then use Atrovent (2 times days, may use it up to 4x daily)  25-30 minutes after the saline nasal rinse.    Continue with taking Zyrtec, Claritin, or Allegra.   You may use Aquaphor or Vaseline 2-3 times daily or up to 4-6 times daily.     EAR ITCHING:   You may use hydrocortisone 1-2% on the outer ear and use the drops as needed.     Laryngeal Reflux Medication Management:     Laryngeal Reflux is when there is swelling around the arytenoids, which hold the vocal cords in place. Reflux symptoms/GERD symptoms, silent reflux (you don't feel the heart burn), and things you eat and drink can cause these symptoms.         1.Start taking the Omeprazole 40 mg take one tablet prior to breakfast for the next 8 weeks.   2.Take the Famotidine 40 mg at bedtime for the next 8 weeks.   4.I will send a mychart to see how you are doing in 2 months.     Adult lifestyle changes to prevent LPR reviewed     Avoid eating and drinking within two to three hours prior to bedtime    Do not drink alcohol    Eat small meals and slowly    Limit problem foods:      Caffeine    Carbonated drinks     Chocolate    Peppermint    Tomato    Citrus fruits    Fatty and fried foods    Spicy     Lose weight        NASAL SALINE IRRIGATION INSTRUCTIONS    You will be starting nasal saline irrigations and will need to obtain the following:      - NeilMed Sinus Rinse 8 oz Kit  - Distilled or filtered water   - Normal saline salt  packets    Place filtered or distilled water into the NeilMed bottle up to the fill line (DO NOT USE TAP OR WELL WATER).  Place the pre-made salt packet in the 8 oz of saline.  Shake the bottle to suspend into solution.  Lean head forward over a sink or a basin.  Rinse each side of the nose with one-half of the bottle (each squeeze is about one-half of the bottle). Rinse the nose daily.     If you use topical nasal sprays, apply following irrigation.    Video example: https://[email or web address].com/watch?v=[email or web address]

## 2025-04-27 ENCOUNTER — OFFICE VISIT (OUTPATIENT)
Dept: URGENT CARE | Facility: URGENT CARE | Age: 50
End: 2025-04-27
Payer: COMMERCIAL

## 2025-04-27 VITALS
RESPIRATION RATE: 18 BRPM | SYSTOLIC BLOOD PRESSURE: 123 MMHG | OXYGEN SATURATION: 97 % | BODY MASS INDEX: 26.01 KG/M2 | TEMPERATURE: 98 F | HEART RATE: 87 BPM | DIASTOLIC BLOOD PRESSURE: 82 MMHG | WEIGHT: 163.6 LBS

## 2025-04-27 DIAGNOSIS — H65.01 NON-RECURRENT ACUTE SEROUS OTITIS MEDIA OF RIGHT EAR: Primary | ICD-10-CM

## 2025-04-27 DIAGNOSIS — R07.0 THROAT PAIN: ICD-10-CM

## 2025-04-27 DIAGNOSIS — F41.0 PANIC ATTACK: ICD-10-CM

## 2025-04-27 DIAGNOSIS — F41.1 GAD (GENERALIZED ANXIETY DISORDER): ICD-10-CM

## 2025-04-27 LAB
DEPRECATED S PYO AG THROAT QL EIA: NEGATIVE
S PYO DNA THROAT QL NAA+PROBE: NOT DETECTED

## 2025-04-27 PROCEDURE — 3079F DIAST BP 80-89 MM HG: CPT | Performed by: NURSE PRACTITIONER

## 2025-04-27 PROCEDURE — 99214 OFFICE O/P EST MOD 30 MIN: CPT | Performed by: NURSE PRACTITIONER

## 2025-04-27 PROCEDURE — 3074F SYST BP LT 130 MM HG: CPT | Performed by: NURSE PRACTITIONER

## 2025-04-27 PROCEDURE — 87651 STREP A DNA AMP PROBE: CPT | Performed by: NURSE PRACTITIONER

## 2025-04-27 NOTE — RESULT ENCOUNTER NOTE
Results discussed with patient in clinic. States understanding of these results.    Sanaz Diallo CNP

## 2025-04-27 NOTE — PROGRESS NOTES
Urgent Care Clinic Visit    Chief Complaint   Patient presents with    URI     Sx: sinus drainage, cough, stuffy nose, headache and pressure -onset 3 weeks    Otalgia     Right ear pain onset 2 days - unable to sleep               4/27/2025     9:50 AM   Additional Questions   Roomed by inocente sheldon   Accompanied by self     No  Does the patient have a sore throat and either history of fever >100.4 in the previous 24 hours without a cough or recent exposure to a known case of strep throat? Yes

## 2025-04-27 NOTE — PROGRESS NOTES
Assessment & Plan      Diagnosis Comments   1. Non-recurrent acute serous otitis media of right ear  amoxicillin-clavulanate (AUGMENTIN) 875-125 MG tablet       2. Throat pain  Streptococcus A Rapid Screen w/Reflex to PCR - Clinic Collect, Group A Streptococcus PCR Throat Swab Pending strep culture            Discussed treatment for otitis oral antibiotics side effects reviewed.  We discussed conservative measures including warm compress, Tylenol or ibuprofen as needed for discomfort.  We also discussed red flag symptoms that would warrant further evaluation patient verbalized understanding is agreement this plan.    SINDHU Khan [facility] CARE RUDDY Sahni is a 49 year old female who presents to clinic today for the following health issues:  Chief Complaint   Patient presents with    URI     Sx: sore throat, sinus drainage, cough, stuffy nose, headache and pressure -onset 3 weeks    Otalgia     Right ear pain onset 2 days - unable to sleep         4/27/2025     9:50 AM   Additional Questions   Roomed by inocente sheldon   Accompanied by self     HPI    With a history of vasomotor rhinitis, hypertrophied nasal turbinates, bilateral ear pruritus, and chronic throat clearing secondarily  She was recently seen through ENT and has started several new medications including omeprazole, Pepcid, Atrovent nasal spray, fluocinolone acetonide oil ear drops she has been feeling her chronic symptoms have improved with this plan of care.  I reviewed the encounter note from her ENT visit.    She was recently in [place] visiting her grandchildren since returning home she has acute onset of right ear pain with a sore throat and increased sinus congestion and pressure she states her grandkids were sick at the time of her visit.  Patient denies fever, malaise, nausea vomiting or productive cough with shortness of breath or wheezing.      Review of Systems  Constitutional, HEENT,  cardiovascular, pulmonary, gi and gu systems are negative, except as otherwise noted.      Objective    /82 (BP Location: Left arm, Patient Position: Sitting, Cuff Size: Adult Regular)   Pulse 87   Temp 98  F (36.7  C) (Tympanic)   Resp 18   Wt 74.2 kg (163 lb 9.6 oz)   SpO2 97%   BMI 26.01 kg/m    Physical Exam   GENERAL: alert and no distress  EYES: Eyes grossly normal to inspection, PERRL and conjunctivae and sclerae normal  HENT: normal cephalic/atraumatic, right ear: erythematous, bulging membrane, and mucopurulent effusion, left ear: normal: no effusions, no erythema, normal landmarks, nose and mouth without ulcers or lesions, oropharynx clear, and oral mucous membranes moist  NECK: bilateral anterior cervical adenopathy, no asymmetry, masses, or scars, and thyroid normal to palpation  RESP: lungs clear to auscultation - no rales, rhonchi or wheezes  CV: regular rate and rhythm, normal S1 S2, no S3 or S4, no murmur, click or rub, no peripheral edema  ABDOMEN: soft, nontender, no hepatosplenomegaly, no masses and bowel sounds normal  MS: no gross musculoskeletal defects noted, no edema  SKIN: no suspicious lesions or rashes    Results for orders placed or performed in visit on 04/27/25   Streptococcus A Rapid Screen w/Reflex to PCR - Clinic Collect     Status: Normal    Specimen: Throat; Swab   Result Value Ref Range    Group A Strep antigen Negative Negative

## 2025-04-28 RX ORDER — VENLAFAXINE HYDROCHLORIDE 37.5 MG/1
37.5 CAPSULE, EXTENDED RELEASE ORAL DAILY
Qty: 90 CAPSULE | Refills: 3 | OUTPATIENT
Start: 2025-04-28

## 2025-04-29 DIAGNOSIS — F41.0 PANIC ATTACK: ICD-10-CM

## 2025-04-29 DIAGNOSIS — F41.1 GAD (GENERALIZED ANXIETY DISORDER): ICD-10-CM

## 2025-04-29 RX ORDER — VENLAFAXINE HYDROCHLORIDE 37.5 MG/1
37.5 CAPSULE, EXTENDED RELEASE ORAL DAILY
Qty: 90 CAPSULE | Refills: 3 | OUTPATIENT
Start: 2025-04-29

## 2025-05-01 ENCOUNTER — LAB (OUTPATIENT)
Dept: LAB | Facility: CLINIC | Age: 50
End: 2025-05-01
Payer: COMMERCIAL

## 2025-05-01 DIAGNOSIS — R79.89 ELEVATED LFTS: ICD-10-CM

## 2025-05-01 LAB
ALBUMIN SERPL BCG-MCNC: 4.3 G/DL (ref 3.5–5.2)
ALP SERPL-CCNC: 63 U/L (ref 40–150)
ALT SERPL W P-5'-P-CCNC: 34 U/L (ref 0–50)
AST SERPL W P-5'-P-CCNC: 25 U/L (ref 0–45)
BILIRUB DIRECT SERPL-MCNC: 0.12 MG/DL (ref 0–0.3)
BILIRUB SERPL-MCNC: 0.3 MG/DL
PROT SERPL-MCNC: 6.7 G/DL (ref 6.4–8.3)

## 2025-05-01 NOTE — RESULT ENCOUNTER NOTE
David Garcia,    Attached are your test results.  -Liver and gallbladder tests (ALT,AST, Alk phos,bilirubin) are normal.   Please contact us if you have any questions.    Ani Slater, CNP

## 2025-05-23 DIAGNOSIS — B00.1 RECURRENT COLD SORES: ICD-10-CM

## 2025-05-23 NOTE — TELEPHONE ENCOUNTER
Clinic RN: Please investigate patient's chart or contact patient if the information cannot be found because the medication is listed as historical or discontinued. Confirm patient is taking this medication. Document findings and route refill encounter to provider for approval or denial.    Emily Vasquez RN

## 2025-05-26 ENCOUNTER — MYC MEDICAL ADVICE (OUTPATIENT)
Dept: OTOLARYNGOLOGY | Facility: CLINIC | Age: 50
End: 2025-05-26
Payer: COMMERCIAL

## 2025-05-27 NOTE — TELEPHONE ENCOUNTER
Patient last seen in ENT on 4/16/25. Per note:    We discussed that her nasal symptoms are most likely related to vasomotor rhinitis, therefore we discussed trying Atrovent nasal spray. She does have evidence of hypertrophy turbinates, which can cause a congested feeling. We will trial the saline rinse 1-2 times daily, Atrovent (starting at 2 times daily but can use up to 4 times daily), and taking an antihistamine...  We will start the patient on omeprazole 40 mg once daily 20-30 minutes prior to morning meal and 40 mg of famotidine at bedtime. We provided counseling on lifestyle changes including avoidance of certain foods, sleeping on an incline, and avoiding lying down within 3-4 hours after eating.     Please see mychart update and advise further as needed.    Adela Caban RN on 5/27/2025 at 8:10 AM

## 2025-05-28 RX ORDER — VALACYCLOVIR HYDROCHLORIDE 1 G/1
2000 TABLET, FILM COATED ORAL 2 TIMES DAILY
Qty: 4 TABLET | Refills: 5 | Status: SHIPPED | OUTPATIENT
Start: 2025-05-28

## 2025-05-28 NOTE — TELEPHONE ENCOUNTER
Spoke with patient as requested by refill team:    Prescription requested: Valacyclovir      Clinic RN: Please investigate patient's chart or contact patient if the information cannot be found because the medication is listed as historical or discontinued. Confirm patient is taking this medication. Document findings and route refill encounter to provider for approval or denial.        Patient reports that she takes prescription as needed. Requesting refill. Routing to provider, please review and advise. Dayron Mcgee, RN, BSN, PHN

## 2025-06-02 NOTE — PROGRESS NOTES
.FOLLOW UP  Jun 4, 2025     Kaitlyn Garcia is a 49 year old woman who follows for a history of atypical ductal hyperplasia.      HPI:     Family history of 2 paternal aunts who had breast cancer in their 60's and a paternal cousin with breast cancer in her 40's. Kaitlyn had negative genetic testing in 2023.     History of right breast biopsies x2 that were benign.     In 2012 she had a left breast intraductal papilloma surgical excised.     In 2016 she had left needle biopsies x2, one of which demonstrated atypical ductal hyperplasia. The biopsied area of ADH was surgical excised.     In 2017 she had an MRI guided needle left breast biopsy that demonstrated an intraductal papilloma.       She is doing high risk screening with breast MRI and mammogram. She completed 5 years of Tamoxifen for risk reduction.      Today she denies any breast concerns including mass, skin change, nipple inversion or nipple discharge.      BREAST-SPECIFIC HISTORY:     Previous breast imaging: Yes   - 5/21/12 b/l Dmammo and right u/s for right breast lump: calcifications in the upper outer quadrant of left breast spanning 5 cm. Right u/s negative. BI-RADS 4 suspicious for malignancy   - 5/23/12 left stereotactic biopsy: intraductal papilloma   - 7/30/12 left wire-placement   - 2/19/13 MRI: large segmental enhancement of left lateral breast, BI-RADS 0 incomplete   Left u/s: BI-RADS 3 probably benign  - 3/29/13 MRI: BI-RADS 2 benign findings  - 8/5/13 b/l Dmammo for fullness: BI-RADS 2 benign   - 2/5/14 MRI: cysts in left breast, BI-RADS 2 benign   - 5/9/14 Dmammo b/l: negative. Left u/s: cystic appearing structures, BI-RADS 2 benign  - 5/20/14 MRI: study not completed due to nausea, BI-RADS 0 incomplete  - 5/28/14 MRI: BI-RADS 2 benign  - 10/28/14 b/l Dmammo: BI-RADS 2 benign   - 5/7/15 MRI BI-RADS 2 benign   - 11/10/15 Smammo: bilateral calcs: BI-RADS 2 benign  - 1/25/16 left u/s for pain: multiple tiny cyst  - 5/17/16 MRI: BI-RADS 2 benign    - 12/6/16  Smammo: lateral left breast developing macrocalcifications BI-RADS 0 incomplete   - 12/7/16 Dmammo left: left 2:00 calcifications BI-RADS 4 suspicious  - 12/12/16: stereotactic biopsy: focus of ADH    - 2/1/17 wire placement   - 5/9/17 MRI: 2 adjacent mass like areas of enhancement mid central left breast BI-RADS 0 incomplete   - 5/12/17 left u/s: scattered small cysts BI-RADS 4 suspicious   - 5/18/17 MR biopsy: intraductal papilloma   - 4/30/18 MRI BI-RADS 2  - 6/6/18 left Dmammo and ultrasound for pain BI-RADS 2  - 11/28/18 Smammo BI-RADS 2  - 5/7/19 breast MRI BI-RADS 2  - 8/21/19 Dmammo and right breast ultrasound: BI-RADS 2  - 11/12/19 Smammo BI-RADS 1  - 5/20/20 breast MRI BI-ARDS 2  - 11/4/20 b/l Dmammo and left breast ultrasound for lump: BI-RADS 1  - 5/5/21 Breast MRI BI-RADS 1  - 11/10/21 Smammo BI-RADS 2  - 5/11/22 breast MRI BI-RADS 2  - 11/16/22 Smammo BI-RADS 2  - 5/17/23 breast MRI BI-RADS 2  - 11/22/23 Smammo BI-RADS 2  - 5/22/24 breast MRI BI-RADS 2  - 11/27/24 Smammo BI-RADS 1  - 6/4/25 breast MRI     Prior breast biopsies:Yes   - 2 right breast biopsies   - 5/23/12 left needle biopsy: intraductal papilloma with sclerosing features   - 7/30/12 left excisional biopsy with Dr. Lynch: focal columnar cell change with atypia, intraductal papilloma and surrounding intraductal papillomatosis, sclerosing adenosis, fibrocystic changes  - 12/12/16 left needle biopsy x2: flat epithelial atypia with focus of ADH, duct ectasia and focal sclerosing adenosis, microcalcifications.   FEA and columnar cell change with atypia  - 2/1/17 left scar excision and excisional biopsy with Dr. Moni: FEA, columnar cell change/hyperplasia, intraductal papillomas, usual ductal hyperplasia, calcifications  - 5/18/17 left MRI needle biopsy: intraductal papilloma, mild columnar cell hyperplasia, mild duct ectasia, focal sclerosing adenosis      Prior history of breast cancer: No  Prior radiation history: No   Self breast  exams: No  Breast density: heterogeneously dense     GYN HISTORY:  . Age at 1st pregnancy: 17. Breastfeeding history: Yes.   Age at menarche: 14-14  Menopausal: premenopausal, uterine ablation   Menopausal hormone replacement therapy: No     RISK ASSESSMENT: < 3% 5 year risk and > 20% lifetime risk   Heather: 1.9% 5 year risk   SEBASTIAN: 43.4% lifetime risk      FAMILY HISTORY:  Breast ca: Yes    - paternal aunt 60 's (5 paternal aunts)  - paternal aunt 60's  - paternal cousin 40 (aunt with cancer, 1 sisters)  Ovarian ca: No   - ? Paternal cousin with ovarian cancer   Pancreatic ca: No   Prostate: yes  - maternal uncle 69   Gastric ca: Yes   - paternal grandfather, passed   Melanoma: No  Colon ca: No  Other cancer: Yes   - maternal grandmother skin cancer 90's  - paternal grandfather brain 60's   Gnosticism ethnicity: No  Other genetic, testing, syndromes, or clotting disorders: No   - She saw a genetic counselor 2015 and no testing was recommended     PAST MEDICAL HISTORY  Past Medical History:   Diagnosis Date    Anemia     Atypical ductal hyperplasia of breast     Choroidal nevus of right eye     Concussion 10/2015    Depressive disorder 2006    Treated with celexa for two years    Family history of breast cancer 2015    Family history of breast cancer 2015    Family history of breast cancer 2015    Family history of breast cancer 2015    Gastroesophageal reflux disease     Glaucoma suspect of both eyes     Papilloma of breast 2012    Left, See Dr. Lynch at     [facility] (postoperative nausea and vomiting)     S/P endometrial ablation 10/10/2013    Menorrhagia    Shingles     x2 in the past    WBC decreased 2015    WBC decreased 2015    WBC decreased 2015     PAST SURGICAL HISTORY   Past Surgical History:   Procedure Laterality Date    ABDOMEN SURGERY      rectoplasty    BIOPSY BREAST NEEDLE LOCALIZATION  2012    Procedure: BIOPSY BREAST NEEDLE LOCALIZATION;  left  breast excisional Biopsy with wire localization @0830;  Surgeon: Robert Lynch MD;  Location: [facility] OR    BREAST SURGERY      COSMETIC SURGERY      Rectalplasty for episiotomy repair    COSMETIC SURGERY      Rectalplasty for episiotomy repair    DILATION AND CURETTAGE, HYSTEROSCOPY, ABLATE ENDOMETRIUM NOVASURE, COMBINED  10/10/2013    Procedure: COMBINED DILATION AND CURETTAGE, HYSTEROSCOPY, ABLATE ENDOMETRIUM NOVASURE;  Endometrial ablation with Novasure;  Surgeon: Indu Birmingham DO;  Location: [facility] OR    HC TOOTH EXTRACTION W/FORCEP      LUMPECTOMY BREAST Left 2/1/2017    Procedure: LUMPECTOMY BREAST;  Surgeon: Lori Montenegro MD;  Location: [facility]    SOFT TISSUE SURGERY      lumpectomy x 2 right breast    SOFT TISSUE SURGERY      episiotomy fixed   She had a pelvic ultrasound 6/4/18, 6/12/19, and 11/4/20 that were stable.     MEDICATIONS  Current Outpatient Medications   Medication Sig Dispense Refill    acetaminophen (TYLENOL) 500 MG tablet Take 500-1,000 mg by mouth every 6 hours as needed for mild pain.      albuterol (PROAIR HFA/PROVENTIL HFA/VENTOLIN HFA) 108 (90 Base) MCG/ACT inhaler Inhale 2 puffs into the lungs every 6 hours as needed for cough 8.5 g 0    cetirizine (ZYRTEC) 10 MG tablet Take 10 mg by mouth daily.      famotidine (PEPCID) 40 MG tablet Take 1 tablet (40 mg) by mouth at bedtime. 60 tablet 0    fluocinolone acetonide oil 0.01 % ear drops Place 0.25 mLs (5 drops) into both ears 2 times daily. PRN for ear itching. 20 mL 0    fluticasone (FLONASE) 50 MCG/ACT nasal spray SPRAY 2 SPRAYS INTO BOTH NOSTRILS DAILY. 48 g 2    fluticasone (FLONASE) 50 MCG/ACT nasal spray Spray 1 spray into both nostrils daily.      ibuprofen (ADVIL/MOTRIN) 200 MG tablet Take 200 mg by mouth every 4 hours as needed for pain.      ipratropium (ATROVENT) 0.06 % nasal spray Spray 2 sprays into both nostrils 4 times daily. 15 mL 2    LORazepam (ATIVAN) 1 MG tablet 2 times daily as needed. Take 1  tablet (1 mg) by mouth twice a day as needed.      Naproxen Sodium (ALEVE PO) Take 440 mg by mouth as needed       omeprazole (PRILOSEC) 40 MG DR capsule Take 1 capsule (40 mg) by mouth daily. Take 1 tablet 30 minutes prior to breakfast. 90 capsule 0    rOPINIRole (REQUIP) 0.5 MG tablet Take 3 tablets (1.5 mg) by mouth at bedtime. 270 tablet 3    SUMAtriptan (IMITREX) 50 MG tablet Take 1 tablet (50 mg) by mouth at onset of headache for migraine. May repeat in 2 hours. Max 4 tablets/24 hours. 30 tablet 2    valACYclovir (VALTREX) 1000 mg tablet Take 2 tablets (2,000 mg) by mouth 2 times daily. 4 tablet 5    venlafaxine (EFFEXOR XR) 37.5 MG 24 hr capsule Take 1 capsule (37.5 mg) by mouth daily. 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 Her  has lymphoma and has had several knee surgeries. She has a daughter who lives in [place] and a 27 year old son. They have [employer] season tickets.      ROS:  Change in vision No  Headaches: no  Respiratory: No shortness of breath, dyspnea on exertion, cough, or hemoptysis   Cardiovascular: negative   Gastrointestinal: negative Abdominal pain: no  Breast: negative  Musculoskeletal: negative Joint pain No Back pain: no  Psychiatric: negative  Hematologic/Lymphatic/Immunologic: negative  Endocrine: negative    EXAM  /86 (BP Location: Right arm, Patient Position: Sitting, Cuff Size: Adult Regular)   Pulse 72   Temp 98.1  F (36.7  C) (Oral)   Resp 18   Wt 73.9 kg (163 lb)   SpO2 99%   BMI 25.91 kg/m     PHYSICAL EXAM  Respiratory: breathing non labored.   Breasts: Examination was done in both the upright and supine positions.  Breasts are symmetrical . No dominant fixed or suspicious masses noted. No skin or nipple changes. No nipple discharge.   Left lateral and periareolar scar well  healed. Right lateral scar well healed. No clavicular, cervical, or axillary lymphadenopathy.     INVESTIGATIONS:    6/4/25 breast MRI: per Radiology no concerning findings, final report pending.     ASSESSMENT/PLAN:    Kaitlyn Garcia is a 49 year old woman with history of left breast atypical ductal hyperplasia. She meets NCCN guidelines for high risk screening and risk reduction. She completed 5 years of low dose Tamoxifen for risk reduction.       1) Atypical ductal hyperplasia and family history of breast cancer.   We also reviewed that atypical ductal hyperplasia increases her baseline risk for breast cancer. She meets guidelines for high risk screening with a lifetime risk of breast cancer >20%. Discussed she meets guidelines for high risk screening with yearly mammogram alternating with Breast MRI every six months. Clinical encounter every 6-12 months including family history, risk assessment, and clinical breast exam.   - Screening mammogram with clinic visit due 12/2026  - Breast MRI with clinic visit due 6/5/26     2) Lifestyle Modifications were provided. - Maintain your best healthy weight. Higher body fat and adult weight gain is associated with increased risk for breast cancer. This increase in risk has been attributed to increase in circulating endogenous estrogen levels from fat tissue.   - Alcohol can raise estrogen. Alcohol consumption, even at moderate levels (1-2 drinks per day), increases breast cancer risk and are best avoided. If you choose to drink alcohol limit alcohol consumption to less than 1 drink per day. (1 ounce of liquor, 6 ounces of wine, or 8 ounces of beer).  - Be active daily and void being sedentary.     Merced Wiggins PA-C    20 minutes spent on the date of the encounter doing chart review, review of test results, interpretation of tests, patient visit and documentation.

## 2025-06-04 ENCOUNTER — ONCOLOGY VISIT (OUTPATIENT)
Dept: SURGERY | Facility: CLINIC | Age: 50
End: 2025-06-04
Attending: PHYSICIAN ASSISTANT
Payer: COMMERCIAL

## 2025-06-04 ENCOUNTER — ANCILLARY PROCEDURE (OUTPATIENT)
Dept: MRI IMAGING | Facility: CLINIC | Age: 50
End: 2025-06-04
Attending: PHYSICIAN ASSISTANT
Payer: COMMERCIAL

## 2025-06-04 ENCOUNTER — RESULTS FOLLOW-UP (OUTPATIENT)
Dept: SURGERY | Facility: CLINIC | Age: 50
End: 2025-06-04

## 2025-06-04 VITALS
WEIGHT: 163 LBS | OXYGEN SATURATION: 99 % | DIASTOLIC BLOOD PRESSURE: 86 MMHG | HEART RATE: 72 BPM | RESPIRATION RATE: 18 BRPM | BODY MASS INDEX: 25.91 KG/M2 | TEMPERATURE: 98.1 F | SYSTOLIC BLOOD PRESSURE: 123 MMHG

## 2025-06-04 DIAGNOSIS — Z91.89 AT HIGH RISK FOR BREAST CANCER: ICD-10-CM

## 2025-06-04 DIAGNOSIS — Z12.31 ENCOUNTER FOR SCREENING MAMMOGRAM FOR BREAST CANCER: ICD-10-CM

## 2025-06-04 DIAGNOSIS — Z91.89 AT HIGH RISK FOR BREAST CANCER: Primary | ICD-10-CM

## 2025-06-04 PROCEDURE — 99213 OFFICE O/P EST LOW 20 MIN: CPT | Performed by: PHYSICIAN ASSISTANT

## 2025-06-04 RX ORDER — GADOBUTROL 604.72 MG/ML
7.5 INJECTION INTRAVENOUS ONCE
Status: COMPLETED | OUTPATIENT
Start: 2025-06-04 | End: 2025-06-04

## 2025-06-04 RX ADMIN — GADOBUTROL 7.5 ML: 604.72 INJECTION INTRAVENOUS at 08:57

## 2025-06-04 ASSESSMENT — PAIN SCALES - GENERAL: PAINLEVEL_OUTOF10: NO PAIN (0)

## 2025-06-04 NOTE — DISCHARGE INSTRUCTIONS
MRI Contrast Discharge Instructions    The IV contrast you received today will pass out of your body in your  urine. This will happen in the next 24 hours. You will not feel this process.  Your urine will not change color.    Drink at least 4 extra glasses of water or juice today (unless your doctor  has restricted your fluids). This reduces the stress on your kidneys.  You may take your regular medicines.    If you are on dialysis: It is best to have dialysis today.    If you have a reaction: Most reactions happen right away. If you have  any new symptoms after leaving the hospital (such as hives or swelling),  call your hospital at the correct number below. Or call your family doctor.  If you have breathing distress or wheezing, call 911.    Special instructions: ***    I have read and understand the above information.    Signature:______________________________________ Date:___________    Staff:__________________________________________ Date:___________     Time:__________    [facility] Departments:    ___[place]: [phone]  ___[facility]: [phone]  ___[place]: [phone] ___[facility]: [phone]  ___[facility]: [phone]  ___[facility]: [phone]  ___[phone]  ___[facility]: [phone]  ___[phone]

## 2025-06-04 NOTE — PATIENT INSTRUCTIONS
Kaitlyn Garcia is a 49 year old woman with history of left breast atypical ductal hyperplasia. She meets NCCN guidelines for high risk screening and risk reduction. She completed 5 years of low dose Tamoxifen for risk reduction.       1) Atypical ductal hyperplasia and family history of breast cancer.   We also reviewed that atypical ductal hyperplasia increases her baseline risk for breast cancer. She meets guidelines for high risk screening with a lifetime risk of breast cancer >20%. Discussed she meets guidelines for high risk screening with yearly mammogram alternating with Breast MRI every six months. Clinical encounter every 6-12 months including family history, risk assessment, and clinical breast exam.   - Screening mammogram with clinic visit due 12/2026  - Breast MRI with clinic visit due 6/5/26     2) Lifestyle Modifications were provided. - Maintain your best healthy weight. Higher body fat and adult weight gain is associated with increased risk for breast cancer. This increase in risk has been attributed to increase in circulating endogenous estrogen levels from fat tissue.   - Alcohol can raise estrogen. Alcohol consumption, even at moderate levels (1-2 drinks per day), increases breast cancer risk and are best avoided. If you choose to drink alcohol limit alcohol consumption to less than 1 drink per day. (1 ounce of liquor, 6 ounces of wine, or 8 ounces of beer).  - Be active daily and void being sedentary.

## 2025-06-04 NOTE — LETTER
6/4/2025      Kaitlyn Garcia  [address]  [facility] [address]      Dear Colleague,    Thank you for referring your patient, Kaitlyn Garcia, to the [facility]. Please see a copy of my visit note below.    .FOLLOW UP  Jun 4, 2025     Kaitlyn Garcia is a 49 year old woman who follows for a history of atypical ductal hyperplasia.      HPI:     Family history of 2 paternal aunts who had breast cancer in their 60's and a paternal cousin with breast cancer in her 40's. Kaitlyn had negative genetic testing in 2023.     History of right breast biopsies x2 that were benign.     In 2012 she had a left breast intraductal papilloma surgical excised.     In 2016 she had left needle biopsies x2, one of which demonstrated atypical ductal hyperplasia. The biopsied area of ADH was surgical excised.     In 2017 she had an MRI guided needle left breast biopsy that demonstrated an intraductal papilloma.       She is doing high risk screening with breast MRI and mammogram. She completed 5 years of Tamoxifen for risk reduction.      Today she denies any breast concerns including mass, skin change, nipple inversion or nipple discharge.      BREAST-SPECIFIC HISTORY:     Previous breast imaging: Yes   - 5/21/12 b/l Dmammo and right u/s for right breast lump: calcifications in the upper outer quadrant of left breast spanning 5 cm. Right u/s negative. BI-RADS 4 suspicious for malignancy   - 5/23/12 left stereotactic biopsy: intraductal papilloma   - 7/30/12 left wire-placement   - 2/19/13 MRI: large segmental enhancement of left lateral breast, BI-RADS 0 incomplete   Left u/s: BI-RADS 3 probably benign  - 3/29/13 MRI: BI-RADS 2 benign findings  - 8/5/13 b/l Dmammo for fullness: BI-RADS 2 benign   - 2/5/14 MRI: cysts in left breast, BI-RADS 2 benign   - 5/9/14 Dmammo b/l: negative. Left u/s: cystic appearing structures, BI-RADS 2 benign  - 5/20/14 MRI: study not completed due to nausea, BI-RADS 0 incomplete  -  5/28/14 MRI: BI-RADS 2 benign  - 10/28/14 b/l Dmammo: BI-RADS 2 benign   - 5/7/15 MRI BI-RADS 2 benign   - 11/10/15 Smammo: bilateral calcs: BI-RADS 2 benign  - 1/25/16 left u/s for pain: multiple tiny cyst  - 5/17/16 MRI: BI-RADS 2 benign   - 12/6/16  Smammo: lateral left breast developing macrocalcifications BI-RADS 0 incomplete   - 12/7/16 Dmammo left: left 2:00 calcifications BI-RADS 4 suspicious  - 12/12/16: stereotactic biopsy: focus of ADH    - 2/1/17 wire placement   - 5/9/17 MRI: 2 adjacent mass like areas of enhancement mid central left breast BI-RADS 0 incomplete   - 5/12/17 left u/s: scattered small cysts BI-RADS 4 suspicious   - 5/18/17 MR biopsy: intraductal papilloma   - 4/30/18 MRI BI-RADS 2  - 6/6/18 left Dmammo and ultrasound for pain BI-RADS 2  - 11/28/18 Smammo BI-RADS 2  - 5/7/19 breast MRI BI-RADS 2  - 8/21/19 Dmammo and right breast ultrasound: BI-RADS 2  - 11/12/19 Smammo BI-RADS 1  - 5/20/20 breast MRI BI-ARDS 2  - 11/4/20 b/l Dmammo and left breast ultrasound for lump: BI-RADS 1  - 5/5/21 Breast MRI BI-RADS 1  - 11/10/21 Smammo BI-RADS 2  - 5/11/22 breast MRI BI-RADS 2  - 11/16/22 Smammo BI-RADS 2  - 5/17/23 breast MRI BI-RADS 2  - 11/22/23 Smammo BI-RADS 2  - 5/22/24 breast MRI BI-RADS 2  - 11/27/24 Smammo BI-RADS 1  - 6/4/25 breast MRI     Prior breast biopsies:Yes   - 2 right breast biopsies   - 5/23/12 left needle biopsy: intraductal papilloma with sclerosing features   - 7/30/12 left excisional biopsy with Dr. Lynch: focal columnar cell change with atypia, intraductal papilloma and surrounding intraductal papillomatosis, sclerosing adenosis, fibrocystic changes  - 12/12/16 left needle biopsy x2: flat epithelial atypia with focus of ADH, duct ectasia and focal sclerosing adenosis, microcalcifications.   FEA and columnar cell change with atypia  - 2/1/17 left scar excision and excisional biopsy with Dr. Montenegro: FEA, columnar cell change/hyperplasia, intraductal papillomas, usual ductal  hyperplasia, calcifications  - 17 left MRI needle biopsy: intraductal papilloma, mild columnar cell hyperplasia, mild duct ectasia, focal sclerosing adenosis      Prior history of breast cancer: No  Prior radiation history: No   Self breast exams: No  Breast density: heterogeneously dense     GYN HISTORY:  . Age at 1st pregnancy: 17. Breastfeeding history: Yes.   Age at menarche: 14-14  Menopausal: premenopausal, uterine ablation   Menopausal hormone replacement therapy: No     RISK ASSESSMENT: < 3% 5 year risk and > 20% lifetime risk   Heather: 1.9% 5 year risk   SEBASTIAN: 43.4% lifetime risk      FAMILY HISTORY:  Breast ca: Yes    - paternal aunt 60 's (5 paternal aunts)  - paternal aunt 60's  - paternal cousin 40 (aunt with cancer, 1 sisters)  Ovarian ca: No   - ? Paternal cousin with ovarian cancer   Pancreatic ca: No   Prostate: yes  - maternal uncle 69   Gastric ca: Yes   - paternal grandfather, passed   Melanoma: No  Colon ca: No  Other cancer: Yes   - maternal grandmother skin cancer 90's  - paternal grandfather brain 60's   Sikhism ethnicity: No  Other genetic, testing, syndromes, or clotting disorders: No   - She saw a genetic counselor 2015 and no testing was recommended     PAST MEDICAL HISTORY  Past Medical History:   Diagnosis Date     Anemia      Atypical ductal hyperplasia of breast      Choroidal nevus of right eye      Concussion 10/2015     Depressive disorder 2006    Treated with celexa for two years     Family history of breast cancer 2015     Family history of breast cancer 2015     Family history of breast cancer 2015     Family history of breast cancer 2015     Gastroesophageal reflux disease      Glaucoma suspect of both eyes      Papilloma of breast 2012    Left, See Dr. Lynch at      PONV (postoperative nausea and vomiting)      S/P endometrial ablation 10/10/2013    Menorrhagia     Shingles     x2 in the past     WBC decreased 2015     WBC decreased  02/03/2015     WBC decreased 02/03/2015     PAST SURGICAL HISTORY   Past Surgical History:   Procedure Laterality Date     ABDOMEN SURGERY      rectoplasty     BIOPSY BREAST NEEDLE LOCALIZATION  7/30/2012    Procedure: BIOPSY BREAST NEEDLE LOCALIZATION;  left breast excisional Biopsy with wire localization @0830;  Surgeon: Robert Lynch MD;  Location: [facility] OR     BREAST SURGERY       COSMETIC SURGERY      Rectalplasty for episiotomy repair     COSMETIC SURGERY      Rectalplasty for episiotomy repair     DILATION AND CURETTAGE, HYSTEROSCOPY, ABLATE ENDOMETRIUM NOVASURE, COMBINED  10/10/2013    Procedure: COMBINED DILATION AND CURETTAGE, HYSTEROSCOPY, ABLATE ENDOMETRIUM NOVASURE;  Endometrial ablation with Novasure;  Surgeon: Indu Birmingham DO;  Location: [facility] OR     HC TOOTH EXTRACTION W/FORCEP       LUMPECTOMY BREAST Left 2/1/2017    Procedure: LUMPECTOMY BREAST;  Surgeon: Lori Montenegro MD;  Location: [facility]     SOFT TISSUE SURGERY      lumpectomy x 2 right breast     SOFT TISSUE SURGERY      episiotomy fixed   She had a pelvic ultrasound 6/4/18, 6/12/19, and 11/4/20 that were stable.     MEDICATIONS  Current Outpatient Medications   Medication Sig Dispense Refill     acetaminophen (TYLENOL) 500 MG tablet Take 500-1,000 mg by mouth every 6 hours as needed for mild pain.       albuterol (PROAIR HFA/PROVENTIL HFA/VENTOLIN HFA) 108 (90 Base) MCG/ACT inhaler Inhale 2 puffs into the lungs every 6 hours as needed for cough 8.5 g 0     cetirizine (ZYRTEC) 10 MG tablet Take 10 mg by mouth daily.       famotidine (PEPCID) 40 MG tablet Take 1 tablet (40 mg) by mouth at bedtime. 60 tablet 0     fluocinolone acetonide oil 0.01 % ear drops Place 0.25 mLs (5 drops) into both ears 2 times daily. PRN for ear itching. 20 mL 0     fluticasone (FLONASE) 50 MCG/ACT nasal spray SPRAY 2 SPRAYS INTO BOTH NOSTRILS DAILY. 48 g 2     fluticasone (FLONASE) 50 MCG/ACT nasal spray Spray 1 spray into both nostrils  daily.       ibuprofen (ADVIL/MOTRIN) 200 MG tablet Take 200 mg by mouth every 4 hours as needed for pain.       ipratropium (ATROVENT) 0.06 % nasal spray Spray 2 sprays into both nostrils 4 times daily. 15 mL 2     LORazepam (ATIVAN) 1 MG tablet 2 times daily as needed. Take 1 tablet (1 mg) by mouth twice a day as needed.       Naproxen Sodium (ALEVE PO) Take 440 mg by mouth as needed        omeprazole (PRILOSEC) 40 MG DR capsule Take 1 capsule (40 mg) by mouth daily. Take 1 tablet 30 minutes prior to breakfast. 90 capsule 0     rOPINIRole (REQUIP) 0.5 MG tablet Take 3 tablets (1.5 mg) by mouth at bedtime. 270 tablet 3     SUMAtriptan (IMITREX) 50 MG tablet Take 1 tablet (50 mg) by mouth at onset of headache for migraine. May repeat in 2 hours. Max 4 tablets/24 hours. 30 tablet 2     valACYclovir (VALTREX) 1000 mg tablet Take 2 tablets (2,000 mg) by mouth 2 times daily. 4 tablet 5     venlafaxine (EFFEXOR XR) 37.5 MG 24 hr capsule Take 1 capsule (37.5 mg) by mouth daily. 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 Her  has lymphoma and has had several knee surgeries. She has a daughter who lives in [place] and a 27 year old son. They have [employer] season tickets.      ROS:  Change in vision No  Headaches: no  Respiratory: No shortness of breath, dyspnea on exertion, cough, or hemoptysis   Cardiovascular: negative   Gastrointestinal: negative Abdominal pain: no  Breast: negative  Musculoskeletal: negative Joint pain No Back pain: no  Psychiatric: negative  Hematologic/Lymphatic/Immunologic: negative  Endocrine: negative    EXAM  /86 (BP Location: Right arm, Patient Position: Sitting, Cuff Size: Adult Regular)   Pulse 72   Temp 98.1  F (36.7  C) (Oral)   Resp 18   Wt 73.9 kg (163 lb)   SpO2 99%   BMI 25.91 kg/m      PHYSICAL EXAM  Respiratory: breathing non labored.   Breasts: Examination was done in both the upright and supine positions.  Breasts are symmetrical . No dominant fixed or suspicious masses noted. No skin or nipple changes. No nipple discharge.   Left lateral and periareolar scar well healed. Right lateral scar well healed. No clavicular, cervical, or axillary lymphadenopathy.     INVESTIGATIONS:    6/4/25 breast MRI: per Radiology no concerning findings, final report pending.     ASSESSMENT/PLAN:    Kaitlyn Garcia is a 49 year old woman with history of left breast atypical ductal hyperplasia. She meets NCCN guidelines for high risk screening and risk reduction. She completed 5 years of low dose Tamoxifen for risk reduction.       1) Atypical ductal hyperplasia and family history of breast cancer.   We also reviewed that atypical ductal hyperplasia increases her baseline risk for breast cancer. She meets guidelines for high risk screening with a lifetime risk of breast cancer >20%. Discussed she meets guidelines for high risk screening with yearly mammogram alternating with Breast MRI every six months. Clinical encounter every 6-12 months including family history, risk assessment, and clinical breast exam.   - Screening mammogram with clinic visit due 12/2026  - Breast MRI with clinic visit due 6/5/26     2) Lifestyle Modifications were provided. - Maintain your best healthy weight. Higher body fat and adult weight gain is associated with increased risk for breast cancer. This increase in risk has been attributed to increase in circulating endogenous estrogen levels from fat tissue.   - Alcohol can raise estrogen. Alcohol consumption, even at moderate levels (1-2 drinks per day), increases breast cancer risk and are best avoided. If you choose to drink alcohol limit alcohol consumption to less than 1 drink per day. (1 ounce of liquor, 6 ounces of wine, or 8 ounces of beer).  - Be active daily and void being  sedentary.     Merced Wiggins PA-C    20 minutes spent on the date of the encounter doing chart review, review of test results, interpretation of tests, patient visit and documentation.     Again, thank you for allowing me to participate in the care of your patient.        Sincerely,        Merced Wiggins PA-C    Electronically signed

## 2025-06-04 NOTE — NURSING NOTE
"Oncology Rooming Note    June 4, 2025 9:50 AM   Kaitlyn Garcia is a 49 year old female who presents for:    Chief Complaint   Patient presents with    Oncology Clinic Visit     Breast cancer     Initial Vitals: /86 (BP Location: Right arm, Patient Position: Sitting, Cuff Size: Adult Regular)   Pulse 72   Temp 98.1  F (36.7  C) (Oral)   Resp 18   Wt 73.9 kg (163 lb)   SpO2 99%   BMI 25.91 kg/m   Estimated body mass index is 25.91 kg/m  as calculated from the following:    Height as of 4/16/25: 1.689 m (5' 6.5\").    Weight as of this encounter: 73.9 kg (163 lb). Body surface area is 1.86 meters squared.  No Pain (0) Comment: Data Unavailable   No LMP recorded. Patient has had an ablation.  Allergies reviewed: Yes  Medications reviewed: Yes    Medications: Medication refills not needed today.  Pharmacy name entered into [facility]:    [facility] [place] - [address]/[facility]ACY [id number] [place], [place] [facility] [address] [facility]    Frailty Screening:   Is the patient here for a new oncology consult visit in cancer care? 2. No    PHQ9:  Did this patient require a PHQ9?: No      Clinical concerns: none.      Reagan Mcmanus"

## 2025-06-23 ENCOUNTER — MYC MEDICAL ADVICE (OUTPATIENT)
Dept: OTOLARYNGOLOGY | Facility: CLINIC | Age: 50
End: 2025-06-23
Payer: COMMERCIAL

## 2025-06-23 DIAGNOSIS — K21.9 LPRD (LARYNGOPHARYNGEAL REFLUX DISEASE): ICD-10-CM

## 2025-06-23 NOTE — TELEPHONE ENCOUNTER
"LOV 4/16/25  \"We will start the patient on omeprazole 40 mg once daily 20-30 minutes prior to morning meal and 40 mg of famotidine at bedtime. We provided counseling on lifestyle changes including avoidance of certain foods, sleeping on an incline, and avoiding lying down within 3-4 hours after eating.\"    Per Ebony Serrato, can start Apple Cider Vinegar but hold off on stopping omeprazole until symptoms improve.     Dinah Pryor RN    "

## 2025-06-24 RX ORDER — OMEPRAZOLE 40 MG/1
40 CAPSULE, DELAYED RELEASE ORAL DAILY
Qty: 90 CAPSULE | Refills: 0 | Status: SHIPPED | OUTPATIENT
Start: 2025-06-24

## 2025-06-24 RX ORDER — FAMOTIDINE 40 MG/1
40 TABLET, FILM COATED ORAL AT BEDTIME
Qty: 90 TABLET | Refills: 0 | Status: SHIPPED | OUTPATIENT
Start: 2025-06-24

## (undated) DEVICE — PACK MINOR CUSTOM ASC

## (undated) DEVICE — NDL 15GA 1.5" 8881200029

## (undated) DEVICE — GLOVE PROTEXIS BLUE W/NEU-THERA 6.5  2D73EB65

## (undated) DEVICE — DRSG PRIMAPORE 03 1/8X6" 66000318

## (undated) DEVICE — NDL 30GA 0.5" 305106

## (undated) DEVICE — DRAPE IOBAN INCISE 23X17" 6650EZ

## (undated) DEVICE — SU SILK 3-0 SH 30" K832H

## (undated) DEVICE — BLADE KNIFE SURG 15 371115

## (undated) DEVICE — GLOVE PROTEXIS MICRO 5.5  2D73PM55

## (undated) DEVICE — SU MONOCRYL 4-0 PS-2 27" UND Y426H

## (undated) DEVICE — ESU ELEC BLADE 2.75" COATED/INSULATED E1455

## (undated) DEVICE — SU VICRYL 3-0 SH 27" UND J416H

## (undated) DEVICE — CLIP HORIZON SM RED WIDE SLOT 001201

## (undated) DEVICE — CLIP HORIZON MED BLUE 002200

## (undated) DEVICE — ESU PENCIL SMOKE EVAC W/ROCKER SWITCH 0703-047-000

## (undated) DEVICE — GLOVE PROTEXIS MICRO 6.5  2D73PM65

## (undated) DEVICE — SPECIMEN CONTAINER W/10% BUFFERED FORMALIN 120ML 591201

## (undated) DEVICE — ESU GROUND PAD ADULT W/CORD E7507

## (undated) DEVICE — DRSG PRIMAPORE 02X3" 7133

## (undated) DEVICE — SOL NACL 0.9% IRRIG 1000ML BOTTLE 2F7124

## (undated) DEVICE — SOL WATER IRRIG 1000ML BOTTLE 2F7114

## (undated) DEVICE — LINEN TOWEL PACK X6 WHITE 5487

## (undated) DEVICE — LINEN TOWEL PACK X5 5464

## (undated) DEVICE — DRSG STERI STRIP 1/2X4" R1547

## (undated) DEVICE — PREP CHLORAPREP 26ML TINTED ORANGE  260815

## (undated) DEVICE — LIGHT HANDLE X1 31140133

## (undated) DEVICE — SPECIMEN CONTAINER 5OZ STERILE 2600SA

## (undated) DEVICE — SUCTION TIP YANKAUER W/O VENT K86

## (undated) DEVICE — DRAPE LAP TRANSVERSE 29421

## (undated) DEVICE — GLOVE PROTEXIS BLUE W/NEU-THERA 6.0  2D73EB60

## (undated) DEVICE — SYR 10ML FINGER CONTROL W/O NDL 309695

## (undated) RX ORDER — PROPOFOL 10 MG/ML
INJECTION, EMULSION INTRAVENOUS
Status: DISPENSED
Start: 2017-02-01

## (undated) RX ORDER — ONDANSETRON 2 MG/ML
INJECTION INTRAMUSCULAR; INTRAVENOUS
Status: DISPENSED
Start: 2017-02-01

## (undated) RX ORDER — FENTANYL CITRATE 50 UG/ML
INJECTION, SOLUTION INTRAMUSCULAR; INTRAVENOUS
Status: DISPENSED
Start: 2017-02-01

## (undated) RX ORDER — GABAPENTIN 300 MG/1
CAPSULE ORAL
Status: DISPENSED
Start: 2017-02-01

## (undated) RX ORDER — DEXAMETHASONE SODIUM PHOSPHATE 4 MG/ML
INJECTION, SOLUTION INTRA-ARTICULAR; INTRALESIONAL; INTRAMUSCULAR; INTRAVENOUS; SOFT TISSUE
Status: DISPENSED
Start: 2017-02-01